# Patient Record
Sex: FEMALE | Race: WHITE | NOT HISPANIC OR LATINO | Employment: OTHER | URBAN - METROPOLITAN AREA
[De-identification: names, ages, dates, MRNs, and addresses within clinical notes are randomized per-mention and may not be internally consistent; named-entity substitution may affect disease eponyms.]

---

## 2017-01-06 LAB
ALBUMIN SERPL-MCNC: 4.3 G/DL (ref 3.5–4.8)
ALBUMIN/GLOB SERPL: 2 {RATIO} (ref 1.1–2.5)
ALP SERPL-CCNC: 82 IU/L (ref 39–117)
ALT SERPL-CCNC: 46 IU/L (ref 0–32)
AST SERPL-CCNC: 34 IU/L (ref 0–40)
BILIRUB SERPL-MCNC: 0.6 MG/DL (ref 0–1.2)
BUN SERPL-MCNC: 29 MG/DL (ref 8–27)
BUN/CREAT SERPL: 41 (ref 11–26)
CALCIUM SERPL-MCNC: 9.1 MG/DL (ref 8.7–10.3)
CHLORIDE SERPL-SCNC: 103 MMOL/L (ref 96–106)
CHOLEST SERPL-MCNC: 134 MG/DL (ref 100–199)
CO2 SERPL-SCNC: 23 MMOL/L (ref 18–29)
COMMENT 011824: ABNORMAL
CREAT SERPL-MCNC: 0.71 MG/DL (ref 0.57–1)
GLOBULIN SER CALC-MCNC: 2.1 G/DL (ref 1.5–4.5)
GLUCOSE SERPL-MCNC: 160 MG/DL (ref 65–99)
HBA1C MFR BLD: 8.3 % (ref 4.8–5.6)
HDLC SERPL-MCNC: 37 MG/DL
LDLC SERPL CALC-MCNC: 58 MG/DL (ref 0–99)
POTASSIUM SERPL-SCNC: 4.5 MMOL/L (ref 3.5–5.2)
PROT SERPL-MCNC: 6.4 G/DL (ref 6–8.5)
SODIUM SERPL-SCNC: 143 MMOL/L (ref 134–144)
TRIGL SERPL-MCNC: 193 MG/DL (ref 0–149)
TSH SERPL DL<=0.005 MIU/L-ACNC: 2.23 UIU/ML (ref 0.45–4.5)
VLDLC SERPL CALC-MCNC: 39 MG/DL (ref 5–40)

## 2017-01-09 ENCOUNTER — OFFICE VISIT (OUTPATIENT)
Dept: MEDICAL GROUP | Facility: CLINIC | Age: 72
End: 2017-01-09
Payer: MEDICARE

## 2017-01-09 VITALS
BODY MASS INDEX: 45.82 KG/M2 | OXYGEN SATURATION: 97 % | SYSTOLIC BLOOD PRESSURE: 120 MMHG | HEART RATE: 78 BPM | WEIGHT: 249 LBS | TEMPERATURE: 97.2 F | RESPIRATION RATE: 16 BRPM | DIASTOLIC BLOOD PRESSURE: 80 MMHG | HEIGHT: 62 IN

## 2017-01-09 DIAGNOSIS — E66.01 MORBID OBESITY WITH BMI OF 45.0-49.9, ADULT (HCC): ICD-10-CM

## 2017-01-09 DIAGNOSIS — E78.5 DYSLIPIDEMIA, GOAL LDL BELOW 100: ICD-10-CM

## 2017-01-09 DIAGNOSIS — I10 ESSENTIAL HYPERTENSION: ICD-10-CM

## 2017-01-09 DIAGNOSIS — K76.0 FATTY LIVER: ICD-10-CM

## 2017-01-09 DIAGNOSIS — E03.2 HYPOTHYROIDISM DUE TO NON-MEDICATION EXOGENOUS SUBSTANCES: ICD-10-CM

## 2017-01-09 PROCEDURE — 99214 OFFICE O/P EST MOD 30 MIN: CPT | Performed by: FAMILY MEDICINE

## 2017-01-09 RX ORDER — LEVOTHYROXINE SODIUM 0.15 MG/1
150 TABLET ORAL
Qty: 90 TAB | Refills: 3 | Status: SHIPPED | OUTPATIENT
Start: 2017-01-09 | End: 2018-02-11 | Stop reason: SDUPTHER

## 2017-01-09 NOTE — MR AVS SNAPSHOT
"        Ragini Griffin Jelly   2017 4:00 PM   Office Visit   MRN: [id number]    Department:  [facility]   Dept Phone:  [phone]    Description:  Female : 1945   Provider:  Franchesca Marques M.D.           Reason for Visit     Diabetes Mellitus     Hypertension     Hyperlipidemia           Allergies as of 2017     Allergen Noted Reactions    Asa [Aspirin] 2009       Cough Syrup M [Cough Cold-Flu Relief] 2009       Dextromethorphan 2013   Swelling      You were diagnosed with     Uncontrolled type 2 diabetes mellitus without complication, without long-term current use of insulin ([facility])   [9831737]       Essential hypertension   [5783841]       Dyslipidemia, goal LDL below 100   [513983]       Morbid obesity with BMI of 45.0-49.9, adult ([facility])   [928917]       Fatty liver   [943140]       Hypothyroidism due to non-medication exogenous substances   [3100356]         Vital Signs     Blood Pressure Pulse Temperature Respirations Height Weight    120/80 mmHg 78 36.2 °C (97.2 °F) 16 1.575 m (5' 2.01\") 112.946 kg (249 lb)    Body Mass Index Oxygen Saturation Smoking Status             45.53 kg/m2 97% Never Smoker          Basic Information     Date Of Birth Sex Race Ethnicity Preferred Language    1945 Female White Non- English      Problem List              ICD-10-CM Priority Class Noted - Resolved    Type II diabetes mellitus, uncontrolled (HCC) E11.65   Unknown - Present    Hypothyroidism due to non-medication exogenous substances E03.2   Unknown - Present    Nocturnal hypoxia G47.34   Unknown - Present    Essential hypertension I10   Unknown - Present    Dyslipidemia, goal LDL below 100 E78.5   Unknown - Present    Osteopenia M85.80   Unknown - Present    Primary osteoarthritis of both shoulders M19.011, M19.012   Unknown - Present    Mild persistent asthma, uncomplicated J45.30   3/3/2014 - Present    Bilateral incipient cataracts H26.9   2016 - Present   " Fatty liver K76.0   5/2/2016 - Present    Microalbuminuria R80.9   5/2/2016 - Present    Morbid obesity with BMI of 45.0-49.9, adult (HCC) E66.01, Z68.42   1/9/2017 - Present      Health Maintenance        Date Due Completion Dates    MAMMOGRAM 12/18/2017 (Originally 9/22/2016) 9/22/2014, 8/13/2013, 6/27/2012, 6/27/2012 (Prv Comp), 4/11/2008, 4/11/2008, 7/14/2006    Override on 6/27/2012: Previously completed    IMM ZOSTER VACCINE 1/1/2018 (Originally 1/12/2005) ---    RETINAL SCREENING 2/26/2017 2/26/2016    A1C SCREENING 7/5/2017 1/5/2017, 9/6/2016, 4/25/2016, 1/4/2016, 9/8/2015, 5/5/2015, 1/6/2015, 8/6/2014, 4/2/2014, 11/13/2013, 7/24/2013, 7/27/2012 (Prv Comp)    Override on 7/27/2012: Previously completed    URINE ACR / MICROALBUMIN 9/6/2017 9/6/2016, 4/25/2016, 9/8/2015, 1/6/2015, 4/2/2014, 11/13/2013    DIABETES MONOFILAMTENT / LE EXAM 9/12/2017 9/12/2016 (Done), 9/14/2015, 9/5/2014, 8/5/2013    Override on 9/12/2016: Done    FASTING LIPID PROFILE 1/5/2018 1/5/2017, 9/6/2016, 4/25/2016, 1/4/2016, 9/8/2015, 5/5/2015, 1/6/2015, 8/6/2014, 4/2/2014, 11/13/2013, 7/24/2013, 7/27/2012 (Prv Comp)    Override on 7/27/2012: Previously completed    SERUM CREATININE 1/5/2018 1/5/2017, 9/6/2016, 4/25/2016, 1/4/2016, 9/8/2015, 5/5/2015, 1/6/2015, 8/6/2014, 4/2/2014, 11/13/2013, 7/24/2013, 7/27/2012 (Prv Comp)    Override on 7/27/2012: Previously completed    BONE DENSITY 8/13/2018 8/13/2013, 4/11/2008    IMM DTaP/Tdap/Td Vaccine (2 - Td) 11/6/2022 11/6/2012    COLONOSCOPY 5/21/2023 5/21/2013 (Prv Comp), 12/3/2012 (Declined)    Override on 5/21/2013: Previously completed    Override on 12/3/2012: Patient Declined            Current Immunizations     13-VALENT PCV PREVNAR 1/12/2016    Influenza TIV (IM) 12/6/2013, 12/3/2012    Influenza Vaccine Adult HD 10/25/2016, 1/12/2016    Pneumococcal Vaccine (UF)Historical Data 11/8/2008    Pneumococcal polysaccharide vaccine (PPSV-23) 12/6/2013    Tdap Vaccine 11/6/2012  1:04 PM         Below and/or attached are the medications your provider expects you to take. Review all of your home medications and newly ordered medications with your provider and/or pharmacist. Follow medication instructions as directed by your provider and/or pharmacist. Please keep your medication list with you and share with your provider. Update the information when medications are discontinued, doses are changed, or new medications (including over-the-counter products) are added; and carry medication information at all times in the event of emergency situations     Allergies:  ASA - (reactions not documented)     COUGH SYRUP M - (reactions not documented)     DEXTROMETHORPHAN - Swelling               Medications  Valid as of: January 09, 2017 -  5:18 PM    Generic Name Brand Name Tablet Size Instructions for use    Albuterol Sulfate (Aero Soln) albuterol 108 (90 BASE) MCG/ACT Inhale 2 Puffs by mouth every 6 hours as needed for Shortness of Breath.        Atenolol (Tab) TENORMIN 25 MG TAKE 1 TABLET BY MOUTH EVERY DAY        Benazepril HCl (Tab) LOTENSIN 5 MG Take 1 Tab by mouth every day.        Blood Glucose Monitoring Suppl (Misc) Blood Glucose Monitoring Suppl SUPPLIES Test strips order: Test strips for Abbott Freestyle Lite meter. Sig: use daily and prn 90 day supply ICD-9 code 250.02        Calcium Citrate-Vitamin D (Tab) CITRACAL+D 315-200 MG-UNIT Take 1 Tab by mouth every day.        Furosemide (Tab) LASIX 40 MG TAKE 1 TABLET BY MOUTH EVERY DAY        Glucosamine-Chondroitin-Vit C (Liquid) Glucosamine-Chondroitin-Vit C 9967-8446-60 MG/30ML Take 30 mL by mouth every day.        Glucose Blood (Strip) glucose blood  1 Strip by Other route 2 Times a Day.        Ibuprofen (Tab) MOTRIN 800 MG TAKE 1 TABLET BY MOUTH TWICE A DAY WITH MEALS        Lancets (Misc) FREESTYLE LANCETS  Inject 1 Each as instructed 2 Times a Day.        Levothyroxine Sodium (Tab) SYNTHROID 150 MCG Take 1 Tab by mouth every day.        Linagliptin  (Tab) TRADJENTA 5 MG Take 1 Tab by mouth every day.        Magnesium Oxide (Tab) MAG- MG Take 3 Tabs by mouth every day. 2 am and 1 hs        MetFORMIN HCl (Tab) GLUCOPHAGE 1000 MG TAKE 1 TABLET BY MOUTH TWICE A DAY WITH MEALS        Methylsulfonylmethane (Cap) Methylsulfonylmethane 500 MG Take 1 Cap by mouth every day.        Misc Natural Product Nasal (Solution) PONARIS NASAL  Spray 10 Drops in nose every 6 hours as needed.        Misc. Devices (Misc) Misc. Devices  This is an order for overnight pulse oxygen study  Dx nocturnal hypoxia G47.34, please do the test on room air.       SPENCER 99 months   NPI [id number]        Misc. Devices (Misc) Misc. Devices  This is an order for overnight pulse ox on room air.  Dx. Nocturnal hypoxia  G47.34, mild persistent asthma J45.30       SPENCER 99 months   NPI [id number]        Multiple Vitamins-Minerals (Tab) CENTRUM SILVER ULTRA WOMENS  Take 1 Tab by mouth every day.        Potassium Chloride Ladonna CR (Tab CR) K-DUR 20 MEQ TAKE 1 TABLET BY MOUTH EVERY DAY        Simvastatin (Tab) ZOCOR 5 MG TAKE 1 TABLET BY MOUTH EVERY EVENING        Whey Protein (Powder) Whey Protein  Take 36 g by mouth 2 Times a Day.        .                 Medicines prescribed today were sent to:     [facility]/PH[facility]ACY #[id number] - [place]  [facility] [address] [address]    Phone: [phone] Fax: [phone]    Open 24 Hours?: No      Medication refill instructions:       If your prescription bottle indicates you have medication refills left, it is not necessary to call your provider’s office. Please contact your pharmacy and they will refill your medication.    If your prescription bottle indicates you do not have any refills left, you may request refills at any time through one of the following ways: The online [employer] system (except Urgent Care), by calling your provider’s office, or by asking your pharmacy to contact your provider’s office with a refill request. Medication  refills are processed only during regular business hours and may not be available until the next business day. Your provider may request additional information or to have a follow-up visit with you prior to refilling your medication.   *Please Note: Medication refills are assigned a new Rx number when refilled electronically. Your pharmacy may indicate that no refills were authorized even though a new prescription for the same medication is available at the pharmacy. Please request the medicine by name with the pharmacy before contacting your provider for a refill.        Your To Do List     Future Labs/Procedures Complete By Expires    COMP METABOLIC PANEL  As directed 1/10/2018    HEMOGLOBIN A1C  As directed 1/10/2018    LIPID PROFILE  As directed 1/10/2018         EcoFactor Access Code: [email or web address]  Expires: 2/8/2017  5:18 PM    EcoFactor  A secure, online tool to manage your health information     [employer]’s EcoFactor® is a secure, online tool that connects you to your personalized health information from the privacy of your home -- day or night - making it very easy for you to manage your healthcare. Once the activation process is completed, you can even access your medical information using the EcoFactor wil, which is available for free in the [employer] Wil store or Google Play store.     EcoFactor provides the following levels of access (as shown below):   My Chart Features   [facility] Doctor [facility]  Specialists [facility]  Urgent  Care Non-[facility]  Primary Care  Doctor   Email your healthcare team securely and privately 24/7 X X X    Manage appointments: schedule your next appointment; view details of past/upcoming appointments X      Request prescription refills. X      View recent personal medical records, including lab and immunizations X X X X   View health record, including health history, allergies, medications X X X X   Read reports about your outpatient visits, procedures, consult and ER notes X X  X X   See your discharge summary, which is a recap of your hospital and/or ER visit that includes your diagnosis, lab results, and care plan. X X       How to register for Lua:  1. Go to  [email or web address].  2. Click on the Sign Up Now box, which takes you to the New Member Sign Up page. You will need to provide the following information:  a. Enter your Lua Access Code exactly as it appears at the top of this page. (You will not need to use this code after you’ve completed the sign-up process. If you do not sign up before the expiration date, you must request a new code.)   b. Enter your date of birth.   c. Enter your home email address.   d. Click Submit, and follow the next screen’s instructions.  3. Create a ITYZt ID. This will be your ITYZt login ID and cannot be changed, so think of one that is secure and easy to remember.  4. Create a Lua password. You can change your password at any time.  5. Enter your Password Reset Question and Answer. This can be used at a later time if you forget your password.   6. Enter your e-mail address. This allows you to receive e-mail notifications when new information is available in Lua.  7. Click Sign Up. You can now view your health information.    For assistance activating your Lua account, call [phone]

## 2017-01-10 NOTE — PROGRESS NOTES
Diabetes Focused Exam:    Chief Complaint   Patient presents with   • Diabetes Mellitus   • Hypertension   • Hyperlipidemia      Subjective:   HPI  Ragini Reaves is a 71 y.o. female who presents for follow up of chronic conditions of diabetes mellitus, hypertension and hyperlipidemia. She indicates that she is feeling well and denies any symptoms referable to the above diagnoses. Specifically denies chest pain, palpitations, dyspnea, orthopnea, PND or peripheral edema. Also denies polyuria, polydipsia, urinary complaints, abdominal complaints, myalgias, numbness, weakness or other related symptoms.     The patient is taking ASA every day and taking all other medications as prescribed. Patient denies any side effects of medication.  DM: A1c goal <7  Glucose monitoring frequency: daily   Fasting sugars: 119-170. Post-prandial sugars: 160-244  Hypoglycemic episodes none  Diabetic complications: none  ACR Albumin/Creatinine Ratio goal <30  HTN: Blood pressure goal <140/<80. Currently Rx ACE/ARB: Yes  Hyperlipidemia:Cholesterol goal LDL <100, total/HDL <5. Currently Rx Statin: Yes  Last eye exam February   Denies visual blurring, double vision, eye pain and floaters  Last monofilament foot exam: 9/2016 Denies foot pain, numbness, calluses, ulcers      See medications and orders placed in encounter report.  Past medical history, family history, social history reviewed and updated as documented in medical record.  Current medications including changes today:  Current Outpatient Prescriptions   Medication Sig Dispense Refill   • levothyroxine (SYNTHROID) 150 MCG Tab TAKE 1 TABLET BY MOUTH EVERY DAY 90 Tab 0   • Misc. Devices Misc This is an order for overnight pulse ox on room air.  Dx. Nocturnal hypoxia  G47.34, mild persistent asthma J45.30       SPENCER 99 months   NPI [id number] 1 Each 0   • glucose blood (FREESTYLE LITE) strip 1 Strip by Other route 2 Times a Day. 50 Strip 4   • FREESTYLE LANCETS Misc Inject 1  Each as instructed 2 Times a Day. 100 Each 4   • albuterol (PROAIR HFA) 108 (90 BASE) MCG/ACT Aero Soln inhalation aerosol Inhale 2 Puffs by mouth every 6 hours as needed for Shortness of Breath. 8.5 g 3   • linagliptin (TRADJENTA) 5 MG Tab tablet Take 1 Tab by mouth every day. 30 Tab 6   • metformin (GLUCOPHAGE) 1000 MG tablet TAKE 1 TABLET BY MOUTH TWICE A DAY WITH MEALS 180 Tab 3   • simvastatin (ZOCOR) 5 MG Tab TAKE 1 TABLET BY MOUTH EVERY EVENING 90 Tab 3   • ibuprofen (MOTRIN) 800 MG Tab TAKE 1 TABLET BY MOUTH TWICE A DAY WITH MEALS 180 Tab 3   • atenolol (TENORMIN) 25 MG Tab TAKE 1 TABLET BY MOUTH EVERY DAY 90 Tab 3   • benazepril (LOTENSIN) 5 MG Tab Take 1 Tab by mouth every day. 90 Tab 3   • potassium chloride SA (K-DUR) 20 MEQ Tab CR TAKE 1 TABLET BY MOUTH EVERY DAY 90 Tab 3   • furosemide (LASIX) 40 MG Tab TAKE 1 TABLET BY MOUTH EVERY DAY 90 Tab 3   • Misc. Devices Misc This is an order for overnight pulse oxygen study  Dx nocturnal hypoxia G47.34, please do the test on room air.       SPENCER 99 months   NPI [id number] 1 Each 0   • eucalyptus/peppermint oil (PONARIS NASAL) SOLN Spray 10 Drops in nose every 6 hours as needed. 1 Bottle 0   • Blood Glucose Monitoring Suppl SUPPLIES MISC Test strips order: Test strips for Abbott Freestyle Lite meter. Sig: use daily and prn 90 day supply ICD-9 code 250.02 150 Each 11   • magnesium oxide (MAG-OX) 400 MG TABS Take 3 Tabs by mouth every day. 2 am and 1 hs 30 Each 0   • calcium citrate-vitamin D (CITRACAL+D) 315-200 MG-UNIT per tablet Take 1 Tab by mouth every day. 30 Tab 0   • Multiple Vitamins-Minerals (CENTRUM SILVER ULTRA WOMENS) TABS Take 1 Tab by mouth every day. 30 Tab 0   • Glucosamine-Chondroitin-Vit C 8778-6820-32 MG/30ML LIQD Take 30 mL by mouth every day. 1 Bottle 0   • Methylsulfonylmethane (MSM) 500 MG CAPS Take 1 Cap by mouth every day. 30 Cap 0   • Whey Protein POWD Take 36 g by mouth 2 Times a Day. 1 Bottle 0     No current facility-administered  "medications for this visit.     Allergies:   Allergies   Allergen Reactions   • Asa [Aspirin]    • Cough Syrup M [Cough Cold-Flu Relief]    • Dextromethorphan Swelling      Social History   Substance Use Topics   • Smoking status: Never Smoker    • Smokeless tobacco: Never Used   • Alcohol Use: No     Exercise: she is making herself walk a little more.  Health Maintenance/Immunizations: discussed, cannot afford co-pay for the shingles.    ROS  Pertinent  ROS findings as above. All other systems reviewed and are negative.      Objective:     OBJECTIVE:  /80 mmHg  Pulse 78  Temp(Src) 36.2 °C (97.2 °F)  Resp 16  Ht 1.575 m (5' 2.01\")  Wt 112.946 kg (249 lb)  BMI 45.53 kg/m2  SpO2 97% Body mass index is 45.53 kg/(m^2). BMI: severely obese  General: No apparent distress, conversant, cooperative and pleasant with the examination.  Psych: Alert and oriented x4, judgment and insight normal  Neck: No JVD or bruits, no adenopathy, supple  Thyroid: normal to inspection and palpation  Lungs: negative findings: normal respiratory rate and rhythm, lungs clear to auscultation  Heart: negative findings: regular rate and rhythm, S1 normal, S2 normal, no murmurs, clicks, or gallops  Abdomen: Soft, nontender, no hepatosplenomegaly or masses, normal bowel sounds  Skin: No rashes, no cyanosis, no lesions or ulcers  Extremities: No cyanosis clubbing or edema.   Feet are examined   Excellent DP pulses    POC labs today:   Lab Results   Component Value Date/Time    GLUCOSE - ACCU-* 04/14/2009 05:27 PM          Last labs    Lab Results   Component Value Date/Time    CHOLESTEROL, 01/05/2017 08:00 AM    LDL 58 01/05/2017 08:00 AM    HDL 37* 01/05/2017 08:00 AM    TRIGLYCERIDES 193* 01/05/2017 08:00 AM       Lab Results   Component Value Date/Time    SODIUM 143 01/05/2017 08:00 AM    POTASSIUM 4.5 01/05/2017 08:00 AM    GLUCOSE 160* 01/05/2017 08:00 AM    BUN-CREATININE RATIO 41* 01/05/2017 08:00 AM     Lab Results "   Component Value Date/Time    GLYCOHEMOGLOBIN 8.3* 01/05/2017 08:00 AM    GLYCOHEMOGLOBIN 8.2* 09/06/2016 07:12 AM    GLYCOHEMOGLOBIN 9.5* 04/25/2016 07:40 AM     Lab Results   Component Value Date/Time    MICROALBUMIN, URINE RANDOM 55.8 09/06/2016 07:12 AM          Assessment/Plan:   Medications, refills, and referrals per orders.   1. Uncontrolled type 2 diabetes mellitus without complication, without long-term current use of insulin ([facility])     2. Essential hypertension     3. Dyslipidemia, goal LDL below 100     4. Morbid obesity with BMI of 45.0-49.9, adult ([facility])     5. Fatty liver       DM2 A1c is not at goal but improving.  Weight has improved, discussed at length.  She is reducing starch in the diet and is feeling better.  Patient to monitor sugars: daily to bid frequency  Discussed diet, exercise, disease management and weight loss goals.  She is thrilled to be under 250, congratulated.  Reducing to next target of 240 discussed.   Education and advise provided today:All medications, side effects and compliance (discussed carefully)  Annual eye examinations at Ophthalmology  Foot care discussed and Podiatry visits  Glycohemoglobin and other lab monitoring  Home glucose monitoring emphasized  Labs immediately prior to next visit  Low cholesterol diet, weight control and daily exercise    Followup:   Do labs and RTC 4 months, sooner should new symptoms or problems arise.

## 2017-03-04 ENCOUNTER — OFFICE VISIT (OUTPATIENT)
Dept: URGENT CARE | Facility: PHYSICIAN GROUP | Age: 72
End: 2017-03-04
Payer: MEDICARE

## 2017-03-04 VITALS
TEMPERATURE: 98.2 F | HEIGHT: 62 IN | OXYGEN SATURATION: 96 % | WEIGHT: 247 LBS | SYSTOLIC BLOOD PRESSURE: 124 MMHG | RESPIRATION RATE: 18 BRPM | BODY MASS INDEX: 45.45 KG/M2 | DIASTOLIC BLOOD PRESSURE: 88 MMHG | HEART RATE: 86 BPM

## 2017-03-04 DIAGNOSIS — J45.30 MILD PERSISTENT ASTHMA WITHOUT COMPLICATION: ICD-10-CM

## 2017-03-04 DIAGNOSIS — J06.9 VIRAL URI WITH COUGH: ICD-10-CM

## 2017-03-04 PROCEDURE — 99213 OFFICE O/P EST LOW 20 MIN: CPT | Performed by: NURSE PRACTITIONER

## 2017-03-04 PROCEDURE — 3017F COLORECTAL CA SCREEN DOC REV: CPT | Performed by: NURSE PRACTITIONER

## 2017-03-04 PROCEDURE — G8482 FLU IMMUNIZE ORDER/ADMIN: HCPCS | Performed by: NURSE PRACTITIONER

## 2017-03-04 PROCEDURE — G8432 DEP SCR NOT DOC, RNG: HCPCS | Performed by: NURSE PRACTITIONER

## 2017-03-04 PROCEDURE — 3014F SCREEN MAMMO DOC REV: CPT | Mod: 8P | Performed by: NURSE PRACTITIONER

## 2017-03-04 PROCEDURE — G8419 CALC BMI OUT NRM PARAM NOF/U: HCPCS | Performed by: NURSE PRACTITIONER

## 2017-03-04 PROCEDURE — 4040F PNEUMOC VAC/ADMIN/RCVD: CPT | Performed by: NURSE PRACTITIONER

## 2017-03-04 PROCEDURE — 1101F PT FALLS ASSESS-DOCD LE1/YR: CPT | Performed by: NURSE PRACTITIONER

## 2017-03-04 PROCEDURE — 1036F TOBACCO NON-USER: CPT | Performed by: NURSE PRACTITIONER

## 2017-03-04 ASSESSMENT — ENCOUNTER SYMPTOMS
CHILLS: 0
RHINORRHEA: 1
FEVER: 0
SORE THROAT: 1
WHEEZING: 0
COUGH: 1
SHORTNESS OF BREATH: 0
MYALGIAS: 0

## 2017-03-04 NOTE — MR AVS SNAPSHOT
"Ragini Reaves   3/4/2017 12:45 PM   Office Visit   MRN: [id number]    Department:  [facility]   Dept Phone:  [phone]    Description:  Female : 1945   Provider:  HERON Crowe           Reason for Visit     Cough cough, sore throat and nasal [id number] sore throat getting better      Allergies as of 3/4/2017     Allergen Noted Reactions    Asa [Aspirin] 2009       Cough Syrup M [Cough Cold-Flu Relief] 2009       Dextromethorphan 2013   Swelling      You were diagnosed with     Viral URI with cough   [040148]       Mild persistent asthma without complication   [369036]         Vital Signs     Blood Pressure Pulse Temperature Respirations Height Weight    124/88 mmHg 86 36.8 °C (98.2 °F) 18 1.575 m (5' 2.01\") 112.038 kg (247 lb)    Body Mass Index Oxygen Saturation Smoking Status             45.17 kg/m2 96% Never Smoker          Basic Information     Date Of Birth Sex Race Ethnicity Preferred Language    1945 Female White Non- English      Your appointments     May 09, 2017  4:00 PM   Established Patient with Franchesca aMrques M.D.   [facility])    [address] [place] [address] [address] [place] [id number] [phone]           You will be receiving a confirmation call a few days before your appointment from our automated call confirmation system.              Problem List              ICD-10-CM Priority Class Noted - Resolved    Type II diabetes mellitus, uncontrolled (CMS-HCC) E11.65   Unknown - Present    Hypothyroidism due to non-medication exogenous substances E03.2   Unknown - Present    Nocturnal hypoxia G47.34   Unknown - Present    Essential hypertension I10   Unknown - Present    Dyslipidemia, goal LDL below 100 E78.5   Unknown - Present    Osteopenia M85.80   Unknown - Present    Primary osteoarthritis of both shoulders M19.011, M19.012   Unknown - Present    Mild persistent asthma, uncomplicated J45.30   3/3/2014 - " Present    Bilateral incipient cataracts H26.9   1/12/2016 - Present    Fatty liver K76.0   5/2/2016 - Present    Microalbuminuria R80.9   5/2/2016 - Present    Morbid obesity with BMI of 45.0-49.9, adult (CMS-HCC) E66.01, Z68.42   1/9/2017 - Present      Health Maintenance        Date Due Completion Dates    RETINAL SCREENING 2/26/2017 2/26/2016    MAMMOGRAM 12/18/2017 (Originally 9/22/2016) 9/22/2014, 8/13/2013, 6/27/2012, 6/27/2012 (Prv Comp), 4/11/2008, 4/11/2008, 7/14/2006    Override on 6/27/2012: Previously completed    IMM ZOSTER VACCINE 1/1/2018 (Originally 1/12/2005) ---    A1C SCREENING 7/5/2017 1/5/2017, 9/6/2016, 4/25/2016, 1/4/2016, 9/8/2015, 5/5/2015, 1/6/2015, 8/6/2014, 4/2/2014, 11/13/2013, 7/24/2013, 7/27/2012 (Prv Comp)    Override on 7/27/2012: Previously completed    URINE ACR / MICROALBUMIN 9/6/2017 9/6/2016, 4/25/2016, 9/8/2015, 1/6/2015, 4/2/2014, 11/13/2013    DIABETES MONOFILAMENT / LE EXAM 9/12/2017 9/12/2016 (Done), 9/14/2015, 9/5/2014, 8/5/2013    Override on 9/12/2016: Done    FASTING LIPID PROFILE 1/5/2018 1/5/2017, 9/6/2016, 4/25/2016, 1/4/2016, 9/8/2015, 5/5/2015, 1/6/2015, 8/6/2014, 4/2/2014, 11/13/2013, 7/24/2013, 7/27/2012 (Prv Comp)    Override on 7/27/2012: Previously completed    SERUM CREATININE 1/5/2018 1/5/2017, 9/6/2016, 4/25/2016, 1/4/2016, 9/8/2015, 5/5/2015, 1/6/2015, 8/6/2014, 4/2/2014, 11/13/2013, 7/24/2013, 7/27/2012 (Prv Comp)    Override on 7/27/2012: Previously completed    BONE DENSITY 8/13/2018 8/13/2013, 4/11/2008    IMM DTaP/Tdap/Td Vaccine (2 - Td) 11/6/2022 11/6/2012    COLONOSCOPY 5/21/2023 5/21/2013 (Prv Comp), 5/21/2013, 12/3/2012 (Declined)    Override on 5/21/2013: Previously completed    Override on 12/3/2012: Patient Declined            Current Immunizations     13-VALENT PCV PREVNAR 1/12/2016    Influenza TIV (IM) 12/6/2013, 12/3/2012    Influenza Vaccine Adult HD 10/25/2016, 1/12/2016    Pneumococcal Vaccine (UF)Historical Data 11/8/2008     Pneumococcal polysaccharide vaccine (PPSV-23) 12/6/2013    Tdap Vaccine 11/6/2012  1:04 PM      Below and/or attached are the medications your provider expects you to take. Review all of your home medications and newly ordered medications with your provider and/or pharmacist. Follow medication instructions as directed by your provider and/or pharmacist. Please keep your medication list with you and share with your provider. Update the information when medications are discontinued, doses are changed, or new medications (including over-the-counter products) are added; and carry medication information at all times in the event of emergency situations     Allergies:  ASA - (reactions not documented)     COUGH SYRUP M - (reactions not documented)     DEXTROMETHORPHAN - Swelling               Medications  Valid as of: March 04, 2017 -  1:02 PM    Generic Name Brand Name Tablet Size Instructions for use    Albuterol Sulfate (Aero Soln) albuterol 108 (90 BASE) MCG/ACT Inhale 2 Puffs by mouth every 6 hours as needed for Shortness of Breath.        Atenolol (Tab) TENORMIN 25 MG TAKE 1 TABLET BY MOUTH EVERY DAY        Benazepril HCl (Tab) LOTENSIN 5 MG Take 1 Tab by mouth every day.        Blood Glucose Monitoring Suppl (Misc) Blood Glucose Monitoring Suppl SUPPLIES Test strips order: Test strips for Abbott Freestyle Lite meter. Sig: use daily and prn 90 day supply ICD-9 code 250.02        Calcium Citrate-Vitamin D (Tab) CITRACAL+D 315-200 MG-UNIT Take 1 Tab by mouth every day.        Furosemide (Tab) LASIX 40 MG TAKE 1 TABLET BY MOUTH EVERY DAY        Glucosamine-Chondroitin-Vit C (Liquid) Glucosamine-Chondroitin-Vit C 3287-8022-33 MG/30ML Take 30 mL by mouth every day.        Glucose Blood (Strip) glucose blood  1 Strip by Other route 2 Times a Day.        Ibuprofen (Tab) MOTRIN 800 MG TAKE 1 TABLET BY MOUTH TWICE A DAY WITH MEALS        Lancets (Misc) FREESTYLE LANCETS  Inject 1 Each as instructed 2 Times a Day.         Levothyroxine Sodium (Tab) SYNTHROID 150 MCG Take 1 Tab by mouth every day.        Linagliptin (Tab) TRADJENTA 5 MG Take 1 Tab by mouth every day.        Magnesium Oxide (Tab) MAG- MG Take 3 Tabs by mouth every day. 2 am and 1 hs        MetFORMIN HCl (Tab) GLUCOPHAGE 1000 MG TAKE 1 TABLET BY MOUTH TWICE A DAY WITH MEALS        Methylsulfonylmethane (Cap) Methylsulfonylmethane 500 MG Take 1 Cap by mouth every day.        Misc Natural Product Nasal (Solution) PONARIS NASAL  Spray 10 Drops in nose every 6 hours as needed.        Misc. Devices (Misc) Misc. Devices  This is an order for overnight pulse oxygen study  Dx nocturnal hypoxia G47.34, please do the test on room air.       SPENCER 99 months   NPI [id number]        Misc. Devices (Misc) Misc. Devices  This is an order for overnight pulse ox on room air.  Dx. Nocturnal hypoxia  G47.34, mild persistent asthma J45.30       SPENCER 99 months   NPI [id number]        Multiple Vitamins-Minerals (Tab) CENTRUM SILVER ULTRA WOMENS  Take 1 Tab by mouth every day.        Potassium Chloride Ladonna CR (Tab CR) Kdur 20 MEQ TAKE 1 TABLET BY MOUTH EVERY DAY        Simvastatin (Tab) ZOCOR 5 MG TAKE 1 TABLET BY MOUTH EVERY EVENING        Whey Protein (Powder) Whey Protein  Take 36 g by mouth 2 Times a Day.        .                 Medicines prescribed today were sent to:     [id number] - [place], [place] - [address] [place] [address] [address]    Phone: [phone] Fax: [phone]    Open 24 Hours?: No      Medication refill instructions:       If your prescription bottle indicates you have medication refills left, it is not necessary to call your provider’s office. Please contact your pharmacy and they will refill your medication.    If your prescription bottle indicates you do not have any refills left, you may request refills at any time through one of the following ways: The online [employer] system (except Urgent Care), by calling your provider’s office, or by  asking your pharmacy to contact your provider’s office with a refill request. Medication refills are processed only during regular business hours and may not be available until the next business day. Your provider may request additional information or to have a follow-up visit with you prior to refilling your medication.   *Please Note: Medication refills are assigned a new Rx number when refilled electronically. Your pharmacy may indicate that no refills were authorized even though a new prescription for the same medication is available at the pharmacy. Please request the medicine by name with the pharmacy before contacting your provider for a refill.           [employer] Access Code: [email or web address]  Expires: 4/3/2017  1:02 PM    [employer]  A secure, online tool to manage your health information     [employer]’s [employer]® is a secure, online tool that connects you to your personalized health information from the privacy of your home -- day or night - making it very easy for you to manage your healthcare. Once the activation process is completed, you can even access your medical information using the [employer] wil, which is available for free in the Apple Wil store or Google Play store.     [employer] provides the following levels of access (as shown below):   My Chart Features   [facility] Doctor [facility]  Urgent  Care Non-[facility]  Primary Care  Doctor   Email your healthcare team securely and privately 24/7 X X X    Manage appointments: schedule your next appointment; view details of past/upcoming appointments X      Request prescription refills. X      View recent personal medical records, including lab and immunizations X X X X   View health record, including health history, allergies, medications X X X X   Read reports about your outpatient visits, procedures, consult and ER notes X X X X   See your discharge summary, which is a recap of your hospital and/or ER visit that includes your  diagnosis, lab results, and care plan. X X       How to register for [employer]:  1. Go to  [email or web address].  2. Click on the Sign Up Now box, which takes you to the New Member Sign Up page. You will need to provide the following information:  a. Enter your [employer] Access Code exactly as it appears at the top of this page. (You will not need to use this code after you’ve completed the sign-up process. If you do not sign up before the expiration date, you must request a new code.)   b. Enter your date of birth.   c. Enter your home email address.   d. Click Submit, and follow the next screen’s instructions.  3. Create a [employer] ID. This will be your [employer] login ID and cannot be changed, so think of one that is secure and easy to remember.  4. Create a [employer] password. You can change your password at any time.  5. Enter your Password Reset Question and Answer. This can be used at a later time if you forget your password.   6. Enter your e-mail address. This allows you to receive e-mail notifications when new information is available in [employer].  7. Click Sign Up. You can now view your health information.    For assistance activating your [employer] account, call [phone]

## 2017-03-04 NOTE — PROGRESS NOTES
"Subjective:      Ragini Reaves is a 72 y.o. female who presents with Cough            Cough  This is a new problem. The current episode started in the past 7 days. The problem has been unchanged. The problem occurs constantly. The cough is non-productive. Associated symptoms include nasal congestion, postnasal drip, rhinorrhea and a sore throat. Pertinent negatives include no chills, fever, myalgias, shortness of breath or wheezing. She has tried a beta-agonist inhaler and OTC cough suppressant for the symptoms. The treatment provided mild relief.   Allergies, medications and history reviewed by me today      Review of Systems   Constitutional: Negative for fever and chills.   HENT: Positive for congestion, postnasal drip, rhinorrhea and sore throat.    Respiratory: Positive for cough. Negative for shortness of breath and wheezing.    Musculoskeletal: Negative for myalgias.          Objective:     /88 mmHg  Pulse 86  Temp(Src) 36.8 °C (98.2 °F)  Resp 18  Ht 1.575 m (5' 2.01\")  Wt 112.038 kg (247 lb)  BMI 45.17 kg/m2  SpO2 96%     Physical Exam   Constitutional: She is oriented to person, place, and time. She appears well-developed and well-nourished. No distress.   HENT:   Head: Normocephalic and atraumatic.   Right Ear: External ear and ear canal normal. Tympanic membrane is not injected and not perforated. No middle ear effusion.   Left Ear: External ear and ear canal normal. Tympanic membrane is not injected and not perforated.  No middle ear effusion.   Nose: Mucosal edema present.   Mouth/Throat: Posterior oropharyngeal erythema present. No oropharyngeal exudate.   Eyes: Conjunctivae are normal. Right eye exhibits no discharge. Left eye exhibits no discharge.   Neck: Normal range of motion. Neck supple.   Cardiovascular: Normal rate, regular rhythm and normal heart sounds.    No murmur heard.  Pulmonary/Chest: Effort normal and breath sounds normal. No respiratory distress. She exhibits no " tenderness.   Musculoskeletal: Normal range of motion.   Normal movement of all 4 extremities.   Lymphadenopathy:     She has no cervical adenopathy.        Right: No supraclavicular adenopathy present.        Left: No supraclavicular adenopathy present.   Neurological: She is alert and oriented to person, place, and time. Gait normal.   Skin: Skin is warm and dry.   Psychiatric: She has a normal mood and affect. Her behavior is normal. Thought content normal.               Assessment/Plan:     1. Viral URI with cough     2. Mild persistent asthma without complication       Viral illness at this time with no indication for antibiotics. Reviewed with patient expected course of illness and also reviewed OTC medications that may be used for symptom relief. Follow up 7-10 days if not improving.  Watch asthma symptoms.  Differential diagnosis, natural history, supportive care, and indications for immediate follow-up discussed at length.

## 2017-03-19 RX ORDER — LINAGLIPTIN 5 MG/1
TABLET, FILM COATED ORAL
Qty: 30 TAB | Refills: 6 | Status: SHIPPED | OUTPATIENT
Start: 2017-03-19 | End: 2017-09-27 | Stop reason: SDUPTHER

## 2017-04-05 DIAGNOSIS — I10 ESSENTIAL HYPERTENSION: ICD-10-CM

## 2017-04-05 RX ORDER — POTASSIUM CHLORIDE 20 MEQ/1
TABLET, EXTENDED RELEASE ORAL
Qty: 90 TAB | Refills: 3 | Status: SHIPPED | OUTPATIENT
Start: 2017-04-05 | End: 2017-11-17

## 2017-04-05 RX ORDER — FUROSEMIDE 40 MG/1
TABLET ORAL
Qty: 90 TAB | Refills: 3 | Status: SHIPPED | OUTPATIENT
Start: 2017-04-05 | End: 2018-03-23 | Stop reason: SDUPTHER

## 2017-04-21 RX ORDER — BENAZEPRIL HYDROCHLORIDE 5 MG/1
TABLET ORAL
Qty: 90 TAB | Refills: 2 | Status: SHIPPED | OUTPATIENT
Start: 2017-04-21 | End: 2018-01-11 | Stop reason: SDUPTHER

## 2017-04-21 NOTE — TELEPHONE ENCOUNTER
Was the patient seen in the last year in this department? Yes pt last appt 1/19/17 next appt 5/9/17    Does patient have an active prescription for medications requested? No     Received Request Via: Pharmacy

## 2017-05-03 LAB
ALBUMIN SERPL-MCNC: 4.5 G/DL (ref 3.5–4.8)
ALBUMIN/GLOB SERPL: 2 {RATIO} (ref 1.2–2.2)
ALP SERPL-CCNC: 80 IU/L (ref 39–117)
ALT SERPL-CCNC: 36 IU/L (ref 0–32)
AST SERPL-CCNC: 27 IU/L (ref 0–40)
BILIRUB SERPL-MCNC: 0.6 MG/DL (ref 0–1.2)
BUN SERPL-MCNC: 32 MG/DL (ref 8–27)
BUN/CREAT SERPL: 46 (ref 12–28)
CALCIUM SERPL-MCNC: 9.6 MG/DL (ref 8.7–10.3)
CHLORIDE SERPL-SCNC: 99 MMOL/L (ref 96–106)
CHOLEST SERPL-MCNC: 139 MG/DL (ref 100–199)
CO2 SERPL-SCNC: 26 MMOL/L (ref 18–29)
COMMENT 011824: ABNORMAL
CREAT SERPL-MCNC: 0.7 MG/DL (ref 0.57–1)
GLOBULIN SER CALC-MCNC: 2.2 G/DL (ref 1.5–4.5)
GLUCOSE SERPL-MCNC: 119 MG/DL (ref 65–99)
HBA1C MFR BLD: 6.9 % (ref 4.8–5.6)
HDLC SERPL-MCNC: 38 MG/DL
LDLC SERPL CALC-MCNC: 71 MG/DL (ref 0–99)
POTASSIUM SERPL-SCNC: 4.2 MMOL/L (ref 3.5–5.2)
PROT SERPL-MCNC: 6.7 G/DL (ref 6–8.5)
SODIUM SERPL-SCNC: 141 MMOL/L (ref 134–144)
TRIGL SERPL-MCNC: 152 MG/DL (ref 0–149)
VLDLC SERPL CALC-MCNC: 30 MG/DL (ref 5–40)

## 2017-05-09 ENCOUNTER — OFFICE VISIT (OUTPATIENT)
Dept: MEDICAL GROUP | Facility: CLINIC | Age: 72
End: 2017-05-09
Payer: MEDICARE

## 2017-05-09 VITALS
SYSTOLIC BLOOD PRESSURE: 128 MMHG | BODY MASS INDEX: 44.53 KG/M2 | TEMPERATURE: 97.2 F | HEART RATE: 78 BPM | OXYGEN SATURATION: 97 % | DIASTOLIC BLOOD PRESSURE: 80 MMHG | RESPIRATION RATE: 16 BRPM | WEIGHT: 242 LBS | HEIGHT: 62 IN

## 2017-05-09 DIAGNOSIS — E66.01 MORBID OBESITY WITH BMI OF 40.0-44.9, ADULT (HCC): ICD-10-CM

## 2017-05-09 DIAGNOSIS — E78.5 DYSLIPIDEMIA, GOAL LDL BELOW 100: ICD-10-CM

## 2017-05-09 DIAGNOSIS — I10 ESSENTIAL HYPERTENSION: ICD-10-CM

## 2017-05-09 PROCEDURE — 3014F SCREEN MAMMO DOC REV: CPT | Mod: 8P | Performed by: FAMILY MEDICINE

## 2017-05-09 PROCEDURE — 1036F TOBACCO NON-USER: CPT | Performed by: FAMILY MEDICINE

## 2017-05-09 PROCEDURE — G8432 DEP SCR NOT DOC, RNG: HCPCS | Performed by: FAMILY MEDICINE

## 2017-05-09 PROCEDURE — 1101F PT FALLS ASSESS-DOCD LE1/YR: CPT | Performed by: FAMILY MEDICINE

## 2017-05-09 PROCEDURE — 4040F PNEUMOC VAC/ADMIN/RCVD: CPT | Performed by: FAMILY MEDICINE

## 2017-05-09 PROCEDURE — 99214 OFFICE O/P EST MOD 30 MIN: CPT | Performed by: FAMILY MEDICINE

## 2017-05-09 PROCEDURE — 3044F HG A1C LEVEL LT 7.0%: CPT | Performed by: FAMILY MEDICINE

## 2017-05-09 PROCEDURE — G8417 CALC BMI ABV UP PARAM F/U: HCPCS | Performed by: FAMILY MEDICINE

## 2017-05-09 NOTE — MR AVS SNAPSHOT
"        Ragini Reaves   2017 4:00 PM   Office Visit   MRN: [id number]    Department:  [facility]   Dept Phone:  [phone]    Description:  Female : 1945   Provider:  Franchesca Marques M.D.           Reason for Visit     Diabetes Mellitus     Hypertension     Hyperlipidemia           Allergies as of 2017     Allergen Noted Reactions    Asa [Aspirin] 2009       Cough Syrup M [Cough Cold-Flu Relief] 2009       Dextromethorphan 2013   Swelling      You were diagnosed with     Uncontrolled type 2 diabetes mellitus without complication, without long-term current use of insulin (CMS-HCC)   [0598721]       Dyslipidemia, goal LDL below 100   [184672]       Essential hypertension   [5505392]       Morbid obesity with BMI of 40.0-44.9, adult (CMS-HCC)   [540678]         Vital Signs     Blood Pressure Pulse Temperature Respirations Height Weight    128/80 mmHg 78 36.2 °C (97.2 °F) 16 1.575 m (5' 2.01\") 109.77 kg (242 lb)    Body Mass Index Oxygen Saturation Smoking Status             44.25 kg/m2 97% Never Smoker          Basic Information     Date Of Birth Sex Race Ethnicity Preferred Language    1945 Female White Non- English      Your appointments     Sep 11, 2017  4:20 PM   Established Patient with Franchesca Marques M.D.   [address] [id number] [phone]           You will be receiving a confirmation call a few days before your appointment from our automated call confirmation system.              Problem List              ICD-10-CM Priority Class Noted - Resolved    Type II diabetes mellitus, uncontrolled (CMS-HCC) E11.65   Unknown - Present    Hypothyroidism due to non-medication exogenous substances E03.2   Unknown - Present    Nocturnal hypoxia G47.34   Unknown - Present    Essential hypertension I10   Unknown - Present    Dyslipidemia, goal LDL below 100 E78.5   Unknown - Present    Osteopenia " M85.80   Unknown - Present    Primary osteoarthritis of both shoulders M19.011, M19.012   Unknown - Present    Mild persistent asthma, uncomplicated J45.30   3/3/2014 - Present    Bilateral incipient cataracts H26.9   1/12/2016 - Present    Fatty liver K76.0   5/2/2016 - Present    Microalbuminuria R80.9   5/2/2016 - Present    Morbid obesity with BMI of 40.0-44.9, adult (CMS-HCC) E66.01, Z68.41   1/9/2017 - Present      Health Maintenance        Date Due Completion Dates    MAMMOGRAM 12/18/2017 (Originally 9/22/2016) 9/22/2014, 8/13/2013, 6/27/2012, 6/27/2012 (Prv Comp), 4/11/2008, 4/11/2008, 7/14/2006    Override on 6/27/2012: Previously completed    IMM ZOSTER VACCINE 1/1/2018 (Originally 1/12/2005) ---    URINE ACR / MICROALBUMIN 9/6/2017 9/6/2016, 4/25/2016, 9/8/2015, 1/6/2015, 4/2/2014, 11/13/2013    DIABETES MONOFILAMENT / LE EXAM 9/12/2017 9/12/2016 (Done), 9/14/2015, 9/5/2014, 8/5/2013    Override on 9/12/2016: Done    A1C SCREENING 11/2/2017 5/2/2017, 1/5/2017, 9/6/2016, 4/25/2016, 1/4/2016, 9/8/2015, 5/5/2015, 1/6/2015, 8/6/2014, 4/2/2014, 11/13/2013, 7/24/2013, 7/27/2012 (Prv Comp)    Override on 7/27/2012: Previously completed    RETINAL SCREENING 3/10/2018 3/10/2017, 2/26/2016    FASTING LIPID PROFILE 5/2/2018 5/2/2017, 1/5/2017, 9/6/2016, 4/25/2016, 1/4/2016, 9/8/2015, 5/5/2015, 1/6/2015, 8/6/2014, 4/2/2014, 11/13/2013, 7/24/2013, 7/27/2012 (Prv Comp)    Override on 7/27/2012: Previously completed    SERUM CREATININE 5/2/2018 5/2/2017, 1/5/2017, 9/6/2016, 4/25/2016, 1/4/2016, 9/8/2015, 5/5/2015, 1/6/2015, 8/6/2014, 4/2/2014, 11/13/2013, 7/24/2013, 7/27/2012 (Prv Comp)    Override on 7/27/2012: Previously completed    BONE DENSITY 8/13/2018 8/13/2013, 4/11/2008    IMM DTaP/Tdap/Td Vaccine (2 - Td) 11/6/2022 11/6/2012    COLONOSCOPY 5/21/2023 5/21/2013 (Prv Comp), 5/21/2013, 12/3/2012 (Declined)    Override on 5/21/2013: Previously completed    Override on 12/3/2012: Patient Declined            Current  Immunizations     13-VALENT PCV PREVNAR 1/12/2016    Influenza TIV (IM) 12/6/2013, 12/3/2012    Influenza Vaccine Adult HD 10/25/2016, 1/12/2016    Pneumococcal Vaccine (UF)Historical Data 11/8/2008    Pneumococcal polysaccharide vaccine (PPSV-23) 12/6/2013    Tdap Vaccine 11/6/2012  1:04 PM      Below and/or attached are the medications your provider expects you to take. Review all of your home medications and newly ordered medications with your provider and/or pharmacist. Follow medication instructions as directed by your provider and/or pharmacist. Please keep your medication list with you and share with your provider. Update the information when medications are discontinued, doses are changed, or new medications (including over-the-counter products) are added; and carry medication information at all times in the event of emergency situations     Allergies:  ASA - (reactions not documented)     COUGH SYRUP M - (reactions not documented)     DEXTROMETHORPHAN - Swelling               Medications  Valid as of: May 09, 2017 -  4:37 PM    Generic Name Brand Name Tablet Size Instructions for use    Albuterol Sulfate (Aero Soln) albuterol 108 (90 BASE) MCG/ACT Inhale 2 Puffs by mouth every 6 hours as needed for Shortness of Breath.        Atenolol (Tab) TENORMIN 25 MG TAKE 1 TABLET BY MOUTH EVERY DAY        Benazepril HCl (Tab) LOTENSIN 5 MG TAKE 1 TAB BY MOUTH EVERY DAY.        Blood Glucose Monitoring Suppl (Misc) Blood Glucose Monitoring Suppl SUPPLIES Test strips order: Test strips for Abbott Freestyle Lite meter. Sig: use daily and prn 90 day supply ICD-9 code 250.02        Calcium Citrate-Vitamin D (Tab) CITRACAL+D 315-200 MG-UNIT Take 1 Tab by mouth every day.        Furosemide (Tab) LASIX 40 MG TAKE 1 TABLET BY MOUTH EVERY DAY        Glucosamine-Chondroitin-Vit C (Liquid) Glucosamine-Chondroitin-Vit C 2955-8030-92 MG/30ML Take 30 mL by mouth every day.        Glucose Blood (Strip) glucose blood  1 Strip by Other  route 2 Times a Day.        Ibuprofen (Tab) MOTRIN 800 MG TAKE 1 TABLET BY MOUTH TWICE A DAY WITH MEALS        Lancets (Misc) FREESTYLE LANCETS  Inject 1 Each as instructed 2 Times a Day.        Levothyroxine Sodium (Tab) SYNTHROID 150 MCG Take 1 Tab by mouth every day.        Linagliptin (Tab) TRADJENTA 5 MG TAKE 1 TAB BY MOUTH EVERY DAY.        Magnesium Oxide (Tab) MAG- MG Take 3 Tabs by mouth every day. 2 am and 1 hs        MetFORMIN HCl (Tab) GLUCOPHAGE 500 MG Take 1 Tab by mouth 2 times a day, with meals.        Methylsulfonylmethane (Cap) Methylsulfonylmethane 500 MG Take 1 Cap by mouth every day.        Misc Natural Product Nasal (Solution) PONARIS NASAL  Spray 10 Drops in nose every 6 hours as needed.        Misc. Devices (Misc) Misc. Devices  This is an order for overnight pulse oxygen study  Dx nocturnal hypoxia G47.34, please do the test on room air.       SPENCER 99 months   NPI [id number]        Misc. Devices (Misc) Misc. Devices  This is an order for overnight pulse ox on room air.  Dx. Nocturnal hypoxia  G47.34, mild persistent asthma J45.30       SPENCER 99 months   NPI [id number]        Multiple Vitamins-Minerals (Tab) CENTRUM SILVER ULTRA WOMENS  Take 1 Tab by mouth every day.        Potassium Chloride Ladonna CR (Tab CR) Kdur 20 MEQ TAKE 1 TABLET BY MOUTH EVERY DAY        Simvastatin (Tab) ZOCOR 5 MG TAKE 1 TABLET BY MOUTH EVERY EVENING        Whey Protein (Powder) Whey Protein  Take 36 g by mouth 2 Times a Day.        .                 Medicines prescribed today were sent to:     [id number] - [place] - [address] [address]    Phone: [phone] Fax: [phone]    Open 24 Hours?: No      Medication refill instructions:       If your prescription bottle indicates you have medication refills left, it is not necessary to call your provider’s office. Please contact your pharmacy and they will refill your medication.    If your prescription bottle indicates  you do not have any refills left, you may request refills at any time through one of the following ways: The online [employer] system (except Urgent Care), by calling your provider’s office, or by asking your pharmacy to contact your provider’s office with a refill request. Medication refills are processed only during regular business hours and may not be available until the next business day. Your provider may request additional information or to have a follow-up visit with you prior to refilling your medication.   *Please Note: Medication refills are assigned a new Rx number when refilled electronically. Your pharmacy may indicate that no refills were authorized even though a new prescription for the same medication is available at the pharmacy. Please request the medicine by name with the pharmacy before contacting your provider for a refill.        Your To Do List     Future Labs/Procedures Complete By Expires    COMP METABOLIC PANEL  As directed 5/10/2018    HEMOGLOBIN A1C  As directed 5/10/2018    LIPID PROFILE  As directed 5/10/2018    MICROALBUMIN CREAT RATIO URINE  As directed 5/10/2018         [employer] Access Code: [email or web address]  Expires: 6/8/2017  4:37 PM    [employer]  A secure, online tool to manage your health information     [employer]’s [employer]® is a secure, online tool that connects you to your personalized health information from the privacy of your home -- day or night - making it very easy for you to manage your healthcare. Once the activation process is completed, you can even access your medical information using the [employer] wil, which is available for free in the Apple Wil store or Google Play store.     [employer] provides the following levels of access (as shown below):   My Chart Features   [facility] Doctor [facility]  Specialists [facility]  Urgent  Care Non-[facility]  Primary Care  Doctor   Email your healthcare team securely and privately 24/7 X X X    Manage appointments: schedule your next  appointment; view details of past/upcoming appointments X      Request prescription refills. X      View recent personal medical records, including lab and immunizations X X X X   View health record, including health history, allergies, medications X X X X   Read reports about your outpatient visits, procedures, consult and ER notes X X X X   See your discharge summary, which is a recap of your hospital and/or ER visit that includes your diagnosis, lab results, and care plan. X X       How to register for [employer]:  1. Go to  [email or web address].  2. Click on the Sign Up Now box, which takes you to the New Member Sign Up page. You will need to provide the following information:  a. Enter your [employer] Access Code exactly as it appears at the top of this page. (You will not need to use this code after you’ve completed the sign-up process. If you do not sign up before the expiration date, you must request a new code.)   b. Enter your date of birth.   c. Enter your home email address.   d. Click Submit, and follow the next screen’s instructions.  3. Create a [employer] ID. This will be your [employer] login ID and cannot be changed, so think of one that is secure and easy to remember.  4. Create a [employer] password. You can change your password at any time.  5. Enter your Password Reset Question and Answer. This can be used at a later time if you forget your password.   6. Enter your e-mail address. This allows you to receive e-mail notifications when new information is available in [employer].  7. Click Sign Up. You can now view your health information.    For assistance activating your [employer] account, call [phone]

## 2017-05-09 NOTE — PROGRESS NOTES
Diabetes Focused Exam:    Chief Complaint   Patient presents with   • Diabetes Mellitus   • Hypertension   • Hyperlipidemia      Subjective:   HPI  Ragini Reaves is a 72 y.o. female who presents for follow up of chronic conditions of diabetes mellitus, hypertension and hyperlipidemia. She indicates that she is feeling well and denies any symptoms referable to the above diagnoses. Specifically denies chest pain, palpitations, dyspnea, orthopnea, PND or peripheral edema. Also denies polyuria, polydipsia, urinary complaints, abdominal complaints, myalgias, numbness, weakness or other related symptoms.     The patient is not taking ASA due to history of GI bleed, she is taking all other medications as prescribed. Patient denies any side effects of medication.  DM: A1c goal <7  Glucose monitoring frequency: daily   Fasting sugars: . Post-prandial sugars: not checking  Hypoglycemic episodes none noted  Diabetic complications: none  ACR Albumin/Creatinine Ratio goal <30   HTN: Blood pressure goal <140/<80. Currently Rx ACE/ARB: Yes  Hyperlipidemia:Cholesterol goal LDL <100, total/HDL <5. Currently Rx Statin: Yes  Last eye exam 3/2017  Denies visual blurring, double vision, eye pain and floaters  Last monofilament foot exam: 9/2016 Denies foot pain, numbness, calluses, ulcers      See medications and orders placed in encounter report.  Past medical history, family history, social history reviewed and updated as documented in medical record.  Current medications including changes today:  Current Outpatient Prescriptions   Medication Sig Dispense Refill   • benazepril (LOTENSIN) 5 MG Tab TAKE 1 TAB BY MOUTH EVERY DAY. 90 Tab 2   • furosemide (LASIX) 40 MG Tab TAKE 1 TABLET BY MOUTH EVERY DAY 90 Tab 3   • potassium chloride SA (KDUR) 20 MEQ Tab CR TAKE 1 TABLET BY MOUTH EVERY DAY 90 Tab 3   • TRADJENTA 5 MG Tab tablet TAKE 1 TAB BY MOUTH EVERY DAY. 30 Tab 6   • levothyroxine (SYNTHROID) 150 MCG Tab Take 1 Tab by  mouth every day. 90 Tab 3   • Misc. Devices Misc This is an order for overnight pulse ox on room air.  Dx. Nocturnal hypoxia  G47.34, mild persistent asthma J45.30       SPENCER 99 months   NPI [id number] 1 Each 0   • FREESTYLE LANCETS Misc Inject 1 Each as instructed 2 Times a Day. 100 Each 4   • albuterol (PROAIR HFA) 108 (90 BASE) MCG/ACT Aero Soln inhalation aerosol Inhale 2 Puffs by mouth every 6 hours as needed for Shortness of Breath. 8.5 g 3   • metformin (GLUCOPHAGE) 1000 MG tablet TAKE 1 TABLET BY MOUTH TWICE A DAY WITH MEALS 180 Tab 3   • simvastatin (ZOCOR) 5 MG Tab TAKE 1 TABLET BY MOUTH EVERY EVENING 90 Tab 3   • ibuprofen (MOTRIN) 800 MG Tab TAKE 1 TABLET BY MOUTH TWICE A DAY WITH MEALS 180 Tab 3   • atenolol (TENORMIN) 25 MG Tab TAKE 1 TABLET BY MOUTH EVERY DAY 90 Tab 3   • Misc. Devices Misc This is an order for overnight pulse oxygen study  Dx nocturnal hypoxia G47.34, please do the test on room air.       SPENCER 99 months   NPI [id number] 1 Each 0   • Blood Glucose Monitoring Suppl SUPPLIES MISC Test strips order: Test strips for Abbott Freestyle Lite meter. Sig: use daily and prn 90 day supply ICD-9 code 250.02 150 Each 11   • glucose blood (FREESTYLE LITE) strip 1 Strip by Other route 2 Times a Day. 50 Strip 4   • eucalyptus/peppermint oil (PONARIS NASAL) SOLN Spray 10 Drops in nose every 6 hours as needed. 1 Bottle 0   • magnesium oxide (MAG-OX) 400 MG TABS Take 3 Tabs by mouth every day. 2 am and 1 hs 30 Each 0   • calcium citrate-vitamin D (CITRACAL+D) 315-200 MG-UNIT per tablet Take 1 Tab by mouth every day. 30 Tab 0   • Multiple Vitamins-Minerals (CENTRUM SILVER ULTRA WOMENS) TABS Take 1 Tab by mouth every day. 30 Tab 0   • Glucosamine-Chondroitin-Vit C 8254-1616-68 MG/30ML LIQD Take 30 mL by mouth every day. 1 Bottle 0   • Methylsulfonylmethane (MSM) 500 MG CAPS Take 1 Cap by mouth every day. 30 Cap 0   • Whey Protein POWD Take 36 g by mouth 2 Times a Day. 1 Bottle 0     No current  "facility-administered medications for this visit.     Allergies:   Allergies   Allergen Reactions   • Asa [Aspirin]    • Cough Syrup M [Cough Cold-Flu Relief]    • Dextromethorphan Swelling      Social History   Substance Use Topics   • Smoking status: Never Smoker    • Smokeless tobacco: Never Used   • Alcohol Use: No     Exercise: walking now daily  Health Maintenance/Immunizations: discussed    ROS  Pertinent  ROS findings as above. All other systems reviewed and are negative.      Objective:     OBJECTIVE:  /80 mmHg  Pulse 78  Temp(Src) 36.2 °C (97.2 °F)  Resp 16  Ht 1.575 m (5' 2.01\")  Wt 109.77 kg (242 lb)  BMI 44.25 kg/m2  SpO2 97% Body mass index is 44.25 kg/(m^2). BMI: severely obese  General: No apparent distress, conversant, cooperative and pleasant with the examination.  Psych: Alert and oriented x4, judgment and insight normal  Neck: No JVD or bruits, no adenopathy, supple  Thyroid: normal to inspection and palpation  Lungs: negative findings: normal respiratory rate and rhythm, lungs clear to auscultation  Heart: negative findings: regular rate and rhythm, S1 normal, S2 normal, no murmurs, clicks, or gallops  Abdomen: Soft, nontender, no hepatosplenomegaly or masses, normal bowel sounds  Skin: No rashes, no cyanosis, no lesions or ulcers  Extremities: No cyanosis clubbing or edema.   Feet are examined, no ulcerations, maceration or fissuring    POC labs:   Lab Results   Component Value Date/Time    GLUCOSE - ACCU-* 04/14/2009 05:27 PM          Last labs    Lab Results   Component Value Date/Time    CHOLESTEROL, 05/02/2017 07:40 AM    LDL 71 05/02/2017 07:40 AM    HDL 38* 05/02/2017 07:40 AM    TRIGLYCERIDES 152* 05/02/2017 07:40 AM       Lab Results   Component Value Date/Time    SODIUM 141 05/02/2017 07:40 AM    POTASSIUM 4.2 05/02/2017 07:40 AM    GLUCOSE 119* 05/02/2017 07:40 AM    BUN-CREATININE RATIO 46* 05/02/2017 07:40 AM     Lab Results   Component Value Date/Time    " GLYCOHEMOGLOBIN 6.9* 05/02/2017 07:40 AM    GLYCOHEMOGLOBIN 8.3* 01/05/2017 08:00 AM    GLYCOHEMOGLOBIN 8.2* 09/06/2016 07:12 AM     Lab Results   Component Value Date/Time    MICROALBUMIN, URINE RANDOM 55.8 09/06/2016 07:12 AM          Assessment/Plan:   Medications, refills, and referrals per orders.   1. Uncontrolled type 2 diabetes mellitus without complication, without long-term current use of insulin (CMS-HCC)     2. Dyslipidemia, goal LDL below 100     3. Essential hypertension     4. Morbid obesity with BMI of 45.0-49.9, adult (CMS-HCC)       DM2 A1c is at goal   Patient to monitor sugars: daily frequency  Discussed diet, exercise, disease management and weight loss goals.  She is congratulated on her weight loss, she will keep going.   Education and advise provided today:All medications, side effects and compliance (discussed carefully)  Annual eye examinations at Ophthalmology  Foot care discussed and Podiatry visits  Glycohemoglobin and other lab monitoring  Home glucose monitoring emphasized  Labs immediately prior to next visit  Low cholesterol diet, weight control and daily exercise    Followup:   Do labs and RTC 4 months, sooner should new symptoms or problems arise.    The metformin is reduced to 500 mg twice daily    Diet discussed, she is congratulated on her weight loss.

## 2017-07-09 RX ORDER — BLOOD-GLUCOSE METER
KIT MISCELLANEOUS
Qty: 100 STRIP | Refills: 4 | Status: SHIPPED | OUTPATIENT
Start: 2017-07-09 | End: 2017-07-13 | Stop reason: SDUPTHER

## 2017-07-13 NOTE — TELEPHONE ENCOUNTER
Previous Rx sent with no Dx code please resend.  Thank you!  Was the patient seen in the last year in this department? Yes     Does patient have an active prescription for medications requested? Yes     Received Request Via: Pharmacy

## 2017-07-14 NOTE — TELEPHONE ENCOUNTER
Was the patient seen in the last year in this department? Yes     Does patient have an active prescription for medications requested? Yes     Received Request Via: Pharmacy   Previous order sent without DX please resent to pharmacy.  Thank you

## 2017-07-18 DIAGNOSIS — E78.5 DYSLIPIDEMIA, GOAL LDL BELOW 100: ICD-10-CM

## 2017-07-18 DIAGNOSIS — M19.011 PRIMARY OSTEOARTHRITIS OF BOTH SHOULDERS: ICD-10-CM

## 2017-07-18 DIAGNOSIS — M19.012 PRIMARY OSTEOARTHRITIS OF BOTH SHOULDERS: ICD-10-CM

## 2017-07-18 RX ORDER — SIMVASTATIN 5 MG
TABLET ORAL
Qty: 90 TAB | Refills: 0 | Status: SHIPPED | OUTPATIENT
Start: 2017-07-18 | End: 2017-10-16 | Stop reason: SDUPTHER

## 2017-07-18 RX ORDER — ATENOLOL 25 MG/1
TABLET ORAL
Qty: 90 TAB | Refills: 0 | Status: SHIPPED | OUTPATIENT
Start: 2017-07-18 | End: 2017-10-16 | Stop reason: SDUPTHER

## 2017-07-18 RX ORDER — IBUPROFEN 800 MG/1
TABLET ORAL
Qty: 180 TAB | Refills: 0 | Status: SHIPPED | OUTPATIENT
Start: 2017-07-18 | End: 2017-10-16 | Stop reason: SDUPTHER

## 2017-07-19 NOTE — TELEPHONE ENCOUNTER
Was the patient seen in the last year in this department? Yes     Does patient have an active prescription for medications requested? Yes     Received Request Via: Pharmacy per pharmacy is missing ICD 10 and without it it can't be billed to [employer]. Resent no verbal allowed.

## 2017-08-18 RX ORDER — LANCETS 28 GAUGE
EACH MISCELLANEOUS
Qty: 100 EACH | Refills: 4 | Status: SHIPPED | OUTPATIENT
Start: 2017-08-18 | End: 2018-05-29 | Stop reason: SDUPTHER

## 2017-08-18 NOTE — TELEPHONE ENCOUNTER
Was the patient seen in the last year in this department? Yes     Does patient have an active prescription for medications requested? No     Received Request Via: Pharmacy'

## 2017-08-31 RX ORDER — BLOOD-GLUCOSE METER
KIT MISCELLANEOUS
Qty: 100 STRIP | Refills: 0 | Status: SHIPPED | OUTPATIENT
Start: 2017-08-31 | End: 2017-09-11 | Stop reason: SDUPTHER

## 2017-09-07 LAB
ALBUMIN SERPL-MCNC: 4.6 G/DL (ref 3.5–4.8)
ALBUMIN/CREAT UR: 37.9 MG/G CREAT (ref 0–30)
ALBUMIN/GLOB SERPL: 1.9 {RATIO} (ref 1.2–2.2)
ALP SERPL-CCNC: 95 IU/L (ref 39–117)
ALT SERPL-CCNC: 55 IU/L (ref 0–32)
AST SERPL-CCNC: 40 IU/L (ref 0–40)
BILIRUB SERPL-MCNC: 0.6 MG/DL (ref 0–1.2)
BUN SERPL-MCNC: 31 MG/DL (ref 8–27)
BUN/CREAT SERPL: 47 (ref 12–28)
CALCIUM SERPL-MCNC: 8.9 MG/DL (ref 8.7–10.3)
CHLORIDE SERPL-SCNC: 102 MMOL/L (ref 96–106)
CHOLEST SERPL-MCNC: 149 MG/DL (ref 100–199)
CO2 SERPL-SCNC: 23 MMOL/L (ref 18–29)
COMMENT 011824: ABNORMAL
CREAT SERPL-MCNC: 0.66 MG/DL (ref 0.57–1)
CREAT UR-MCNC: 117.9 MG/DL
GLOBULIN SER CALC-MCNC: 2.4 G/DL (ref 1.5–4.5)
GLUCOSE SERPL-MCNC: 162 MG/DL (ref 65–99)
HBA1C MFR BLD: 7.3 % (ref 4.8–5.6)
HDLC SERPL-MCNC: 43 MG/DL
LDLC SERPL CALC-MCNC: 73 MG/DL (ref 0–99)
MICROALBUMIN UR-MCNC: 44.7 UG/ML
POTASSIUM SERPL-SCNC: 4.5 MMOL/L (ref 3.5–5.2)
PROT SERPL-MCNC: 7 G/DL (ref 6–8.5)
SODIUM SERPL-SCNC: 142 MMOL/L (ref 134–144)
TRIGL SERPL-MCNC: 167 MG/DL (ref 0–149)
VLDLC SERPL CALC-MCNC: 33 MG/DL (ref 5–40)

## 2017-09-11 ENCOUNTER — OFFICE VISIT (OUTPATIENT)
Dept: MEDICAL GROUP | Facility: CLINIC | Age: 72
End: 2017-09-11
Payer: MEDICARE

## 2017-09-11 VITALS
BODY MASS INDEX: 43.98 KG/M2 | SYSTOLIC BLOOD PRESSURE: 120 MMHG | OXYGEN SATURATION: 95 % | HEART RATE: 90 BPM | RESPIRATION RATE: 20 BRPM | HEIGHT: 62 IN | DIASTOLIC BLOOD PRESSURE: 60 MMHG | WEIGHT: 239 LBS | TEMPERATURE: 97.2 F

## 2017-09-11 DIAGNOSIS — E78.5 DYSLIPIDEMIA, GOAL LDL BELOW 100: ICD-10-CM

## 2017-09-11 DIAGNOSIS — I10 ESSENTIAL HYPERTENSION: ICD-10-CM

## 2017-09-11 DIAGNOSIS — E66.01 MORBID OBESITY WITH BMI OF 40.0-44.9, ADULT (HCC): ICD-10-CM

## 2017-09-11 DIAGNOSIS — Z23 NEED FOR IMMUNIZATION AGAINST INFLUENZA: ICD-10-CM

## 2017-09-11 DIAGNOSIS — K76.0 FATTY LIVER: ICD-10-CM

## 2017-09-11 DIAGNOSIS — Z12.31 ENCOUNTER FOR SCREENING MAMMOGRAM FOR BREAST CANCER: ICD-10-CM

## 2017-09-11 DIAGNOSIS — E03.2 HYPOTHYROIDISM DUE TO NON-MEDICATION EXOGENOUS SUBSTANCES: ICD-10-CM

## 2017-09-11 DIAGNOSIS — E11.9 DM TYPE 2, GOAL HBA1C < 8% (HCC): ICD-10-CM

## 2017-09-11 PROCEDURE — 99214 OFFICE O/P EST MOD 30 MIN: CPT | Mod: 25 | Performed by: FAMILY MEDICINE

## 2017-09-11 PROCEDURE — 90662 IIV NO PRSV INCREASED AG IM: CPT | Performed by: FAMILY MEDICINE

## 2017-09-11 PROCEDURE — G0008 ADMIN INFLUENZA VIRUS VAC: HCPCS | Performed by: FAMILY MEDICINE

## 2017-09-11 NOTE — PROGRESS NOTES
Diabetes Focused Exam:    Chief Complaint   Patient presents with   • Diabetes Mellitus     on lefft leg   • Back Pain   • Shoulder Pain   • Numbness     left leg      Subjective:   HPI  Ragini Reaves is a 72 y.o. female who presents for follow up of chronic conditions of diabetes mellitus, hypertension and hyperlipidemia. She indicates that she is feeling well and denies any symptoms referable to the above diagnoses. Specifically denies chest pain, palpitations, dyspnea, orthopnea, PND or peripheral edema. Also denies polyuria, polydipsia, urinary complaints, abdominal complaints, myalgias, numbness, weakness or other related symptoms.     Needs her flu vaccine today.    She is getting numbness left lateral thigh.  There is a feeling of tightness as well, occurs after walking 20 minutes or more.    The patient is not taking ASA every day since she is allergic, she and taking all other medications as prescribed. Patient denies any side effects of medication.  DM: A1c goal <8 Glucose monitoring frequency: once or twice daily   Fasting sugars: . Post-prandial sugars: 150s  Hypoglycemic episodes none  Diabetic complications: peripheral neuropathy  ACR Albumin/Creatinine Ratio goal <30  HTN: Blood pressure goal <140/<80. Currently Rx ACE/ARB: Yes  Hyperlipidemia:Cholesterol goal LDL <100, total/HDL <5. Currently Rx Statin: Yes  Last eye exam 3/2017   Denies visual blurring, double vision, eye pain and floaters  Last monofilament foot exam: today  Denies foot pain, numbness, calluses, ulcers      See medications and orders placed in encounter report.  Past medical history, family history, social history reviewed and updated as documented in medical record.  Current medications including changes today:  Current Outpatient Prescriptions   Medication Sig Dispense Refill   • FREESTYLE LITE strip USE 1 STRIP BY OTHER ROUTE 2 TIMES A DAY. ICD 10 E11.9 100 Strip 0   • FREESTYLE LANCETS Misc INJECT 1 EACH AS  INSTRUCTED 2 TIMES A DAY. 100 Each 4   • atenolol (TENORMIN) 25 MG Tab TAKE 1 TABLET BY MOUTH EVERY DAY 90 Tab 0   • ibuprofen (MOTRIN) 800 MG Tab TAKE 1 TABLET BY MOUTH TWICE A DAY WITH MEALS 180 Tab 0   • simvastatin (ZOCOR) 5 MG Tab TAKE 1 TABLET BY MOUTH EVERY EVENING 90 Tab 0   • metformin (GLUCOPHAGE) 500 MG Tab Take 1 Tab by mouth 2 times a day, with meals. 180 Tab 3   • benazepril (LOTENSIN) 5 MG Tab TAKE 1 TAB BY MOUTH EVERY DAY. 90 Tab 2   • furosemide (LASIX) 40 MG Tab TAKE 1 TABLET BY MOUTH EVERY DAY 90 Tab 3   • potassium chloride SA (KDUR) 20 MEQ Tab CR TAKE 1 TABLET BY MOUTH EVERY DAY 90 Tab 3   • TRADJENTA 5 MG Tab tablet TAKE 1 TAB BY MOUTH EVERY DAY. 30 Tab 6   • levothyroxine (SYNTHROID) 150 MCG Tab Take 1 Tab by mouth every day. 90 Tab 3   • Misc. Devices Misc This is an order for overnight pulse ox on room air.  Dx. Nocturnal hypoxia  G47.34, mild persistent asthma J45.30       SPENCER 99 months   NPI [id number] 1 Each 0   • albuterol (PROAIR HFA) 108 (90 BASE) MCG/ACT Aero Soln inhalation aerosol Inhale 2 Puffs by mouth every 6 hours as needed for Shortness of Breath. 8.5 g 3   • Misc. Devices Misc This is an order for overnight pulse oxygen study  Dx nocturnal hypoxia G47.34, please do the test on room air.       SPENCER 99 months   NPI [id number] 1 Each 0   • eucalyptus/peppermint oil (PONARIS NASAL) SOLN Spray 10 Drops in nose every 6 hours as needed. 1 Bottle 0   • Blood Glucose Monitoring Suppl SUPPLIES MISC Test strips order: Test strips for Abbott Freestyle Lite meter. Sig: use daily and prn 90 day supply ICD-9 code 250.02 150 Each 11   • magnesium oxide (MAG-OX) 400 MG TABS Take 3 Tabs by mouth every day. 2 am and 1 hs 30 Each 0   • calcium citrate-vitamin D (CITRACAL+D) 315-200 MG-UNIT per tablet Take 1 Tab by mouth every day. 30 Tab 0   • Multiple Vitamins-Minerals (CENTRUM SILVER ULTRA WOMENS) TABS Take 1 Tab by mouth every day. 30 Tab 0   • Glucosamine-Chondroitin-Vit C 0621-7104-42 MG/30ML  "LIQD Take 30 mL by mouth every day. 1 Bottle 0   • Methylsulfonylmethane (MSM) 500 MG CAPS Take 1 Cap by mouth every day. 30 Cap 0   • Whey Protein POWD Take 36 g by mouth 2 Times a Day. 1 Bottle 0     No current facility-administered medications for this visit.      Allergies:   Allergies   Allergen Reactions   • Asa [Aspirin]    • Cough Syrup M [Cough Cold-Flu Relief]    • Dextromethorphan Swelling      Social History   Substance Use Topics   • Smoking status: Never Smoker   • Smokeless tobacco: Never Used   • Alcohol use No     Exercise: she is waking with a friend five days per week   Health Maintenance/Immunizations: discussed, influenza vaccine today    ROS  Pertinent  ROS findings as above. All other systems reviewed and are negative.      Objective:     OBJECTIVE:  /60   Pulse 90   Temp 36.2 °C (97.2 °F)   Resp 20   Ht 1.575 m (5' 2.01\")   Wt 108.4 kg (239 lb)   SpO2 95%   BMI 43.70 kg/m²  Body mass index is 43.7 kg/m². BMI: severely obese  General: No apparent distress, conversant, cooperative and pleasant with the examination.  Psych: Alert and oriented x4, judgment and insight normal  Neck: No JVD or bruits, no adenopathy, supple  Thyroid: normal to inspection and palpation  Lungs: negative findings: normal respiratory rate and rhythm, lungs clear to auscultation  Heart: negative findings: regular rate and rhythm, S1 normal, S2 normal, no murmurs, clicks, or gallops  Abdomen: Soft, nontender, no hepatosplenomegaly or masses, normal bowel sounds  Skin: No rashes, no cyanosis, no lesions or ulcers  Extremities: No cyanosis clubbing or edema. Stasis changes on left leg are improved.  Feet are examined sensation is slightly diminished on left but present.  Right is normal to monofilament.  DP pulses are normal.      POC labs:   Lab Results   Component Value Date/Time    POCGLUCOSE 109 (H) 04/14/2009 05:27 PM          Last labs    Lab Results   Component Value Date/Time    CHOLSTRLTOT 149 " 09/06/2017 08:22 AM    LDL 73 09/06/2017 08:22 AM    HDL 43 09/06/2017 08:22 AM    TRIGLYCERIDE 167 (H) 09/06/2017 08:22 AM       Lab Results   Component Value Date/Time    SODIUM 142 09/06/2017 08:22 AM    POTASSIUM 4.5 09/06/2017 08:22 AM    GLUCOSE 162 (H) 09/06/2017 08:22 AM    BUNCREATRAT 47 (H) 09/06/2017 08:22 AM     Lab Results   Component Value Date/Time    HBA1C 7.3 (H) 09/06/2017 08:22 AM    HBA1C 6.9 (H) 05/02/2017 07:40 AM    HBA1C 8.3 (H) 01/05/2017 08:00 AM    HBA1C 8.7 (H) 09/08/2015 07:49 AM    HBA1C 8.4 (H) 05/05/2015 08:32 AM    HBA1C 9.4 (H) 01/06/2015 08:37 AM     Lab Results   Component Value Date/Time    MICRALB 44.7 09/06/2017 08:22 AM          Assessment/Plan:   Medications, refills, and referrals per orders.   1. Uncontrolled type 2 diabetes mellitus without complication, without long-term current use of insulin (CMS-[facility])     2. Essential hypertension     3. Dyslipidemia, goal LDL below 100     4. Morbid obesity with BMI of 40.0-44.9, adult (CMS-[facility])     5. Fatty liver     6. Need for immunization against influenza  INFLUENZA VACCINE, HIGH DOSE (65+ ONLY)     DM2 A1c is at goal   Patient to monitor sugars: daily frequency  Discussed diet, exercise, disease management and weight loss goals.  She has made progress on her morbid obesity.  She is walking and cutting back on portions.   Education and advise provided today:All medications, side effects and compliance (discussed carefully)  Annual eye examinations at Ophthalmology  Foot care discussed and Podiatry visits  Glycohemoglobin and other lab monitoring  Home glucose monitoring emphasized  Labs immediately prior to next visit, orders placed  Long term diabetic complications  Low cholesterol diet, weight control and daily exercise    Followup:   Do labs and RTC 4 months, sooner should new symptoms or problems arise.    Mammogram order discussed and placed

## 2017-09-27 RX ORDER — LINAGLIPTIN 5 MG/1
TABLET, FILM COATED ORAL
Qty: 30 TAB | Refills: 6 | Status: SHIPPED | OUTPATIENT
Start: 2017-09-27 | End: 2018-01-12 | Stop reason: SDUPTHER

## 2017-10-04 ENCOUNTER — HOSPITAL ENCOUNTER (OUTPATIENT)
Dept: RADIOLOGY | Facility: MEDICAL CENTER | Age: 72
End: 2017-10-04
Attending: FAMILY MEDICINE
Payer: MEDICARE

## 2017-10-04 DIAGNOSIS — Z12.31 SCREENING MAMMOGRAM, ENCOUNTER FOR: ICD-10-CM

## 2017-10-04 PROCEDURE — G0202 SCR MAMMO BI INCL CAD: HCPCS

## 2017-10-16 DIAGNOSIS — E78.5 DYSLIPIDEMIA, GOAL LDL BELOW 100: ICD-10-CM

## 2017-10-16 DIAGNOSIS — M19.011 PRIMARY OSTEOARTHRITIS OF BOTH SHOULDERS: ICD-10-CM

## 2017-10-16 DIAGNOSIS — M19.012 PRIMARY OSTEOARTHRITIS OF BOTH SHOULDERS: ICD-10-CM

## 2017-10-16 RX ORDER — SIMVASTATIN 5 MG
5 TABLET ORAL EVERY EVENING
Qty: 90 TAB | Refills: 3 | Status: SHIPPED | OUTPATIENT
Start: 2017-10-16 | End: 2018-09-30 | Stop reason: SDUPTHER

## 2017-10-16 RX ORDER — IBUPROFEN 800 MG/1
800 TABLET ORAL 2 TIMES DAILY WITH MEALS
Qty: 180 TAB | Refills: 3 | Status: SHIPPED | OUTPATIENT
Start: 2017-10-16 | End: 2017-11-29

## 2017-10-16 RX ORDER — ATENOLOL 25 MG/1
TABLET ORAL
Qty: 90 TAB | Refills: 3 | Status: SHIPPED | OUTPATIENT
Start: 2017-10-16 | End: 2017-11-29

## 2017-11-17 ENCOUNTER — OFFICE VISIT (OUTPATIENT)
Dept: MEDICAL GROUP | Facility: CLINIC | Age: 72
End: 2017-11-17
Payer: MEDICARE

## 2017-11-17 ENCOUNTER — HOSPITAL ENCOUNTER (OUTPATIENT)
Dept: RADIOLOGY | Facility: MEDICAL CENTER | Age: 72
End: 2017-11-17
Attending: FAMILY MEDICINE
Payer: MEDICARE

## 2017-11-17 VITALS
SYSTOLIC BLOOD PRESSURE: 118 MMHG | WEIGHT: 236 LBS | HEART RATE: 80 BPM | HEIGHT: 62 IN | RESPIRATION RATE: 14 BRPM | BODY MASS INDEX: 43.43 KG/M2 | OXYGEN SATURATION: 96 % | DIASTOLIC BLOOD PRESSURE: 64 MMHG | TEMPERATURE: 97.2 F

## 2017-11-17 DIAGNOSIS — R07.89 CHEST TIGHTNESS OR PRESSURE: ICD-10-CM

## 2017-11-17 DIAGNOSIS — R06.02 EXERTIONAL SHORTNESS OF BREATH: ICD-10-CM

## 2017-11-17 DIAGNOSIS — G47.34 NOCTURNAL HYPOXIA: ICD-10-CM

## 2017-11-17 DIAGNOSIS — E66.01 MORBID OBESITY WITH BMI OF 40.0-44.9, ADULT (HCC): ICD-10-CM

## 2017-11-17 DIAGNOSIS — R91.8 OPACITIES OF BOTH LUNGS PRESENT ON CHEST X-RAY: ICD-10-CM

## 2017-11-17 PROCEDURE — 93000 ELECTROCARDIOGRAM COMPLETE: CPT | Performed by: FAMILY MEDICINE

## 2017-11-17 PROCEDURE — 71020 DX-CHEST-2 VIEWS: CPT

## 2017-11-17 PROCEDURE — 99214 OFFICE O/P EST MOD 30 MIN: CPT | Performed by: FAMILY MEDICINE

## 2017-11-17 RX ORDER — SPIRONOLACTONE 25 MG/1
25 TABLET ORAL DAILY
Qty: 30 TAB | Refills: 3 | Status: SHIPPED | OUTPATIENT
Start: 2017-11-17 | End: 2018-01-12 | Stop reason: SDUPTHER

## 2017-11-17 RX ORDER — ASCORBIC ACID 500 MG
500 TABLET ORAL DAILY
Qty: 30 TAB | Refills: 11 | COMMUNITY
Start: 2017-11-17

## 2017-11-17 ASSESSMENT — PATIENT HEALTH QUESTIONNAIRE - PHQ9: CLINICAL INTERPRETATION OF PHQ2 SCORE: 0

## 2017-11-17 NOTE — PROGRESS NOTES
Chief Complaint   Patient presents with   • Shortness of Breath   • Chest Pressure       Subjective:     HPI:   Ragini Reaves presents today with the followin. Exertional shortness of breath  She is persistently short of breath even with mild exertion. She feels this is getting worse. She denies wheezing or whistling in the chest  Present since August.  Denies palpitations. Has rare dizziness.    2. Chest tightness or pressure  She is noting mid chest pressure, this is occurring nearly daily. Has been present since august    3. Nocturnal hypoxia  She is using only nighttime oxygen. Was unable to tolerate C Pap masks in the past. Has heard of new mask that she may be able to tolerate. Discussed referral to pulmonology. May be able to try the new CPAP    4. Morbid obesity with BMI of 40.0-44.9, adult (CMS-HCC)  She has been successfully losing weight.  She has been unable to add increased exercise due to her shortness of breath but has reduced portions.    5. Opacities of both lungs present on chest x-ray  This was discovered after the patient left. I believe the lung opacities represent some fluid from heart failure. Not able to adequately characterize at this time. I have called the patient with the results. I will begin spironolactone at low dose once daily. I have also discussed a cardiology referral.  Discussed this and findings.  Also stressed stopping the potassium with the addition of the spironolactone. Her last potassium level was 4.5 in September, on the higher side of normal anyway. With the ACE inhibitor and the spironolactone I feel the potassium would be unwise despite good kidney function.  - REFERRAL TO CARDIOLOGY      Patient Active Problem List    Diagnosis Date Noted   • DM type 2, goal HbA1c < 8% (CMS-HCC)    • Morbid obesity with BMI of 40.0-44.9, adult (CMS-HCC) 2017   • Fatty liver 2016   • Microalbuminuria 2016   • Bilateral incipient cataracts 2016   •  Mild persistent asthma, uncomplicated 03/03/2014   • Primary osteoarthritis of both shoulders    • Osteopenia    • Essential hypertension    • Dyslipidemia, goal LDL below 100    • Uncontrolled type 2 diabetes mellitus without complication, without long-term current use of insulin (CMS-[facility])    • Hypothyroidism due to non-medication exogenous substances    • Nocturnal hypoxia        Current medicines (including changes today)  Current Outpatient Prescriptions   Medication Sig Dispense Refill   • coenzyme Q-10 30 MG capsule Take 1 Cap by mouth every day. 30 Cap 11   • ascorbic acid (VITAMIN C) 500 MG tablet Take 1 Tab by mouth every day. 30 Tab 11   • Lactobacillus (DIGESTIVE HEALTH PROBIOTIC) Cap Take 1 Cap by mouth every day. 30 Cap 11   • Multiple Vitamins-Minerals (ULTRA WOMENS PACK) Misc Take 1 Each by mouth every day. 30 Each 11   • spironolactone (ALDACTONE) 25 MG Tab Take 1 Tab by mouth every day. 30 Tab 3   • atenolol (TENORMIN) 25 MG Tab TAKE 1 TABLET BY MOUTH EVERY DAY 90 Tab 3   • ibuprofen (MOTRIN) 800 MG Tab Take 1 Tab by mouth 2 times a day, with meals. 180 Tab 3   • simvastatin (ZOCOR) 5 MG Tab Take 1 Tab by mouth every evening. 90 Tab 3   • TRADJENTA 5 MG Tab tablet TAKE 1 TAB BY MOUTH EVERY DAY. 30 Tab 6   • glucose blood (FREESTYLE LITE) strip 1 Strip by Other route 2 Times a Day. 100 Strip 11   • FREESTYLE LANCETS Misc INJECT 1 EACH AS INSTRUCTED 2 TIMES A DAY. 100 Each 4   • metformin (GLUCOPHAGE) 500 MG Tab Take 1 Tab by mouth 2 times a day, with meals. 180 Tab 3   • benazepril (LOTENSIN) 5 MG Tab TAKE 1 TAB BY MOUTH EVERY DAY. 90 Tab 2   • furosemide (LASIX) 40 MG Tab TAKE 1 TABLET BY MOUTH EVERY DAY 90 Tab 3   • levothyroxine (SYNTHROID) 150 MCG Tab Take 1 Tab by mouth every day. 90 Tab 3   • Misc. Devices Misc This is an order for overnight pulse ox on room air.  Dx. Nocturnal hypoxia  G47.34, mild persistent asthma J45.30       SPENCER 99 months   NPI [id number] 1 Each 0   • albuterol (PROAIR  "HFA) 108 (90 BASE) MCG/ACT Aero Soln inhalation aerosol Inhale 2 Puffs by mouth every 6 hours as needed for Shortness of Breath. 8.5 g 3   • Misc. Devices Misc This is an order for overnight pulse oxygen study  Dx nocturnal hypoxia G47.34, please do the test on room air.       SPENCER 99 months   NPI [id number] 1 Each 0   • eucalyptus/peppermint oil (PONARIS NASAL) SOLN Spray 10 Drops in nose every 6 hours as needed. 1 Bottle 0   • Blood Glucose Monitoring Suppl SUPPLIES MISC Test strips order: Test strips for Abbott Freestyle Lite meter. Sig: use daily and prn 90 day supply ICD-9 code 250.02 150 Each 11   • Glucosamine-Chondroitin-Vit C 5010-7434-12 MG/30ML LIQD Take 30 mL by mouth every day. 1 Bottle 0     No current facility-administered medications for this visit.        Allergies   Allergen Reactions   • Asa [Aspirin]    • Cough Syrup M [Cough Cold-Flu Relief]    • Dextromethorphan Swelling       ROS: As per HPI       Objective:     Blood pressure 118/64, pulse 80, temperature 36.2 °C (97.2 °F), resp. rate 14, height 1.575 m (5' 2.01\"), weight 107 kg (236 lb), SpO2 96 %. Body mass index is 43.15 kg/m².    Physical Exam:  Constitutional: Well-developed and well-nourished. Not diaphoretic. No distress. Lucid and fluent. Short of breath walking or with rapid speech.  Skin: Skin is warm and dry. No rash noted.  Head: Atraumatic without lesions.  Eyes: Conjunctivae and extraocular motions are normal. Pupils are equal, round, and reactive to light. No scleral icterus.   Ears:  External ears unremarkable.   Nose: Nares patent.   Mouth/Throat: Tongue normal. Oropharynx is clear and moist. Posterior pharynx without erythema or exudates.  Neck: Supple, trachea midline. No thyromegaly present. No cervical or supraclavicular lymphadenopathy. No JVD or carotid bruits appreciated.  No retractions.  Cardiovascular: Regular rate and rhythm.  Normal S1, S2 without murmur appreciated.  Chest: Effort normal. Clear to auscultation " throughout. No adventitious sounds. No rales appreciated.  Sounds are distant in both bases.  Abdomen: Soft, non tender, and without distention. Active bowel sounds in all four quadrants. No rebound, guarding, masses or hepatosplenomegaly.  Extremities: No cyanosis, clubbing, erythema, nor edema. No pitting edema appreciated.  Neurological: Alert and oriented x 3.  No cranial nerve deficit.   Psychiatric:  Behavior, mood, and affect are appropriate.    EKG Interpretation-HR is 71, normal.  there are no previous tracings available for comparison, normal sinus rhythm, left axis deviation, probable RVH, no ischemic changes or Q waves appreciated.  Otherwise normal.         Assessment and Plan:     72 y.o. female with the following issues:    1. Exertional shortness of breath  DX-CHEST-2 VIEWS    CARDIAC STRESS TEST TREADMILL ONLY    EKG - Clinic performed    REFERRAL TO PULMONOLOGY    REFERRAL TO CARDIOLOGY    spironolactone (ALDACTONE) 25 MG Tab   2. Chest tightness or pressure  DX-CHEST-2 VIEWS    CARDIAC STRESS TEST TREADMILL ONLY    EKG - Clinic performed    REFERRAL TO CARDIOLOGY   3. Nocturnal hypoxia  REFERRAL TO PULMONOLOGY   4. Morbid obesity with BMI of 40.0-44.9, adult (CMS-[facility])  Patient identified as having weight management issue.  Appropriate orders and counseling given.   5. Opacities of both lungs present on chest x-ray  REFERRAL TO CARDIOLOGY    spironolactone (ALDACTONE) 25 MG Tab         Followup: Return in about 6 weeks (around 12/29/2017), or if symptoms worsen or fail to improve.     Addendum after patient left; she went and had the chest x-ray done which shows some fluffy infiltrates consistent with diffuse edema. I believe I see Kerley  B-mary.  Will call the patient and add spironolactone.  Treadmill is ordered but will ask her to see cardiology as well.

## 2017-11-27 ENCOUNTER — TELEPHONE (OUTPATIENT)
Dept: CARDIOLOGY | Facility: MEDICAL CENTER | Age: 72
End: 2017-11-27

## 2017-11-29 ENCOUNTER — OFFICE VISIT (OUTPATIENT)
Dept: CARDIOLOGY | Facility: MEDICAL CENTER | Age: 72
End: 2017-11-29
Payer: MEDICARE

## 2017-11-29 VITALS
WEIGHT: 238 LBS | OXYGEN SATURATION: 92 % | HEART RATE: 80 BPM | SYSTOLIC BLOOD PRESSURE: 120 MMHG | BODY MASS INDEX: 43.79 KG/M2 | HEIGHT: 62 IN | DIASTOLIC BLOOD PRESSURE: 74 MMHG

## 2017-11-29 DIAGNOSIS — E66.01 MORBID OBESITY WITH BMI OF 40.0-44.9, ADULT (HCC): ICD-10-CM

## 2017-11-29 DIAGNOSIS — I10 ESSENTIAL HYPERTENSION: ICD-10-CM

## 2017-11-29 DIAGNOSIS — I50.32 CHRONIC DIASTOLIC CONGESTIVE HEART FAILURE (HCC): ICD-10-CM

## 2017-11-29 DIAGNOSIS — E78.5 DYSLIPIDEMIA, GOAL LDL BELOW 100: ICD-10-CM

## 2017-11-29 DIAGNOSIS — R06.09 DYSPNEA ON EXERTION: ICD-10-CM

## 2017-11-29 DIAGNOSIS — C73 PRIMARY THYROID CANCER (HCC): ICD-10-CM

## 2017-11-29 DIAGNOSIS — I20.89 ANGINA OF EFFORT: ICD-10-CM

## 2017-11-29 LAB — EKG IMPRESSION: NORMAL

## 2017-11-29 PROCEDURE — 99205 OFFICE O/P NEW HI 60 MIN: CPT | Mod: 25 | Performed by: INTERNAL MEDICINE

## 2017-11-29 PROCEDURE — 93000 ELECTROCARDIOGRAM COMPLETE: CPT | Performed by: INTERNAL MEDICINE

## 2017-11-29 RX ORDER — BISOPROLOL FUMARATE 5 MG/1
5 TABLET, FILM COATED ORAL DAILY
Qty: 30 TAB | Refills: 1 | Status: SHIPPED | OUTPATIENT
Start: 2017-11-29 | End: 2018-01-23 | Stop reason: SDUPTHER

## 2017-11-29 RX ORDER — POTASSIUM CHLORIDE 20 MEQ/1
TABLET, EXTENDED RELEASE ORAL
COMMUNITY
Start: 2017-09-27 | End: 2017-11-29

## 2017-11-29 RX ORDER — NITROGLYCERIN 0.4 MG/1
0.4 TABLET SUBLINGUAL PRN
Qty: 25 TAB | Refills: 1 | Status: SHIPPED | OUTPATIENT
Start: 2017-11-29 | End: 2018-08-22

## 2017-11-29 RX ORDER — ACETAMINOPHEN 500 MG
500 TABLET ORAL EVERY 6 HOURS PRN
Status: DISCONTINUED | OUTPATIENT
Start: 2017-11-29 | End: 2019-03-11

## 2017-11-29 ASSESSMENT — ENCOUNTER SYMPTOMS
PARESTHESIAS: 0
DYSPNEA ON EXERTION: 1
LEFT EYE: 0
NUMBNESS: 0
FOCAL WEAKNESS: 0
CLAUDICATION: 0
SYNCOPE: 0
ORTHOPNEA: 0
MYALGIAS: 0
PALPITATIONS: 1
LIGHT-HEADEDNESS: 0
DOUBLE VISION: 0
NIGHT SWEATS: 0
COUGH: 1
HEMATEMESIS: 0
FEVER: 0
IRREGULAR HEARTBEAT: 0
RIGHT EYE: 0
CHILLS: 0
HEMATOCHEZIA: 0

## 2017-11-29 NOTE — PROGRESS NOTES
Cardiology Initial Consultation Note    Date of note:    11/29/2017    Primary Care Provider: Chapito Marques M.D.  Referring Provider: Chapito Marques M.D.    Patient Name: Ragini Reaves   YOB: 1945  MRN:              [id number]    Chief Complaint: shortness of breath.     History of Present Illness: Ragini Reaves is a 72 y.o. female whose current medical problems include NIDDM, YASMANI on home O2 at night, hypothyroidism, hypertension, dyslipidemia who is here for cardiac consultation for shortness of breath.     In July she was walking 15 minutes without stopping with friends, about half a mild.  Starting august she started getting short of breath during these walks, and she had to stop last month due to SOB.  She now gets dyspneic even walking across the room. She also has mild chest pressure which also started around august.     She does take 800mg twice a day of motrin for years for MSK pain    She has been on lasix 40mg PO daily for years. Of note she is very claustrophobic. She does have a history of phen phen use in the 1990s for 4-5 months.      She has lost over 50 pounds intentionally, and plans to lose more.       Review of Systems   Constitution: Negative for chills, fever and night sweats.   Eyes: Negative for double vision, vision loss in left eye and vision loss in right eye.   Cardiovascular: Positive for dyspnea on exertion and palpitations (rare, self limited since she was a child). Negative for chest pain, claudication, cyanosis, irregular heartbeat, leg swelling, orthopnea and syncope.   Respiratory: Positive for cough (dry).    Skin: Positive for rash (slight redness on forearms. ).   Musculoskeletal: Negative for myalgias.   Gastrointestinal: Negative for hematemesis, hematochezia and melena.   Genitourinary: Positive for non-menstrual bleeding (from fibroids, severe bleeding 2002, not removed.).   Neurological: Negative for focal weakness,  light-headedness, numbness and paresthesias.       All other systems reviewed by comprehensive questionnaire and are negative.       Past Medical History:   Diagnosis Date   • Cataract immature 7/2012    Dr. Carey   • DM type 2, goal HbA1c < 8% (CMS-HCC)    • Dyslipidemia, goal LDL below 130    • Hypertension    • Hypothyroidism    • Mild intermittent asthma 2004    perfumes or smoke triggers   • Nocturnal hypoxemia     on 2.5 liter nightly   • Osteoarthritis    • Osteopenia    • Papillary carcinoma of thyroid (CMS-HCC) 6/2000   • Type II or unspecified type diabetes mellitus without mention of complication, uncontrolled    • Uterine fibroid          Past Surgical History:   Procedure Laterality Date   • COLONOSCOPY WITH BIOPSY  5/21/2013    Performed by Mauro Garcia M.D. at ENDOSCOPY [facility] ORS   • OTHER      THYROIDECTOMY  2009         Current Outpatient Prescriptions   Medication Sig Dispense Refill   • coenzyme Q-10 30 MG capsule Take 1 Cap by mouth every day. 30 Cap 11   • ascorbic acid (VITAMIN C) 500 MG tablet Take 1 Tab by mouth every day. 30 Tab 11   • Lactobacillus (DIGESTIVE HEALTH PROBIOTIC) Cap Take 1 Cap by mouth every day. 30 Cap 11   • Multiple Vitamins-Minerals (ULTRA WOMENS PACK) Misc Take 1 Each by mouth every day. 30 Each 11   • spironolactone (ALDACTONE) 25 MG Tab Take 1 Tab by mouth every day. 30 Tab 3   • atenolol (TENORMIN) 25 MG Tab TAKE 1 TABLET BY MOUTH EVERY DAY 90 Tab 3   • ibuprofen (MOTRIN) 800 MG Tab Take 1 Tab by mouth 2 times a day, with meals. 180 Tab 3   • simvastatin (ZOCOR) 5 MG Tab Take 1 Tab by mouth every evening. 90 Tab 3   • TRADJENTA 5 MG Tab tablet TAKE 1 TAB BY MOUTH EVERY DAY. 30 Tab 6   • glucose blood (FREESTYLE LITE) strip 1 Strip by Other route 2 Times a Day. 100 Strip 11   • FREESTYLE LANCETS Misc INJECT 1 EACH AS INSTRUCTED 2 TIMES A DAY. 100 Each 4   • metformin (GLUCOPHAGE) 500 MG Tab Take 1 Tab by mouth 2 times a day, with meals. 180 Tab 3   •  benazepril (LOTENSIN) 5 MG Tab TAKE 1 TAB BY MOUTH EVERY DAY. 90 Tab 2   • furosemide (LASIX) 40 MG Tab TAKE 1 TABLET BY MOUTH EVERY DAY 90 Tab 3   • levothyroxine (SYNTHROID) 150 MCG Tab Take 1 Tab by mouth every day. 90 Tab 3   • Misc. Devices Misc This is an order for overnight pulse ox on room air.  Dx. Nocturnal hypoxia  G47.34, mild persistent asthma J45.30       SPENCER 99 months   NPI [id number] 1 Each 0   • albuterol (PROAIR HFA) 108 (90 BASE) MCG/ACT Aero Soln inhalation aerosol Inhale 2 Puffs by mouth every 6 hours as needed for Shortness of Breath. 8.5 g 3   • Misc. Devices Misc This is an order for overnight pulse oxygen study  Dx nocturnal hypoxia G47.34, please do the test on room air.       SPENCER 99 months   NPI [id number] 1 Each 0   • eucalyptus/peppermint oil (PONARIS NASAL) SOLN Spray 10 Drops in nose every 6 hours as needed. 1 Bottle 0   • Blood Glucose Monitoring Suppl SUPPLIES MISC Test strips order: Test strips for Abbott Freestyle Lite meter. Sig: use daily and prn 90 day supply ICD-9 code 250.02 150 Each 11   • Glucosamine-Chondroitin-Vit C 8401-6516-07 MG/30ML LIQD Take 30 mL by mouth every day. 1 Bottle 0     No current facility-administered medications for this visit.          Allergies   Allergen Reactions   • Asa [Aspirin]    • Cough Syrup M [Cough Cold-Flu Relief]    • Dextromethorphan Swelling         Family History   Problem Relation Age of Onset   • Cancer Mother      pelvic   • Lung Disease Brother      Weggeners, fibrosis   No FH of CAD.       Social History     Social History   • Marital status: Single     Spouse name: N/A   • Number of children: N/A   • Years of education: N/A     Occupational History   • Not on file.     Social History Main Topics   • Smoking status: Never Smoker   • Smokeless tobacco: Never Used   • Alcohol use No   • Drug use: No   • Sexual activity: No     Other Topics Concern   • Not on file     Social History Narrative   • No narrative on file         Physical  "Exam:  Ambulatory Vitals  Blood pressure 120/74, pulse 80, height 1.575 m (5' 2\"), weight 108 kg (238 lb), SpO2 92 %.   Oxygen Therapy:  Pulse Oximetry: 92 %  BP Readings from Last 4 Encounters:   11/29/17 120/74   11/17/17 118/64   09/11/17 120/60   05/09/17 128/80       Weight/BMI: Body mass index is 43.53 kg/m².  Wt Readings from Last 4 Encounters:   11/29/17 108 kg (238 lb)   11/17/17 107 kg (236 lb)   09/11/17 108.4 kg (239 lb)   05/09/17 109.8 kg (242 lb)       General: Well appearing and in no apparent distress  Eyes: nl conjunctiva  ENT: OP clear  Neck: JVP 6-7 cm H2O, no carotid bruits  Lungs: normal respiratory effort, CTAB  Heart: RRR, no murmurs, no rubs or gallops, trace edema bilateral lower extremities. No LV/RV heave on cardiac palpatation. 2+ bilateral radial pulses.  2+ bilateral dp pulses.   Abdomen: soft, non tender, non distended, no masses, normal bowel sounds.  No HSM. Severe central obesity  Extremities/MSK: no clubbing, no cyanosis  Neurological: No focal sensory deficits  Psychiatric: Appropriate affect, A/O x 3  Skin: Warm extremities      Lab Data Review:  Lab Results   Component Value Date/Time    CHOLSTRLTOT 149 09/06/2017 08:22 AM    LDL 73 09/06/2017 08:22 AM    HDL 43 09/06/2017 08:22 AM    TRIGLYCERIDE 167 (H) 09/06/2017 08:22 AM       Lab Results   Component Value Date/Time    SODIUM 142 09/06/2017 08:22 AM    POTASSIUM 4.5 09/06/2017 08:22 AM    CHLORIDE 102 09/06/2017 08:22 AM    CO2 23 09/06/2017 08:22 AM    GLUCOSE 162 (H) 09/06/2017 08:22 AM    BUN 31 (H) 09/06/2017 08:22 AM    CREATININE 0.66 09/06/2017 08:22 AM    BUNCREATRAT 47 (H) 09/06/2017 08:22 AM     CrCl cannot be calculated (Patient's most recent lab result is older than the maximum 7 days allowed.).  Lab Results   Component Value Date/Time    ALKPHOSPHAT 95 09/06/2017 08:22 AM    ASTSGOT 40 09/06/2017 08:22 AM    ALTSGPT 55 (H) 09/06/2017 08:22 AM    TBILIRUBIN 0.6 09/06/2017 08:22 AM      Lab Results   Component " Value Date/Time    WBC 8.0 2015 08:37 AM    WBC 8.0 2005 07:00 PM     Lab Results   Component Value Date/Time    HBA1C 7.3 (H) 2017 08:22 AM    HBA1C 8.7 (H) 2015 07:49 AM     No components found for: CHAPARRITA      Cardiac Imaging and Procedures Review:    EKG dated 17 : My personal interpretation is NSR.     Radiology test Review:  CXR:   FINDINGS:  The heart is normal in size.  Moderate diffuse interstitial opacities.  No airspace consolidation.  No pleural effusions are appreciated.  Sternotomy wires demonstrated.        Medical Decision Makin. Angina of effort (CMS-HCC)  High risk for CAD and now with typical angina.   - start aspirin 81mg PO daily, will need PPI if stomach discomfort  - no active ischemia on EKG today, discussed at length LHC vs stress testing, patient prefers medical management and stress testing at this point  -continue statin, uptitrate if positive  -DC atenolol, start bisoprolol 5mg PO daily  -ntg prn  -ED precautions discussed at length.       2. Uncontrolled type 2 diabetes mellitus without complication, without long-term current use of insulin (CMS-HCC)  Per Dr. Marques, last hgbA1c 7.3, improving with diet control    3. Essential hypertension  Well controlled  -continue spironolactone, bisoprolol, lasix, benazepril    4. Dyslipidemia, goal LDL below 100  Simvastatin, will increase if dx CAD    5. Morbid obesity with BMI of 40.0-44.9, adult (CMS-HCC)  Working on weight loss  -consider weight management referral next visit    6. Dyspnea on exertion  Valvular disease vs angina equivalent vs right heart failure    7. Primary thyroid cancer (CMS-HCC)  Resected with no e/o recurrence. This is why she has sternal wires    8. Chronic diastolic heart failure  -patient has been on lasix stably for years, assumed HFpEF  -echo, r/o ischemia as per above.     Return in about 2 months (around 2018).      Frandy Pereira MD  [facility]  [facility] [address] [facility]  [address] [address]  Phone: [phone]  Fax: [phone]

## 2017-11-29 NOTE — PATIENT INSTRUCTIONS
Please start aspirin 81mg by mouth daily, this is a necessary medication for your heart condition.     Please stop motrin/ibuprofen as this may cause stomach bleeding or kidney failure.     Stop atenolol, and start bisoprolol 5mg by mouth daily.     Please start tylenol 500-1000mg (1-2 tablets) every 6 hours as needed for pain.  This is a necessary medication for your condition as a replacement for your ibuprofen.  Consider tramadol from your PCP if you need further pain control.     Please call our office if you experience shortness of breath, decreased ability to exercise, or start having chest pain which goes away with rest or use of nitroglycerin.  If you experience chest pain that does not resolve with nitroglycerin and/or rest, please call 911 for emergency evaluation.

## 2017-11-29 NOTE — LETTER
Frandy Pereira MD  [facility]  [facility] [address]  [address], [place] [address]  [place] [address]  Phone: [phone]  Fax: [phone]             Dear Chapito Marques M.D.,    I had the pleasure of seeing your patient Ragini Reaves ( - 1945) today in clinic for shortness of breath and chest pain.  As you may recall, she is a pleasant 72 y.o. woman whose current medical problems include NIDDM, YASMANI on home O2 at night, hypothyroidism, hypertension, obesity, and dyslipidemia.  As you were, I am worried her symptoms are anginal in nature.  I have started her on aspirin 81mg PO daily, and asked her to let me know if she has stomach discomfort (why she has a listed allergy) as she can then start on a ppi.  I have asked her to stop her motrin due to the risk of GI bleeding with this medication and aspirin, and suggested she use tylenol and possibly tramadol instead.  I have switched her atenolol to bisoprolol 5mg PO daily.  I have ordered an echocardiogram as well as a stress test to evaluate her for coronary artery disease.  I have started her on nitroglycerin as needed for symptom control.  I have not changed her lasix dose as she appeared euvolemic today.  I will let you know what I find with the above test results, and will likely have further recommendations at that time.  Thank you for your consultation, and I look forward to providing your patients with excellent cardiovascular care.  Please feel free to contact me at any time if you have any questions.      Alonso,  Frandy Pereira MD  [facility]

## 2017-12-08 ENCOUNTER — PATIENT OUTREACH (OUTPATIENT)
Dept: HEALTH INFORMATION MANAGEMENT | Facility: OTHER | Age: 72
End: 2017-12-08

## 2017-12-09 NOTE — PROGRESS NOTES
1. Attempt #: 1    2. HealthConnect Verified: yes    3. Verify PCP: yes    4. Care Team Updated:       •   DME Company (gait device, O2, CPAP, etc.): NO/UNABLE TO ADDRESS       •   Other Specialists (eye doctor, derm, GYN, cardiology, endo, etc): YES    5.  Reviewed/Updated the following with patient:       •   Communication Preference Obtained? YES       •   Preferred Pharmacy? YES       •   Preferred Lab? YES       •   Family History (document living status of immediate family members and if + hx of cancer, diabetes, hypertension, hyperlipidemia, heart attack, stroke) YES. Was Abstract Encounter opened and chart updated? YES    6. [employer] Activation: declined    7. [employer] Wil: no    8. Annual Wellness Visit Scheduling  Scheduling Status:Scheduled      9. Care Gap Scheduling (Attempt to Schedule EACH Overdue Care Gap!)     Health Maintenance Due   Topic Date Due   • Annual Wellness Visit  07/29/2016        Scheduled patient for Annual Wellness Visit      10. Patient was advised: “This is a free wellness visit. The provider will screen for medical conditions to help you stay healthy. If you have other concerns to address you may be asked to discuss these at a separate visit or there may be an additional fee.”     11. Patient was informed to arrive 15 min prior to their scheduled appointment and bring in their medication bottles.

## 2017-12-11 ENCOUNTER — HOSPITAL ENCOUNTER (OUTPATIENT)
Dept: CARDIOLOGY | Facility: MEDICAL CENTER | Age: 72
End: 2017-12-11
Attending: INTERNAL MEDICINE
Payer: MEDICARE

## 2017-12-11 DIAGNOSIS — R06.09 DYSPNEA ON EXERTION: ICD-10-CM

## 2017-12-11 PROCEDURE — 93306 TTE W/DOPPLER COMPLETE: CPT | Mod: 26 | Performed by: INTERNAL MEDICINE

## 2017-12-11 PROCEDURE — 93306 TTE W/DOPPLER COMPLETE: CPT

## 2017-12-12 ENCOUNTER — OFFICE VISIT (OUTPATIENT)
Dept: PULMONOLOGY | Facility: HOSPICE | Age: 72
End: 2017-12-12
Payer: MEDICARE

## 2017-12-12 VITALS
SYSTOLIC BLOOD PRESSURE: 122 MMHG | BODY MASS INDEX: 42.69 KG/M2 | HEIGHT: 62 IN | TEMPERATURE: 97.5 F | HEART RATE: 80 BPM | RESPIRATION RATE: 16 BRPM | DIASTOLIC BLOOD PRESSURE: 54 MMHG | OXYGEN SATURATION: 96 % | WEIGHT: 232 LBS

## 2017-12-12 DIAGNOSIS — G47.33 OSA (OBSTRUCTIVE SLEEP APNEA): ICD-10-CM

## 2017-12-12 DIAGNOSIS — J45.30 MILD PERSISTENT ASTHMA, UNCOMPLICATED: ICD-10-CM

## 2017-12-12 DIAGNOSIS — R06.00 DYSPNEA, UNSPECIFIED TYPE: ICD-10-CM

## 2017-12-12 LAB
LV EJECT FRACT  99904: 70
LV EJECT FRACT MOD 2C 99903: 72.61
LV EJECT FRACT MOD 4C 99902: 81.34
LV EJECT FRACT MOD BP 99901: 78.25

## 2017-12-12 PROCEDURE — 99204 OFFICE O/P NEW MOD 45 MIN: CPT | Performed by: INTERNAL MEDICINE

## 2017-12-12 RX ORDER — ATENOLOL 25 MG/1
TABLET ORAL
COMMUNITY
Start: 2017-10-16 | End: 2018-01-12

## 2017-12-12 RX ORDER — ZOLPIDEM TARTRATE 5 MG/1
5 TABLET ORAL NIGHTLY PRN
Qty: 3 TAB | Refills: 0 | Status: SHIPPED | OUTPATIENT
Start: 2017-12-12 | End: 2018-01-12

## 2017-12-12 RX ORDER — IBUPROFEN 800 MG/1
TABLET ORAL
COMMUNITY
Start: 2017-10-16 | End: 2018-01-12

## 2017-12-13 ENCOUNTER — TELEPHONE (OUTPATIENT)
Dept: CARDIOLOGY | Facility: MEDICAL CENTER | Age: 72
End: 2017-12-13

## 2017-12-13 ENCOUNTER — APPOINTMENT (OUTPATIENT)
Dept: RADIOLOGY | Facility: MEDICAL CENTER | Age: 72
End: 2017-12-13
Attending: INTERNAL MEDICINE
Payer: MEDICARE

## 2017-12-13 DIAGNOSIS — I20.89 ANGINA OF EFFORT: ICD-10-CM

## 2017-12-13 PROCEDURE — 700111 HCHG RX REV CODE 636 W/ 250 OVERRIDE (IP)

## 2017-12-13 PROCEDURE — A9555 RB82 RUBIDIUM: HCPCS

## 2017-12-13 NOTE — TELEPHONE ENCOUNTER
ISI Ortega/Lyssa     Patient is returning your call from today, 12/13 and can be reached at [phone].        ==============================================================================    S/w pt, discussed Echo results per Dr Pereira, pt verbalizes understanding

## 2017-12-13 NOTE — PROGRESS NOTES
Chief Complaint   Patient presents with   • New Patient     Shortness of breath, nocturnal hypoxemia       HPI:  The patient is a 72-year-old woman with a history of increasing dyspnea on exertion over the past 6 months. She does not give a history of chest pain or angina. She is currently being evaluated by cardiology. She has history of mild asthma and uses pro-air as a rescue inhaler. She is not on any chronic asthma medication. She had pulmonary function testing in 2007 which was normal. She says she gets short of breath with walking up stairs and minimal activity. She says this is a distinct change over the past 6 months. She feels that her shortness of breath is not similar to her episodes of asthma that she experienced in the past. She does complain of an occasional nonproductive cough. She has no history of any significant industrial exposures. She has no history of pulmonary embolism or DVT. She did use Fen/Phen for short period of time in the 1990s. She tells me that she had a brother that was diagnosed with Wegener's granulomatosis. She had a sleep study in 1992. At that time she was told that she did not have obstructive sleep apnea. However she did have evidence of nocturnal hypoxemia and has been on supplemental oxygen at 3 L/m since that time. A recent echocardiogram shows no evidence of pulmonary hypertension. Her left ventricular function is good.    Past Medical History:   Diagnosis Date   • Cataract immature 7/2012    Dr. Carey   • Chickenpox    • DM type 2, goal HbA1c < 8% (CMS-HCC)    • Dyslipidemia, goal LDL below 130    • Frequent urination    • Hypertension    • Hypothyroidism    • Mild intermittent asthma 2004    perfumes or smoke triggers   • Nocturnal hypoxemia     on 2.5 liter nightly   • Obesity    • Osteoarthritis    • Osteopenia    • Papillary carcinoma of thyroid (CMS-[facility]) 6/2000   • Ringing in ears    • Shortness of breath    • Sleep apnea    • Tonsillitis    • Toothache    • Type  II or unspecified type diabetes mellitus without mention of complication, uncontrolled    • Uterine fibroid    • Wears glasses        ROS:   Constitutional: Denies fevers, chills, night sweats, fatigue or weight loss  Eyes: Denies vision loss, pain, drainage, double vision  Ears, Nose, Throat: Denies earache, tinnitus, hoarseness  Cardiovascular: Denies chest pain, tightness, palpitations  Respiratory:See history of present illness  Sleep: See history of present illness  GI: Denies abdominal pain, nausea, vomiting, diarrhea  : Denies frequent urination, hematuria, painful urination  Musculoskeletal: Denies back pain, painful joints, sore muscles  Neurological: Denies headaches, seizures  Skin: Denies rashes, color changes  Psychiatric: Denies depression or thoughts of suicide  Hematologic: Denies bleeding tendency or clotting tendency  Allergic/Immunologic: Denies rhinitis, skin sensitivity    Social History     Social History   • Marital status: Single     Spouse name: N/A   • Number of children: N/A   • Years of education: N/A     Occupational History   • Not on file.     Social History Main Topics   • Smoking status: Never Smoker   • Smokeless tobacco: Never Used   • Alcohol use No   • Drug use: No   • Sexual activity: No     Other Topics Concern   • Not on file     Social History Narrative   • No narrative on file     Cough syrup m [cough cold-flu relief]; Dextromethorphan; and Asa [aspirin]  Current Outpatient Prescriptions on File Prior to Visit   Medication Sig Dispense Refill   • aspirin 81 MG tablet Take 1 Tab by mouth every day. 90 Tab 3   • bisoprolol (ZEBETA) 5 MG Tab Take 1 Tab by mouth every day. 30 Tab 1   • nitroglycerin (NITROSTAT) 0.4 MG SL Tab Place 1 Tab under tongue as needed for Chest Pain. 25 Tab 1   • coenzyme Q-10 30 MG capsule Take 1 Cap by mouth every day. 30 Cap 11   • ascorbic acid (VITAMIN C) 500 MG tablet Take 1 Tab by mouth every day. 30 Tab 11   • Lactobacillus (DIGESTIVE HEALTH  PROBIOTIC) Cap Take 1 Cap by mouth every day. 30 Cap 11   • Multiple Vitamins-Minerals (ULTRA WOMENS PACK) Misc Take 1 Each by mouth every day. 30 Each 11   • spironolactone (ALDACTONE) 25 MG Tab Take 1 Tab by mouth every day. 30 Tab 3   • simvastatin (ZOCOR) 5 MG Tab Take 1 Tab by mouth every evening. 90 Tab 3   • TRADJENTA 5 MG Tab tablet TAKE 1 TAB BY MOUTH EVERY DAY. 30 Tab 6   • metformin (GLUCOPHAGE) 500 MG Tab Take 1 Tab by mouth 2 times a day, with meals. 180 Tab 3   • benazepril (LOTENSIN) 5 MG Tab TAKE 1 TAB BY MOUTH EVERY DAY. 90 Tab 2   • furosemide (LASIX) 40 MG Tab TAKE 1 TABLET BY MOUTH EVERY DAY 90 Tab 3   • levothyroxine (SYNTHROID) 150 MCG Tab Take 1 Tab by mouth every day. 90 Tab 3   • albuterol (PROAIR HFA) 108 (90 BASE) MCG/ACT Aero Soln inhalation aerosol Inhale 2 Puffs by mouth every 6 hours as needed for Shortness of Breath. 8.5 g 3   • eucalyptus/peppermint oil (PONARIS NASAL) SOLN Spray 10 Drops in nose every 6 hours as needed. 1 Bottle 0   • Glucosamine-Chondroitin-Vit C 9829-5941-89 MG/30ML LIQD Take 30 mL by mouth every day. 1 Bottle 0   • glucose blood (FREESTYLE LITE) strip 1 Strip by Other route 2 Times a Day. 100 Strip 11   • FREESTYLE LANCETS Misc INJECT 1 EACH AS INSTRUCTED 2 TIMES A DAY. 100 Each 4   • Misc. Devices Misc This is an order for overnight pulse ox on room air.  Dx. Nocturnal hypoxia  G47.34, mild persistent asthma J45.30       SPENCER 99 months   NPI [id number] 1 Each 0   • Misc. Devices Misc This is an order for overnight pulse oxygen study  Dx nocturnal hypoxia G47.34, please do the test on room air.       SPENCER 99 months   NPI [id number] 1 Each 0   • Blood Glucose Monitoring Suppl SUPPLIES MISC Test strips order: Test strips for Abbott Freestyle Lite meter. Sig: use daily and prn 90 day supply ICD-9 code 250.02 150 Each 11     Current Facility-Administered Medications on File Prior to Visit   Medication Dose Route Frequency Provider Last Rate Last Dose   • acetaminophen  "(TYLENOL) tablet 500 mg  500 mg Oral Q6HRS PRN Frandy Pereira M.D.         Blood pressure 122/54, pulse 80, temperature 36.4 °C (97.5 °F), resp. rate 16, height 1.575 m (5' 2\"), weight 105.2 kg (232 lb), SpO2 96 %.  Family History   Problem Relation Age of Onset   • Lung Disease Brother    • Cancer Mother      pelvic   • Stroke Maternal Grandmother        Physical Exam:  No distress at rest. BMI 42.43  HEENT: PERRLA, EOMI, no scleral icterus, no nasal or oral lesions  Neck: No thyromegaly, no adenopathy, no bruits  Mallampatti: Grade III  Lungs: Equal breath sounds, no wheezes or crackles  Heart: Regular rate and rhythm, no gallops or murmurs  Abdomen: Soft, benign, no organomegaly  Extremities: No clubbing, cyanosis, or edema  Neurologic: Cranial nerve, motor, and sensory exam are normal    1. Dyspnea, unspecified type    2. YASMANI (obstructive sleep apnea)    3. Mild persistent asthma, uncomplicated        We will repeat pulmonary function testing to see if there has been any significant change since her prior study in 2007.  We will also arrange for overnight polysomnography since she does have a history of possible obstructive sleep apnea.  We will provide her with Ambien 5 mg, 3 tabs to bring to her sleep study.  She will continue to use albuterol as rescue inhaler. We will not add any other medications pending her repeat PFTs  She is finishing a cardiac evaluation with an echocardiogram and cardiac PET scan  We will see her back in about 6 weeks with pulmonary function tests  "

## 2017-12-13 NOTE — PATIENT INSTRUCTIONS
We will repeat pulmonary function testing to see if there has been any significant change since her prior study in 2007.  We will also arrange for overnight polysomnography since she does have a history of possible obstructive sleep apnea.  We will provide her with Ambien 5 mg, 3 tabs to bring to her sleep study.  She will continue to use albuterol as rescue inhaler. We will not add any other medications pending her repeat PFTs  She is finishing a cardiac evaluation with an echocardiogram and cardiac PET scan  We will see her back in about 6 weeks with pulmonary function tests

## 2017-12-13 NOTE — TELEPHONE ENCOUNTER
----- Message from Frandy Pereira M.D. sent at 12/13/2017  8:39 AM PST -----  The patient's echocardiogram appears completely normal except for mild regurgitation of her aortic valve.  Please call her with the results and confirm they have no further questions.  If they have other results pending, let them know we will contact them when they are available. Thank you!  -Dr. Pereira

## 2017-12-14 NOTE — PROCEDURES
REFERRING PHYSICIAN:  Dr. Frandy Pereira    AGE: 72.  GENDER:  Female.  HEIGHT:  5 feet, 2 inches.  WEIGHT:  238 pounds.    INDICATIONS:  Chest pain.    PROCEDURE:  The patient reviewed and signed the acknowledgement for testing   form.  The patient was in a fasting state and was properly prepared for   testing.  An intravenous line was inserted and a flush of normal saline   followed to insure line patency.    A transmission scan was acquired for attenuation correction using the internal   Germanium sources.  The patient was then administered 30.00 mCi of   Rubidium-82.  Approximately 90 seconds after the infusion, resting imagine   were obtained with ECG-gating.  Following the resting series, the patient   administered _____ mg of adenosine over four minutes.  The blood pressure,   heart rate and ECG were monitored and recorded.  After the dipyridamole   infusion was completed, another transmission scan for attenuation correction   was obtained.  The patient was then administered 30.00 mCi of Rubidium-82.    Approximately 90 seconds after the infusion, Peak stress images were obtained   with ECG-gating.    CLINICAL RESPONSE:  Resting blood pressure was 102/60 mmHg with a heart rate   of 84 beats per minute.  Immediately post-dipyridamole infusion the blood   pressure was 87/59 mmHg with a heart rate of 101 beats per minute.  After a   recovery period the blood pressure was 93/53 mmHg with a heart rate of 80   beats per minute.    The patient experienced shortness of breath symptoms during testing.    Aminophylline 0 mg was administered following the scan.    ELECTROCARDIOGRAPHIC FINDINGS:  Rest EKG showed sinus rhythm with RSR prime of   lead V1.  Stress EKG shows no significant ST segment changes, occasional PVCs   noted.    SCINTOGRAPHIC FINDINGS:  Rest and stress images shows no significant   attenuation.    GATED WALL MOTION FINDINGS:  Resting ejection fraction 84% and stress ejection   fraction 91%.  Gated  images showed no significant wall motion abnormalities.    CONCLUSIONS AND IMPRESSIONS:  1.  Negative cardiac PET scan.  No evidence of ischemia or infarction.  2.  Resting ejection fraction of 84%, stress ejection fraction of 91%.  No   segmental wall motion abnormalities noted.  3.  No significant ST segment changes.  Occasional PVCs noted.  4.  Shortness of breath was experienced in this study.       ____________________________________     MD DAVID SANCHEZ / DIVYA    DD:  12/13/2017 14:39:38  DT:  12/13/2017 16:01:43    D#:  [id number]  Job#:  [id number]

## 2017-12-15 ENCOUNTER — TELEPHONE (OUTPATIENT)
Dept: CARDIOLOGY | Facility: MEDICAL CENTER | Age: 72
End: 2017-12-15

## 2017-12-15 NOTE — TELEPHONE ENCOUNTER
----- Message from Frandy Pereira M.D. sent at 12/14/2017  1:26 PM PST -----  The patient's stress test was completely normal, please call her with the results and confirm they have no further questions.  If they have other results pending, let them know we will contact them when they are available.  I will see her in February for follow-up as scheduled. Thank you!  -Dr. Pereira

## 2017-12-16 ENCOUNTER — SLEEP STUDY (OUTPATIENT)
Dept: SLEEP MEDICINE | Facility: MEDICAL CENTER | Age: 72
End: 2017-12-16
Attending: INTERNAL MEDICINE
Payer: MEDICARE

## 2017-12-16 DIAGNOSIS — G47.33 OSA (OBSTRUCTIVE SLEEP APNEA): ICD-10-CM

## 2017-12-16 PROCEDURE — 95811 POLYSOM 6/>YRS CPAP 4/> PARM: CPT | Performed by: INTERNAL MEDICINE

## 2017-12-19 ENCOUNTER — TELEPHONE (OUTPATIENT)
Dept: MEDICAL GROUP | Facility: CLINIC | Age: 72
End: 2017-12-19

## 2017-12-19 NOTE — PROCEDURES
CLINICAL COMMENTS:  The patient underwent a split night polysomnogram with a CPAP titration using the standard montage for measurement of parameters of sleep, respiratory events, movement abnormalities, heart rate and rhythm. A microphone was used to monitor snoring.    ANALYSIS: Testing began at 8:16 PM.  The diagnostic recording time was 178.6 minutes with a sleep period of 159.8 minutes.  Total sleep time was 85.5 minutes with a sleep efficiency of 47.9%.  The sleep latency was 18.9 minutes, and REM latency was N/A minutes.  The patient had 27 arousals in total, for an arousal index of 18.9.        RESPIRATORY: The patient had 1 apneas in total.  Of these, 1 were obstructive apneas, and 0 were central apneas.  This resulted in an apnea index (AI) of 0.7.  The patient had 14 hypopneas in total, which resulted in a hypopnea index of 9.8.  The overall AHI was 10.5, while the AHI during REM was N/A.  The supine AHI = N/A.    OXIMETRY: Oxygen saturation monitoring showed a mean SpO2 of 92.8% for the diagnostic part of the study, with a minimum oxygen saturation of 87.0%.  Oxygen saturations were below 89% for 11.1% of sleep time.    CARDIAC: The highest heart rate for the first part of the study was 83.0 beats per minute.  The average heart rate during sleep was 70.4 bpm, while the highest heart rate was 79.0 bpm.    LIMB MOVEMENTS: There were a total of 15 periodic limb movements during sleep, of which 5 were PLMS arousals.  This resulted in a PLMS index of 10.5 and a PLMS arousal index of 3.5.    TREATMENT    Treatment recording time was 199.2 minutes with a total sleep time of 21.0min.  The patient had an arousal index of 0.0.      RESPIRATORY: The patient had 0 obstructive apneas, 0 central apneas, and 0 hypopneas for an overall AHI was 0.0.    OXIMETRY: The mean SpO2 during treatment was 91.4%, with a minimum oxygen saturation of 88.0%.        Interpretation:    Ms. Reaves presented with exertional dyspnea and  nocturnal hypoxemia. This is a split-night study.    In the diagnostic phase there is severe fragmentation of sleep with increased wake after sleep onset time and an elevated arousal and awakening index. No slow-wave sleep or REM sleep time was recorded. There are some periodic limb movements and the periodic limb movement with arousal index is 3.5 events per hour. The apnea hypopnea index is 10.5 events per hour, primarily including hypopnea events with a single obstructive apnea. That number may underestimate the severity of sleep-disordered breathing as no supine sleep or REM sleep time was recorded. The lowest arterial oxygen saturation was 87% on room air she spent about 15% of the diagnostic time the saturation below 90%.    In the treatment phase of the study there was a further deterioration in sleep continuity. Only 21 minutes of sleep time was recorded with wake after sleep onset time totaling more than 1.5 hours and sleep onset latency of about 1.5 hours. The periodic limb movements were reduced in frequency. CPAP was applied at 5 cm water pressure where the apnea hypopnea index was zero with a mean arterial oxygen saturation of 90% and a minimum saturation of 88% on room air. That step in the titration included 21 minutes of non-REM sleep time but did not include supine sleep time or REM sleep time.    Assessment:  Mild obstructive sleep apnea hypopnea with an apnea hypopnea index of 10.5 events per hour and a lowest arterial oxygen saturation of 87% on room air. Marked fragmentation of sleep related in part to laboratory and treatment effects but chronic insomnia might be considered. There did seem to be some response to CPAP therapy but the study was limited by the collection of only 21 minutes of sleep time in the treatment phase of the split-night study.    Recommendations:  Clinical correlation is required. If the patient presented with significant daytime somnolence or if the mild arterial oxygen  desaturation seen in this study is deemed clinically significant, treatment may be considered. Airway pressurization could be initiated through a titration polysomnogram with the use of auto titrating CPAP. Alternative treatments for sleep-disordered breathing include reconstructive otolaryngologic surgery, dental appliances and weight loss. If any these latter treatment options are selected, a follow-up polysomnogram is suggested to assess the efficacy of therapy. Behavioral measures including avoidance of sedatives, alcohol and supine body position may be of additional benefit.

## 2017-12-21 ENCOUNTER — OFFICE VISIT (OUTPATIENT)
Dept: MEDICAL GROUP | Facility: CLINIC | Age: 72
End: 2017-12-21
Payer: MEDICARE

## 2017-12-21 VITALS
BODY MASS INDEX: 44.37 KG/M2 | SYSTOLIC BLOOD PRESSURE: 155 MMHG | DIASTOLIC BLOOD PRESSURE: 60 MMHG | TEMPERATURE: 98 F | WEIGHT: 235 LBS | HEIGHT: 61 IN | RESPIRATION RATE: 16 BRPM | HEART RATE: 74 BPM | OXYGEN SATURATION: 94 %

## 2017-12-21 DIAGNOSIS — K76.0 FATTY LIVER: ICD-10-CM

## 2017-12-21 DIAGNOSIS — C73 PRIMARY THYROID CANCER (HCC): ICD-10-CM

## 2017-12-21 DIAGNOSIS — R80.9 MICROALBUMINURIA: ICD-10-CM

## 2017-12-21 DIAGNOSIS — H26.9 BILATERAL INCIPIENT CATARACTS: ICD-10-CM

## 2017-12-21 DIAGNOSIS — Z00.00 MEDICARE ANNUAL WELLNESS VISIT, SUBSEQUENT: ICD-10-CM

## 2017-12-21 DIAGNOSIS — M19.012 PRIMARY OSTEOARTHRITIS OF BOTH SHOULDERS: ICD-10-CM

## 2017-12-21 DIAGNOSIS — M19.011 PRIMARY OSTEOARTHRITIS OF BOTH SHOULDERS: ICD-10-CM

## 2017-12-21 DIAGNOSIS — I10 ESSENTIAL HYPERTENSION: ICD-10-CM

## 2017-12-21 DIAGNOSIS — E11.9 DM TYPE 2, GOAL HBA1C < 8% (HCC): ICD-10-CM

## 2017-12-21 DIAGNOSIS — E78.5 DYSLIPIDEMIA, GOAL LDL BELOW 100: ICD-10-CM

## 2017-12-21 DIAGNOSIS — E66.01 MORBID OBESITY WITH BMI OF 40.0-44.9, ADULT (HCC): ICD-10-CM

## 2017-12-21 DIAGNOSIS — R09.89 ABNORMAL PULMONARY FINDING: ICD-10-CM

## 2017-12-21 DIAGNOSIS — Z99.81 CHRONIC RESPIRATORY FAILURE WITH HYPOXIA, ON HOME O2 THERAPY (HCC): ICD-10-CM

## 2017-12-21 DIAGNOSIS — J96.11 CHRONIC RESPIRATORY FAILURE WITH HYPOXIA, ON HOME O2 THERAPY (HCC): ICD-10-CM

## 2017-12-21 DIAGNOSIS — J45.30 MILD PERSISTENT ASTHMA, UNCOMPLICATED: ICD-10-CM

## 2017-12-21 DIAGNOSIS — E03.2 HYPOTHYROIDISM DUE TO NON-MEDICATION EXOGENOUS SUBSTANCES: ICD-10-CM

## 2017-12-21 DIAGNOSIS — M85.80 OSTEOPENIA, UNSPECIFIED LOCATION: ICD-10-CM

## 2017-12-21 DIAGNOSIS — G47.34 NOCTURNAL HYPOXIA: ICD-10-CM

## 2017-12-21 PROCEDURE — G0439 PPPS, SUBSEQ VISIT: HCPCS | Performed by: FAMILY MEDICINE

## 2017-12-21 RX ORDER — LUTEIN 10 MG
10 TABLET ORAL EVERY EVENING
COMMUNITY

## 2017-12-21 ASSESSMENT — PATIENT HEALTH QUESTIONNAIRE - PHQ9: CLINICAL INTERPRETATION OF PHQ2 SCORE: 0

## 2017-12-21 NOTE — PROGRESS NOTES
Chief Complaint   Patient presents with   • Annual Wellness Visit         HPI:  Ragini is a 72 y.o. here for Medicare Annual Wellness Visit    She lives alone. She is in a senior housing complex. She has support from neighbors.    Patient Active Problem List    Diagnosis Date Noted   • Abnormal pulmonary finding 12/21/2017   • Chronic respiratory failure with hypoxia, on home O2 therapy (CMS-HCC) 12/21/2017   • Primary thyroid cancer (CMS-HCC) 11/29/2017   • DM type 2, goal HbA1c < 8% (CMS-HCC)    • Morbid obesity with BMI of 40.0-44.9, adult (CMS-HCC) 01/09/2017   • Fatty liver 05/02/2016   • Microalbuminuria 05/02/2016   • Bilateral incipient cataracts 01/12/2016   • Mild persistent asthma, uncomplicated 03/03/2014   • Primary osteoarthritis of both shoulders    • Osteopenia    • Essential hypertension    • Dyslipidemia, goal LDL below 100    • Uncontrolled type 2 diabetes mellitus without complication, without long-term current use of insulin (CMS-HCC)    • Hypothyroidism due to non-medication exogenous substances    • Nocturnal hypoxia        Current Outpatient Prescriptions   Medication Sig Dispense Refill   • Lutein 10 MG Tab Take  by mouth.     • aspirin 81 MG tablet Take 1 Tab by mouth every day. 90 Tab 3   • bisoprolol (ZEBETA) 5 MG Tab Take 1 Tab by mouth every day. 30 Tab 1   • coenzyme Q-10 30 MG capsule Take 1 Cap by mouth every day. 30 Cap 11   • ascorbic acid (VITAMIN C) 500 MG tablet Take 1 Tab by mouth every day. 30 Tab 11   • Lactobacillus (DIGESTIVE HEALTH PROBIOTIC) Cap Take 1 Cap by mouth every day. 30 Cap 11   • Multiple Vitamins-Minerals (ULTRA WOMENS PACK) Misc Take 1 Each by mouth every day. 30 Each 11   • spironolactone (ALDACTONE) 25 MG Tab Take 1 Tab by mouth every day. 30 Tab 3   • simvastatin (ZOCOR) 5 MG Tab Take 1 Tab by mouth every evening. 90 Tab 3   • TRADJENTA 5 MG Tab tablet TAKE 1 TAB BY MOUTH EVERY DAY. 30 Tab 6   • glucose blood (FREESTYLE LITE) strip 1 Strip by Other route  2 Times a Day. 100 Strip 11   • FREESTYLE LANCETS Misc INJECT 1 EACH AS INSTRUCTED 2 TIMES A DAY. 100 Each 4   • metformin (GLUCOPHAGE) 500 MG Tab Take 1 Tab by mouth 2 times a day, with meals. 180 Tab 3   • benazepril (LOTENSIN) 5 MG Tab TAKE 1 TAB BY MOUTH EVERY DAY. 90 Tab 2   • furosemide (LASIX) 40 MG Tab TAKE 1 TABLET BY MOUTH EVERY DAY 90 Tab 3   • levothyroxine (SYNTHROID) 150 MCG Tab Take 1 Tab by mouth every day. 90 Tab 3   • albuterol (PROAIR HFA) 108 (90 BASE) MCG/ACT Aero Soln inhalation aerosol Inhale 2 Puffs by mouth every 6 hours as needed for Shortness of Breath. 8.5 g 3   • Blood Glucose Monitoring Suppl SUPPLIES MISC Test strips order: Test strips for Abbott Freestyle Lite meter. Sig: use daily and prn 90 day supply ICD-9 code 250.02 150 Each 11   • Glucosamine-Chondroitin-Vit C 2105-8142-80 MG/30ML LIQD Take 30 mL by mouth every day. 1 Bottle 0   • atenolol (TENORMIN) 25 MG Tab      • ibuprofen (MOTRIN) 800 MG Tab      • zolpidem (AMBIEN) 5 MG Tab Take 1 Tab by mouth at bedtime as needed for Sleep. 3 Tab 0   • nitroglycerin (NITROSTAT) 0.4 MG SL Tab Place 1 Tab under tongue as needed for Chest Pain. 25 Tab 1   • Misc. Devices Misc This is an order for overnight pulse ox on room air.  Dx. Nocturnal hypoxia  G47.34, mild persistent asthma J45.30       SPENCER 99 months   NPI [id number] 1 Each 0   • Misc. Devices Misc This is an order for overnight pulse oxygen study  Dx nocturnal hypoxia G47.34, please do the test on room air.       SPENCER 99 months   NPI [id number] 1 Each 0   • eucalyptus/peppermint oil (PONARIS NASAL) SOLN Spray 10 Drops in nose every 6 hours as needed. 1 Bottle 0     Current Facility-Administered Medications   Medication Dose Route Frequency Provider Last Rate Last Dose   • acetaminophen (TYLENOL) tablet 500 mg  500 mg Oral Q6HRS PRN Frandy Pereira M.D.            Patient is taking medications as noted in medication list.  Current supplements as per medication list.     Allergies:  Cough syrup m [cough cold-flu relief]; Dextromethorphan; and Asa [aspirin]    Current social contact/activities: Pt states that she spends time with family and friends. She reports that she lives in a senior living center.      Is patient current with immunizations? No, due for ZOSTAVAX (Shingles). Patient is interested in receiving NONE today.    She  reports that she has never smoked. She has never used smokeless tobacco. She reports that she does not drink alcohol or use drugs.  Counseling given: Yes        DPA/Advanced directive: Patient has Advanced Directive, but it is not on file. Instructed to bring in a copy to scan into their chart.    ROS:    Gait: Uses no assistive device   Ostomy: no   Other tubes: no   Amputations: no   Chronic oxygen use yes 2.5 when she sleeps at night  Last eye exam March 2017   Wears hearing aids: no   : Reports urinary leakage during the last 6 months that has not interfered at all with their daily activities or sleep.  Denies chest pain, pressure, palpitations.  She does have exertional SOB  Felt better after the second CPAP mask was tried.  Denies abdominal pain, denies blood in the stool.    Screening:    DIABETES    Has patient ever had diabetes education? Yes, and patient is interested in more. Referral pended.          Depression Screening    Little interest or pleasure in doing things?  0 - not at all  Feeling down, depressed, or hopeless? 0 - not at all  Patient Health Questionnaire Score: 0    If depressive symptoms identified deferred to follow up visit unless specifically addressed in assessment and plan.    Interpretation of PHQ-9 Total Score   Score Severity   1-4 No Depression   5-9 Mild Depression   10-14 Moderate Depression   15-19 Moderately Severe Depression   20-27 Severe Depression    Screening for Cognitive Impairment    Three Minute Recall (apple, watch, vernon)  3/3    Draw clock face with all 12 numbers set to the hand to show 10 minutes past 11 o'clock  1  5/5  If cognitive concerns identified, deferred for follow up unless specifically addressed in assessment and plan.    Fall Risk Assessment    Has the patient had two or more falls in the last year or any fall with injury in the last year?  No  If fall risk identified, deferred for follow up unless specifically addressed in assessment and plan.    Safety Assessment    Throw rugs on floor.  Yes  Handrails on all stairs.  Yes  Good lighting in all hallways.  Yes  Difficulty hearing.  No  Patient counseled about all safety risks that were identified.    Functional Assessment ADLs    Are there any barriers preventing you from cooking for yourself or meeting nutritional needs?  No.    Are there any barriers preventing you from driving safely or obtaining transportation?  No.    Are there any barriers preventing you from using a telephone or calling for help?  No.    Are there any barriers preventing you from shopping?  No.    Are there any barriers preventing you from taking care of your own finances?  No.    Are there any barriers preventing you from managing your medications?  No.    Are you currently engaging any exercise or physical activity?  No.  Pt states that she was walking, but it has been hard due to her breathing.     Health Maintenance Summary                Annual Wellness Visit Overdue 7/29/2016      Done 7/29/2015     IMM ZOSTER VACCINE Postponed 1/1/2018 Originally 1/12/2005. Insurance/Financial    A1C SCREENING Next Due 3/6/2018      Done 9/6/2017 HEMOGLOBIN A1C (A)     Patient has more history with this topic...    RETINAL SCREENING Next Due 3/10/2018      Done 3/10/2017 REFERRAL FOR RETINAL SCREENING EXAM     Patient has more history with this topic...    BONE DENSITY Next Due 8/13/2018      Done 8/13/2013 DS-BONE DENSITY STUDY (DEXA)     Patient has more history with this topic...    FASTING LIPID PROFILE Next Due 9/6/2018      Done 9/6/2017 LIPID PANEL (A)     Patient has more history with this  topic...    URINE ACR / MICROALBUMIN Next Due 9/6/2018      Done 9/6/2017 MICROALB/CREAT RATIO RAND. UR (A)     Patient has more history with this topic...    SERUM CREATININE Next Due 9/6/2018      Done 9/6/2017 COMP METABOLIC PANEL (A)     Patient has more history with this topic...    DIABETES MONOFILAMENT / LE EXAM Next Due 9/11/2018      Done 9/11/2017 AMB DIABETIC MONOFILAMENT LOWER EXTREMITY EXAM     Patient has more history with this topic...    MAMMOGRAM Next Due 10/4/2019      Done 10/4/2017 MA-MAMMO SCREENING BILAT W/TOMOSYNTHESIS W/CAD     Patient has more history with this topic...    IMM DTaP/Tdap/Td Vaccine Next Due 11/6/2022      Done 11/6/2012 Imm Admin: Tdap Vaccine    COLONOSCOPY Next Due 5/21/2023      Done 5/21/2013 Surg:COLONOSCOPY WITH BIOPSY     Patient has more history with this topic...          Patient Care Team:  Chapito Marques M.D. as PCP - General (Family Medicine)  Taras Herring D.P.M. as Consulting Physician (Podiatry)  Tyson Carey M.D. as Consulting Physician (Ophthalmology)  Frandy Pereira M.D. as Consulting Physician (Cardiology)  [employer]    Social History   Substance Use Topics   • Smoking status: Never Smoker   • Smokeless tobacco: Never Used   • Alcohol use No     Family History   Problem Relation Age of Onset   • Lung Disease Brother    • Cancer Mother      pelvic   • Stroke Maternal Grandmother      She  has a past medical history of Cataract immature (7/2012); Chickenpox; DM type 2, goal HbA1c < 8% (CMS-[facility]); Dyslipidemia, goal LDL below 130; Frequent urination; Hypertension; Hypothyroidism; Mild intermittent asthma (2004); Nocturnal hypoxemia; Obesity; Osteoarthritis; Osteopenia; 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   Past Surgical History:  "  Procedure Laterality Date   • COLONOSCOPY WITH BIOPSY  5/21/2013    Performed by Mauro Garcia M.D. at ENDOSCOPY [facility]   • OTHER      THYROIDECTOMY  2009   • TONSILLECTOMY             Exam:     Blood pressure 155/60, pulse 74, temperature 36.7 °C (98 °F), resp. rate 16, height 1.549 m (5' 1\"), weight 106.6 kg (235 lb), SpO2 94 %. On room air at rest. Body mass index is 44.4 kg/m².    Hearing good.    Dentition fair  Alert, oriented in no acute distress.  Eye contact is good, speech goal directed, affect calm  HEENT:   EOMI, PERRLA.   Oropharynx is well hydrated with normal soft palate motion. No exudate or ulcerations noted. Ear canals are patent, normal cerumen, TMs are pearly grey and quiet in appearance.  Neck:       Full range of motion, mild posterior cervical spasm and forward head position. No JVD or carotid bruits appreciated. No cervical adenopathy appreciated. No thyromegaly or neck masses appreciated.  No retractions appreciated.  Lungs:     Clear to auscultation A&P with good air movement. Moderate kyphosis.  Heart:      Regular rate and rhythm normal S1 and S2 without murmur appreciated.  Strong and symmetric radial and DP pulses.  Abd:        Soft, bowel sounds positive, nontender. No bloating noted. No hepatosplenomegaly or mass appreciated. No pulsatile mass appreciated.  Ext:         Extremities show symmetric and full range of motion with normal strength. No cyanosis or clubbing appreciated. No peripheral edema appreciated.  Neuro:    Gait is normal. Patient is lucid, fluent and appropriate. No significant tremor appreciated.  Skin:       Exhibit no rashes, pigmented lesions or ulcerations.      Assessment and Plan. The following treatment and monitoring plan is recommended: She is generally well and stable  1. Medicare annual wellness visit, subsequent  Her medical problems are stable overall    2. Uncontrolled type 2 diabetes mellitus without complication, without long-term current " use of insulin (CMS-HCC)  REFERRAL TO DIABETIC EDUCATION Diabetes Self Management Education / Training (DSME/T) and Medical Nutrition Therapy (MNT): Initial Group DSME/MNT as authorized by payor, Follow-Up DSME/MNT as authorized by payor; DSME/T Content: Monitoring Diabete...   3. Hypothyroidism due to non-medication exogenous substances  She has stable hypothyroidism. She has a history of thyroid cancer treated with surgery and radiation. Her last TSH was in January and is at target. She has lab orders already placed for a TSH recheck next month. Denies increased insomnia, tremor or significant change in appetite. Stable problem.    4. Nocturnal hypoxia  She continues to wear the nocturnal oxygen but this was leaving her with a shortness of breath. She had a recent evaluation for sleep apnea which showed some mixed results. However, it seems she had some benefit from the C Pap. She will be following up with pulmonology in a few weeks and hopes that they will agree to start C Pap treatment. Has been investigated and is relatively stable although she is still short of breath and fatigued. Daytime oxygen levels remain normal.    5. Essential hypertension  She is checking her blood pressure intermittently. At home and at the grocery store she typically gets 130s to 140s systolic over 60s to 70s diastolic. Denies palpitations, chest pressure, syncope, increased cough or edema. Clinically stable though mildly elevated here today.    6. Dyslipidemia, goal LDL below 100  She is a never smoker. She continues low-dose coated aspirin 81 mg daily. She is tolerating the low-dose simvastatin. Denies muscle aches or muscle weakness from the medication. No history of stroke or heart attack. No history of documented peripheral vascular disease. Stable problem.    7. Abnormal pulmonary finding And chronic hypoxia on home O2 therapy  As referred to above, she recently had overnight sleep apnea testing which showed some abnormalities  with significant oxygen desaturation. She is oxygen dependent at night. May benefit from sleep. Has pulmonary follow-up.    8. Mild persistent asthma, uncomplicated  This is currently in good control on her pulmonary regimen. She continues to follow-up with pulmonology.    9. Osteopenia, unspecified location  She has mild osteopenia. She is on a low-dose vitamin D and calcium regimen. No history of pathologic fracture. Stable.    10. Primary osteoarthritis of both shoulders  She has pain from arthritis in both shoulders as well as some arthritis in other joints. She treats with Tylenol as needed, heat and stretching.    11. Bilateral incipient cataracts  She is following with optometry. no decrease in vision. Clinically stable.    12. Fatty liver  She continues to work on controlling her diabetes and reducing her carbohydrates. Discussed triglycerides and triglycerides storage overall. Has been clinically stable.    13. Microalbuminuria  This has been relatively stable and improving from the last few. She is on an ACE inhibitor. This may need to be increased at her next visit. Stable problem.    14. Morbid obesity with BMI of 40.0-44.9, adult (CMS-HCC)  She continues to struggle with her morbid obesity. She has gained 3 pounds from the last visit but was measured at an inch less in height. Therefore her BMI is higher. Discussed challenges of holiday eating. Discussed strategies. Stable problem.    15. DM type 2, goal HbA1c < 8% (CMS-HCC)  Her diabetes control has been improving. She continues to follow regularly and is compliant with medication regimen. Stable problem.    16. Primary thyroid cancer (CMS-HCC)  She has been treated for this in the past and is clinically stable.          Services suggested: No services needed at this time  Health Care Screening recommendations as per orders if indicated.  Referrals offered: PT/OT/Nutrition counseling/Behavioral Health/Smoking cessation as per orders if  indicated.    Discussion today about general wellness and lifestyle habits:  Discussed, needs help with meal planning  · Prevent falls and reduce trip hazards; Cautioned about securing or removing rugs.  · Have a working fire alarm and carbon monoxide detector; yes  · Engage in regular physical activity and social activities       Follow-up: Return in about 3 weeks (around 1/12/2018), or if symptoms worsen or fail to improve.

## 2018-01-04 LAB
ALBUMIN SERPL-MCNC: 4.3 G/DL (ref 3.5–4.8)
ALBUMIN/GLOB SERPL: 1.7 {RATIO} (ref 1.2–2.2)
ALP SERPL-CCNC: 93 IU/L (ref 39–117)
ALT SERPL-CCNC: 38 IU/L (ref 0–32)
AST SERPL-CCNC: 32 IU/L (ref 0–40)
BILIRUB SERPL-MCNC: 0.5 MG/DL (ref 0–1.2)
BUN SERPL-MCNC: 24 MG/DL (ref 8–27)
BUN/CREAT SERPL: 42 (ref 12–28)
CALCIUM SERPL-MCNC: 9.2 MG/DL (ref 8.7–10.3)
CHLORIDE SERPL-SCNC: 102 MMOL/L (ref 96–106)
CHOLEST SERPL-MCNC: 135 MG/DL (ref 100–199)
CO2 SERPL-SCNC: 25 MMOL/L (ref 18–29)
COMMENT 011824: ABNORMAL
CREAT SERPL-MCNC: 0.57 MG/DL (ref 0.57–1)
ERYTHROCYTE [DISTWIDTH] IN BLOOD BY AUTOMATED COUNT: 14.1 % (ref 12.3–15.4)
GLOBULIN SER CALC-MCNC: 2.6 G/DL (ref 1.5–4.5)
GLUCOSE SERPL-MCNC: 136 MG/DL (ref 65–99)
HBA1C MFR BLD: 7.4 % (ref 4.8–5.6)
HCT VFR BLD AUTO: 43.3 % (ref 34–46.6)
HDLC SERPL-MCNC: 37 MG/DL
HGB BLD-MCNC: 14.4 G/DL (ref 11.1–15.9)
IF AFRICAN AMERICAN  100797: 107 ML/MIN/1.73
IF NON AFRICAN AMER 100791: 93 ML/MIN/1.73
LDLC SERPL CALC-MCNC: 69 MG/DL (ref 0–99)
MCH RBC QN AUTO: 28 PG (ref 26.6–33)
MCHC RBC AUTO-ENTMCNC: 33.3 G/DL (ref 31.5–35.7)
MCV RBC AUTO: 84 FL (ref 79–97)
NRBC BLD AUTO-RTO: NORMAL %
PLATELET # BLD AUTO: 242 X10E3/UL (ref 150–379)
POTASSIUM SERPL-SCNC: 4.6 MMOL/L (ref 3.5–5.2)
PROT SERPL-MCNC: 6.9 G/DL (ref 6–8.5)
RBC # BLD AUTO: 5.15 X10E6/UL (ref 3.77–5.28)
SODIUM SERPL-SCNC: 142 MMOL/L (ref 134–144)
TRIGL SERPL-MCNC: 145 MG/DL (ref 0–149)
TSH SERPL DL<=0.005 MIU/L-ACNC: 0.91 UIU/ML (ref 0.45–4.5)
VLDLC SERPL CALC-MCNC: 29 MG/DL (ref 5–40)
WBC # BLD AUTO: 5.7 X10E3/UL (ref 3.4–10.8)

## 2018-01-08 ENCOUNTER — NON-PROVIDER VISIT (OUTPATIENT)
Dept: PULMONOLOGY | Facility: HOSPICE | Age: 73
End: 2018-01-08
Payer: MEDICARE

## 2018-01-08 DIAGNOSIS — R06.00 DYSPNEA, UNSPECIFIED TYPE: ICD-10-CM

## 2018-01-08 PROCEDURE — 94060 EVALUATION OF WHEEZING: CPT | Performed by: INTERNAL MEDICINE

## 2018-01-08 PROCEDURE — 94726 PLETHYSMOGRAPHY LUNG VOLUMES: CPT | Performed by: INTERNAL MEDICINE

## 2018-01-08 PROCEDURE — 94729 DIFFUSING CAPACITY: CPT | Performed by: INTERNAL MEDICINE

## 2018-01-08 ASSESSMENT — PULMONARY FUNCTION TESTS
FEV1/FVC_PERCENT_PREDICTED: 120
FEV1_PREDICTED: 2.11
FEV1_PERCENT_CHANGE: 0
FEV1/FVC: 93.64
FEV1: 2.01
FEV1/FVC_PERCENT_PREDICTED: 124
FVC_PERCENT_PREDICTED: 78
FVC: 2.2
FVC_PREDICTED: 2.81
FEV1/FVC_PERCENT_PREDICTED: 75
FEV1_PERCENT_PREDICTED: 97
FEV1: 2.06
FEV1/FVC: 91
FEV1_PERCENT_PREDICTED: 95
FVC: 2.22
FVC_PERCENT_PREDICTED: 79
FEV1/FVC_PERCENT_CHANGE: 0
FEV1_PERCENT_CHANGE: 2

## 2018-01-09 NOTE — PROCEDURES
Good patient effort & cooperation.  The results of this test meet the ATS/ERS standards for acceptability and repeatability.  Predicted equations for Spirometry are N-Gonzales II, ITS for lung volumes, and [facility] for DLCO.  The DLCO was uncorrected for Hgb.  A bronchodilator of Ventolin HFA- 2puffs via spacer were administered.  DLCO was performed during dilation period.    SPIROMETRY:  1. FVC was 2.20 L, 78 % of predicted  2. FEV1 was 2.06 L, 97 % of predicted   3. FEV1/FVC ratio was 94 %  4. There was no significant response to bronchodilators     LUNG VOLUMES:  1. RV was  91 % of predicted   2. TLC was 89 % of predicted       DIFFUSION CAPACITY:  1.Diffusion capacity [id number] % of predicted       IMPRESSION:  The patient has mild decrease in FVC to 78% predicted probably secondary to the patient's elevated BMI of 40.3. Total lung capacity was 89% predicted which preclude restrictive ventilatory defect by definition. The rest of the pulmonary function test was within normal limits. Clinical correlation is required.

## 2018-01-11 RX ORDER — BENAZEPRIL HYDROCHLORIDE 5 MG/1
TABLET ORAL
Qty: 90 TAB | Refills: 2 | Status: SHIPPED | OUTPATIENT
Start: 2018-01-11 | End: 2018-08-30 | Stop reason: SDUPTHER

## 2018-01-12 ENCOUNTER — OFFICE VISIT (OUTPATIENT)
Dept: MEDICAL GROUP | Facility: CLINIC | Age: 73
End: 2018-01-12
Payer: MEDICARE

## 2018-01-12 VITALS
SYSTOLIC BLOOD PRESSURE: 112 MMHG | DIASTOLIC BLOOD PRESSURE: 70 MMHG | TEMPERATURE: 97.2 F | HEIGHT: 61 IN | HEART RATE: 87 BPM | BODY MASS INDEX: 43.43 KG/M2 | WEIGHT: 230 LBS | RESPIRATION RATE: 12 BRPM | OXYGEN SATURATION: 94 %

## 2018-01-12 DIAGNOSIS — E66.01 MORBID OBESITY WITH BMI OF 40.0-44.9, ADULT (HCC): ICD-10-CM

## 2018-01-12 DIAGNOSIS — R91.8 OPACITIES OF BOTH LUNGS PRESENT ON CHEST X-RAY: ICD-10-CM

## 2018-01-12 DIAGNOSIS — Z99.81 CHRONIC RESPIRATORY FAILURE WITH HYPOXIA, ON HOME O2 THERAPY (HCC): ICD-10-CM

## 2018-01-12 DIAGNOSIS — E78.5 DYSLIPIDEMIA, GOAL LDL BELOW 100: ICD-10-CM

## 2018-01-12 DIAGNOSIS — I10 ESSENTIAL HYPERTENSION: ICD-10-CM

## 2018-01-12 DIAGNOSIS — J96.11 CHRONIC RESPIRATORY FAILURE WITH HYPOXIA, ON HOME O2 THERAPY (HCC): ICD-10-CM

## 2018-01-12 DIAGNOSIS — C73 PRIMARY THYROID CANCER (HCC): ICD-10-CM

## 2018-01-12 DIAGNOSIS — E11.9 DM TYPE 2, GOAL HBA1C < 8% (HCC): ICD-10-CM

## 2018-01-12 DIAGNOSIS — R06.02 EXERTIONAL SHORTNESS OF BREATH: ICD-10-CM

## 2018-01-12 PROCEDURE — 99214 OFFICE O/P EST MOD 30 MIN: CPT | Performed by: FAMILY MEDICINE

## 2018-01-12 RX ORDER — SPIRONOLACTONE 25 MG/1
25 TABLET ORAL DAILY
Qty: 90 TAB | Refills: 3 | Status: SHIPPED | OUTPATIENT
Start: 2018-01-12 | End: 2018-09-10 | Stop reason: SDUPTHER

## 2018-01-12 ASSESSMENT — PAIN SCALES - GENERAL: PAINLEVEL: NO PAIN

## 2018-01-13 NOTE — PROGRESS NOTES
Diabetes Focused Exam:    Chief Complaint   Patient presents with   • Diabetes Mellitus     questions about veins    • Hypertension     talk about weight loss   • Hyperlipidemia   • Obesity   • Other     veins      Subjective:   HPI  Ragini Reaves is a 73 y.o. female who presents for follow up of chronic conditions of diabetes mellitus, hypertension and hyperlipidemia. She indicates that she is feeling well and denies any symptoms referable to the above diagnoses. Specifically denies chest pain, palpitations, dyspnea, orthopnea, PND or peripheral edema. Also denies polyuria, polydipsia, urinary complaints, abdominal complaints, myalgias, numbness, weakness or other related symptoms.     The patient is taking ASA every day 81 mg and taking all other medications as prescribed. Patient denies any side effects of medication.  DM: A1c goal <8  Glucose monitoring frequency: daily   Fasting sugars: , usually 130-160. Post-prandial sugars: not checking  Hypoglycemic episodes none  Diabetic complications: none  ACR Albumin/Creatinine Ratio goal <30  HTN: Blood pressure goal <140/<80. Currently Rx ACE/ARB: Yes  Hyperlipidemia:Cholesterol goal LDL <100, total/HDL <5. Currently Rx Statin: Yes  Last eye exam 3/2017, has appointment for March   Denies visual blurring, double vision, eye pain and floaters  Last monofilament foot exam: 9/2017 Denies foot pain, numbness, calluses, ulcers      See medications and orders placed in encounter report.  Past medical history, family history, social history reviewed and updated as documented in medical record.    Current medications including changes today:  Current Outpatient Prescriptions   Medication Sig Dispense Refill   • spironolactone (ALDACTONE) 25 MG Tab Take 1 Tab by mouth every day. 90 Tab 3   • linagliptin (TRADJENTA) 5 MG Tab tablet Take 1 Tab by mouth every day. 90 Tab 3   • benazepril (LOTENSIN) 5 MG Tab TAKE 1 TAB BY MOUTH EVERY DAY. 90 Tab 2   • Lutein 10 MG  Tab Take  by mouth.     • aspirin 81 MG tablet Take 1 Tab by mouth every day. 90 Tab 3   • bisoprolol (ZEBETA) 5 MG Tab Take 1 Tab by mouth every day. 30 Tab 1   • nitroglycerin (NITROSTAT) 0.4 MG SL Tab Place 1 Tab under tongue as needed for Chest Pain. 25 Tab 1   • coenzyme Q-10 30 MG capsule Take 1 Cap by mouth every day. 30 Cap 11   • ascorbic acid (VITAMIN C) 500 MG tablet Take 1 Tab by mouth every day. 30 Tab 11   • Lactobacillus (DIGESTIVE HEALTH PROBIOTIC) Cap Take 1 Cap by mouth every day. 30 Cap 11   • Multiple Vitamins-Minerals (ULTRA WOMENS PACK) Misc Take 1 Each by mouth every day. 30 Each 11   • simvastatin (ZOCOR) 5 MG Tab Take 1 Tab by mouth every evening. 90 Tab 3   • glucose blood (FREESTYLE LITE) strip 1 Strip by Other route 2 Times a Day. 100 Strip 11   • FREESTYLE LANCETS Misc INJECT 1 EACH AS INSTRUCTED 2 TIMES A DAY. 100 Each 4   • metformin (GLUCOPHAGE) 500 MG Tab Take 1 Tab by mouth 2 times a day, with meals. 180 Tab 3   • furosemide (LASIX) 40 MG Tab TAKE 1 TABLET BY MOUTH EVERY DAY 90 Tab 3   • levothyroxine (SYNTHROID) 150 MCG Tab Take 1 Tab by mouth every day. 90 Tab 3   • Misc. Devices Misc This is an order for overnight pulse ox on room air.  Dx. Nocturnal hypoxia  G47.34, mild persistent asthma J45.30       SPENCER 99 months   NPI [id number] 1 Each 0   • albuterol (PROAIR HFA) 108 (90 BASE) MCG/ACT Aero Soln inhalation aerosol Inhale 2 Puffs by mouth every 6 hours as needed for Shortness of Breath. 8.5 g 3   • Misc. Devices Misc This is an order for overnight pulse oxygen study  Dx nocturnal hypoxia G47.34, please do the test on room air.       SPENCER 99 months   NPI [id number] 1 Each 0   • eucalyptus/peppermint oil (PONARIS NASAL) SOLN Spray 10 Drops in nose every 6 hours as needed. 1 Bottle 0   • Blood Glucose Monitoring Suppl SUPPLIES MISC Test strips order: Test strips for Abbott Freestyle Lite meter. Sig: use daily and prn 90 day supply ICD-9 code 250.02 150 Each 11   •  "Glucosamine-Chondroitin-Vit C 9950-6181-29 MG/30ML LIQD Take 30 mL by mouth every day. 1 Bottle 0     Current Facility-Administered Medications   Medication Dose Route Frequency Provider Last Rate Last Dose   • acetaminophen (TYLENOL) tablet 500 mg  500 mg Oral Q6HRS PRN Frandy Pereira M.D.         Allergies:   Allergies   Allergen Reactions   • Cough Syrup M [Cough Cold-Flu Relief] Swelling     Severe facial swelling   • Dextromethorphan Swelling   • Asa [Aspirin]      Stomach bleeding.       Social History   Substance Use Topics   • Smoking status: Never Smoker   • Smokeless tobacco: Never Used   • Alcohol use No     Exercise: walking more.  Has had good weight loss.  Persists.  Health Maintenance/Immunizations: discussed, up to date    ROS  Pertinent  ROS findings as above. All other systems reviewed and are negative.      Objective:     OBJECTIVE:  /70   Pulse 87   Temp 36.2 °C (97.2 °F)   Resp 12   Ht 1.549 m (5' 1\")   Wt 104.3 kg (230 lb)   SpO2 94%   Breastfeeding? No   BMI 43.46 kg/m²  Body mass index is 43.46 kg/m². BMI: severely obese, improved  General: No apparent distress, conversant, cooperative and pleasant with the examination.  Psych: Alert and oriented x4, judgment and insight normal  Neck: No JVD or bruits, no adenopathy, supple  Thyroid: normal to inspection and palpation  Lungs: negative findings: normal respiratory rate and rhythm, lungs clear to auscultation  Heart: negative findings: regular rate and rhythm, S1 normal, S2 normal, no murmurs, clicks, or gallops  Abdomen: Soft, nontender, no hepatosplenomegaly or masses, normal bowel sounds  Skin: No rashes, no cyanosis, no lesions or ulcers  Extremities: No cyanosis clubbing or edema.   Feet are examined   POC labs today:   Lab Results   Component Value Date/Time    POCGLUCOSE 109 (H) 04/14/2009 05:27 PM          Last labs    Lab Results   Component Value Date/Time    CHOLSTRLTOT 135 01/03/2018 10:12 AM    LDL 69 01/03/2018 " 10:12 AM    HDL 37 (L) 01/03/2018 10:12 AM    TRIGLYCERIDE 145 01/03/2018 10:12 AM       Lab Results   Component Value Date/Time    SODIUM 142 01/03/2018 10:12 AM    POTASSIUM 4.6 01/03/2018 10:12 AM    GLUCOSE 136 (H) 01/03/2018 10:12 AM    BUNCREATRAT 42 (H) 01/03/2018 10:12 AM     Lab Results   Component Value Date/Time    HBA1C 7.4 (H) 01/03/2018 10:12 AM    HBA1C 7.3 (H) 09/06/2017 08:22 AM    HBA1C 6.9 (H) 05/02/2017 07:40 AM    HBA1C 8.7 (H) 09/08/2015 07:49 AM    HBA1C 8.4 (H) 05/05/2015 08:32 AM    HBA1C 9.4 (H) 01/06/2015 08:37 AM     Lab Results   Component Value Date/Time    MICRALB 44.7 09/06/2017 08:22 AM          Assessment/Plan:   Medications, refills, and referrals per orders.   1. Uncontrolled type 2 diabetes mellitus without complication, without long-term current use of insulin (CMS-HCC)  linagliptin (TRADJENTA) 5 MG Tab tablet    COMP METABOLIC PANEL    LIPID PROFILE    HEMOGLOBIN A1C   2. DM type 2, goal HbA1c < 8% (CMS-HCC)  linagliptin (TRADJENTA) 5 MG Tab tablet    COMP METABOLIC PANEL   3. Morbid obesity with BMI of 40.0-44.9, adult (CMS-HCC)     4. Essential hypertension  COMP METABOLIC PANEL   5. Dyslipidemia, goal LDL below 100  COMP METABOLIC PANEL    LIPID PROFILE   6. Primary thyroid cancer (CMS-HCC)     7. [place] with [place] [place]     8. Opacities of both lungs present on chest x-ray  spironolactone (ALDACTONE) 25 MG Tab    CBC WITHOUT DIFFERENTIAL   9. Exertional shortness of breath  spironolactone (ALDACTONE) 25 MG Tab     DM2 A1c is at goal   Patient to monitor sugars: daily frequency  Discussed diet, exercise, disease management and weight loss goals.  Congratulated on her progress, has lost nearly 60 pounds.   Education and advise provided today:All medications, side effects and compliance (discussed carefully)  Annual eye examinations at Ophthalmology  Foot care discussed and Podiatry visits  Glycohemoglobin and other lab  monitoring  Home glucose monitoring emphasized  Labs immediately prior to next visit  Low cholesterol diet, weight control and daily exercise    Followup:   Do labs and RTC 6 months, sooner should new symptoms or problems arise.      Tolerating spironolactone well, renew of medication discussed and written.

## 2018-01-23 DIAGNOSIS — I20.89 ANGINA OF EFFORT: ICD-10-CM

## 2018-01-23 RX ORDER — BISOPROLOL FUMARATE 5 MG/1
5 TABLET, FILM COATED ORAL DAILY
Qty: 90 TAB | Refills: 3 | Status: SHIPPED | OUTPATIENT
Start: 2018-01-23 | End: 2018-08-22

## 2018-02-01 ENCOUNTER — OFFICE VISIT (OUTPATIENT)
Dept: PULMONOLOGY | Facility: HOSPICE | Age: 73
End: 2018-02-01
Payer: MEDICARE

## 2018-02-01 VITALS
WEIGHT: 232 LBS | HEART RATE: 64 BPM | HEIGHT: 61 IN | TEMPERATURE: 98.1 F | OXYGEN SATURATION: 97 % | BODY MASS INDEX: 43.8 KG/M2 | RESPIRATION RATE: 16 BRPM | SYSTOLIC BLOOD PRESSURE: 124 MMHG | DIASTOLIC BLOOD PRESSURE: 80 MMHG

## 2018-02-01 DIAGNOSIS — E66.01 MORBID OBESITY WITH BMI OF 40.0-44.9, ADULT (HCC): ICD-10-CM

## 2018-02-01 DIAGNOSIS — G47.34 NOCTURNAL HYPOXIA: ICD-10-CM

## 2018-02-01 DIAGNOSIS — Z99.81 CHRONIC RESPIRATORY FAILURE WITH HYPOXIA, ON HOME O2 THERAPY (HCC): ICD-10-CM

## 2018-02-01 DIAGNOSIS — G47.33 OBSTRUCTIVE SLEEP APNEA: ICD-10-CM

## 2018-02-01 DIAGNOSIS — J45.30 MILD PERSISTENT ASTHMA, UNCOMPLICATED: ICD-10-CM

## 2018-02-01 DIAGNOSIS — J96.11 CHRONIC RESPIRATORY FAILURE WITH HYPOXIA, ON HOME O2 THERAPY (HCC): ICD-10-CM

## 2018-02-01 PROCEDURE — 99214 OFFICE O/P EST MOD 30 MIN: CPT | Performed by: INTERNAL MEDICINE

## 2018-02-01 NOTE — PATIENT INSTRUCTIONS
This lady comes in today to follow-up on her lung function testing. It looks remarkably good with the exception of the impact of her weight, I explained that her flows and volumes are normal except for the reduced expiratory reserve volume. She does not have significant wheezing or cough. She is been trying to exercise, medically significant excessive weight has been addressed with portion control and she is dropped 60 pounds intentionally.    The second issue is that she is on nighttime oxygen, and actually underwent the sleep study before the sleep office appointment. She had mild sleep apnea, with low-level CPAP this was corrected although she did not have complete titration, it appears that low-level CPAP was very effective. We will initiate auto CPAP range 6-12, she likes fullface mask, saturations were corrected and she can place her nighttime oxygen aside until we do the follow-up in the sleep center. If downloaded compliance data look good, home follow-up oximetry on his CPAP may be useful to then discontinue supplemental oxygen entirely. In the meantime health maintenance issues addressed include weight loss as described above, not smoking, and vaccinations are current. Reassess in the sleep center in follow-up with a nurse practitioner

## 2018-02-01 NOTE — PROGRESS NOTES
Ragini Reaves is a 73 y.o. female here for nighttime hypoxia, results of lung function testing, and results of recent sleep study. Patient was referred by her primary care doctor.    History of Present Illness:      This lady comes in today to follow-up on her lung function testing. It looks remarkably good with the exception of the impact of her weight, I explained that her flows and volumes are normal except for the reduced expiratory reserve volume. She does not have significant wheezing or cough. She is been trying to exercise, medically significant excessive weight has been addressed with portion control and she is dropped 60 pounds intentionally.    The second issue is that she is on nighttime oxygen, and actually underwent the sleep study before the sleep office appointment. She had mild sleep apnea, with low-level CPAP this was corrected although she did not have complete titration, it appears that low-level CPAP was very effective. We will initiate auto CPAP range 6-12, she likes fullface mask, saturations were corrected and she can place her nighttime oxygen aside until we do the follow-up in the sleep center. If downloaded compliance data look good, home follow-up oximetry on his CPAP may be useful to then discontinue supplemental oxygen entirely. In the meantime health maintenance issues addressed include weight loss as described above, not smoking, and vaccinations are current. Reassess in the sleep center in follow-up with a nurse practitioner    Constitutional ROS: No unexpected change in weight, No unexplained fevers  Eyes: No change in vision or blurring or double vision  Mouth/Throat ROS: No sore throat, No recent change in voice or hoarseness  Pulmonary ROS: See present history for pertinent positives  Cardiovascular ROS: No chest pain to suggest acute coronary syndrome  Gastrointestinal ROS: No abdominal pain to suggest peptic disease  Musculoskeletal/Extremities ROS: no acute artritis  or unusual swelling  Hematologic/Lymphatic ROS: No easy bleeding or unusual lymph node swelling  Neurologic ROS: No new or unusual weakness  Psychiatric ROS: No hallucinations  Allergic/Immunologic: No  urticaria or allergic rash      Current Outpatient Prescriptions   Medication Sig Dispense Refill   • spironolactone (ALDACTONE) 25 MG Tab Take 1 Tab by mouth every day. 90 Tab 3   • linagliptin (TRADJENTA) 5 MG Tab tablet Take 1 Tab by mouth every day. 90 Tab 3   • benazepril (LOTENSIN) 5 MG Tab TAKE 1 TAB BY MOUTH EVERY DAY. 90 Tab 2   • Lutein 10 MG Tab Take  by mouth.     • aspirin 81 MG tablet Take 1 Tab by mouth every day. 90 Tab 3   • coenzyme Q-10 30 MG capsule Take 1 Cap by mouth every day. 30 Cap 11   • ascorbic acid (VITAMIN C) 500 MG tablet Take 1 Tab by mouth every day. 30 Tab 11   • Lactobacillus (DIGESTIVE HEALTH PROBIOTIC) Cap Take 1 Cap by mouth every day. 30 Cap 11   • Multiple Vitamins-Minerals (ULTRA WOMENS PACK) Misc Take 1 Each by mouth every day. 30 Each 11   • simvastatin (ZOCOR) 5 MG Tab Take 1 Tab by mouth every evening. 90 Tab 3   • metformin (GLUCOPHAGE) 500 MG Tab Take 1 Tab by mouth 2 times a day, with meals. 180 Tab 3   • furosemide (LASIX) 40 MG Tab TAKE 1 TABLET BY MOUTH EVERY DAY 90 Tab 3   • levothyroxine (SYNTHROID) 150 MCG Tab Take 1 Tab by mouth every day. 90 Tab 3   • albuterol (PROAIR HFA) 108 (90 BASE) MCG/ACT Aero Soln inhalation aerosol Inhale 2 Puffs by mouth every 6 hours as needed for Shortness of Breath. 8.5 g 3   • eucalyptus/peppermint oil (PONARIS NASAL) SOLN Spray 10 Drops in nose every 6 hours as needed. 1 Bottle 0   • Glucosamine-Chondroitin-Vit C 0630-4092-51 MG/30ML LIQD Take 30 mL by mouth every day. 1 Bottle 0   • bisoprolol (ZEBETA) 5 MG Tab TAKE 1 TAB BY MOUTH EVERY DAY. 90 Tab 3   • nitroglycerin (NITROSTAT) 0.4 MG SL Tab Place 1 Tab under tongue as needed for Chest Pain. 25 Tab 1   • glucose blood (FREESTYLE LITE) strip 1 Strip by Other route 2 Times a Day.  100 Strip 11   • FREESTYLE LANCETS Misc INJECT 1 EACH AS INSTRUCTED 2 TIMES A DAY. 100 Each 4   • Misc. Devices Misc This is an order for overnight pulse ox on room air.  Dx. Nocturnal hypoxia  G47.34, mild persistent asthma J45.30       SPENCER 99 months   NPI [id number] 1 Each 0   • Misc. Devices Misc This is an order for overnight pulse oxygen study  Dx nocturnal hypoxia G47.34, please do the test on room air.       SPENCER 99 months   NPI [id number] 1 Each 0   • Blood Glucose Monitoring Suppl SUPPLIES MISC Test strips order: Test strips for Abbott Freestyle Lite meter. Sig: use daily and prn 90 day supply ICD-9 code 250.02 150 Each 11     Current Facility-Administered Medications   Medication Dose Route Frequency Provider Last Rate Last Dose   • acetaminophen (TYLENOL) tablet 500 mg  500 mg Oral Q6HRS PRN Frandy Pereira M.D.           Social History   Substance Use Topics   • Smoking status: Never Smoker   • Smokeless tobacco: Never Used   • Alcohol use No        Past Medical History:   Diagnosis Date   • Cataract immature 7/2012    Dr. 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 at ENDOSCOPY [facility]S   • OTHER      THYROIDECTOMY  2009   • TONSILLECTOMY         Allergies: Cough syrup m [cough cold-flu relief];  "Dextromethorphan; and Asa [aspirin]    Family History   Problem Relation Age of Onset   • Lung Disease Brother    • Cancer Mother      pelvic   • Stroke Maternal Grandmother        Physical Examination    Vitals:    02/01/18 1027   Height: 1.549 m (5' 1\")   Weight: 105.2 kg (232 lb)   Weight % change since last entry.: 0 %   BP: 124/80   Pulse: 64   BMI (Calculated): 43.84   Resp: 16   Temp: 36.7 °C (98.1 °F)       General Appearance: alert, no distress  Skin: Skin color, texture, turgor normal. No rashes or lesions.  Eyes: negative  Oropharynx: Lips, mucosa, and tongue normal. Teeth and gums normal. Oropharynx moist and without lesion  Lungs: positive findings: Scattered rhonchi without wheezes  Heart: negative. RRR without murmur, gallop, or rubs.  No ectopy.  Abdomen: Abdomen soft, non-tender. . No masses,  No organomegaly  Extremities:  No deformities, edema, or skin discoloration  Joints: No acute arthritis  Peripheral Pulses:perfused  Neurologic: intact grossly      III (soft palate, base of uvula visible)    Imaging: None presently    PFTS: Described      Assessment and Plan  1. Chronic respiratory failure with hypoxia, on home O2 therapy (CMS-HCC)    2. Nocturnal hypoxia  - DME CPAP    3. Mild persistent asthma, uncomplicated    4. Morbid obesity with BMI of 40.0-44.9, adult (CMS-[facility])    5. Obstructive sleep apnea  - DME CPAP    This lady comes in today to follow-up on her lung function testing. It looks remarkably good with the exception of the impact of her weight, I explained that her flows and volumes are normal except for the reduced expiratory reserve volume. She does not have significant wheezing or cough. She is been trying to exercise, medically significant excessive weight has been addressed with portion control and she is dropped 60 pounds intentionally.    The second issue is that she is on nighttime oxygen, and actually underwent the sleep study before the sleep office appointment. She had mild " sleep apnea, with low-level CPAP this was corrected although she did not have complete titration, it appears that low-level CPAP was very effective. We will initiate auto CPAP range 6-12, she likes fullface mask, saturations were corrected and she can place her nighttime oxygen aside until we do the follow-up in the sleep center. If downloaded compliance data look good, home follow-up oximetry on his CPAP may be useful to then discontinue supplemental oxygen entirely. In the meantime health maintenance issues addressed include weight loss as described above, not smoking, and vaccinations are current. Reassess in the sleep center in follow-up with a nurse practitioner  Followup Return in about 6 weeks (around 3/15/2018) for follow up visit with sleep provider.

## 2018-02-11 DIAGNOSIS — E03.2 HYPOTHYROIDISM DUE TO NON-MEDICATION EXOGENOUS SUBSTANCES: ICD-10-CM

## 2018-02-11 RX ORDER — LEVOTHYROXINE SODIUM 0.15 MG/1
150 TABLET ORAL
Qty: 90 TAB | Refills: 3 | Status: SHIPPED | OUTPATIENT
Start: 2018-02-11 | End: 2018-12-03 | Stop reason: SDUPTHER

## 2018-02-15 ENCOUNTER — TELEPHONE (OUTPATIENT)
Dept: CARDIOLOGY | Facility: MEDICAL CENTER | Age: 73
End: 2018-02-15

## 2018-02-15 ENCOUNTER — OFFICE VISIT (OUTPATIENT)
Dept: CARDIOLOGY | Facility: MEDICAL CENTER | Age: 73
End: 2018-02-15
Payer: MEDICARE

## 2018-02-15 VITALS
DIASTOLIC BLOOD PRESSURE: 60 MMHG | HEART RATE: 82 BPM | SYSTOLIC BLOOD PRESSURE: 110 MMHG | OXYGEN SATURATION: 90 % | WEIGHT: 233 LBS | BODY MASS INDEX: 43.99 KG/M2 | HEIGHT: 61 IN

## 2018-02-15 DIAGNOSIS — E66.01 MORBID OBESITY WITH BMI OF 40.0-44.9, ADULT (HCC): ICD-10-CM

## 2018-02-15 DIAGNOSIS — G47.34 NOCTURNAL HYPOXIA: ICD-10-CM

## 2018-02-15 DIAGNOSIS — K76.0 FATTY LIVER: ICD-10-CM

## 2018-02-15 DIAGNOSIS — I10 ESSENTIAL HYPERTENSION: ICD-10-CM

## 2018-02-15 DIAGNOSIS — G47.33 OBSTRUCTIVE SLEEP APNEA: ICD-10-CM

## 2018-02-15 DIAGNOSIS — E78.5 DYSLIPIDEMIA, GOAL LDL BELOW 100: ICD-10-CM

## 2018-02-15 PROCEDURE — 99214 OFFICE O/P EST MOD 30 MIN: CPT | Performed by: INTERNAL MEDICINE

## 2018-02-15 ASSESSMENT — ENCOUNTER SYMPTOMS
HEMOPTYSIS: 0
LIGHT-HEADEDNESS: 0
FEVER: 0
HEMATEMESIS: 0
CHILLS: 0
NIGHT SWEATS: 0
FOCAL WEAKNESS: 0
LEFT EYE: 0
HEMATOCHEZIA: 0
DOUBLE VISION: 0
COUGH: 0
RIGHT EYE: 0

## 2018-02-15 NOTE — PROGRESS NOTES
Cardiology Follow-up Consultation Note    Date of note:    2/15/2018    Primary Care Provider: Chapito Marques M.D.    Name:             Ragini Reaves   YOB: 1945  MRN:               [id number]    CC: shortness of breath.     Patient ID/HPI:   Ragini Reaves is a 72 y.o. female whose current medical problems include NIDDM, YASMANI on home O2 at night transitioning to CPAP, hypothyroidism, hypertension, dyslipidemia, fatty liver, and obesity with BMI of 44 who presents for follow-up.     Last clinic visit: 1/23/2018  She has been on lasix 40mg PO daily for years. Of note she is very claustrophobic. She does have a history of phen phen use in the 1990s for 4-5 months.       She has lost over 50 pounds intentionally, and plans to lose more.       Interim History:  She saw Dr. Newsome from pulmonary who arranged for repeat PFTs and sleep study. PFTs looked great, however she did have mild sleep apnea and was started on CPAP which has been ordered, but she hasn't started it yet.    She had a relatively normal echocardiogram and a normal stress test.     She reports her shortness of breath is improved, and she can walk well as long as she's not hurrying.     No further exertional chest pain.  No longer taking motrin, tylenol is working.         Review of Systems   Constitution: Negative for chills, fever and night sweats.   Eyes: Negative for double vision, vision loss in left eye and vision loss in right eye.   Respiratory: Negative for cough and hemoptysis.    Gastrointestinal: Negative for hematemesis, hematochezia and melena.   Neurological: Negative for focal weakness and light-headedness.     Patient denies chest pain, palpitations, dyspnea on exertion, pre-syncope, syncope, lower extremity swelling, orthopnea, PND or recent weight gain.       Past Medical History:   Diagnosis Date   • Cataract immature 7/2012    Dr. Carey   • Chickenpox    • DM type 2, goal HbA1c < 8% (CMS-HCC)     • Dyslipidemia, goal LDL below 130    • Frequent urination    • Hypertension    • Hypothyroidism    • Mild intermittent asthma 2004    perfumes or smoke triggers   • Nocturnal hypoxemia     on 2.5 liter nightly   • Obesity    • Osteoarthritis    • Osteopenia    • Papillary carcinoma of thyroid (CMS-HCC) 6/2000   • Ringing in ears    • Shortness of breath    • Sleep apnea    • Tonsillitis    • Toothache    • Type II or unspecified type diabetes mellitus without mention of complication, uncontrolled    • Uterine fibroid    • Wears glasses          Past Surgical History:   Procedure Laterality Date   • COLONOSCOPY WITH BIOPSY  5/21/2013    Performed by Mauro Garcia M.D. at ENDOSCOPY [facility] [place] [facility]   • OTHER      THYROIDECTOMY  2009   • TONSILLECTOMY           Current Outpatient Prescriptions   Medication Sig Dispense Refill   • levothyroxine (SYNTHROID) 150 MCG Tab TAKE 1 TAB BY MOUTH EVERY DAY. 90 Tab 3   • bisoprolol (ZEBETA) 5 MG Tab TAKE 1 TAB BY MOUTH EVERY DAY. 90 Tab 3   • spironolactone (ALDACTONE) 25 MG Tab Take 1 Tab by mouth every day. 90 Tab 3   • linagliptin (TRADJENTA) 5 MG Tab tablet Take 1 Tab by mouth every day. 90 Tab 3   • benazepril (LOTENSIN) 5 MG Tab TAKE 1 TAB BY MOUTH EVERY DAY. 90 Tab 2   • Lutein 10 MG Tab Take  by mouth.     • aspirin 81 MG tablet Take 1 Tab by mouth every day. 90 Tab 3   • coenzyme Q-10 30 MG capsule Take 1 Cap by mouth every day. 30 Cap 11   • ascorbic acid (VITAMIN C) 500 MG tablet Take 1 Tab by mouth every day. 30 Tab 11   • Lactobacillus (DIGESTIVE HEALTH PROBIOTIC) Cap Take 1 Cap by mouth every day. 30 Cap 11   • Multiple Vitamins-Minerals (ULTRA WOMENS PACK) Misc Take 1 Each by mouth every day. 30 Each 11   • simvastatin (ZOCOR) 5 MG Tab Take 1 Tab by mouth every evening. 90 Tab 3   • metformin (GLUCOPHAGE) 500 MG Tab Take 1 Tab by mouth 2 times a day, with meals. 180 Tab 3   • furosemide (LASIX) 40 MG Tab TAKE 1 TABLET BY MOUTH EVERY DAY 90 Tab 3   •  Glucosamine-Chondroitin-Vit C 6708-3462-21 MG/30ML LIQD Take 30 mL by mouth every day. 1 Bottle 0   • nitroglycerin (NITROSTAT) 0.4 MG SL Tab Place 1 Tab under tongue as needed for Chest Pain. 25 Tab 1   • glucose blood (FREESTYLE LITE) strip 1 Strip by Other route 2 Times a Day. 100 Strip 11   • FREESTYLE LANCETS Misc INJECT 1 EACH AS INSTRUCTED 2 TIMES A DAY. 100 Each 4   • Misc. Devices Misc This is an order for overnight pulse ox on room air.  Dx. Nocturnal hypoxia  G47.34, mild persistent asthma J45.30       PSENCER 99 months   NPI [id number] 1 Each 0   • albuterol (PROAIR HFA) 108 (90 BASE) MCG/ACT Aero Soln inhalation aerosol Inhale 2 Puffs by mouth every 6 hours as needed for Shortness of Breath. 8.5 g 3   • Misc. Devices Misc This is an order for overnight pulse oxygen study  Dx nocturnal hypoxia G47.34, please do the test on room air.       SPENCER 99 months   NPI [id number] 1 Each 0   • eucalyptus/peppermint oil (PONARIS NASAL) SOLN Spray 10 Drops in nose every 6 hours as needed. 1 Bottle 0   • Blood Glucose Monitoring Suppl SUPPLIES MISC Test strips order: Test strips for Abbott Freestyle Lite meter. Sig: use daily and prn 90 day supply ICD-9 code 250.02 150 Each 11     Current Facility-Administered Medications   Medication Dose Route Frequency Provider Last Rate Last Dose   • acetaminophen (TYLENOL) tablet 500 mg  500 mg Oral Q6HRS PRN Frandy Pereira M.D.             Allergies   Allergen Reactions   • Cough Syrup M [Cough Cold-Flu Relief] Swelling     Severe facial swelling   • Dextromethorphan Swelling   • Asa [Aspirin]      Stomach bleeding.          Family History   Problem Relation Age of Onset   • Lung Disease Brother    • Cancer Mother      pelvic   • Stroke Maternal Grandmother          Social History     Social History   • Marital status:      Spouse name: N/A   • Number of children: N/A   • Years of education: N/A     Occupational History   • Not on file.     Social History Main Topics   •  "Smoking status: Never Smoker   • Smokeless tobacco: Never Used   • Alcohol use No   • Drug use: No   • Sexual activity: No     Other Topics Concern   • Not on file     Social History Narrative   • No narrative on file         Physical Exam:  Ambulatory Vitals  Blood pressure 110/60, pulse 82, height 1.549 m (5' 1\"), weight 105.7 kg (233 lb), SpO2 90 %.   Oxygen Therapy:  Pulse Oximetry: 90 %  BP Readings from Last 4 Encounters:   02/15/18 110/60   02/01/18 124/80   01/12/18 112/70   12/21/17 155/60       Weight/BMI: Body mass index is 44.02 kg/m².  Wt Readings from Last 4 Encounters:   02/15/18 105.7 kg (233 lb)   02/01/18 105.2 kg (232 lb)   01/12/18 104.3 kg (230 lb)   12/21/17 106.6 kg (235 lb)       General: Well appearing and in no apparent distress  Eyes: nl conjunctiva  ENT: OP clear  Neck: JVP 6-7 cm H2O, no carotid bruits  Lungs: normal respiratory effort, CTAB  Heart: RRR, no murmurs, no rubs or gallops, trace right LE edema, 1+ left LE edema. No LV/RV heave on cardiac palpatation. 2+ bilateral radial pulses.  2+ bilateral dp pulses.   Abdomen: soft, non tender, non distended, no masses, normal bowel sounds.  No HSM. Severe central obesity  Extremities/MSK: no clubbing, no cyanosis  Neurological: No focal sensory deficits  Psychiatric: Appropriate affect, A/O x 3, intact judgement.   Skin: Warm extremities      Lab Data Review:  Lab Results   Component Value Date/Time    CHOLSTRLTOT 135 01/03/2018 10:12 AM    LDL 69 01/03/2018 10:12 AM    HDL 37 (L) 01/03/2018 10:12 AM    TRIGLYCERIDE 145 01/03/2018 10:12 AM       Lab Results   Component Value Date/Time    SODIUM 142 01/03/2018 10:12 AM    POTASSIUM 4.6 01/03/2018 10:12 AM    CHLORIDE 102 01/03/2018 10:12 AM    CO2 25 01/03/2018 10:12 AM    GLUCOSE 136 (H) 01/03/2018 10:12 AM    BUN 24 01/03/2018 10:12 AM    CREATININE 0.57 01/03/2018 10:12 AM    BUNCREATRAT 42 (H) 01/03/2018 10:12 AM     Lab Results   Component Value Date/Time    ALKPHOSPHAT 93 01/03/2018 " 10:12 AM    ASTSGOT 32 01/03/2018 10:12 AM    ALTSGPT 38 (H) 01/03/2018 10:12 AM    TBILIRUBIN 0.5 01/03/2018 10:12 AM      Lab Results   Component Value Date/Time    WBC 5.7 01/03/2018 10:12 AM    WBC 8.0 02/01/2005 07:00 PM     Lab Results   Component Value Date/Time    HBA1C 7.4 (H) 01/03/2018 10:12 AM    HBA1C 8.7 (H) 09/08/2015 07:49 AM     No components found for: TROP          Cardiac Imaging and Procedures Review:    EKG dated 11/29/17 : My personal interpretation is NSR.        Nuclear perfusion imaging 12/13/2017:  CONCLUSIONS AND IMPRESSIONS:  1.  Negative cardiac PET scan.  No evidence of ischemia or infarction.  2.  Resting ejection fraction of 84%, stress ejection fraction of 91%.  No   segmental wall motion abnormalities noted.  3.  No significant ST segment changes.  Occasional PVCs noted.  4.  Shortness of breath was experienced in this study.    Radiology test Review:  CXR:   FINDINGS:  The heart is normal in size.  Moderate diffuse interstitial opacities.  No airspace consolidation.  No pleural effusions are appreciated.  Sternotomy wires demonstrated.       Sleep study:  Assessment:  Mild obstructive sleep apnea hypopnea with an apnea hypopnea index of 10.5 events per hour and a lowest arterial oxygen saturation of 87% on room air. Marked fragmentation of sleep related in part to laboratory and treatment effects but chronic insomnia might be considered. There did seem to be some response to CPAP therapy but the study was limited by the collection of only 21 minutes of sleep time in the treatment phase of the split-night study.     Recommendations:  Clinical correlation is required. If the patient presented with significant daytime somnolence or if the mild arterial oxygen desaturation seen in this study is deemed clinically significant, treatment may be considered. Airway pressurization could be initiated through a titration polysomnogram with the use of auto titrating CPAP. Alternative  treatments for sleep-disordered breathing include reconstructive otolaryngologic surgery, dental appliances and weight loss. If any these latter treatment options are selected, a follow-up polysomnogram is suggested to assess the efficacy of therapy. Behavioral measures including avoidance of sedatives, alcohol and supine body position may be of additional benefit.     PFTs:  SPIROMETRY:  1. FVC was 2.20 L, 78 % of predicted  2. FEV1 was 2.06 L, 97 % of predicted   3. FEV1/FVC ratio was 94 %  4. There was no significant response to bronchodilators      LUNG VOLUMES:  1. RV was  91 % of predicted   2. TLC was 89 % of predicted         DIFFUSION CAPACITY:  1.Diffusion capacity [id number] % of predicted         IMPRESSION:  The patient has mild decrease in FVC to 78% predicted probably secondary to the patient's elevated BMI of 40.3. Total lung capacity was 89% predicted which preclude restrictive ventilatory defect by definition. The rest of the pulmonary function test was within normal limits. Clinical correlation is required.       Medical Decision Makin. Angina of effort (CMS-HCC)  Resolved, negative stress test  -ED precautions discussed, has ntg prn  -continue aspirin for primary prevention given risk factors  -continue statin, last LDL at goal        2. Uncontrolled type 2 diabetes mellitus without complication, without long-term current use of insulin (CMS-HCC)  Per Dr. Marques, last hgbA1c 7.3, improving with diet control     3. Essential hypertension  Well controlled  -continue spironolactone, bisoprolol, lasix, benazepril     4. Dyslipidemia, goal LDL below 100  Simvastatin     5. Morbid obesity with BMI of 40.0-44.9, adult (CMS-HCC)  Working on weight loss  - weight management referral      6. Dyspnea on exertion  Improving  -continue weight loss, CPAP     7. Primary thyroid cancer (CMS-HCC)  Resected with no e/o recurrence. This is why she has sternal wires     8. Chronic diastolic heart failure  -patient  has been on lasix stably for years, assumed HFpEF  -continue lasix/spironolactone  -discussed importance of daily weights, and ED precautions for dehydration or heart failure.        Return in about 6 months (around 8/15/2018).      Frandy Pereira MD  [facility]  [facility], [address]  [address] [address]  Phone: [phone]  Fax: [phone]

## 2018-02-16 NOTE — TELEPHONE ENCOUNTER
"Yadira Carpenter - REFERRAL TO [facility] HEALTH IMPROVEMENT PROGRAMS << Less Detail',event)\" href=\"javascript:;\">More Detail >>      REFERRAL TO [facility] HEALTH IMPROVEMENT PROGRAMS   Yadira Carpenter   Sent: Thu February 15, 2018  3:27 PM   To: Lyssa Ellison R.N.                  Message     The referral to [facility] Health Improvement Programs will be sent to [facility] Health Improvement Programs. The contact number is [phone].       =================================================================================    MyChart message sent to pt.   "

## 2018-02-28 ENCOUNTER — NON-PROVIDER VISIT (OUTPATIENT)
Dept: MEDICAL GROUP | Facility: CLINIC | Age: 73
End: 2018-02-28
Payer: MEDICARE

## 2018-02-28 DIAGNOSIS — E11.9 DM TYPE 2, GOAL HBA1C < 8% (HCC): ICD-10-CM

## 2018-02-28 PROCEDURE — G0109 DIAB MANAGE TRN IND/GROUP: HCPCS | Performed by: INTERNAL MEDICINE

## 2018-02-28 NOTE — LETTER
February 28, 2018      Chapito Marques M.D.  [address]  [address]  [place] [address]          RE: Ragini Reaves  [id number]    Dear:Chapito Marques M.D.    The above referenced patient received 3 hours of diabetes education from [facility] [place].    Topics taught (may include but not limited to):  Introduction to diabetes, benefits and responsibilities of patient, physiology of diabetes and the diease process, benefits of blood glucose monitoring and record keeping, medication action and possible side effects, hypoglycemia, sick day management, exercise, stress reduction and travel with diabetes.     Provided meter and instructed in use: no. Pt using Abbott Freestyle Litemeter.    Dilated eye exam within the past year: yes Goal is for patient to have yearly.   Lipid panel:   Lab Results   Component Value Date/Time    CHOLSTRLTOT 135 01/03/2018 10:12 AM    LDL 69 01/03/2018 10:12 AM    HDL 37 (L) 01/03/2018 10:12 AM    TRIGLYCERIDE 145 01/03/2018 10:12 AM    Date:1/3/18 Chol./HDL ratio 3.6  Goal is less than 5   Micro-albumin/Creat. Ratio: 37.9  Date: 9/6/17  Goal is less than 20 ng/dl  HbA1c:   Lab Results   Component Value Date/Time    HBA1C 7.4 (H) 01/03/2018 10:12 AM    HBA1C 8.7 (H) 09/08/2015 07:49 AM    Goal is 7% or less in the next 3 months.  Vitamin D level: 38.1   Date: in 2014 Goal is greater than 30 ng/ml.    Daily aspirin use if >30 years old w/o contradictions:yes dose :81 mg    Patient/caregiver appeared to understand the content as demonstrated by appropriate questions.     Notes:Ragini states that she has lost almost 65 lbs over the past 4-5 years, she is very interested in trying to lose more weight.  She states she thought she had been given a referral to the weight loss program but I did not see one in the chart.  She would also be eligible for the Medicare Obesity Counseling with one of our dietitians in your office.      The patient was provided with  written materials to back-up verbal education.   Thank you for this consultation,     Sarah Belcher R.N., CDE  Certified Diabetes Nurse Educator

## 2018-02-28 NOTE — PROGRESS NOTES
Attended Type 2 Self Management Class on : 2/28/18  Time: 2657-7570  Has had diabetes since: Diagnosed in 2006  Medications for diabetes: Metformin 500 mg bid and Tradjenta 5 mg daily    Exercise: not currently exercising.   Eye Exam: states completed March 2017, reminded to make follow up.   Foot Exam: checks feet daily, denies problems.     Diabetes Education Content    Introduction To Diabetes   Define Type 2 diabetes: Education taught  Define Type 1 diabetes: Education taught  Understand feaures and benefits of education and management: Education taught  Describe who is responsible for diabetes management: Education taught  Describe impact of diabetes on family/friends: Education taught  Discuss role of significant other in management: Education taught  Diabetes Lifestyle Changes / Goals  State benefits of making appropriate lifestyle changes: Education taught  Identify lifestyle behaviors participant wants to change: Education taught  Identify risk factors that interfere with health and strategies to reduce : Education taught  Verbalize need for and frequency of health care follow-up: Education taught  Develop behavioral objectives and expected health outcomes: Education taught  Diabetes Exercise and Activity  Describe role of exercise in diabetes management: Education taught  State relationship of exercise to blood sugar: Education taught  State the benefits/risk(s) of exercise and precautions to follow: Education taught  Diabetes Self Blood Glucose Monitoring  Discuss rationale and importance of SBGM: Education taught  Discuss appropriate record keeping: Education taught  Discuss how to use results from blood glucose testing: Education taught  Evaluation and interpretation of blood glucose patterns: Education taught  Diabetes Disease Process  Discuss signs, symptoms, TX and prevention of hyperglycemia: Education taught  Discuss beta cell dysfunction and insulin resistance: Education taught  Discuss  "Insulin and its role in the body: Education taught  Discuss the role of the liver in glucose metabolism: Education taught  Discuss hormonal regulation: Education taught  Define benefits of good control and discuss what it means to be in \"good control\": Education taught  Discuss impact of exercise, food, meds, stress, and special factors on diabetes: Education taught  Discuss when to confer with HCP for possible treatment plan adjustments: Education taught  Diabetes Insulin and Medications  Action of oral medications, onset and duration: Education taught  Discuss incretin secretagogs and their use in diabetes management: Education taught  Identify the onset, peak and duration of different insulin: Education taught  Discuss proper injection technique and site rotation: Education taught  Discuss storage of insulin and disposal of sharps: Education taught  Hypoglycemia  List signs, symptoms and causes of hypoglycemia: Education taught  Discuss physiology of hypoglycemic reactions: Education taught  Accurately describe appropriate treatment and prevention: Education taught  Discuss when to contact HCP: Education taught  Sick Day Care  Verbalize important items to monitor when sick and when to contact HCP: Education taught  Review sick day box: Education taught  State diabetes medication adjustments on sick days: Education taught  Complications (Chronic)  Explain prevention, TX, signs/symptoms of: retinopathy, neuropathy, nephropathy, infections: Education taught  Explain prevention, TX, signs/symptoms of: CAD, cerebral-vascular disease and sexual dysfunction: Education taught  Identify when to notify HCP of complications: Education taught  State principles of skin, dental and foot care.  Discuss proper foot care, prevention of foot probelms when to notify HCP>: Education taught  Demonstrate how to examine feet and what to look for: Education taught  Psychosocial adjustment  Identify sources of stress: Education " taught  Identify resources and techniques for stress reduction: Education taught  Discuss health care referral network / community resources for support: Education taught  Define proper precautions to use when traveling: Education taught

## 2018-03-21 ENCOUNTER — SLEEP CENTER VISIT (OUTPATIENT)
Dept: SLEEP MEDICINE | Facility: MEDICAL CENTER | Age: 73
End: 2018-03-21
Payer: MEDICARE

## 2018-03-21 VITALS
HEART RATE: 70 BPM | SYSTOLIC BLOOD PRESSURE: 128 MMHG | WEIGHT: 233 LBS | BODY MASS INDEX: 43.99 KG/M2 | DIASTOLIC BLOOD PRESSURE: 84 MMHG | OXYGEN SATURATION: 95 % | RESPIRATION RATE: 16 BRPM | HEIGHT: 61 IN

## 2018-03-21 DIAGNOSIS — E11.9 DM TYPE 2, GOAL HBA1C < 8% (HCC): ICD-10-CM

## 2018-03-21 DIAGNOSIS — J45.30 MILD PERSISTENT ASTHMA, UNCOMPLICATED: ICD-10-CM

## 2018-03-21 DIAGNOSIS — E66.01 MORBID OBESITY WITH BMI OF 40.0-44.9, ADULT (HCC): ICD-10-CM

## 2018-03-21 DIAGNOSIS — G47.33 OBSTRUCTIVE SLEEP APNEA: ICD-10-CM

## 2018-03-21 PROCEDURE — 99214 OFFICE O/P EST MOD 30 MIN: CPT | Performed by: FAMILY MEDICINE

## 2018-03-21 NOTE — PATIENT INSTRUCTIONS
Calorie Counting for Weight Loss  Calories are energy you get from the things you eat and drink. Your body uses this energy to keep you going throughout the day. The number of calories you eat affects your weight. When you eat more calories than your body needs, your body stores the extra calories as fat. When you eat fewer calories than your body needs, your body burns fat to get the energy it needs.  Calorie counting means keeping track of how many calories you eat and drink each day. If you make sure to eat fewer calories than your body needs, you should lose weight. In order for calorie counting to work, you will need to eat the number of calories that are right for you in a day to lose a healthy amount of weight per week. A healthy amount of weight to lose per week is usually 1-2 lb (0.5-0.9 kg). A dietitian can determine how many calories you need in a day and give you suggestions on how to reach your calorie goal.   WHAT IS MY MY PLAN?  My goal is to have __________ calories per day.   If I have this many calories per day, I should lose around __________ pounds per week.  WHAT DO I NEED TO KNOW ABOUT CALORIE COUNTING?  In order to meet your daily calorie goal, you will need to:  · Find out how many calories are in each food you would like to eat. Try to do this before you eat.  · Decide how much of the food you can eat.  · Write down what you ate and how many calories it had. Doing this is called keeping a food log.  WHERE DO I FIND CALORIE INFORMATION?  The number of calories in a food can be found on a Nutrition Facts label. Note that all the information on a label is based on a specific serving of the food. If a food does not have a Nutrition Facts label, try to look up the calories online or ask your dietitian for help.  HOW DO I DECIDE HOW MUCH TO EAT?  To decide how much of the food you can eat, you will need to consider both the number of calories in one serving and the size of one serving. This  information can be found on the Nutrition Facts label. If a food does not have a Nutrition Facts label, look up the information online or ask your dietitian for help.  Remember that calories are listed per serving. If you choose to have more than one serving of a food, you will have to multiply the calories per serving by the amount of servings you plan to eat. For example, the label on a package of bread might say that a serving size is 1 slice and that there are 90 calories in a serving. If you eat 1 slice, you will have eaten 90 calories. If you eat 2 slices, you will have eaten 180 calories.  HOW DO I KEEP A FOOD LOG?  After each meal, record the following information in your food log:  · What you ate.  · How much of it you ate.  · How many calories it had.  · Then, add up your calories.  Keep your food log near you, such as in a small notebook in your pocket. Another option is to use a mobile irvin or website. Some programs will calculate calories for you and show you how many calories you have left each time you add an item to the log.  WHAT ARE SOME CALORIE COUNTING TIPS?  · Use your calories on foods and drinks that will fill you up and not leave you hungry. Some examples of this include foods like nuts and nut butters, vegetables, lean proteins, and high-fiber foods (more than 5 g fiber per serving).  · Eat nutritious foods and avoid empty calories. Empty calories are calories you get from foods or beverages that do not have many nutrients, such as candy and soda. It is better to have a nutritious high-calorie food (such as an avocado) than a food with few nutrients (such as a bag of chips).  · Know how many calories are in the foods you eat most often. This way, you do not have to look up how many calories they have each time you eat them.  · Look out for foods that may seem like low-calorie foods but are really high-calorie foods, such as baked goods, soda, and fat-free candy.  · Pay attention to calories  in drinks. Drinks such as sodas, specialty coffee drinks, alcohol, and juices have a lot of calories yet do not fill you up. Choose low-calorie drinks like water and diet drinks.  · Focus your calorie counting efforts on higher calorie items. Logging the calories in a garden salad that contains only vegetables is less important than calculating the calories in a milk shake.  · Find a way of tracking calories that works for you. Get creative. Most people who are successful find ways to keep track of how much they eat in a day, even if they do not count every calorie.  WHAT ARE SOME PORTION CONTROL TIPS?  · Know how many calories are in a serving. This will help you know how many servings of a certain food you can have.  · Use a measuring cup to measure serving sizes. This is helpful when you start out. With time, you will be able to estimate serving sizes for some foods.  · Take some time to put servings of different foods on your favorite plates, bowls, and cups so you know what a serving looks like.  · Try not to eat straight from a bag or box. Doing this can lead to overeating. Put the amount you would like to eat in a cup or on a plate to make sure you are eating the right portion.  · Use smaller plates, glasses, and bowls to prevent overeating. This is a quick and easy way to practice portion control. If your plate is smaller, less food can fit on it.  · Try not to multitask while eating, such as watching TV or using your computer. If it is time to eat, sit down at a table and enjoy your food. Doing this will help you to start recognizing when you are full. It will also make you more aware of what and how much you are eating.  HOW CAN I CALORIE COUNT WHEN EATING OUT?  · Ask for smaller portion sizes or child-sized portions.  · Consider sharing an entree and sides instead of getting your own entree.  · If you get your own entree, eat only half. Ask for a box at the beginning of your meal and put the rest of your  "entree in it so you are not tempted to eat it.  · Look for the calories on the menu. If calories are listed, choose the lower calorie options.  · Choose dishes that include vegetables, fruits, whole grains, low-fat dairy products, and lean protein. Focusing on smart food choices from each of the 5 food groups can help you stay on track at restaurants.  · Choose items that are boiled, broiled, grilled, or steamed.  · Choose water, milk, unsweetened iced tea, or other drinks without added sugars. If you want an alcoholic beverage, choose a lower calorie option. For example, a regular sen can have up to 700 calories and a glass of wine has around 150.  · Stay away from items that are buttered, battered, fried, or served with cream sauce. Items labeled \"crispy\" are usually fried, unless stated otherwise.  · Ask for dressings, sauces, and syrups on the side. These are usually very high in calories, so do not eat much of them.  · Watch out for salads. Many people think salads are a healthy option, but this is often not the case. Many salads come with lombardi, fried chicken, lots of cheese, fried chips, and dressing. All of these items have a lot of calories. If you want a salad, choose a garden salad and ask for grilled meats or steak. Ask for the dressing on the side, or ask for olive oil and vinegar or lemon to use as dressing.  · Estimate how many servings of a food you are given. For example, a serving of cooked rice is ½ cup or about the size of half a tennis ball or one cupcake wrapper. Knowing serving sizes will help you be aware of how much food you are eating at restaurants. The list below tells you how big or small some common portion sizes are based on everyday objects.  ¨ 1 oz--4 stacked dice.  ¨ 3 oz--1 deck of cards.  ¨ 1 tsp--1 dice.  ¨ 1 Tbsp--½ a Ping-Pong ball.  ¨ 2 Tbsp--1 Ping-Pong ball.  ¨ ½ cup--1 tennis ball or 1 cupcake wrapper.  ¨ 1 cup--1 baseball.     This information is not intended to " replace advice given to you by your health care provider. Make sure you discuss any questions you have with your health care provider.     Document Released: 12/18/2006 Document Revised: 01/08/2016 Document Reviewed: 10/23/2014  [employer] Patient Education ©2016 [employer]

## 2018-03-21 NOTE — PROGRESS NOTES
[facility] Follow Up Note     Date: 3/21/2018 / Time: 11:21 AM    Patient ID:   Name:             Ragini Reaves   YOB: 1945  Age:                 73 y.o.  female   MRN:               [id number]      Thank you for requesting a sleep medicine consultation on Ragini Reaves at the sleep center. She presents today with the chief complaints of YASMANI follow up.     HISTORY OF PRESENT ILLNESS:       Pt is currently on supplemental O2 @ 2.55 L/min. CPAP was ordered on 2/1/18 but has not received the CPAP yet. She goes to sleep around 2:30 am and wakes up around 11:30 am. She is getting about 5-6 hrs of sleep on a good night and about few hr of sleep on a bad night. The bad nights are about 1 per week. She is using CPAP most days of the week. The symptoms of excessive daytime continues.         SLEEP HISTORY   PSG: Mild obstructive sleep apnea hypopnea with an apnea hypopnea index of 10.5 events per hour and a lowest arterial oxygen saturation of 87% on room air.      REVIEW OF SYSTEMS:       Constitutional: Denies fevers, Denies weight changes  Eyes: Denies changes in vision, no eye pain  Ears/Nose/Throat/Mouth: Denies nasal congestion or sore throat   Cardiovascular: Denies chest pain or palpitations   Respiratory: Denies shortness of breath , Denies cough  Gastrointestinal/Hepatic: Denies abdominal pain, nausea, vomiting, diarrhea, constipation or GI bleeding   Genitourinary: Denies bladder dysfunction, dysuria or frequency  Musculoskeletal/Rheum: Denies  joint pain and swelling   Skin/Breast: Denies rash,   Neurological: Denies headache, confusion, memory loss or focal weakness/parasthesias  Psychiatric: denies mood disorder     Comprehensive review of systems form is reviewed with the patient and is attached in the EMR.     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  MEDS:   Current Outpatient Prescriptions:   •  levothyroxine (SYNTHROID) 150 MCG Tab, TAKE 1 TAB BY MOUTH EVERY DAY., Disp: 90 Tab, Rfl: 3  •  bisoprolol (ZEBETA) 5 MG Tab, TAKE 1 TAB BY MOUTH EVERY DAY., Disp: 90 Tab, Rfl: 3  •  spironolactone (ALDACTONE) 25 MG Tab, Take 1 Tab by mouth every day., Disp: 90 Tab, Rfl: 3  •  linagliptin (TRADJENTA) 5 MG Tab tablet, Take 1 Tab by mouth every day., Disp: 90 Tab, Rfl: 3  •  benazepril (LOTENSIN) 5 MG Tab, TAKE 1 TAB BY MOUTH EVERY DAY., Disp: 90 Tab, Rfl: 2  •  Lutein 10 MG Tab, Take  by mouth., Disp: , Rfl:   •  aspirin 81 MG tablet, Take 1 Tab by mouth every day., Disp: 90 Tab, Rfl: 3  •  nitroglycerin (NITROSTAT) 0.4 MG SL Tab, Place 1 Tab under tongue as needed for Chest Pain., Disp: 25 Tab, Rfl: 1  •  coenzyme Q-10 30 MG capsule, Take 1 Cap by mouth every day., Disp: 30 Cap, Rfl: 11  •  ascorbic acid (VITAMIN C) 500 MG tablet, Take 1 Tab by mouth every day., Disp: 30 Tab, Rfl: 11  •  Lactobacillus (DIGESTIVE HEALTH PROBIOTIC) Cap, Take 1 Cap by mouth every day., Disp: 30 Cap, Rfl: 11  •  Multiple Vitamins-Minerals (ULTRA WOMENS PACK) Misc, Take 1 Each by mouth every day., Disp: 30 Each, Rfl: 11  •  simvastatin (ZOCOR) 5 MG Tab, Take 1 Tab by mouth every evening., Disp: 90 Tab, Rfl: 3  •  glucose blood (FREESTYLE LITE) strip, 1 Strip by Other route 2 Times a Day., Disp: 100 Strip, Rfl: 11  •  FREESTYLE LANCETS Misc, INJECT 1 EACH AS INSTRUCTED 2 TIMES A DAY., Disp: 100 Each, Rfl: 4  •  metformin (GLUCOPHAGE) 500 MG Tab, Take 1 Tab by mouth 2 times a day, with meals., Disp: 180 Tab, Rfl:  3  •  furosemide (LASIX) 40 MG Tab, TAKE 1 TABLET BY MOUTH EVERY DAY, Disp: 90 Tab, Rfl: 3  •  Misc. Devices Misc, This is an order for overnight pulse ox on room air.  Dx. Nocturnal hypoxia  G47.34, mild persistent asthma J45.30       SPENCER 99 months   NPI [id number], Disp: 1 Each, Rfl: 0  •  albuterol (PROAIR HFA) 108 (90 BASE) MCG/ACT Aero Soln inhalation aerosol, Inhale 2 Puffs by mouth every 6 hours as needed for Shortness of Breath., Disp: 8.5 g, Rfl: 3  •  Misc. Devices Misc, This is an order for overnight pulse oxygen study  Dx nocturnal hypoxia G47.34, please do the test on room air.       SPENCER 99 months   NPI [id number], Disp: 1 Each, Rfl: 0  •  eucalyptus/peppermint oil (PONARIS NASAL) SOLN, Spray 10 Drops in nose every 6 hours as needed., Disp: 1 Bottle, Rfl: 0  •  Blood Glucose Monitoring Suppl SUPPLIES MISC, Test strips order: Test strips for Abbott Freestyle Lite meter. Sig: use daily and prn 90 day supply ICD-9 code 250.02, Disp: 150 Each, Rfl: 11  •  Glucosamine-Chondroitin-Vit C 1561-8986-31 MG/30ML LIQD, Take 30 mL by mouth every day., Disp: 1 Bottle, Rfl: 0    Current Facility-Administered Medications:   •  acetaminophen (TYLENOL) tablet 500 mg, 500 mg, Oral, Q6HRS PRN, Frandy Pereira M.D.  ALLERGIES:   Allergies   Allergen Reactions   • Cough Syrup M [Cough Cold-Flu Relief] Swelling     Severe facial swelling   • Dextromethorphan Swelling   • Asa [Aspirin]      Stomach bleeding.      SURGHX:   Past Surgical History:   Procedure Laterality Date   • COLONOSCOPY WITH BIOPSY  5/21/2013    Performed by Mauro Garcia M.D. at ENDOSCOPY [facility]   • OTHER      THYROIDECTOMY  2009   • TONSILLECTOMY       SOCHX:  reports that she has never smoked. She has never used smokeless tobacco. She reports that she does not drink alcohol or use drugs..  FH:   Family History   Problem Relation Age of Onset   • Lung Disease Brother    • Cancer Mother      pelvic   • Stroke Maternal Grandmother   "        Physical Exam:  Vitals/ General Appearance:   Weight/BMI: Body mass index is 44.02 kg/m².  Blood pressure 128/84, pulse 70, resp. rate 16, height 1.549 m (5' 1\"), weight 105.7 kg (233 lb), SpO2 95 %.  Vitals:    03/21/18 1123   BP: 128/84   Pulse: 70   Resp: 16   SpO2: 95%   Weight: 105.7 kg (233 lb)   Height: 1.549 m (5' 1\")       Pt. is alert and oriented to time, place and person. Cooperative and in no apparent distress.       1. Head: Atraumatic, normocephalic.   2. Ears: Normal tympanic membrane and no discharge  3. Nose: No inferior turbinate hypertophy, no septal deviation, no polyp.   4. Throat: Oropharynx appears crowded in that the palate is not overhanging (Malam Tatiana scale 3. Tonsils are enlarged, uvula is large, pharynx not inflamed. Tongue isnot enlarged.   5. Neck: Supple. No thyromegaly  6. Chest: Trachea central, no spine deformity   7. Lungs auscultation: B/L good air entry, vesicular breath sounds, no adventitious sounds  8. Heart auscultation: 1st and 2nd heart sounds normal, regular rhythm. No appreciable murmur.  9. Abdomen: Soft, non tender, no organomegaly. Bowel sounds present  10. Extremities: no clubbing, no pedal edema.  11. Skin: No rash  12. NEUROLOGICAL EXAMINATION: On neurological exam, the patient was alert and oriented x3. speech was clear and fluent without dysarthria.      INVESTIGATIONS:           ASSESSMENT AND PLAN     1.Obstructive Sleep Apnea (YASMANI).She  Is currently suppose to be on CPAP 6-12 cm.  The symptoms of excessive daytime, snoring continues.      The pathophysiology of YASMANI and the increased risk of cardiovascular morbidity from untreated YASMANI is discussed in detail with the patient. She  also has HTN and DM which can be worsened by her YASMANI.     She is urged to avoid supine sleep, weight gain and alcoholic beverages since all of these can worsen YASMANI. She is cautioned against drowsy driving. If She feels sleepy while driving, She must pull over for a break/nap, " rather than persist on the road, in the interest of She own safety and that of others on the road.   Plan   - Should be on Auto CPAP 6-12 cm however she has not received it from [facility]   - overnight pulse ox should be with CPAP w/o supplemental oxygen once she has received her CPAP   - compliance download was reviewed and discussed with the pt   - compliance was reinforced     2. Body mass index is 44.02 kg/m².  Obesity  - recommended healthy diet with portion control , aerobic exercise 5x week  - obesity counseling done today: Drink more water. Reduce carb intake in drinks. Try to eat 3 meals a day with last meal 4-6 hours prior to bedtime. Limit caloric intake to 1600 - 1800 kcal per day.Restrict carbohydrate intake to 50 g per day to 100 g per day    3. IDDM:   well controlled on Tradjenta and metformin. Last A1c was 7.4.     3. Mild persistent Asthma: Managed by pulmonary. Currently stable on albuterol inhaler PRN.  Based on recent PFT The patient has mild decrease in FVC to 78% predicted probably secondary to the patient's elevated BMI of 40.3. Total lung capacity was 89% predicted which preclude restrictive ventilatory defect by definition.

## 2018-03-23 DIAGNOSIS — I10 ESSENTIAL HYPERTENSION: ICD-10-CM

## 2018-03-23 RX ORDER — FUROSEMIDE 40 MG/1
TABLET ORAL
Qty: 90 TAB | Refills: 3 | Status: SHIPPED | OUTPATIENT
Start: 2018-03-23 | End: 2018-06-07

## 2018-04-03 ENCOUNTER — TELEPHONE (OUTPATIENT)
Dept: MEDICAL GROUP | Facility: CLINIC | Age: 73
End: 2018-04-03

## 2018-04-03 DIAGNOSIS — E11.9 CONTROLLED TYPE 2 DIABETES MELLITUS WITHOUT COMPLICATION, WITHOUT LONG-TERM CURRENT USE OF INSULIN (HCC): ICD-10-CM

## 2018-04-04 ENCOUNTER — PATIENT MESSAGE (OUTPATIENT)
Dept: HEALTH INFORMATION MANAGEMENT | Facility: OTHER | Age: 73
End: 2018-04-04

## 2018-04-04 NOTE — TELEPHONE ENCOUNTER
Declined; patient has bilateral macular degeneration and age-related cataract. She needs to continue follow-up with ophthalmology.

## 2018-04-18 ENCOUNTER — OFFICE VISIT (OUTPATIENT)
Dept: HEALTH INFORMATION MANAGEMENT | Facility: MEDICAL CENTER | Age: 73
End: 2018-04-18
Payer: MEDICARE

## 2018-04-18 VITALS
SYSTOLIC BLOOD PRESSURE: 110 MMHG | OXYGEN SATURATION: 96 % | HEIGHT: 61 IN | DIASTOLIC BLOOD PRESSURE: 66 MMHG | WEIGHT: 234.7 LBS | BODY MASS INDEX: 44.31 KG/M2 | HEART RATE: 70 BPM

## 2018-04-18 DIAGNOSIS — G47.33 OBSTRUCTIVE SLEEP APNEA: ICD-10-CM

## 2018-04-18 DIAGNOSIS — E66.01 MORBID OBESITY WITH BMI OF 40.0-44.9, ADULT (HCC): ICD-10-CM

## 2018-04-18 DIAGNOSIS — E78.5 DYSLIPIDEMIA, GOAL LDL BELOW 100: ICD-10-CM

## 2018-04-18 DIAGNOSIS — I10 ESSENTIAL HYPERTENSION: ICD-10-CM

## 2018-04-18 DIAGNOSIS — E11.9 DM TYPE 2, GOAL HBA1C < 8% (HCC): ICD-10-CM

## 2018-04-18 PROCEDURE — 99205 OFFICE O/P NEW HI 60 MIN: CPT | Performed by: INTERNAL MEDICINE
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
## 2018-05-01 ENCOUNTER — NON-PROVIDER VISIT (OUTPATIENT)
Dept: MEDICAL GROUP | Facility: CLINIC | Age: 73
End: 2018-05-01
Payer: MEDICARE

## 2018-05-01 PROCEDURE — G0447 BEHAVIOR COUNSEL OBESITY 15M: HCPCS | Performed by: FAMILY MEDICINE

## 2018-05-01 NOTE — PROGRESS NOTES
"IBT INITIAL ASSESMENT    Author: Ysabel SYLWIAMyra Cardona Date & Time created: 5/1/2018  2:29 PM   Visit #: 1  Referring Provider: Chapito Marques M.D.  Patient Age: 73 y.o.  Time in/Out:  1:22-1:42pm    ASSESS:  Vitals:    05/01/18 1427   Weight: 106.8 kg (235 lb 8 oz)   Height: 1.549 m (5' 1\")      Vitals:    05/01/18 1427   Weight: 106.8 kg (235 lb 8 oz)   Height: 1.549 m (5' 1\")    Body mass index is 44.5 kg/m². Goal Weight:  1450-150 lbs  The patient states lowering dose/removing diabetes medications and improving breathing on exertion as a motivator for weight loss and has tried Weight Watcher and Phen-Phen for weight loss in the past with no lasting success.  Comorbidities include YASMANI, fatty liver, uncontrolled type 2 diabetes mellitus, hypertension, dyslipidemia..     Current Dietary/Exercise Habits  1.  How many servings of fruits and vegetables do you eat in a typical day?  1-2x/week  2. How many servings of whole grains do you eat in a typical day?  0  3. How many times in a typical week do you eat foods high in fat or eat at a fast food restaurant?  1-2x/month  4. How many times in a typical week do you eat red meat, pork, or processed meat?  Mostly eats chicken and turkey, no pork  5. How many days a week do you participate in moderately intense physical activity for 30 minutes?  0  6. How many days a week do you participate in vigorously intense physical activity for 20 minutes?  0  7. How many days a week do you do strength training exercise?  0  Estimated Stage of Change   Preparation as evidenced by starting her food journal and keeping a blood sugar log.  ADVISE:    Physical Activity:  Pt is currently walking as tolerated. She gets SOB while walking longer distances. Is planning to start walking with a group of her neighbors once a week at the senior housing she lives in.    Dietary Guidelines: Pt was mostly interested in getting meal ideas - provided her with handout regarding low carb meal ideas " reference for Diabetes by the Plate Diabetic Living cookbook. Pt brought in her protein powder (1 scoop = 50kcals and 10g of protein). After she uses this in the morning she will have a small breakfast 2 hours after. It sounds like she would like to continue using the product - we will discuss on her next visit how this can fit into her meals.     The patient has been advised of how weight management and physical activity impacts their health and will help to reduce complications and health risk factors.    AGREE:  Goals:   1. Continue 3-5 day food diary of all foods/drinks consumed, the amount you consumed (approximately), and the time when it was consumed.  2. Set a schedule to start walking with neighbors      ASSIST:  Ragini states she has already lost 60 lbs and is looking to for assistance and accountability to continue losing. She started a food journal, but unfortunately due to time constraints we did not have time to review it in detail. [facility], but it sounds like she is hesitant to use these. Her blood sugars are averaging  in the last week. Reviewed basic guidelines from [facility] program and will review the Plate Method for meal planning and her food journal on her next visit.   ARRANGE:     Return for follow-up in 2 weeks    The patient was assisted in making follow-up appointment per orders.

## 2018-05-03 ENCOUNTER — OFFICE VISIT (OUTPATIENT)
Dept: HEALTH INFORMATION MANAGEMENT | Facility: MEDICAL CENTER | Age: 73
End: 2018-05-03
Payer: MEDICARE

## 2018-05-03 VITALS
DIASTOLIC BLOOD PRESSURE: 62 MMHG | WEIGHT: 232 LBS | HEIGHT: 61 IN | BODY MASS INDEX: 43.8 KG/M2 | SYSTOLIC BLOOD PRESSURE: 102 MMHG | HEART RATE: 71 BPM | OXYGEN SATURATION: 97 %

## 2018-05-03 DIAGNOSIS — E78.5 DYSLIPIDEMIA, GOAL LDL BELOW 100: ICD-10-CM

## 2018-05-03 DIAGNOSIS — I10 ESSENTIAL HYPERTENSION: ICD-10-CM

## 2018-05-03 DIAGNOSIS — G47.33 OBSTRUCTIVE SLEEP APNEA: ICD-10-CM

## 2018-05-03 DIAGNOSIS — K76.0 FATTY LIVER: ICD-10-CM

## 2018-05-03 DIAGNOSIS — K59.01 SLOW TRANSIT CONSTIPATION: ICD-10-CM

## 2018-05-03 DIAGNOSIS — E66.01 MORBID OBESITY WITH BMI OF 40.0-44.9, ADULT (HCC): ICD-10-CM

## 2018-05-03 PROCEDURE — 99213 OFFICE O/P EST LOW 20 MIN: CPT | Performed by: INTERNAL MEDICINE

## 2018-05-03 RX ORDER — DOCUSATE SODIUM 100 MG/1
100 CAPSULE, LIQUID FILLED ORAL 2 TIMES DAILY
Qty: 60 CAP | Refills: 1 | Status: ON HOLD | OUTPATIENT
Start: 2018-05-03 | End: 2019-03-13

## 2018-05-03 NOTE — PATIENT INSTRUCTIONS
If blood glu <100 for three or more readings, reduce metformin to 500 mg qam  Reduce Lasix by 50% to 20 mg daily

## 2018-05-03 NOTE — PROGRESS NOTES
Bariatric Medicine Follow Up  Chief Complaint   Patient presents with   • Weight Gain       History of Present Illness:   Ragini Reaves is a 73 y.o. female who presents for weight management follow-up and to help address co-morbidities related to overweight, including type 2 diabetes, dyslipidemia, hypertension, YASMANI. Also with history of primary thyroid cancer.    During the patient's last visit, the following were discussed and recommended:  Weight Goal: 5% wt loss at one month after start (pt goal weight is 145 lb)  Diet: For breakfast, 2 cups milk with protein powder. No apple juice. Bring in protein powder product for review next visit.  For lunch and dinner, high protein/low carb meals as per list of foods to try, and carbs to avoid.  One protein snack between lunch and dinner from list given.  1 Robard meal replacement shake or hot chocolate in the evening instead of chips or instant hot cocoa.  Eliminate pasta, potatoes, chips and instant hot chocolate, juice.  Physical Activity: Walking as tolerated  Risk level for moderate/vigorous exercise program: Moderate  New Rx: None.  Will discontinue Tradjenta when glucose consistently 100.  Side Effects: Will review consent if applicable.  Behavior change: Mindful eating, planning  Follow-up: 2 weeks  IBT for RD visits.      The patient is doing well. She has significantly reduced her snacking. She is using her protein powder from [employer] (10 g protein in 2 scoops, carbohydrate content not listed on the bottle) each morning. For lunch, she has a salad with cheese and tomato and to eggs. For dinner she has tomato soup that is homemade and one string cheese stick, not 3. She has increased her water intake. She is not feeling hungry.    Feels that Tradjenta is working well for her. Blood glucose , feels shaky at 97. Denies lightheadedness with blood pressure below her baseline.    Exercise:   Walking as tolerated     Review of Systems  "  Constipation, relieved with Colace. Feeling hypoglycemic at times but not often. Denies lightheadedness or worsening fatigue. Sleeping well.  All other ROS were reviewed and are otherwise unchanged from my previous visit with patient.    Physical Exam:    /62   Pulse 71   Ht 1.549 m (5' 1\")   Wt 105.2 kg (232 lb)   SpO2 97%   BMI 43.84 kg/m²   Waist: 55 in  Body fat % 57  REE 1614 kcal/day    Weight change since last visit: -1 lb   Waist Circum change since last visit: +2.5 in     Constitutional: Oriented to person, place, and time and well-developed, well-nourished, and in no distress.    Head: Normocephalic.   Musculoskeletal: Normal range of motion. No edema.   Neurological: Alert and oriented to person, place, and time. No muscle weakness.  Gait normal.   Skin: Warm and dry. Not diaphoretic.   Psychiatric: Mood, memory, affect and judgment normal.     Laboratory:   None new.    ASSESSMENT/PLAN:  Body mass index is 43.84 kg/m².    Obesity Stage ([place]):  2; Class 3    1. Morbid obesity with BMI of 40.0-44.9, adult (HCC)     2. Uncontrolled type 2 diabetes mellitus without complication, without long-term current use of insulin ([facility])     3. Essential hypertension     4. Dyslipidemia, goal LDL below 100     5. Fatty liver     6. Obstructive sleep apnea     7. Slow transit constipation       The patient has began to make some positive changes, has decreased snacking and overall total calorie intake. Continue to encourage high protein/low carbohydrates. Adjust metformin if blood glucose continues low. Tradjenta seems to be helping with weight management. Monitor blood pressure, may need to reduce antihypertensives, diuretic. Continue to monitor lipids, sleep. Colace for constipation.    The patient and I have discussed at length and agree to the following recommendations, which are all addressing the above diagnoses:    Weight Goal: 3-5% wt loss each month (pt goal weight is 145 lb)  Diet: 4 scoops " protein powder with milk for breakfast  Patient to review carbohydrate contents of protein powder and bring into next dietitian visit  Consider resuming one Robard meal replacement in the evening instead of snacking   Physical Activity: Walking as tolerated  Risk level for moderate/vigorous exercise program: Moderate  New Rx: Colace for constipation  Decrease Lasix by 50%, to 20 mg daily  Consider discontinuing spironolactone  Decrease metformin by 50% to 500 mg by mouth daily if blood glucose reading 100 or less on 3+ readings  Side Effects: Will review consent if applicable.  Behavior change: Mindful eating, planning  Follow-up: One month  RD IBT visit in 2 weeks

## 2018-05-15 ENCOUNTER — NON-PROVIDER VISIT (OUTPATIENT)
Dept: MEDICAL GROUP | Facility: CLINIC | Age: 73
End: 2018-05-15
Payer: MEDICARE

## 2018-05-15 VITALS — HEIGHT: 61 IN | BODY MASS INDEX: 44.46 KG/M2 | WEIGHT: 235.5 LBS

## 2018-05-15 VITALS — WEIGHT: 235.5 LBS | HEIGHT: 61 IN | BODY MASS INDEX: 44.46 KG/M2

## 2018-05-15 PROCEDURE — G0447 BEHAVIOR COUNSEL OBESITY 15M: HCPCS | Performed by: FAMILY MEDICINE

## 2018-05-15 NOTE — PROGRESS NOTES
"5/15/2018     Visit #2       Chapito Marques M.D.      73 y.o.           Time in/Out:  1:24-1:50pm    ASSESS:    Vitals:    05/15/18 1321   Weight: 106.8 kg (235 lb 8 oz)   Height: 1.549 m (5' 1\")            Wt Readings from Last 2 Encounters:   05/15/18 106.8 kg (235 lb 8 oz)   05/03/18 105.2 kg (232 lb)            Body mass index is 44.5 kg/m².       Difference:  +2 lbs     Starting weight:  233.5 lbs     Difference:  +2 lbs     Current Dietary/Exercise Habits:  Ragini has not started to walk around the building yet. She feels like her weight always fluctuates. She feels like she is losing weight up top, but not in her midsection area where she would like to see it go and notes she has some muscle loss specifically in her upper extremities.     Estimated Stage of Change:    Preparation as evidenced by planning to exercise with group home walking group, but has not started yet.      ADVISE:      Physical Activity:  Recommend Ragini start by decreasing her sedentary time. She has agreed to walk around her complex starting 2 times a week.      Dietary Guidelines:  Reviewed food basics with Ragini and specifics about each food groups. Ragini was unaware that carbohydrates have the most affect on her blood sugar so we went into further detail about which foods make up this food group. Discussed combining foods (protein + CHO) for better blood sugar control.    From review of Ragini's food diary, she is very likely not getting enough protein and looks to be averaging 50 grams a day or less. This might be why she is noticing some muscle wasting in her arms. Reviewed healthy protein foods and using the plate guide to incorporate protein choices at every meal. I would like her to be getting at least 80 grams of protein a day (20% based on REE of 1614).      Diet Recall:   B - 4 scoops protein powder + 2C 2% milk  S - string cheese  L - lentils and onions or lettuce, tomato and an egg  S - string cheese, or a " banana or 2 HB eggs   D - tomato soup with canned tomatoes     The patient has been advised of how weight management and physical activity impacts their health and will help to reduce complications and health risk factors.        AGREE:  New Goals:  1. Protein at every meal  2. Walk around building facility 2x a week to start     ASSIST:  Ragini is going to work on increasing protein in her diet. She is unsure how to do this when cooking for one, so we went over some options like using pre-cooked grilled chicken, pre-portioned frozen fish, and eggs to help her meet her daily protein needs. She requests we review some more meal planning ideas and recipes on her next visit as she gets bored of her own cooking and would like some help with planning out what to eat for the week.       ARRANGE:     Return for follow-up in 2 weeks.      The patient was assisted in making follow-up appointment per orders.

## 2018-05-29 ENCOUNTER — NON-PROVIDER VISIT (OUTPATIENT)
Dept: MEDICAL GROUP | Facility: CLINIC | Age: 73
End: 2018-05-29
Payer: MEDICARE

## 2018-05-29 VITALS — BODY MASS INDEX: 44.5 KG/M2 | HEIGHT: 61 IN | WEIGHT: 235.7 LBS

## 2018-05-29 PROCEDURE — G0447 BEHAVIOR COUNSEL OBESITY 15M: HCPCS | Performed by: INTERNAL MEDICINE

## 2018-05-29 RX ORDER — LANCETS 28 GAUGE
EACH MISCELLANEOUS
Qty: 100 EACH | Refills: 4 | Status: SHIPPED | OUTPATIENT
Start: 2018-05-29 | End: 2019-03-11

## 2018-05-29 NOTE — PROGRESS NOTES
"5/29/2018     Visit #3       Chapito Marques M.D.      73 y.o.           Time in/Out:  1:20-1:35pm    ASSESS:    Vitals:    05/29/18 1317   Weight: 106.9 kg (235 lb 11.2 oz)   Height: 1.549 m (5' 1\")            Wt Readings from Last 2 Encounters:   05/29/18 106.9 kg (235 lb 11.2 oz)   05/15/18 106.8 kg (235 lb 8 oz)            Body mass index is 44.54 kg/m².       Difference:  +0.2 lbs     Starting weight:  233.5 lbs     Difference:  +2.2 lbs     Current Dietary/Exercise Habits:  Ragini started walking, she walked around her building twice last week with her walking group. She would like to increase the frequency of this. Using her crock pot to cook chicken for more protein. Using meal replacement powder occasionally, but making a smoothie out of it.     Estimated Stage of Change:    Action as evidenced by starting to walk.      ADVISE:      Physical Activity:  Ragini has agreed to set her alarm so she is up at 9 am. This helps her to stay on track and walk regularly. She plans to continue walking 2 days a week and will increase as she builds up stamina.      Dietary Guidelines:  Ragini has increased her protein intake and is doing some meal preparation using her crock pot. Recommend she limit high added sugar sauces like BBQ and add vegetables to her crock pot recipes. Reviewed meal planning calendar guidelines with her. She will consider using it to plan out the meal she will have during the week.    Reviewed meal replacement guidelines - no more than 1/2 cup fruit, add NS vegetables like mixed greens, preferably water or low calorie liquid like almond milk or water.     The patient has been advised of how weight management and physical activity impacts their health and will help to reduce complications and health risk factors.        AGREE:  New Goals:  1. Continue walking 2 days a week around apartment complex.  2. Limit 1/2 cup fruit in meal replacement smoothie, preferably water added      ASSIST:  " Ragini continues to monitor her blood sugars closely and they have been within acceptable ranges. When she does have a fasting BS above desired, she is able to recognize what it is that she ate that caused it. She occasionally attends potlucks which we will need to discuss in further detail on her next visit as the frequency sounds like it could be adding up in additional calories.       ARRANGE:     Return for follow-up in 4 weeks      The patient was assisted in making follow-up appointment per orders.

## 2018-06-07 ENCOUNTER — OFFICE VISIT (OUTPATIENT)
Dept: HEALTH INFORMATION MANAGEMENT | Facility: MEDICAL CENTER | Age: 73
End: 2018-06-07
Payer: MEDICARE

## 2018-06-07 VITALS
DIASTOLIC BLOOD PRESSURE: 62 MMHG | SYSTOLIC BLOOD PRESSURE: 100 MMHG | HEIGHT: 61 IN | HEART RATE: 70 BPM | WEIGHT: 233 LBS | OXYGEN SATURATION: 92 % | BODY MASS INDEX: 43.99 KG/M2

## 2018-06-07 DIAGNOSIS — E78.5 DYSLIPIDEMIA, GOAL LDL BELOW 100: ICD-10-CM

## 2018-06-07 DIAGNOSIS — E11.9 DM TYPE 2, GOAL HBA1C < 8% (HCC): ICD-10-CM

## 2018-06-07 DIAGNOSIS — E66.01 MORBID OBESITY WITH BMI OF 40.0-44.9, ADULT (HCC): ICD-10-CM

## 2018-06-07 DIAGNOSIS — G47.33 OBSTRUCTIVE SLEEP APNEA: ICD-10-CM

## 2018-06-07 DIAGNOSIS — I10 ESSENTIAL HYPERTENSION: ICD-10-CM

## 2018-06-07 PROCEDURE — 99213 OFFICE O/P EST LOW 20 MIN: CPT | Performed by: INTERNAL MEDICINE

## 2018-06-07 RX ORDER — FUROSEMIDE 20 MG/1
20 TABLET ORAL DAILY
Status: SHIPPED | DISCHARGE
Start: 2018-06-07 | End: 2018-08-22

## 2018-06-07 NOTE — PROGRESS NOTES
Bariatric Medicine Follow Up  Chief Complaint   Patient presents with   • Weight Gain       History of Present Illness:   Ragini Reaves is a 73 y.o. female who presents for weight management follow-up and to help address co-morbidities related to overweight, including uncontrolled type 2 diabetes mellitus, hypertension, dyslipidemia, YASMANI.    During the patient's last visit, the following were discussed and recommended:  Weight Goal: 3-5% wt loss each month (pt goal weight is 145 lb)  Diet: 4 scoops protein powder with milk for breakfast  Patient to review carbohydrate contents of protein powder and bring into next dietitian visit  Consider resuming one Robard meal replacement in the evening instead of snacking   Physical Activity: Walking as tolerated  Risk level for moderate/vigorous exercise program: Moderate  New Rx: Colace for constipation  Decrease Lasix by 50%, to 20 mg daily  Consider discontinuing spironolactone  Decrease metformin by 50% to 500 mg by mouth daily if blood glucose reading 100 or less on 3+ readings  Side Effects: Will review consent if applicable.  Behavior change: Mindful eating, planning  Follow-up: One month  RD IBT visit in 2 weeks    Ragini has been struggling to lose weight.  She is still having a protein shake that she makes for breakfast, has 2 eggs and a cheese stick throughout the day for snacks.  She has a sandwich for lunch, with one slice of bread.  For dinner she has chicken and tomato for example.  She is not keeping track of her intake, would be willing to keep a detailed food journal.  She is not really following our recommendations, food lists would be willing to resume that.    Sleeping better, CPAP mask not fitting completely properly and she does feel claustrophobic.  Her blood sugars are running 120s-130s on average.  Not getting hypoglycemic.  Not currently on a statin.  No recent medication changes.  Blood pressure controlled, on the low side the last 2  "visits.    Exercise:   Walking as tolerated     Review of Systems   Denies lightheadedness, worsening fatigue.  Sleep has improved.  Denies constipation.  Constipation controlled.  All other ROS were reviewed and are otherwise unchanged from my previous visit with patient.    Physical Exam:    /62   Pulse 70   Ht 1.549 m (5' 1\")   Wt 105.7 kg (233 lb)   SpO2 92%   BMI 44.02 kg/m²   Waist: 54.5 in  Body fat % 57  REE 1618 kcal/day    Weight change since last visit: +1 lb (-1 lb total)  Waist Circum change since last visit: -0.5 in (+2 in total)    Constitutional: Oriented to person, place, and time and well-developed, well-nourished, and in no distress.    Head: Normocephalic.   Musculoskeletal: Normal range of motion. No edema.   Neurological: Alert and oriented to person, place, and time. No muscle weakness.  Gait normal.   Skin: Warm and dry. Not diaphoretic.   Psychiatric: Mood, memory, affect and judgment normal.     Laboratory:   None new.    ASSESSMENT/PLAN:  Body mass index is 44.02 kg/m².    Obesity Stage ([place]): 2; Class 3    1. Morbid obesity with BMI of 40.0-44.9, adult ([facility])     2. DM type 2, goal HbA1c < 8% ([facility])     3. Obstructive sleep apnea     4. Dyslipidemia, goal LDL below 100     5. Essential hypertension       It is unclear whether the patient has made significant food changes.  Encouraged her to keep a detailed food journal, bring in to dietitian visit, follow our guidelines.  Blood sugars still somewhat high but overall improved.  Sleep is stable.  Blood pressure low, will reduce Lasix.    The patient and I have discussed at length and agree to the following recommendations, which are all addressing the above diagnoses:    Weight Goal: 3-5% wt loss each month (pt goal weight is 145 lb)  Diet: High Protein/Low Carb Meals and 2 snacks between meals daily  Only have food items from the list given on her initial visit  >100 g protein, <100 g total carbs daily  64+ oz water per " day  Avoid sweet drinks and sodas  Track daily intake, keep very detailed log and bring into dietitian visit  Physical Activity: Encourage increase walking  Risk level for moderate/vigorous exercise program: Low to moderate  New Rx: Reduce Lasix from 40 mg to 20 mg daily.  Side Effects: Will review consent if applicable.  Behavior change: Tracking  Follow-up: 2 months  IBT visit 1 month  Suggest stopping Lasix    Patient's body mass index is 44.02 kg/m². Exercise and nutrition counseling were performed at this visit.

## 2018-06-20 RX ORDER — LANCETS 28 GAUGE
EACH MISCELLANEOUS
Qty: 100 EACH | Refills: 4 | Status: ON HOLD | OUTPATIENT
Start: 2018-06-20 | End: 2019-03-13

## 2018-06-28 ENCOUNTER — SLEEP CENTER VISIT (OUTPATIENT)
Dept: SLEEP MEDICINE | Facility: MEDICAL CENTER | Age: 73
End: 2018-06-28
Payer: MEDICARE

## 2018-06-28 VITALS
WEIGHT: 232 LBS | RESPIRATION RATE: 15 BRPM | SYSTOLIC BLOOD PRESSURE: 112 MMHG | HEART RATE: 71 BPM | DIASTOLIC BLOOD PRESSURE: 72 MMHG | BODY MASS INDEX: 43.8 KG/M2 | OXYGEN SATURATION: 94 % | HEIGHT: 61 IN

## 2018-06-28 DIAGNOSIS — E03.2 HYPOTHYROIDISM DUE TO NON-MEDICATION EXOGENOUS SUBSTANCES: ICD-10-CM

## 2018-06-28 DIAGNOSIS — I10 ESSENTIAL HYPERTENSION: ICD-10-CM

## 2018-06-28 DIAGNOSIS — J45.30 MILD PERSISTENT ASTHMA, UNCOMPLICATED: ICD-10-CM

## 2018-06-28 DIAGNOSIS — C73 PRIMARY THYROID CANCER (HCC): ICD-10-CM

## 2018-06-28 DIAGNOSIS — G47.33 OBSTRUCTIVE SLEEP APNEA: ICD-10-CM

## 2018-06-28 DIAGNOSIS — E66.01 MORBID OBESITY WITH BMI OF 40.0-44.9, ADULT (HCC): ICD-10-CM

## 2018-06-28 PROBLEM — Z99.81 CHRONIC RESPIRATORY FAILURE WITH HYPOXIA, ON HOME O2 THERAPY (HCC): Status: RESOLVED | Noted: 2017-12-21 | Resolved: 2018-06-28

## 2018-06-28 PROBLEM — J96.11 CHRONIC RESPIRATORY FAILURE WITH HYPOXIA, ON HOME O2 THERAPY (HCC): Status: RESOLVED | Noted: 2017-12-21 | Resolved: 2018-06-28

## 2018-06-28 PROCEDURE — 99214 OFFICE O/P EST MOD 30 MIN: CPT | Performed by: NURSE PRACTITIONER

## 2018-06-28 ASSESSMENT — ENCOUNTER SYMPTOMS
FEVER: 0
DIAPHORESIS: 0
POLYDIPSIA: 0
NEUROLOGICAL NEGATIVE: 1
EYE DISCHARGE: 0
HEMOPTYSIS: 0
WHEEZING: 0
PSYCHIATRIC NEGATIVE: 1
MUSCULOSKELETAL NEGATIVE: 1
WEAKNESS: 0
SHORTNESS OF BREATH: 1
SPUTUM PRODUCTION: 0
GASTROINTESTINAL NEGATIVE: 1
WEIGHT LOSS: 0
BRUISES/BLEEDS EASILY: 0
COUGH: 1
CHILLS: 0
EYE PAIN: 0
CARDIOVASCULAR NEGATIVE: 1

## 2018-06-28 NOTE — PROGRESS NOTES
Chief Complaint   Patient presents with   • Follow-Up     1st Compliance   • Apnea         HPI: This patient is a 73 y.o. female, who presents for follow-up of obstructive sleep apnea with first compliance check.     She has a history of mild intermittent asthma, obesity, BMI 43, DM II, HTN, history thyroid cancer S/P thyroidectomy now on thyroid replacement.    In regards to sleep apnea, former PSG indicates mild sleep apnea with an AHI of 10.5, minimum saturation 87%.  Prior to CPAP therapy she was using nocturnal oxygen.  She was ordered CPAP in March.  She has attempted to use CPAP briefly, but feels the pressure is too high.  Surprisingly she feels it has been beneficial although her compliance report shows only 12 minutes of use on a few occasions.  She continues to report fatigue and frequent nocturnal awakenings. she is using a full facemask which is the most comfortable for her.  She has tried a nasal mask in the past but did not like this.      In regards to asthma, she has been followed through our pulmonary clinic with PFTs showing an FEV1 of 2.06 L 97% predicted, FEV1 FVC ratio 94, TLC and DLCO were normal.she uses albuterol as needed.  She reports exertional dyspnea but attributes this to weight.  Her dyspnea has improved since her pulmonary visit.  Her asthma is triggered by strong perfumes, seasonal changes and allergies.  She denies recurrent URIs.  She also had a cardiac workup for her dyspnea, which she tells me was unremarkable.  Echocardiogram from December 2017 shows normal ejection fraction, normal RVSP.    Past Medical History:   Diagnosis Date   • Cataract immature 7/2012    Dr. Carey   • Chickenpox    • DM type 2, goal HbA1c < 8% ([facility])    • Dyslipidemia, goal LDL below 130    • Frequent urination    • Hypertension    • Hypothyroidism    • Mild intermittent asthma 2004    perfumes or smoke triggers   • Nocturnal hypoxemia     on 2.5 liter nightly   • Obesity    • Osteoarthritis    •  Osteopenia    • 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   • Ringing in ears    • Shortness of breath    • Sleep apnea    • Tonsillitis    • Toothache    • Type II or unspecified type diabetes mellitus without mention of complication, uncontrolled    • Uterine fibroid    • Wears glasses        Social History   Substance Use Topics   • Smoking status: Never Smoker   • Smokeless tobacco: Never Used   • Alcohol use No       Family History   Problem Relation Age of Onset   • Lung Disease Brother    • Cancer Mother      pelvic   • Stroke Maternal Grandmother        Current medications as of today   Current Outpatient Prescriptions   Medication Sig Dispense Refill   • FREESTYLE LANCETS Misc INJECT 1 EACH AS INSTRUCTED 2 TIMES A DAY *E11.9* 100 Each 4   • furosemide (LASIX) 20 MG Tab Take 1 Tab by mouth every day.     • FREESTYLE LANCETS Misc INJECT 1 EACH AS INSTRUCTED 2 TIMES A DAY *E11.9* 100 Each 4   • docusate sodium (COLACE) 100 MG Cap Take 1 Cap by mouth 2 times a day. 60 Cap 1   • metFORMIN (GLUCOPHAGE) 500 MG Tab TAKE 1 TAB BY MOUTH 2 TIMES A DAY, WITH MEALS. 180 Tab 3   • levothyroxine (SYNTHROID) 150 MCG Tab TAKE 1 TAB BY MOUTH EVERY DAY. 90 Tab 3   • bisoprolol (ZEBETA) 5 MG Tab TAKE 1 TAB BY MOUTH EVERY DAY. 90 Tab 3   • spironolactone (ALDACTONE) 25 MG Tab Take 1 Tab by mouth every day. 90 Tab 3   • linagliptin (TRADJENTA) 5 MG Tab tablet Take 1 Tab by mouth every day. 90 Tab 3   • benazepril (LOTENSIN) 5 MG Tab TAKE 1 TAB BY MOUTH EVERY DAY. 90 Tab 2   • Lutein 10 MG Tab Take  by mouth.     • aspirin 81 MG tablet Take 1 Tab by mouth every day. 90 Tab 3   • nitroglycerin (NITROSTAT) 0.4 MG SL Tab Place 1 Tab under tongue as needed for Chest Pain. 25 Tab 1   • coenzyme Q-10 30 MG capsule Take 1 Cap by mouth every day. 30 Cap 11   • ascorbic acid (VITAMIN C) 500 MG tablet Take 1 Tab by mouth every day. 30 Tab 11   • Lactobacillus (DIGESTIVE HEALTH PROBIOTIC) Cap Take 1 Cap by mouth every day. 30 Cap 11   •  "Multiple Vitamins-Minerals (ULTRA WOMENS PACK) Misc Take 1 Each by mouth every day. 30 Each 11   • simvastatin (ZOCOR) 5 MG Tab Take 1 Tab by mouth every evening. 90 Tab 3   • glucose blood (FREESTYLE LITE) strip 1 Strip by Other route 2 Times a Day. 100 Strip 11   • Misc. Devices Misc This is an order for overnight pulse ox on room air.  Dx. Nocturnal hypoxia  G47.34, mild persistent asthma J45.30       SPENCER 99 months   NPI [id number] 1 Each 0   • albuterol (PROAIR HFA) 108 (90 BASE) MCG/ACT Aero Soln inhalation aerosol Inhale 2 Puffs by mouth every 6 hours as needed for Shortness of Breath. 8.5 g 3   • Misc. Devices Misc This is an order for overnight pulse oxygen study  Dx nocturnal hypoxia G47.34, please do the test on room air.       SPENCER 99 months   NPI [id number] 1 Each 0   • eucalyptus/peppermint oil (PONARIS NASAL) SOLN Spray 10 Drops in nose every 6 hours as needed. 1 Bottle 0   • Blood Glucose Monitoring Suppl SUPPLIES MISC Test strips order: Test strips for Abbott Freestyle Lite meter. Sig: use daily and prn 90 day supply ICD-9 code 250.02 150 Each 11   • Glucosamine-Chondroitin-Vit C 1575-8310-41 MG/30ML LIQD Take 30 mL by mouth every day. 1 Bottle 0     Current Facility-Administered Medications   Medication Dose Route Frequency Provider Last Rate Last Dose   • acetaminophen (TYLENOL) tablet 500 mg  500 mg Oral Q6HRS PRN Frandy Pereira M.D.           Allergies: Cough syrup m [cough cold-flu relief]; Dextromethorphan; and Asa [aspirin]    Blood pressure 112/72, pulse 71, resp. rate 15, height 1.549 m (5' 1\"), weight 105.2 kg (232 lb), SpO2 94 %.      Review of Systems   Constitutional: Positive for malaise/fatigue. Negative for chills, diaphoresis, fever and weight loss.   HENT: Negative.    Eyes: Negative for pain and discharge.   Respiratory: Positive for cough and shortness of breath. Negative for hemoptysis, sputum production and wheezing.    Cardiovascular: Negative.    Gastrointestinal: Negative.  "   Genitourinary: Negative.    Musculoskeletal: Negative.    Skin: Negative.    Neurological: Negative.  Negative for weakness.   Endo/Heme/Allergies: Negative for environmental allergies and polydipsia. Does not bruise/bleed easily.   Psychiatric/Behavioral: Negative.        Physical Exam   Constitutional: She is oriented to person, place, and time and well-developed, well-nourished, and in no distress.   HENT:   Head: Normocephalic and atraumatic.   Nose: Nose normal.   Eyes: Pupils are equal, round, and reactive to light.   Neck: Normal range of motion. Neck supple. No tracheal deviation present. No thyromegaly present.   Scar present mid chest at site of prior thyroidectomy   Cardiovascular: Normal rate, regular rhythm and normal heart sounds.    Pulmonary/Chest: Effort normal and breath sounds normal.   Musculoskeletal: Normal range of motion.   Neurological: She is alert and oriented to person, place, and time. Gait normal.   Skin: Skin is warm and dry.   Psychiatric: Mood, memory, affect and judgment normal.       Immunization History   Administered Date(s) Administered   • Influenza TIV (IM) 12/03/2012, 12/06/2013   • Influenza Vaccine Adult HD 01/12/2016, 10/25/2016, 09/11/2017   • Pneumococcal Conjugate Vaccine (Prevnar/PCV-13) 01/12/2016   • Pneumococcal Vaccine (UF)Historical Data 11/08/2008   • Pneumococcal polysaccharide vaccine (PPSV-23) 12/06/2013   • Tdap Vaccine 11/06/2012         Diagnosis:  1. Obstructive sleep apnea     2. Morbid obesity with BMI of 40.0-44.9, adult (HCC)  HEIGHT AND WEIGHT   3. Mild persistent asthma, uncomplicated     4. Essential hypertension     5. Hypothyroidism due to non-medication exogenous substances     6. Primary thyroid cancer (HCC)         Plan:  Prior testing including PFTs, echocardiogram, sleep testing was reviewed by myself    Patient is up-to-date with influenza, Pneumovax 23 and Prevnar 13 vaccinations.    In regards to sleep apnea, patient is strongly  encouraged to use her CPAP machine.  I reviewed with her the pathophysiology of obstructive sleep apnea as well as potential cardiac and neurologic risks associated with untreated sleep apnea.  She understands untreated sleep apnea is contributing to her fatigue and frequent nocturnal awakenings.  She is encouraged to use her machine at least 4 hours per night, ideally all night long.  She is recommended to use her machine during the day while watching TV or using the computer to help acclimate to therapy.  I will decrease her pressure to 5-10 cm H2O.  Make sure her ramp-up function is set to 45 minutes.  I would like her to follow-up in 2 months to review compliance download, sooner if needed    Patient's body mass index is 43.84 kg/m². Exercise and nutrition counseling were performed at this visit.  Patient is strongly encouraged to minimize carbohydrates and sugars from her diet.  Encouraged routine exercise 4-5 times per week.  She says that she has lost 60 pounds over the past 7 years and plans to continue to lose weight.  We briefly discussed referral for possible bariatric surgery but at this time will postpone.  Certainly surgery carries risks.  She will continue to try to lose weight with diet and exercise    In regards to asthma, her symptoms are well controlled. she will continue albuterol as needed.  Avoidance of triggers as recommended. should she develop worsening symptoms recommend follow-up back to pulmonology.    In regards to thyroid replacement and previous thyroidectomy.  She follows with her PCP Dr. Sneed regularly.  Last blood work was done in January.  Patient has follow-up July 6.  Patient understands the importance of routine blood work to ensure thyroid replacement adequate as this may contribute to her fatigue.

## 2018-06-30 LAB
ALBUMIN SERPL-MCNC: 4.1 G/DL (ref 3.5–4.8)
ALBUMIN/GLOB SERPL: 1.8 {RATIO} (ref 1.2–2.2)
ALP SERPL-CCNC: 81 IU/L (ref 39–117)
ALT SERPL-CCNC: 34 IU/L (ref 0–32)
AST SERPL-CCNC: 27 IU/L (ref 0–40)
BASOPHILS # BLD AUTO: 0 X10E3/UL (ref 0–0.2)
BASOPHILS NFR BLD AUTO: 1 %
BILIRUB SERPL-MCNC: 0.5 MG/DL (ref 0–1.2)
BUN SERPL-MCNC: 24 MG/DL (ref 8–27)
BUN/CREAT SERPL: 33 (ref 12–28)
CALCIUM SERPL-MCNC: 8.8 MG/DL (ref 8.7–10.3)
CHLORIDE SERPL-SCNC: 105 MMOL/L (ref 96–106)
CHOLEST SERPL-MCNC: 112 MG/DL (ref 100–199)
CO2 SERPL-SCNC: 24 MMOL/L (ref 20–29)
CREAT SERPL-MCNC: 0.72 MG/DL (ref 0.57–1)
EOSINOPHIL # BLD AUTO: 0.2 X10E3/UL (ref 0–0.4)
EOSINOPHIL NFR BLD AUTO: 2 %
ERYTHROCYTE [DISTWIDTH] IN BLOOD BY AUTOMATED COUNT: 14 % (ref 12.3–15.4)
GLOBULIN SER CALC-MCNC: 2.3 G/DL (ref 1.5–4.5)
GLUCOSE SERPL-MCNC: 142 MG/DL (ref 65–99)
HBA1C MFR BLD: 7.2 % (ref 4.8–5.6)
HCT VFR BLD AUTO: 40.9 % (ref 34–46.6)
HDLC SERPL-MCNC: 36 MG/DL
HGB BLD-MCNC: 13.6 G/DL (ref 11.1–15.9)
IF AFRICAN AMERICAN  100797: 96 ML/MIN/1.73
IF NON AFRICAN AMER 100791: 83 ML/MIN/1.73
IMM GRANULOCYTES # BLD: 0 X10E3/UL (ref 0–0.1)
IMM GRANULOCYTES NFR BLD: 0 %
IMMATURE CELLS  115398: NORMAL
LABORATORY COMMENT REPORT: ABNORMAL
LDLC SERPL CALC-MCNC: 54 MG/DL (ref 0–99)
LYMPHOCYTES # BLD AUTO: 2.3 X10E3/UL (ref 0.7–3.1)
LYMPHOCYTES NFR BLD AUTO: 36 %
MCH RBC QN AUTO: 28.2 PG (ref 26.6–33)
MCHC RBC AUTO-ENTMCNC: 33.3 G/DL (ref 31.5–35.7)
MCV RBC AUTO: 85 FL (ref 79–97)
MONOCYTES # BLD AUTO: 0.7 X10E3/UL (ref 0.1–0.9)
MONOCYTES NFR BLD AUTO: 11 %
MORPHOLOGY BLD-IMP: NORMAL
NEUTROPHILS # BLD AUTO: 3.2 X10E3/UL (ref 1.4–7)
NEUTROPHILS NFR BLD AUTO: 50 %
NRBC BLD AUTO-RTO: NORMAL %
PLATELET # BLD AUTO: 189 X10E3/UL (ref 150–379)
POTASSIUM SERPL-SCNC: 4.2 MMOL/L (ref 3.5–5.2)
PROT SERPL-MCNC: 6.4 G/DL (ref 6–8.5)
RBC # BLD AUTO: 4.83 X10E6/UL (ref 3.77–5.28)
SODIUM SERPL-SCNC: 142 MMOL/L (ref 134–144)
TRIGL SERPL-MCNC: 109 MG/DL (ref 0–149)
VLDLC SERPL CALC-MCNC: 22 MG/DL (ref 5–40)
WBC # BLD AUTO: 6.4 X10E3/UL (ref 3.4–10.8)

## 2018-07-06 ENCOUNTER — OFFICE VISIT (OUTPATIENT)
Dept: MEDICAL GROUP | Facility: CLINIC | Age: 73
End: 2018-07-06
Payer: MEDICARE

## 2018-07-06 VITALS
TEMPERATURE: 97.2 F | HEIGHT: 61 IN | SYSTOLIC BLOOD PRESSURE: 100 MMHG | HEART RATE: 66 BPM | BODY MASS INDEX: 43.61 KG/M2 | RESPIRATION RATE: 14 BRPM | WEIGHT: 231 LBS | OXYGEN SATURATION: 93 % | DIASTOLIC BLOOD PRESSURE: 64 MMHG

## 2018-07-06 DIAGNOSIS — J45.30 MILD PERSISTENT ASTHMA, UNCOMPLICATED: ICD-10-CM

## 2018-07-06 DIAGNOSIS — R74.8 ELEVATED LIVER ENZYMES: ICD-10-CM

## 2018-07-06 DIAGNOSIS — E03.2 HYPOTHYROIDISM DUE TO NON-MEDICATION EXOGENOUS SUBSTANCES: ICD-10-CM

## 2018-07-06 DIAGNOSIS — I10 ESSENTIAL HYPERTENSION: ICD-10-CM

## 2018-07-06 DIAGNOSIS — E78.5 DYSLIPIDEMIA, GOAL LDL BELOW 100: ICD-10-CM

## 2018-07-06 DIAGNOSIS — K76.0 FATTY LIVER: ICD-10-CM

## 2018-07-06 DIAGNOSIS — E11.9 DM TYPE 2, GOAL HBA1C < 8% (HCC): ICD-10-CM

## 2018-07-06 DIAGNOSIS — E66.01 MORBID OBESITY WITH BMI OF 40.0-44.9, ADULT (HCC): ICD-10-CM

## 2018-07-06 PROCEDURE — 99214 OFFICE O/P EST MOD 30 MIN: CPT | Performed by: FAMILY MEDICINE

## 2018-07-06 RX ORDER — ALBUTEROL SULFATE 90 UG/1
2 AEROSOL, METERED RESPIRATORY (INHALATION) EVERY 6 HOURS PRN
Qty: 1 INHALER | Refills: 6 | Status: ON HOLD | OUTPATIENT
Start: 2018-07-06 | End: 2019-03-13

## 2018-07-06 NOTE — PROGRESS NOTES
Diabetes Focused Exam:    Chief Complaint   Patient presents with   • Diabetes Mellitus   • Hypertension   • Hyperlipidemia      Subjective:   HPI  Ragini Reaves is a 73 y.o. female who presents for follow up of chronic conditions of diabetes mellitus, hypertension and hyperlipidemia. She indicates that she is feeling well and denies any symptoms referable to the above diagnoses. Specifically denies chest pain, palpitations, dyspnea, orthopnea, PND or peripheral edema. Also denies polyuria, polydipsia, urinary complaints, abdominal complaints, myalgias, numbness, weakness or other related symptoms.     She feels she is making progress with her weight loss.  She is following with Dr. Mendoza.      Trouble with CPAP mask.  The pressure was adjusted which helps but needs to wear longer.  Will be following up soon with pulmonology.    Blood pressure has remained low.  Have discussed the furosemide with her, agree she can go to 20 mg.  She can take 40 mg on days when the edema is bothersome.  She will continue the spironolactone.  Discontinue bisoprolol.  Denies any exertional angina.    The patient is taking ASA every day 81 mg and taking all other medications as prescribed. Patient denies any side effects of medication.  DM: A1c goal <8%  Glucose monitoring frequency:  bid   Fasting sugars: , generally 110-145. Post-prandial sugars: up to 210  Hypoglycemic episodes none  Diabetic complications: none  Recent eye examination was normal.  ACR Albumin/Creatinine Ratio goal <30  HTN: Blood pressure goal <140/<80. Currently Rx ACE/ARB: Yes  Hyperlipidemia:Cholesterol goal LDL <100, total/HDL <5. Currently Rx Statin: Yes  Last eye exam 7/2/2018.   Denies visual blurring, double vision, eye pain and floaters  Last monofilament foot exam: 9/2017  Denies foot pain, numbness, calluses, ulcers      See medications and orders placed in encounter report.  Past medical history, family history, social history  reviewed and updated as documented in medical record.    Current medications including changes today:  Current Outpatient Prescriptions   Medication Sig Dispense Refill   • VENTOLIN  (90 Base) MCG/ACT Aero Soln inhalation aerosol Inhale 2 Puffs by mouth every 6 hours as needed for Shortness of Breath. 1 Inhaler 6   • FREESTYLE LANCETS Misc INJECT 1 EACH AS INSTRUCTED 2 TIMES A DAY *E11.9* 100 Each 4   • furosemide (LASIX) 20 MG Tab Take 1 Tab by mouth every day.     • FREESTYLE LANCETS Misc INJECT 1 EACH AS INSTRUCTED 2 TIMES A DAY *E11.9* 100 Each 4   • docusate sodium (COLACE) 100 MG Cap Take 1 Cap by mouth 2 times a day. 60 Cap 1   • metFORMIN (GLUCOPHAGE) 500 MG Tab TAKE 1 TAB BY MOUTH 2 TIMES A DAY, WITH MEALS. 180 Tab 3   • levothyroxine (SYNTHROID) 150 MCG Tab TAKE 1 TAB BY MOUTH EVERY DAY. 90 Tab 3   • bisoprolol (ZEBETA) 5 MG Tab TAKE 1 TAB BY MOUTH EVERY DAY. 90 Tab 3   • spironolactone (ALDACTONE) 25 MG Tab Take 1 Tab by mouth every day. 90 Tab 3   • linagliptin (TRADJENTA) 5 MG Tab tablet Take 1 Tab by mouth every day. 90 Tab 3   • benazepril (LOTENSIN) 5 MG Tab TAKE 1 TAB BY MOUTH EVERY DAY. 90 Tab 2   • Lutein 10 MG Tab Take  by mouth.     • aspirin 81 MG tablet Take 1 Tab by mouth every day. 90 Tab 3   • nitroglycerin (NITROSTAT) 0.4 MG SL Tab Place 1 Tab under tongue as needed for Chest Pain. 25 Tab 1   • coenzyme Q-10 30 MG capsule Take 1 Cap by mouth every day. 30 Cap 11   • ascorbic acid (VITAMIN C) 500 MG tablet Take 1 Tab by mouth every day. 30 Tab 11   • Lactobacillus (DIGESTIVE HEALTH PROBIOTIC) Cap Take 1 Cap by mouth every day. 30 Cap 11   • Multiple Vitamins-Minerals (ULTRA WOMENS PACK) Misc Take 1 Each by mouth every day. 30 Each 11   • simvastatin (ZOCOR) 5 MG Tab Take 1 Tab by mouth every evening. 90 Tab 3   • glucose blood (FREESTYLE LITE) strip 1 Strip by Other route 2 Times a Day. 100 Strip 11   • Misc. Devices Misc This is an order for overnight pulse ox on room air.  Dx.  "Nocturnal hypoxia  G47.34, mild persistent asthma J45.30       SPENCER 99 months   NPI [id number] 1 Each 0   • Misc. Devices Misc This is an order for overnight pulse oxygen study  Dx nocturnal hypoxia G47.34, please do the test on room air.       SPENCER 99 months   NPI [id number] 1 Each 0   • eucalyptus/peppermint oil (PONARIS NASAL) SOLN Spray 10 Drops in nose every 6 hours as needed. 1 Bottle 0   • Blood Glucose Monitoring Suppl SUPPLIES MISC Test strips order: Test strips for Abbott Freestyle Lite meter. Sig: use daily and prn 90 day supply ICD-9 code 250.02 150 Each 11   • Glucosamine-Chondroitin-Vit C 9154-2823-43 MG/30ML LIQD Take 30 mL by mouth every day. 1 Bottle 0     Current Facility-Administered Medications   Medication Dose Route Frequency Provider Last Rate Last Dose   • acetaminophen (TYLENOL) tablet 500 mg  500 mg Oral Q6HRS PRN Frandy Pereira M.D.         Allergies:   Allergies   Allergen Reactions   • Cough Syrup M [Cough Cold-Flu Relief] Swelling     Severe facial swelling   • Dextromethorphan Swelling   • Asa [Aspirin]      Stomach bleeding.       Social History   Substance Use Topics   • Smoking status: Never Smoker   • Smokeless tobacco: Never Used   • Alcohol use No     Exercise: walking with her friend now 2-3 days per week.  She has enrolled at the [facility]  Health Maintenance/Immunizations: discussed, up to date    ROS  Pertinent  ROS findings as above. All other systems reviewed and are negative.      Objective:     OBJECTIVE:  /64   Pulse 66   Temp 36.2 °C (97.2 °F)   Resp 14   Ht 1.549 m (5' 1\")   Wt 104.8 kg (231 lb)   SpO2 93%   BMI 43.65 kg/m²  Body mass index is 43.65 kg/m². BMI: severely obese  General: No apparent distress, conversant, cooperative and pleasant with the examination.  Psych: Alert and oriented x4, judgment and insight normal  Neck: No JVD or bruits, no adenopathy, supple  Thyroid: normal to inspection and palpation  Lungs: negative findings: normal " respiratory rate and rhythm, lungs clear to auscultation  Heart: negative findings: regular rate and rhythm, S1 normal, S2 normal, no murmurs, clicks, or gallops  Abdomen: Soft, nontender, no hepatosplenomegaly or masses, normal bowel sounds  Skin: No rashes, no cyanosis, no lesions or ulcers  Extremities: No cyanosis clubbing or edema.   Feet are examined     POC labs today:   Lab Results   Component Value Date/Time    POCGLUCOSE 109 (H) 04/14/2009 05:27 PM          Last labs    Lab Results   Component Value Date/Time    CHOLSTRLTOT 112 06/29/2018 08:48 AM    LDL 54 06/29/2018 08:48 AM    HDL 36 (L) 06/29/2018 08:48 AM    TRIGLYCERIDE 109 06/29/2018 08:48 AM       Lab Results   Component Value Date/Time    SODIUM 142 06/29/2018 08:48 AM    POTASSIUM 4.2 06/29/2018 08:48 AM    GLUCOSE 142 (H) 06/29/2018 08:48 AM    BUNCREATRAT 33 (H) 06/29/2018 08:48 AM     Lab Results   Component Value Date/Time    HBA1C 7.2 (H) 06/29/2018 08:48 AM    HBA1C 7.4 (H) 01/03/2018 10:12 AM    HBA1C 7.3 (H) 09/06/2017 08:22 AM    HBA1C 8.7 (H) 09/08/2015 07:49 AM    HBA1C 8.4 (H) 05/05/2015 08:32 AM    HBA1C 9.4 (H) 01/06/2015 08:37 AM     Lab Results   Component Value Date/Time    MICRALB 44.7 09/06/2017 08:22 AM          Assessment/Plan:   Medications, refills, and referrals per orders.   1. Uncontrolled type 2 diabetes mellitus without complication, without long-term current use of insulin (HCC)  HEMOGLOBIN A1C    MICROALBUMIN CREAT RATIO URINE    COMP METABOLIC PANEL   2. DM type 2, goal HbA1c < 8% ([facility])  HEMOGLOBIN A1C    MICROALBUMIN CREAT RATIO URINE    COMP METABOLIC PANEL   3. Essential hypertension  COMP METABOLIC PANEL   4. Dyslipidemia, goal LDL below 100  COMP METABOLIC PANEL    LIPID PROFILE   5. Morbid obesity with BMI of 40.0-44.9, adult ([facility])     6. Mild persistent asthma, uncomplicated  VENTOLIN  (90 Base) MCG/ACT Aero Soln inhalation aerosol    CBC WITHOUT DIFFERENTIAL   7. Elevated liver enzymes  US-ABDOMEN  COMPLETE SURVEY    COMP METABOLIC PANEL   8. Fatty liver  US-ABDOMEN COMPLETE SURVEY    COMP METABOLIC PANEL   9. Hypothyroidism due to non-medication exogenous substances  TSH     DM2 A1c is at goal   Patient to monitor sugars: bid, occasionally tid frequency  Discussed diet, exercise, disease management and weight loss goals.   Education and advise provided today:All medications, side effects and compliance (discussed carefully)  Annual eye examinations at Ophthalmology  Foot care discussed and Podiatry visits  Glycohemoglobin and other lab monitoring  Home glucose monitoring emphasized  Labs immediately prior to next visit  Low cholesterol diet, weight control and daily exercise  Discussed fatty liver and need to stay on metformin.    Discussed furosemide reduction to 20 and discontinuation of bisoprolol.    Followup:   Do labs and RTC 6 months, sooner should new symptoms or problems arise.

## 2018-07-06 NOTE — PATIENT INSTRUCTIONS
Reduce bisoprolol to 1/2 daily for a week then discontinue.  Okay to reduce furosemide (lasix) to 20.  Can take 40 on days when there is increased swelling.

## 2018-07-17 ENCOUNTER — NON-PROVIDER VISIT (OUTPATIENT)
Dept: MEDICAL GROUP | Facility: CLINIC | Age: 73
End: 2018-07-17
Payer: MEDICARE

## 2018-07-17 VITALS — HEIGHT: 61 IN | BODY MASS INDEX: 44.25 KG/M2 | WEIGHT: 234.4 LBS

## 2018-07-17 PROCEDURE — G0447 BEHAVIOR COUNSEL OBESITY 15M: HCPCS | Performed by: FAMILY MEDICINE

## 2018-07-17 NOTE — PROGRESS NOTES
"7/17/2018     Visit #4       Chapito Marques M.D.      73 y.o.           Time in/Out:  1317 - 1331    ASSESS:    Vitals:    07/17/18 1439   Weight: 106.3 kg (234 lb 6.4 oz)   Height: 1.549 m (5' 1\")            Wt Readings from Last 2 Encounters:   07/17/18 106.3 kg (234 lb 6.4 oz)   07/06/18 104.8 kg (231 lb)            Body mass index is 44.29 kg/m².       Difference:  - 1.3#     Starting weight:  235.5#     Difference:  - 1.1#     Current Dietary/Exercise Habits:  Has not been walking outside d/t the excessive heat and has joined the [facility] with a plan to walk on treadmills, swim, and lift some light weights.  She has not yet attended the gym d/t being busy with errands and appointments.      States she had to restart her Lasix because her legs became very swollen and she is still trying to get fluid off from that.  Her BG's have been  mg/dL in the last week and she is happy about this.  She is eating when she is hungry and has been eating some high sodium foods recently, like Top Ramen.    Estimated Stage of Change:    Action as evidenced by the patient eating when she is hungry.    ADVISE:    Physical Activity:  Ragini's plan to go to the gym is solid, now she needs to follow through with going.  She has set a goal to exercise on at least two days each week to start.  She eventually wants to do more and knows that setting a small goal is a good starting point.     Dietary Guidelines:  Ragini and I reviewed high sodium foods and she is going to do try to do a better job of limiting the amount of sodium she consumes by removing certain foods from her regular diet, like Top Ramen.      The patient has been advised of how weight management and physical activity impacts their health and will help to reduce complications and health risk factors.      AGREE:  Visit #5 goal:   1.  Exercise at least two days each week at the [facility].     2.  Eliminate high sodium " foods from your diet.     3.  Continue your other eating habits.    ASSIST:  Ragini is taking some the steps in the right direction and it appears that she is doing some good things.  She has managed to lose weight from her last visit with the RD despite retaining fluid today - I believe that if she can limit her sodium better she will continue to see weight loss through improved fluid status mainly.      The addition of exercise is only going to benefit her so I emphasized that she must make time during the week to go.  Once she is in a good habit she will find it much easier to continue to go on a regular basis.  At next f/u she is going to bring in her food diary and that can be reviewed.    ARRANGE:     Return for follow-up in 3 weeks     The patient was assisted in making follow-up appointment per orders.

## 2018-07-18 ENCOUNTER — HOSPITAL ENCOUNTER (OUTPATIENT)
Dept: RADIOLOGY | Facility: MEDICAL CENTER | Age: 73
End: 2018-07-18
Attending: FAMILY MEDICINE
Payer: MEDICARE

## 2018-07-18 DIAGNOSIS — R74.8 ELEVATED LIVER ENZYMES: ICD-10-CM

## 2018-07-18 DIAGNOSIS — K76.0 FATTY LIVER: ICD-10-CM

## 2018-07-18 PROCEDURE — 76700 US EXAM ABDOM COMPLETE: CPT

## 2018-08-08 ENCOUNTER — NON-PROVIDER VISIT (OUTPATIENT)
Dept: MEDICAL GROUP | Facility: MEDICAL CENTER | Age: 73
End: 2018-08-08
Payer: MEDICARE

## 2018-08-08 PROCEDURE — G0447 BEHAVIOR COUNSEL OBESITY 15M: HCPCS | Performed by: NURSE PRACTITIONER

## 2018-08-09 ENCOUNTER — OFFICE VISIT (OUTPATIENT)
Dept: HEALTH INFORMATION MANAGEMENT | Facility: MEDICAL CENTER | Age: 73
End: 2018-08-09
Payer: MEDICARE

## 2018-08-09 VITALS — WEIGHT: 223.3 LBS | BODY MASS INDEX: 42.16 KG/M2 | HEIGHT: 61 IN

## 2018-08-09 VITALS
WEIGHT: 230.4 LBS | BODY MASS INDEX: 43.5 KG/M2 | HEIGHT: 61 IN | HEART RATE: 81 BPM | SYSTOLIC BLOOD PRESSURE: 122 MMHG | DIASTOLIC BLOOD PRESSURE: 70 MMHG | OXYGEN SATURATION: 95 %

## 2018-08-09 DIAGNOSIS — E78.5 DYSLIPIDEMIA, GOAL LDL BELOW 100: ICD-10-CM

## 2018-08-09 DIAGNOSIS — E66.01 MORBID OBESITY WITH BMI OF 40.0-44.9, ADULT (HCC): ICD-10-CM

## 2018-08-09 DIAGNOSIS — I10 ESSENTIAL HYPERTENSION: ICD-10-CM

## 2018-08-09 DIAGNOSIS — K76.0 FATTY LIVER: ICD-10-CM

## 2018-08-09 DIAGNOSIS — G47.33 OBSTRUCTIVE SLEEP APNEA: ICD-10-CM

## 2018-08-09 PROCEDURE — 99214 OFFICE O/P EST MOD 30 MIN: CPT | Performed by: INTERNAL MEDICINE

## 2018-08-09 NOTE — PROGRESS NOTES
"8/9/2018     Visit #5       Garth Marx D.*      73 y.o.           Time in/Out:  11:00-11:15am    ASSESS:    Vitals:    08/09/18 1542   Weight: 101.3 kg (223 lb 4.8 oz)   Height: 1.549 m (5' 1\")            Wt Readings from Last 2 Encounters:   08/09/18 104.5 kg (230 lb 6.4 oz)   08/09/18 101.3 kg (223 lb 4.8 oz)            Body mass index is 42.19 kg/m².       Difference:  - 1.6 lbs     Starting weight:  233.5     Difference:  - 0.7 lbs     Current Dietary/Exercise Habits:  Pt is still unable to walk outside due to the heat and smoke and yet to go to the community center to exercise. Her blood sugars have been 109-125 in the last week and she is very happy with these results. The edema in her legs has improved and she states she has decreased the frequency of Top Ramen.     Estimated Stage of Change:    Action as evidenced by working to decrease frequency of high sodium foods.      ADVISE:      Physical Activity:  Follow through with plan to go to the gym.      Dietary Guidelines:  Continue limiting high sodium foods. Focus on protein at every meal.     The patient has been advised of how weight management and physical activity impacts their health and will help to reduce complications and health risk factors.        AGREE:  New Goals:  1. Start exercising at [facility]  2. Continue limiting high sodium foods -- read the label     ASSIST:  No significant changes other than an improvement in BLE edema. Ragini will work on getting to the gym and continuing previous goals. We will review her food journal on her next visit as we did not have time on today's visit.       ARRANGE:     Return for follow-up in 2 weeks      The patient was assisted in making follow-up appointment per orders.  "

## 2018-08-09 NOTE — PROGRESS NOTES
Bariatric Medicine Follow Up  Chief Complaint   Patient presents with   • Weight Gain       History of Present Illness:   Ragini Reaves is a 73 y.o. female who presents for weight management follow-up and to help address co-morbidities related to overweight, including T2 DM, YASMANI, HLD, HTN.    During the patient's last visit, the following were discussed and recommended:  Weight Goal: 3-5% wt loss each month (pt goal weight is 145 lb)  Diet: High Protein/Low Carb Meals and 2 snacks between meals daily  Only have food items from the list given on her initial visit  >100 g protein, <100 g total carbs daily  64+ oz water per day  Avoid sweet drinks and sodas  Track daily intake, keep very detailed log and bring into dietitian visit  Physical Activity: Encourage increase walking  Risk level for moderate/vigorous exercise program: Low to moderate  New Rx: Reduce Lasix from 40 mg to 20 mg daily.  Side Effects: Will review consent if applicable.  Behavior change: Tracking  Follow-up: 2 months  IBT visit 1 month  Suggest stopping Lasix    Was to start keeping a strict food diary for review at follow-up visits, which she has been doing and brings in today.  She was also to eliminate high sodium foods so she would not be retaining water.      Had been eating a lot of Ramen noodles.  Still having noodles 3 times per week.  Having fish and vegetables with pressure cooker.  Having protein shake in am. Bananas and fruit also several times daily.    She has been eating when she is hungry.  Pants are fitting looser, which she is pleased about.  Does not seem ready to make major changes, but has made some as per her food diary.    Blood glucose has been around 125.  Told by PCP recently to stay on metformin for fatty liver.  Reduced Lasix to 20 mg daily but then had to resume 40 mg daily due to PASTORA.  Discontinued bisoprolol.  Doing better with CPAP.    Exercise:   Advised recently by RD to make the time to go exercise.    "  Review of Systems   Some shortness of breath with the smoke in the air, but overall feels well.  Denies lightheadedness, hypoglycemia, diaphoresis.  All other ROS were reviewed and are otherwise unchanged from my previous visit with patient.    Physical Exam:    /70   Pulse 81   Ht 1.549 m (5' 1\")   Wt 104.5 kg (230 lb 6.4 oz)   SpO2 95%   BMI 43.53 kg/m²   Waist: 53 in  Body fat % 56  REE 1608 kcal/day    Weight change since last visit: -3 lb (-4 lb total)  Waist Circum change since last visit: -1.5 in (-1 in total)    Constitutional: Oriented to person, place, and time and well-developed, well-nourished, and in no distress.    Head: Normocephalic.   Musculoskeletal: Normal range of motion.  1+ pitting edema ankles to mid shins bilateral.   Neurological: Alert and oriented to person, place, and time. No muscle weakness.  Gait normal.   Skin: Warm and dry. Not diaphoretic.   Psychiatric: Mood, memory, affect and judgment normal.     Laboratory:   None new.      ASSESSMENT/PLAN:  Body mass index is 43.53 kg/m².    Obesity Stage ([place]):  2; Class 3    1. Morbid obesity with BMI of 40.0-44.9, adult (HCC)     2. Uncontrolled type 2 diabetes mellitus without complication, without long-term current use of insulin (HCC)     3. Essential hypertension     4. Dyslipidemia, goal LDL below 100     5. Fatty liver     6. Obstructive sleep apnea       The patient continues to make positive change.  She is slowly altering her diet, has some resistance to reducing CHO more.  Her glucose is under better control with weight loss and CHO reduction.  Blood pressure well controlled.  Continue to monitor lipids, sleep.  Continue Metformin given fatty liver.    The patient and I have discussed at length and agree to the following recommendations, which are all addressing the above diagnoses:    Weight Goal: 3-5% wt loss each month (pt goal weight is 145 lb)  Diet: High Protein/Low Carb Meals and 2 snacks between meals " daily  >100 g protein, <100 g total carbs daily  64+ oz water per day  Avoid sweet drinks and sodas  Track daily intake with written journal as she is doing  Considering whether she wants to reduce bananas and ramen intake  Physical Activity: Consider details of how to incorporate exercise program  Risk level for moderate/vigorous exercise program: Low to moderate  New Rx: None  Behavior change: Continue to assess patient's readiness to change  Follow-up: one month  IBT q2 wks for the next month per her request    Patient's body mass index is 43.53 kg/m². Exercise and nutrition counseling were performed at this visit.

## 2018-08-22 ENCOUNTER — OFFICE VISIT (OUTPATIENT)
Dept: CARDIOLOGY | Facility: MEDICAL CENTER | Age: 73
End: 2018-08-22
Payer: MEDICARE

## 2018-08-22 ENCOUNTER — TELEPHONE (OUTPATIENT)
Dept: CARDIOLOGY | Facility: MEDICAL CENTER | Age: 73
End: 2018-08-22

## 2018-08-22 VITALS
HEART RATE: 90 BPM | OXYGEN SATURATION: 92 % | DIASTOLIC BLOOD PRESSURE: 80 MMHG | HEIGHT: 61 IN | BODY MASS INDEX: 43.61 KG/M2 | WEIGHT: 231 LBS | SYSTOLIC BLOOD PRESSURE: 112 MMHG

## 2018-08-22 DIAGNOSIS — I10 ESSENTIAL HYPERTENSION: ICD-10-CM

## 2018-08-22 DIAGNOSIS — E78.5 DYSLIPIDEMIA, GOAL LDL BELOW 100: ICD-10-CM

## 2018-08-22 DIAGNOSIS — E66.01 MORBID OBESITY WITH BMI OF 40.0-44.9, ADULT (HCC): ICD-10-CM

## 2018-08-22 DIAGNOSIS — G47.33 OBSTRUCTIVE SLEEP APNEA: ICD-10-CM

## 2018-08-22 DIAGNOSIS — I50.32 CHRONIC DIASTOLIC CONGESTIVE HEART FAILURE (HCC): ICD-10-CM

## 2018-08-22 DIAGNOSIS — E11.9 DM TYPE 2, GOAL HBA1C < 8% (HCC): ICD-10-CM

## 2018-08-22 PROCEDURE — 99213 OFFICE O/P EST LOW 20 MIN: CPT | Performed by: INTERNAL MEDICINE

## 2018-08-22 RX ORDER — FUROSEMIDE 40 MG/1
40 TABLET ORAL EVERY MORNING
COMMUNITY
Start: 2018-08-22 | End: 2020-05-05 | Stop reason: SDUPTHER

## 2018-08-22 ASSESSMENT — ENCOUNTER SYMPTOMS
NIGHT SWEATS: 0
DOUBLE VISION: 0
COUGH: 0
LEFT EYE: 0
HEMATOCHEZIA: 0
RIGHT EYE: 0
CHILLS: 0
HEMATEMESIS: 0
HEMOPTYSIS: 0
FEVER: 0
FOCAL WEAKNESS: 0
LIGHT-HEADEDNESS: 0

## 2018-08-22 NOTE — PATIENT INSTRUCTIONS
Please take lasix 40mg twice a day (once in the morning, once in the early afternoon) for 3 days only.    Please monitor your weight daily.  If your weight increases by 2 pounds in one day, or 5 pounds over 3 days, please take an extra dose of lasix 40mg. If this happens twice in a row, please call the office for further instructions.

## 2018-08-22 NOTE — PROGRESS NOTES
Cardiology Follow-up Consultation Note    Date of note:   8/22/2018  Primary Care Provider: Chapito Marques M.D.    Name:             Ragini Reaves   YOB: 1945  MRN:               [id number]    CC: shortness of breath.     Patient ID/HPI:   Ragini Reaves is a 73 y.o. female whose current medical problems include NIDDM, YASMANI on home O2 at night transitioning to CPAP, hypothyroidism, hypertension, dyslipidemia, fatty liver, and obesity with BMI of 44 who presents for follow-up.     Last clinic visit: 1/23/2018  She has been on lasix 40mg PO daily for years. Of note she is very claustrophobic. She does have a history of phen phen use in the 1990s for 4-5 months.       She has lost over 50 pounds intentionally, and plans to lose more.       At our clinic visit,  2/15/2018:  She saw Dr. Newsome from pulmonary who arranged for repeat PFTs and sleep study. PFTs looked great, however she did have mild sleep apnea and was started on CPAP which has been ordered, but she hasn't started it yet.    She had a relatively normal echocardiogram and a normal stress test.     She reports her shortness of breath is improved, and she can walk well as long as she's not hurrying.     No further exertional chest pain.  No longer taking motrin, tylenol is working.       Interim History:  In terms of obesity, she is continuing to lose weight.    In terms of CHF, her lasix dose was decreased to 20mg PO daily, and she did have significant leg swelling, she is back up to the increased dose of 40mg PO Daily.     diabetes control improving, hgbA1c 7.2    Finally on CPAP for YASMANI.         Review of Systems   Constitution: Negative for chills, fever and night sweats.   Eyes: Negative for double vision, vision loss in left eye and vision loss in right eye.   Respiratory: Negative for cough and hemoptysis.    Gastrointestinal: Negative for hematemesis, hematochezia and melena.   Neurological: Negative for focal  weakness and light-headedness.     Patient denies chest pain, palpitations, dyspnea on exertion, pre-syncope, syncope, lower extremity swelling, orthopnea, PND or recent weight gain.       Past Medical History:   Diagnosis Date   • Cataract immature 7/2012    Dr. Carey   • Chickenpox    • DM type 2, goal HbA1c < 8% ([facility])    • Dyslipidemia, goal LDL below 130    • Frequent urination    • Hypertension    • Hypothyroidism    • Mild intermittent asthma 2004    perfumes or smoke triggers   • Nocturnal hypoxemia     on 2.5 liter nightly   • Obesity    • Osteoarthritis    • Osteopenia    • 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   • Ringing in ears    • Shortness of breath    • Sleep apnea    • Tonsillitis    • Toothache    • Type II or unspecified type diabetes mellitus without mention of complication, uncontrolled    • Uterine fibroid    • Wears glasses          Past Surgical History:   Procedure Laterality Date   • COLONOSCOPY WITH BIOPSY  5/21/2013    Performed by Mauro Garcia M.D. at ENDOSCOPY [facility]S   • OTHER      THYROIDECTOMY  2009   • TONSILLECTOMY           Current Outpatient Prescriptions   Medication Sig Dispense Refill   • VENTOLIN  (90 Base) MCG/ACT Aero Soln inhalation aerosol Inhale 2 Puffs by mouth every 6 hours as needed for Shortness of Breath. 1 Inhaler 6   • furosemide (LASIX) 20 MG Tab Take 1 Tab by mouth every day. (Patient taking differently: Take 40 mg by mouth every day.)     • docusate sodium (COLACE) 100 MG Cap Take 1 Cap by mouth 2 times a day. 60 Cap 1   • metFORMIN (GLUCOPHAGE) 500 MG Tab TAKE 1 TAB BY MOUTH 2 TIMES A DAY, WITH MEALS. 180 Tab 3   • levothyroxine (SYNTHROID) 150 MCG Tab TAKE 1 TAB BY MOUTH EVERY DAY. 90 Tab 3   • spironolactone (ALDACTONE) 25 MG Tab Take 1 Tab by mouth every day. 90 Tab 3   • linagliptin (TRADJENTA) 5 MG Tab tablet Take 1 Tab by mouth every day. 90 Tab 3   • benazepril (LOTENSIN) 5 MG Tab TAKE 1 TAB BY MOUTH EVERY DAY. 90 Tab 2   •  Lutein 10 MG Tab Take  by mouth.     • aspirin 81 MG tablet Take 1 Tab by mouth every day. 90 Tab 3   • coenzyme Q-10 30 MG capsule Take 1 Cap by mouth every day. 30 Cap 11   • ascorbic acid (VITAMIN C) 500 MG tablet Take 1 Tab by mouth every day. 30 Tab 11   • Lactobacillus (DIGESTIVE HEALTH PROBIOTIC) Cap Take 1 Cap by mouth every day. 30 Cap 11   • Multiple Vitamins-Minerals (ULTRA WOMENS PACK) Misc Take 1 Each by mouth every day. 30 Each 11   • simvastatin (ZOCOR) 5 MG Tab Take 1 Tab by mouth every evening. 90 Tab 3   • eucalyptus/peppermint oil (PONARIS NASAL) SOLN Spray 10 Drops in nose every 6 hours as needed. 1 Bottle 0   • Glucosamine-Chondroitin-Vit C 2375-1480-57 MG/30ML LIQD Take 30 mL by mouth every day. 1 Bottle 0   • FREESTYLE LANCETS Misc INJECT 1 EACH AS INSTRUCTED 2 TIMES A DAY *E11.9* 100 Each 4   • FREESTYLE LANCETS Misc INJECT 1 EACH AS INSTRUCTED 2 TIMES A DAY *E11.9* 100 Each 4   • bisoprolol (ZEBETA) 5 MG Tab TAKE 1 TAB BY MOUTH EVERY DAY. 90 Tab 3   • nitroglycerin (NITROSTAT) 0.4 MG SL Tab Place 1 Tab under tongue as needed for Chest Pain. 25 Tab 1   • glucose blood (FREESTYLE LITE) strip 1 Strip by Other route 2 Times a Day. 100 Strip 11   • Misc. Devices Misc This is an order for overnight pulse ox on room air.  Dx. Nocturnal hypoxia  G47.34, mild persistent asthma J45.30       SPENCER 99 months   NPI [id number] 1 Each 0   • Misc. Devices Misc This is an order for overnight pulse oxygen study  Dx nocturnal hypoxia G47.34, please do the test on room air.       SPENCER 99 months   NPI [id number] 1 Each 0   • Blood Glucose Monitoring Suppl SUPPLIES MISC Test strips order: Test strips for Abbott Freestyle Lite meter. Sig: use daily and prn 90 day supply ICD-9 code 250.02 150 Each 11     Current Facility-Administered Medications   Medication Dose Route Frequency Provider Last Rate Last Dose   • acetaminophen (TYLENOL) tablet 500 mg  500 mg Oral Q6HRS PRN Frandy Pereira M.D.             Allergies    "  Allergen Reactions   • Cough Syrup M [Cough Cold-Flu Relief] Swelling     Severe facial swelling   • Dextromethorphan Swelling   • Asa [Aspirin]      Stomach bleeding.          Family History   Problem Relation Age of Onset   • Lung Disease Brother    • Cancer Mother         pelvic   • Stroke Maternal Grandmother          Social History     Social History   • Marital status:      Spouse name: N/A   • Number of children: N/A   • Years of education: N/A     Occupational History   • Not on file.     Social History Main Topics   • Smoking status: Never Smoker   • Smokeless tobacco: Never Used   • Alcohol use No   • Drug use: No   • Sexual activity: No     Other Topics Concern   • Not on file     Social History Narrative   • No narrative on file         Physical Exam:  Ambulatory Vitals  Blood pressure 112/80, pulse 90, height 1.549 m (5' 1\"), weight 104.8 kg (231 lb), SpO2 92 %.   Oxygen Therapy:  Pulse Oximetry: 92 %  BP Readings from Last 4 Encounters:   08/22/18 112/80   08/09/18 122/70   07/06/18 100/64   06/28/18 112/72       Weight/BMI: Body mass index is 43.65 kg/m².  Wt Readings from Last 4 Encounters:   08/22/18 104.8 kg (231 lb)   08/09/18 104.5 kg (230 lb 6.4 oz)   08/09/18 101.3 kg (223 lb 4.8 oz)   07/17/18 106.3 kg (234 lb 6.4 oz)       General: No apparent distress  Eyes: nl conjunctiva  ENT: OP clear  Neck: JVP <8 cm H2O, no carotid bruits  Lungs: normal respiratory effort, mild bibasilac crackles  Heart: RRR, no murmurs, no rubs or gallops,1+ bilateral LE edema. No LV/RV heave on cardiac palpatation. 2+ bilateral radial pulses.  2+ bilateral dp pulses.   Abdomen: soft, non tender, non distended, no masses, normal bowel sounds.  No HSM. Severe central obesity  Extremities/MSK: no clubbing, no cyanosis  Neurological: No focal sensory deficits  Psychiatric: Appropriate affect, A/O x 3, intact judgement.   Skin: Warm extremities      Lab Data Review:  Lab Results   Component Value Date/Time    " CHOLSTRLTOT 112 06/29/2018 08:48 AM    LDL 54 06/29/2018 08:48 AM    HDL 36 (L) 06/29/2018 08:48 AM    TRIGLYCERIDE 109 06/29/2018 08:48 AM       Lab Results   Component Value Date/Time    SODIUM 142 06/29/2018 08:48 AM    POTASSIUM 4.2 06/29/2018 08:48 AM    CHLORIDE 105 06/29/2018 08:48 AM    CO2 24 06/29/2018 08:48 AM    GLUCOSE 142 (H) 06/29/2018 08:48 AM    BUN 24 06/29/2018 08:48 AM    CREATININE 0.72 06/29/2018 08:48 AM    BUNCREATRAT 33 (H) 06/29/2018 08:48 AM     Lab Results   Component Value Date/Time    ALKPHOSPHAT 81 06/29/2018 08:48 AM    ASTSGOT 27 06/29/2018 08:48 AM    ALTSGPT 34 (H) 06/29/2018 08:48 AM    TBILIRUBIN 0.5 06/29/2018 08:48 AM      Lab Results   Component Value Date/Time    WBC 6.4 06/29/2018 08:48 AM    WBC 8.0 02/01/2005 07:00 PM     Lab Results   Component Value Date/Time    HBA1C 7.2 (H) 06/29/2018 08:48 AM    HBA1C 8.7 (H) 09/08/2015 07:49 AM     No components found for: TROP          Cardiac Imaging and Procedures Review:    EKG dated 11/29/17 : My personal interpretation is NSR.        Nuclear perfusion imaging 12/13/2017:  CONCLUSIONS AND IMPRESSIONS:  1.  Negative cardiac PET scan.  No evidence of ischemia or infarction.  2.  Resting ejection fraction of 84%, stress ejection fraction of 91%.  No   segmental wall motion abnormalities noted.  3.  No significant ST segment changes.  Occasional PVCs noted.  4.  Shortness of breath was experienced in this study.    TTE 12/2017:  CONCLUSIONS  1. Normal right and left ventricular size and function.   2. Normal regional wall motion.  3. Mild to moderate aortic insufficiency which is eccentric and   directed anteriorly. Mild aortic valve calcification.   4. Normal estimated right atrial pressure.     No prior study is available for comparison.       Radiology test Review:  CXR:   FINDINGS:  The heart is normal in size.  Moderate diffuse interstitial opacities.  No airspace consolidation.  No pleural effusions are  appreciated.  Sternotomy wires demonstrated.       Sleep study:  Assessment:  Mild obstructive sleep apnea hypopnea with an apnea hypopnea index of 10.5 events per hour and a lowest arterial oxygen saturation of 87% on room air. Marked fragmentation of sleep related in part to laboratory and treatment effects but chronic insomnia might be considered. There did seem to be some response to CPAP therapy but the study was limited by the collection of only 21 minutes of sleep time in the treatment phase of the split-night study.     Recommendations:  Clinical correlation is required. If the patient presented with significant daytime somnolence or if the mild arterial oxygen desaturation seen in this study is deemed clinically significant, treatment may be considered. Airway pressurization could be initiated through a titration polysomnogram with the use of auto titrating CPAP. Alternative treatments for sleep-disordered breathing include reconstructive otolaryngologic surgery, dental appliances and weight loss. If any these latter treatment options are selected, a follow-up polysomnogram is suggested to assess the efficacy of therapy. Behavioral measures including avoidance of sedatives, alcohol and supine body position may be of additional benefit.     PFTs:  SPIROMETRY:  1. FVC was 2.20 L, 78 % of predicted  2. FEV1 was 2.06 L, 97 % of predicted   3. FEV1/FVC ratio was 94 %  4. There was no significant response to bronchodilators      LUNG VOLUMES:  1. RV was  91 % of predicted   2. TLC was 89 % of predicted         DIFFUSION CAPACITY:  1.Diffusion capacity [id number] % of predicted         IMPRESSION:  The patient has mild decrease in FVC to 78% predicted probably secondary to the patient's elevated BMI of 40.3. Total lung capacity was 89% predicted which preclude restrictive ventilatory defect by definition. The rest of the pulmonary function test was within normal limits. Clinical correlation is required.       Medical  Decision Makin. Angina of effort (CMS-HCC)  Resolved, negative stress test  -ED precautions discussed, has ntg prn  -continue aspirin for primary prevention given risk factors  -continue statin, last LDL at goal        2. Uncontrolled type 2 diabetes mellitus without complication, without long-term current use of insulin (CMS-HCC)  Per Dr. Marques, last hgbA1c 7.2, improving with diet control     3. Essential hypertension  Well controlled  -continue spironolactone, lasix, benazepril. Bisoprolol stopped to assist with weight loss.      4. Dyslipidemia, goal LDL below 70  Simvastatin, last LDL 54     5. Morbid obesity with BMI of 40.0-44.9, adult (CMS-HCC)  Working on weight loss with Dr. Dillard.      6. Dyspnea on exertion  Improving  -continue weight loss, CPAP     7. Primary thyroid cancer (CMS-HCC)  Resected with no e/o recurrence. This is why she has sternal wires     8. Chronic diastolic heart failure  patient has been on lasix stably for years, assumed HFpEF. NYHA class II-III. No recent 6MWT.   -continue lasix/spironolactone  -discussed importance of daily weights, and ED precautions for dehydration or heart failure.  -increase lasix to 40mg PO bid x 3 days for mild volume overload today.         Return in about 6 months (around 2019).      Frandy Pereira MD  [facility]  [facility], [address]  [address] [address]  Phone: [phone]  Fax: [phone]

## 2018-08-30 ENCOUNTER — SLEEP CENTER VISIT (OUTPATIENT)
Dept: SLEEP MEDICINE | Facility: MEDICAL CENTER | Age: 73
End: 2018-08-30
Payer: MEDICARE

## 2018-08-30 ENCOUNTER — TELEPHONE (OUTPATIENT)
Dept: CARDIOLOGY | Facility: MEDICAL CENTER | Age: 73
End: 2018-08-30

## 2018-08-30 VITALS
HEIGHT: 61 IN | BODY MASS INDEX: 43.43 KG/M2 | SYSTOLIC BLOOD PRESSURE: 110 MMHG | RESPIRATION RATE: 16 BRPM | OXYGEN SATURATION: 91 % | DIASTOLIC BLOOD PRESSURE: 70 MMHG | HEART RATE: 82 BPM | WEIGHT: 230 LBS

## 2018-08-30 DIAGNOSIS — I50.32 CHRONIC DIASTOLIC CONGESTIVE HEART FAILURE (HCC): ICD-10-CM

## 2018-08-30 DIAGNOSIS — Z79.899 ON POTASSIUM WASTING DIURETIC THERAPY: ICD-10-CM

## 2018-08-30 DIAGNOSIS — E66.01 MORBID OBESITY WITH BMI OF 40.0-44.9, ADULT (HCC): ICD-10-CM

## 2018-08-30 DIAGNOSIS — M79.89 LEG SWELLING: ICD-10-CM

## 2018-08-30 DIAGNOSIS — G47.33 OBSTRUCTIVE SLEEP APNEA: ICD-10-CM

## 2018-08-30 DIAGNOSIS — J45.30 MILD PERSISTENT ASTHMA, UNCOMPLICATED: ICD-10-CM

## 2018-08-30 PROCEDURE — 99214 OFFICE O/P EST MOD 30 MIN: CPT | Performed by: NURSE PRACTITIONER

## 2018-08-30 RX ORDER — BENAZEPRIL HYDROCHLORIDE 5 MG/1
TABLET ORAL
Qty: 90 TAB | Refills: 2 | Status: SHIPPED | OUTPATIENT
Start: 2018-08-30 | End: 2018-12-10 | Stop reason: SDUPTHER

## 2018-08-30 RX ORDER — POTASSIUM CHLORIDE 750 MG/1
10 TABLET, FILM COATED, EXTENDED RELEASE ORAL DAILY
Qty: 30 TAB | Refills: 3 | Status: SHIPPED | OUTPATIENT
Start: 2018-08-30 | End: 2018-10-23 | Stop reason: SDUPTHER

## 2018-08-30 ASSESSMENT — ENCOUNTER SYMPTOMS
CHILLS: 0
PND: 0
RESPIRATORY NEGATIVE: 1
WEAKNESS: 0
CLAUDICATION: 0
GASTROINTESTINAL NEGATIVE: 1
EYE PAIN: 0
NEUROLOGICAL NEGATIVE: 1
MUSCULOSKELETAL NEGATIVE: 1
PALPITATIONS: 0
BRUISES/BLEEDS EASILY: 0
WEIGHT LOSS: 0
DIAPHORESIS: 0
EYE DISCHARGE: 0
FEVER: 0
ORTHOPNEA: 0
PSYCHIATRIC NEGATIVE: 1

## 2018-08-30 NOTE — TELEPHONE ENCOUNTER
Frandy Pereira M.D.  ISI Rice, can we touch base with Ms. Reaves and ensure she is back on lasix 40mg PO daily and started kcl 10mEq daily.  She should also get a bmp, mag and bnp in 1 week after returning to this dose.  Thanks!   -Frandy    Previous Messages      ----- Message -----   From: ERNA Luna   Sent: 8/30/2018   9:51 AM   To: Frandy Pereira M.D.     Helraven Pereira,     I just saw this patient at our sleep clinic for her sleep apnea.  She reports her Lasix dose was recently cut back and reports increased swelling in her lower extremities.  1+ on exam today bilaterally.  She reports that she is not taking potassium with her Lasix.  She has no history of kidney disease.  I have encouraged her to follow-up with your office to discuss Lasix dosing and potassium.  Just FYI.     Thanks!   Taniya Mateus HAWKINS         Called pt, pt confirmed that she has been taking her Lasix 40mg PO daily, she reports that Dr Mendoza took her off her KCL, pt requested new Rx for KCL to be sent to [facility], discussed about watching fluid and sodium intake also to help with leg swelling. She agreed w/ repeat blood work in one week.     New Rx for KCL 10meq PO daily sent to [facility]    BMP, Mag and BNP ordered, lab slip mailed to pt.
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
## 2018-09-05 ENCOUNTER — NON-PROVIDER VISIT (OUTPATIENT)
Dept: MEDICAL GROUP | Facility: MEDICAL CENTER | Age: 73
End: 2018-09-05
Payer: MEDICARE

## 2018-09-05 VITALS — BODY MASS INDEX: 43.27 KG/M2 | HEIGHT: 61 IN | WEIGHT: 229.2 LBS

## 2018-09-05 PROCEDURE — G0447 BEHAVIOR COUNSEL OBESITY 15M: HCPCS | Performed by: NURSE PRACTITIONER

## 2018-09-05 NOTE — PROGRESS NOTES
"9/5/2018     Visit #6       Chapito Marques M.D.      73 y.o.           Time in/Out:  11:10-11:27am    ASSESS:    Vitals:    09/05/18 1134   Weight: 104 kg (229 lb 3.2 oz)   Height: 1.549 m (5' 1\")            Wt Readings from Last 2 Encounters:   09/05/18 104 kg (229 lb 3.2 oz)   08/30/18 104.3 kg (230 lb)            Body mass index is 43.31 kg/m².       Difference: - 3.6 lbs    Starting weight:  233.5 lbs (234.7 lbs w/ Dr. Mendoza)   Total Weight Loss:  - 4.3 lbs     Current Dietary/Exercise Habits:  The pt is very happy about her weight loss and reports her home scale is reading 226 lbs. She was frustrated that her weight was stuck in the 230-235 range. Started walking yesterday with her neighbor around the block completing 1 lap. Now that the weather has cooled down she feels doing this more regularly will be easier for her. Snacking on green beans she makes in the pressure cooker which fills her up and keeps her feeling satisfied. Has been limiting Ramen noodles to 2-3 days a week.  She is very happy with her blood sugars lately running averaging in the low 100s. States her physicial told her to have no more than 2 L of water a day, she is getting typically < 4 cups and thinks she should be drinking more. Does not like plain water because it makes her mouth dry, uses Crystal Lite.     Diet Recall:  B-protein powder ([employer]), 1.5 cups of 2% milk  S - fresh pressure cooked green beans  L - mac n cheese 3/4-1 cup, homemade with ~1/2 cup desiree jie cheese OR chicken breast anywhere from 5-13 ounces depending on how hungry she is OR lentils & onions 1.5 cups  S - tomato soup or a smoothie (stawberries, banana, yogurt, milk)  D - fettuccini noodles OR 2 HB eggs w/ crackers OR Ramen noodles    Estimated Stage of Change:    Preparation as evidenced by planning to increase her exercise .      ADVISE:      Physical Activity:  Walking is becoming easier for the pt. She is getting less out of breath and attributes this " to her weight loss. Was told in the past she has no issues with her lungs or heart, but that weight loss to decrease abdominal girth would help.      Dietary Guidelines: The pt still has a very high intake of carbohydrates especially those that are processed (macaroni, fettuccini, Ramen etc). Encouraged her to limit CHO to 1/2 cup serving at a meal. Recommended limiting fruit in her smoothie, try adding almond milk instead of cows milk and add spinach or vegetables for a lower calorie option. Reviewed snack options again of 1 oz protein + NS veggies and recommended a snack range from 100-150 calories.     The patient has been advised of how weight management and physical activity impacts their health and will help to reduce complications and health risk factors.        AGREE:  New Goals:  1. Walk around the block once 5 days a week   2. Increase water intake    ASSIST:  The pt is somewhat resistant to food recommendations. She appears happy with what she is currently doing and despite a slow rate of weight loss is pleased with her progress. Ragini is awake sometimes from 7:30-2:00am. From what she reported to me, it sounds like she may be continuing to snack every 2-3 hrs which is likely contributing to excess calorie intake. We will continue to focus on increasing her exercise as this is an area she appears more open to making changes.     ARRANGE:     Return for follow-up in 1 month      The patient was assisted in making follow-up appointment per orders.

## 2018-09-09 LAB
BNP SERPL-MCNC: 9 PG/ML (ref 0–100)
BUN SERPL-MCNC: 29 MG/DL (ref 8–27)
BUN/CREAT SERPL: 41 (ref 12–28)
CALCIUM SERPL-MCNC: 9.3 MG/DL (ref 8.7–10.3)
CHLORIDE SERPL-SCNC: 102 MMOL/L (ref 96–106)
CO2 SERPL-SCNC: 23 MMOL/L (ref 20–29)
CREAT SERPL-MCNC: 0.7 MG/DL (ref 0.57–1)
GLUCOSE SERPL-MCNC: 183 MG/DL (ref 65–99)
IF AFRICAN AMERICAN  100797: 99 ML/MIN/1.73
IF NON AFRICAN AMER 100791: 86 ML/MIN/1.73
MAGNESIUM SERPL-MCNC: 1.9 MG/DL (ref 1.6–2.3)
POTASSIUM SERPL-SCNC: 4.6 MMOL/L (ref 3.5–5.2)
SODIUM SERPL-SCNC: 140 MMOL/L (ref 134–144)

## 2018-09-10 ENCOUNTER — TELEPHONE (OUTPATIENT)
Dept: CARDIOLOGY | Facility: MEDICAL CENTER | Age: 73
End: 2018-09-10

## 2018-09-10 DIAGNOSIS — R91.8 OPACITIES OF BOTH LUNGS PRESENT ON CHEST X-RAY: ICD-10-CM

## 2018-09-10 DIAGNOSIS — R06.02 EXERTIONAL SHORTNESS OF BREATH: ICD-10-CM

## 2018-09-10 RX ORDER — SPIRONOLACTONE 25 MG/1
12.5 TABLET ORAL DAILY
Qty: 90 TAB | Refills: 3 | COMMUNITY
Start: 2018-09-10 | End: 2019-01-22 | Stop reason: SDUPTHER

## 2018-09-10 NOTE — TELEPHONE ENCOUNTER
Labs stable except for rising bun.  Please have her decrease spironolactone to 12.5mg PO daily, no other changes at this time, thanks!

## 2018-09-11 NOTE — TELEPHONE ENCOUNTER
Called pt, discussed lab results per Dr Pereira and his recommendations, pt verbalizes understanding    MAR updated.

## 2018-09-18 ENCOUNTER — TELEPHONE (OUTPATIENT)
Dept: CARDIOLOGY | Facility: MEDICAL CENTER | Age: 73
End: 2018-09-18

## 2018-09-18 DIAGNOSIS — I10 ESSENTIAL HYPERTENSION: ICD-10-CM

## 2018-09-18 DIAGNOSIS — I50.32 CHRONIC DIASTOLIC CONGESTIVE HEART FAILURE (HCC): ICD-10-CM

## 2018-09-18 NOTE — TELEPHONE ENCOUNTER
Labs reviewed and stable. BUN still elevated.  Advise dehydration precautions, and if worsening dizziness or hypotension, we may need to decrease lasix dose, but for now if she is asymptomatic lets continue to monitor.  Lets plan for a repeat BMP, mag in December, and lets schedule her f/u in February. Thanks!

## 2018-09-18 NOTE — TELEPHONE ENCOUNTER
Called pt, discussed lab results per Dr Pereira and his recommendations, pt agreed and verbalizes understanding    BMP, Mag ordered, lab slip mailed to pt

## 2018-09-30 DIAGNOSIS — E78.5 DYSLIPIDEMIA, GOAL LDL BELOW 100: ICD-10-CM

## 2018-10-01 RX ORDER — SIMVASTATIN 5 MG
5 TABLET ORAL EVERY EVENING
Qty: 90 TAB | Refills: 3 | Status: SHIPPED | OUTPATIENT
Start: 2018-10-01 | End: 2019-09-22 | Stop reason: SDUPTHER

## 2018-10-03 ENCOUNTER — NON-PROVIDER VISIT (OUTPATIENT)
Dept: MEDICAL GROUP | Facility: MEDICAL CENTER | Age: 73
End: 2018-10-03
Payer: MEDICARE

## 2018-10-03 VITALS — BODY MASS INDEX: 43.84 KG/M2 | WEIGHT: 232.2 LBS | HEIGHT: 61 IN

## 2018-10-03 PROCEDURE — G0447 BEHAVIOR COUNSEL OBESITY 15M: HCPCS | Performed by: NURSE PRACTITIONER

## 2018-10-03 NOTE — PROGRESS NOTES
"10/3/2018     Visit #7       Chapito Marques M.D.     73 y.o.           Time in/Out:  11:33-11:55am    ASSESS:    Vitals:    10/03/18 1137   Weight: 105.3 kg (232 lb 3.2 oz)   Height: 1.549 m (5' 1\")            Wt Readings from Last 2 Encounters:   10/03/18 105.3 kg (232 lb 3.2 oz)   09/05/18 104 kg (229 lb 3.2 oz)            Body mass index is 43.87 kg/m².       Difference:  +3 lbs    Starting weight:  233.5 lbs     Difference: -1.3 lbs      Current Dietary/Exercise Habits:  The pt reports eating more than normal this morning. She is trying to walk at least 3 days a week around her complex, but admits she isn't always consistent with this because if her walking partner is not available she will not go. Did not go this morning because it started to rain. She notices her weight fluctuates a lot and she has swelling in her legs sometimes. She wants more meal ideas and remains unsure how to cook healthy for 1 person. She is happy with her blood sugars that have been <130s on average. Had one day >150, but realized she did not wait 2 hrs after the meal to check.     Estimated Stage of Change:    Preparation as evidenced by setting a goal to walk great than 3 days a week.      ADVISE:      Physical Activity:  Pt's goal of 3 days a week walking around her complex remains. We discussed ways to overcome certain barriers that keep her from exercising regularly. She has a small workout machine that is currently in her living room she admits to not using, but has agreed that she could do this on the days she is not able to walk.      Dietary Guidelines:  The patients frequent weight fluctuations are likely due to her high sodium intake. She admits to eating top ramen last night (800 mg sodium) and 1 can of stewed tomatoes (600 mg) totaling about 1400 mg. Recommended she remain in the range of 0426-4217, and most often on the lower end of the range due to hx BLE edema, hypertension, and CKD/3. We practicing reading food labels " and choosing low sodium options with <300 mg per serving. The pt decided that she could pour out the broth from her Ramen and instead just have the noodles. She otherwise is not ready to give up some of her favorite high sodium foods altogether.   Gave the patient Diabetes by the Plate book to give her some ideas for meals. Also provided her with a Meal Ideas handout of lower CHO options to cook.     The patient has been advised of how weight management and physical activity impacts their health and will help to reduce complications and health risk factors.        AGREE:  Previous Goals:  1. Walk around the block once 5 days a week   2. Increase water intake    New Goals:  1. Limit sodium intake   2. Walk or use home seated elliptical machine 3 days a week      ASSIST:  The pt's weight continues to fluctuate. Despite little weight loss since her initial visit with me in May, her blood sugars are better controlled. The pt struggles to reduce her intake of high sodium foods which is likely the cause of her weight fluctuations.  She has made some improvements to her food choices, but from her diet recall still remains with a high intake of refined CHO foods like mac n cheese, noodles, and crackers. She has been incorporating more exercise lately, but is not consistent. We will continue to work together to help her make better food choices and incorporate regular physical activity. We will review some tips for meal planning for 1 on her next visit.       ARRANGE:     Return for follow-up in 4 weeks      The patient was assisted in making follow-up appointment per orders.

## 2018-10-05 ENCOUNTER — NON-PROVIDER VISIT (OUTPATIENT)
Dept: MEDICAL GROUP | Facility: MEDICAL CENTER | Age: 73
End: 2018-10-05
Payer: MEDICARE

## 2018-10-05 ENCOUNTER — TELEPHONE (OUTPATIENT)
Dept: MEDICAL GROUP | Facility: MEDICAL CENTER | Age: 73
End: 2018-10-05

## 2018-10-05 DIAGNOSIS — Z23 NEED FOR IMMUNIZATION AGAINST INFLUENZA: ICD-10-CM

## 2018-10-05 DIAGNOSIS — Z23 NEED FOR VACCINATION: ICD-10-CM

## 2018-10-05 PROCEDURE — 90662 IIV NO PRSV INCREASED AG IM: CPT | Performed by: FAMILY MEDICINE

## 2018-10-05 PROCEDURE — G0008 ADMIN INFLUENZA VIRUS VAC: HCPCS | Performed by: FAMILY MEDICINE

## 2018-10-05 NOTE — NON-PROVIDER
Ragini Reaves is a 73 y.o. female here for a non-provider visit for Flu injection.    Reason for injection: Flu  Order in MAR?: Yes  Patient supplied?:No  Minimum interval has been met for this injection (per MAR order): Yes      Patient tolerated injection and no adverse effects were observed or reported: Yes

## 2018-10-08 RX ORDER — BLOOD-GLUCOSE METER
1 KIT MISCELLANEOUS 2 TIMES DAILY
Qty: 100 STRIP | Refills: 5 | Status: ON HOLD | OUTPATIENT
Start: 2018-10-08 | End: 2019-03-13

## 2018-10-09 ENCOUNTER — OFFICE VISIT (OUTPATIENT)
Dept: HEALTH INFORMATION MANAGEMENT | Facility: MEDICAL CENTER | Age: 73
End: 2018-10-09
Payer: MEDICARE

## 2018-10-09 VITALS
BODY MASS INDEX: 42.37 KG/M2 | SYSTOLIC BLOOD PRESSURE: 118 MMHG | HEIGHT: 61 IN | OXYGEN SATURATION: 94 % | HEART RATE: 91 BPM | WEIGHT: 224.4 LBS | DIASTOLIC BLOOD PRESSURE: 68 MMHG

## 2018-10-09 DIAGNOSIS — I10 ESSENTIAL HYPERTENSION: ICD-10-CM

## 2018-10-09 DIAGNOSIS — E11.9 DM TYPE 2, GOAL HBA1C < 8% (HCC): ICD-10-CM

## 2018-10-09 DIAGNOSIS — E66.01 MORBID OBESITY WITH BMI OF 40.0-44.9, ADULT (HCC): ICD-10-CM

## 2018-10-09 DIAGNOSIS — K59.00 CONSTIPATION, UNSPECIFIED CONSTIPATION TYPE: ICD-10-CM

## 2018-10-09 DIAGNOSIS — E78.5 DYSLIPIDEMIA, GOAL LDL BELOW 100: ICD-10-CM

## 2018-10-09 PROCEDURE — 99214 OFFICE O/P EST MOD 30 MIN: CPT | Performed by: INTERNAL MEDICINE

## 2018-10-09 NOTE — PROGRESS NOTES
Bariatric Medicine Follow Up  Chief Complaint   Patient presents with   • Weight Gain       History of Present Illness:   Ragini Reaves is a 73 y.o. female who presents for weight management follow-up and to help address co-morbidities related to overweight, including T2 DM, HTN, HLD, YASMANI.    During the patient's last visit, the following were discussed and recommended:  Weight Goal: 3-5% wt loss each month (pt goal weight is 145 lb)  Diet: High Protein/Low Carb Meals and 2 snacks between meals daily  >100 g protein, <100 g total carbs daily  64+ oz water per day  Avoid sweet drinks and sodas  Track daily intake with written journal as she is doing  Considering whether she wants to reduce bananas and ramen intake  Physical Activity: Consider details of how to incorporate exercise program  Risk level for moderate/vigorous exercise program: Low to moderate  New Rx: None  Behavior change: Continue to assess patient's readiness to change  Follow-up: one month  IBT q2 wks for the next month per her request    Patient was to go to IBT every 2 weeks until her follow-up here.  Has seen RD.  Pt feels great, energy level higher, really proud of her success so far.    Having PP in am, vegetables and chicken for lunch.  Somedays has second shake in afternoon.  Snacking less in evening, has reduced portion size and eating slower.  Not feeling too hungry.  Being more mindful with food portions.  Finds if she has a little dessert every so often does not crave or binge.    Blood glucose under better control.  FSBG 80-120s usually.  Wants to cut back on Tradjenta if she can.  Does not want to reduce Lasix b/c still with PASTORA.    Exercise:   Walking in mornings M-F, 1/2 mile daily around a large block     Review of Systems   Denies lightheaded, constipation.  Colace helping with constipation.  All other ROS were reviewed and are otherwise unchanged from my previous visit with patient.    Physical Exam:    /68 (BP  "Location: Right arm, Patient Position: Sitting, BP Cuff Size: Large adult)   Pulse 91   Ht 1.549 m (5' 1\")   Wt 101.8 kg (224 lb 6.4 oz)   SpO2 94%   BMI 42.40 kg/m²   Waist:  53 in  Body fat % 55.6  REE 1583 kcal/day    Weight change since last visit: -6 lb (-10 lb total)  Waist Circum change since last visit: 0 in (-2 in total)    Constitutional: Oriented to person, place, and time and well-developed, well-nourished, and in no distress.    Head: Normocephalic.   Abdominal: Soft. Bowel sounds are normal.   Musculoskeletal: Normal range of motion. Trace pitting ankle edema.   Neurological: Alert and oriented to person, place, and time. No muscle weakness.  Gait normal.   Skin: Warm and dry. Not diaphoretic.   Psychiatric: Mood, memory, affect and judgment normal.     Laboratory:   None new.      ASSESSMENT/PLAN:  Body mass index is 42.4 kg/m².    Obesity Stage ([place]):  2; Class 3    1. Morbid obesity with BMI of 40.0-44.9, adult ([facility])     2. DM type 2, goal HbA1c < 8% ([facility])     3. Dyslipidemia, goal LDL below 100     4. Essential hypertension     5. Constipation, unspecified constipation type       The patient is making progress with wt loss, improved T2DM control.  Stimulus narrowing is helping, along with CHO reduction and portion control.  Can reduce Tradjenta dosing.  Continue to monitor blood glucose, may be able to discontinue Tradjenta if continues low normal.  Monitor blood pressure and lipid levels with further weight loss.  Blood pressure controlled.    The patient and I have discussed at length and agree to the following recommendations, which are all addressing the above diagnoses:    Weight Goal: 3-5% wt loss each month (pt goal weight is 145 lb)  Diet: High Protein/Low Carb Meals and 2 snacks between meals daily  >100 g protein, <100 g total carbs daily  64+ oz water per day  Avoid sweet drinks and sodas  Track daily intake in written form, bring in next visit  Physical Activity: Encouraged to " incorporate exercise program  Risk level for moderate/vigorous exercise program: Moderate due to deconditioning  New Rx: Reduce Tradjenta dosing, consider discontinuing if blood glucose  off the medication  No change in Lasix per pt request.  Behavior change: Mindful eating, tracking, stimulus narrowing  Follow-up: One month  IBT    Patient's body mass index is 42.4 kg/m². Exercise and nutrition counseling were performed at this visit.

## 2018-10-18 ENCOUNTER — TELEPHONE (OUTPATIENT)
Dept: SLEEP MEDICINE | Facility: MEDICAL CENTER | Age: 73
End: 2018-10-18

## 2018-10-18 NOTE — TELEPHONE ENCOUNTER
Scheduling called because the patient would like to know if she needs a new sleep study. If so please place order for sleep study.

## 2018-10-23 ENCOUNTER — TELEPHONE (OUTPATIENT)
Dept: PULMONOLOGY | Facility: HOSPICE | Age: 73
End: 2018-10-23

## 2018-10-23 DIAGNOSIS — Z79.899 ON POTASSIUM WASTING DIURETIC THERAPY: ICD-10-CM

## 2018-10-23 DIAGNOSIS — I50.32 CHRONIC DIASTOLIC CONGESTIVE HEART FAILURE (HCC): ICD-10-CM

## 2018-10-23 DIAGNOSIS — M79.89 LEG SWELLING: ICD-10-CM

## 2018-10-23 DIAGNOSIS — G47.33 OSA (OBSTRUCTIVE SLEEP APNEA): ICD-10-CM

## 2018-10-23 RX ORDER — POTASSIUM CHLORIDE 750 MG/1
10 TABLET, FILM COATED, EXTENDED RELEASE ORAL DAILY
Qty: 90 TAB | Refills: 1 | Status: SHIPPED | OUTPATIENT
Start: 2018-10-23 | End: 2019-04-14 | Stop reason: SDUPTHER

## 2018-10-31 ENCOUNTER — NON-PROVIDER VISIT (OUTPATIENT)
Dept: MEDICAL GROUP | Facility: MEDICAL CENTER | Age: 73
End: 2018-10-31
Payer: MEDICARE

## 2018-10-31 VITALS — WEIGHT: 226.6 LBS | HEIGHT: 61 IN | BODY MASS INDEX: 42.78 KG/M2

## 2018-10-31 PROCEDURE — G0447 BEHAVIOR COUNSEL OBESITY 15M: HCPCS | Performed by: NURSE PRACTITIONER

## 2018-10-31 NOTE — PROGRESS NOTES
"10/31/2018     Visit #8       Chapito Marques M.D.      73 y.o.           Time in/Out:  11:25-11:50    ASSESS:    Vitals:    10/31/18 1306   Weight: 102.8 kg (226 lb 9.6 oz)   Height: 1.549 m (5' 1\")            Wt Readings from Last 2 Encounters:   10/31/18 102.8 kg (226 lb 9.6 oz)   10/09/18 101.8 kg (224 lb 6.4 oz)            Body mass index is 42.82 kg/m².       Difference:  - 5.6 lbs     Starting weight:  233.5 lbs     Difference:  - 6.9 lbs     Current Dietary/Exercise Habits:  \"I have been a naughty girl lately eating more sweets\". Baked cookies for friends and then ate at least 10 of them. Knew what she was doing, but didn't stop. Blood sugars slightly higher after potluck meal she had and also the cookies around 150s. Trajenta was stopped by Dr. Mendoza. Her blood sugars were averaging low 90-100s, now more regularly 115-130s, 150 after high CHO meals/snacks. She does not like when her BS is below 100, feels funny. Is considering looking into a gym to get more active, but unsure about this.     Estimated Stage of Change:    Contemplation as evidenced by considering looking into a gym, but hesitant about it.      ADVISE:      Physical Activity:  Encouraged Ragini start by visiting local gyms/exercise facility's to find something that fits for her. Gave her option of [employer]. This will be especially important going into the winter months when her activity is more likely to be limited.      Dietary Guidelines:  Ragini realizes that having home backed cookies in her house was not beneficial for her. She plans to bring a healthier treat next time and not bake cookies. I have recommended she call Dr. Mendoza regarding her Trajenta medication if she has questions or concerns about it as I am unable to answer her medication questions.     The patient has been advised of how weight management and physical activity impacts their health and will help to reduce complications and health risk factors.  "       AGREE:  Previous Goals:  1. Limit sodium intake   2. Walk or use home seated elliptical machine 3 days a week    New Goals:  1. Contact Dr. Mendoza if concerned about being off Trajenta and blood sugars  2. Look into signing up at a gym with your friend  3. Limit sweets, if having to 1 serving/portion       ASSIST:  Today is the pt's 6 month visit and she has lost >6.6 lbs meeting the requirements to continue visits on a monthly basis for the next 6 months. She continues to make progress with weight loss and lifestyle change and is most happy with her ability to keep off the weight she has lost. She is feeling better, has more energy and notes that she was able to wear a t-shirt today that was very tight on her this time last year. She is monitoring her blood sugars closely and able to identify the foods she consumes that cause her sugars to go high and make adjustments accordingly. She continues to work on including more regular physical activity into her weekly routine which I will encourage her to do.       ARRANGE:     Return for follow-up in 1 month     The patient was assisted in making follow-up appointment per orders.

## 2018-11-05 ENCOUNTER — SLEEP STUDY (OUTPATIENT)
Dept: SLEEP MEDICINE | Facility: MEDICAL CENTER | Age: 73
End: 2018-11-05
Payer: MEDICARE

## 2018-11-05 DIAGNOSIS — G47.33 OSA (OBSTRUCTIVE SLEEP APNEA): ICD-10-CM

## 2018-11-05 PROCEDURE — 95811 POLYSOM 6/>YRS CPAP 4/> PARM: CPT | Performed by: FAMILY MEDICINE

## 2018-11-13 ENCOUNTER — TELEPHONE (OUTPATIENT)
Dept: PULMONOLOGY | Facility: HOSPICE | Age: 73
End: 2018-11-13

## 2018-11-13 DIAGNOSIS — G47.33 OBSTRUCTIVE SLEEP APNEA: ICD-10-CM

## 2018-11-13 NOTE — TELEPHONE ENCOUNTER
Please sign updated order for CPAP now that patient had new titration study needed due to usage compliance failure during the first 90 days.      I DID NOT PUT IN A PRESSURE - PLEASE ADD    >>most recent pressure change from 6/28/18    decreaes cpap to 5-10, ramp up function to                              45min     Per new titration study CPAP of  6 was best pressure      Please also advise on her follow up-    Right now she is scheduled for 11/30/18 for a 3 month follow up but technically she does not need the appt as it is really too soon, she will need to be seen for another compliance check which will need to be atleast 30 days out.      Are you OK with me moving her appt out to past 30 days but sooner than 90?

## 2018-11-13 NOTE — PROCEDURES
Comments:  The patient underwent a diagnostic polysomnogram using the standard montage for measurement of parameters of sleep, respiratory events, movement abnormalities, and heart rate and rhythm.    A microphone was used to monitor snoring.  Interpretation:  Study start time was 10:50:39 PM.  Diagnostic recording time was 7h 6.5m with a total sleep time of 4h 43.0m resulting in a sleep efficiency of 66.35%%.    Sleep latency from the start of the study was 25 minutes and the latency from sleep to REM was 235 minutes.  In total,72  arousals were scored for an arousal index of 15.3.  Respiratory:  There were a total of 2 apneas consisting of 0 obstructive apneas, 0 mixed apneas, and 2 central apneas.  A total of 17 hypopneas were scored.  The apnea index was 0.42 per hour and the hypopnea index was 3.60 per hour resulting in an overall AHI of 4.03.  AHI during rem was 9.6 and AHI while supine was 8.40.  Oximetry:  There was a mean oxygen saturation of 90.0% with a minimum oxygen saturation of 84.0%.  Time spent with oxygen saturations below 89% was 10.0 minutes.  Cardiac:  The highest heart rate seen while awake was 105 BPM while the highest heart rate during sleep was 84 BPM with an average sleeping heart rate of 67 BPM.  Limb Movements:  There were a total of 0 PLMs during sleep, of which 0 were PLMS arousals.  This resulted in a PLMS index of 0.0 and a PLMS arousal index of 0.0.     CPAP was tried from 5 to 6 cm H2O.    Technical summary: The patient underwent a CPAP titration.  This was a 16 channel montage study to include a 6 channel EEG, a 2 channel EOG, and chin EMG, left and right leg EMG, a snore channel, and a CFLOW pressure transducer.   Respiratory effort was assessed with the use of a thoracic and abdominal monitor and overnight oximetry was obtained. Audio and video recordings were reviewed. This was a fully attended study and sleep stage scoring was performed. The test was technically  adequate.    General sleep summary:  During the overnight study, the sleep latency was 25 min which is prolonged. The REM latency was 235 min which is increased. The total sleep time was 283 min and sleep efficiency was 66 % which is decreased.  Sleep stage proportions showed decreased REM and increased WASO of 143 min.  In regards to sleep quality there was a moderate degree of sleep fragmentation as shown by the arousal index of 15 an hour. The arousals were due to spontaneous arousal.    CPAP Titration:  The PAP titration was initiated with CPAP 5 cm of water and the pressure which was slowly titrated up in an attempt to eliminate sleep disordered breathing and snoring. The final pressure tested during the study was CPAP 6 cm water and at this final pressure the patient was observed in the supine position  and in the REM sleep stage. The apnea hypopnea index improved to 3.1 per hour and O2 juan 85%. The average O2 stauration was 91%. She spent 7 min of sleep time below 89% O2 saturation. Snoring was resolved. There were no significant periodic limb movements.  The patient demonstrated NSR and an average heart rate of 67 beats per minute.  There was no ventricular ectopy or tachyarrhythmias. The patient utilized small airfit F20 mask with heated humidification. The CPAP was well-tolerated and there were minimal air leaks. No supplemental oxygen was required.    Impression:  1.  YASMANI  2.  Successful CPAP titration   3.  Increased WASO    Recommendations:  I recommend CPAP 6 cm with small airfit F20  mask. Recommended 30 day compliance download to assess the efficacy of the recommended pressure and compliance for further outpatient monitoring and management of CPAP therapy. In some cases alternative treatment options may prove effective in resolving sleep apnea and these options include upper airway surgery, the use of a dental orthotic or weight loss and positional therapy. Clinical correlation is required. In  general patients with sleep apnea are advised to avoid alcohol and sedatives and to not operate a motor vehicle while drowsy and are at a greater risk for cardiovascular disease.

## 2018-11-26 NOTE — TELEPHONE ENCOUNTER
Update notes for appt on 11/30 that she can move appt or be seen but will need another appt between 30 and 90 days for new compliance OV

## 2018-11-30 ENCOUNTER — SLEEP CENTER VISIT (OUTPATIENT)
Dept: SLEEP MEDICINE | Facility: MEDICAL CENTER | Age: 73
End: 2018-11-30
Payer: MEDICARE

## 2018-11-30 VITALS
HEART RATE: 82 BPM | OXYGEN SATURATION: 91 % | DIASTOLIC BLOOD PRESSURE: 82 MMHG | WEIGHT: 227 LBS | RESPIRATION RATE: 18 BRPM | BODY MASS INDEX: 42.86 KG/M2 | HEIGHT: 61 IN | SYSTOLIC BLOOD PRESSURE: 122 MMHG

## 2018-11-30 DIAGNOSIS — G47.33 OBSTRUCTIVE SLEEP APNEA: ICD-10-CM

## 2018-12-03 DIAGNOSIS — E03.2 HYPOTHYROIDISM DUE TO NON-MEDICATION EXOGENOUS SUBSTANCES: ICD-10-CM

## 2018-12-03 RX ORDER — LEVOTHYROXINE SODIUM 0.15 MG/1
150 TABLET ORAL
Qty: 90 TAB | Refills: 3 | Status: ON HOLD | OUTPATIENT
Start: 2018-12-03 | End: 2019-03-13

## 2018-12-03 NOTE — TELEPHONE ENCOUNTER
Was the patient seen in the last year in this department? Yes    Does patient have an active prescription for medications requested? Yes    Received Request Via: Pharmacy     Pharmacy is requesting Levothyroxine refill

## 2018-12-10 ENCOUNTER — OFFICE VISIT (OUTPATIENT)
Dept: HEALTH INFORMATION MANAGEMENT | Facility: MEDICAL CENTER | Age: 73
End: 2018-12-10
Payer: MEDICARE

## 2018-12-10 VITALS
OXYGEN SATURATION: 94 % | SYSTOLIC BLOOD PRESSURE: 118 MMHG | DIASTOLIC BLOOD PRESSURE: 68 MMHG | HEIGHT: 61 IN | BODY MASS INDEX: 42.8 KG/M2 | HEART RATE: 84 BPM | WEIGHT: 226.7 LBS

## 2018-12-10 DIAGNOSIS — E11.9 DM TYPE 2, GOAL HBA1C < 8% (HCC): ICD-10-CM

## 2018-12-10 DIAGNOSIS — E78.5 DYSLIPIDEMIA, GOAL LDL BELOW 100: ICD-10-CM

## 2018-12-10 DIAGNOSIS — E88.810 METABOLIC SYNDROME: ICD-10-CM

## 2018-12-10 DIAGNOSIS — E66.01 MORBID OBESITY WITH BMI OF 40.0-44.9, ADULT (HCC): ICD-10-CM

## 2018-12-10 DIAGNOSIS — I10 ESSENTIAL HYPERTENSION: ICD-10-CM

## 2018-12-10 PROCEDURE — 99214 OFFICE O/P EST MOD 30 MIN: CPT | Performed by: INTERNAL MEDICINE

## 2018-12-10 RX ORDER — BENAZEPRIL HYDROCHLORIDE 5 MG/1
5 TABLET ORAL
Qty: 90 TAB | Refills: 2 | Status: SHIPPED | OUTPATIENT
Start: 2018-12-10 | End: 2019-01-04 | Stop reason: SINTOL

## 2018-12-10 NOTE — PROGRESS NOTES
Bariatric Medicine Follow Up  Chief Complaint   Patient presents with   • Weight Gain       History of Present Illness:   Ragini Reaves is a 73 y.o. female who presents for weight management follow-up and to help address co-morbidities related to overweight, including T2DM, HTN.    During the patient's last visit, the following were discussed and recommended:  Weight Goal: 3-5% wt loss each month (pt goal weight is 145 lb)  Diet: High Protein/Low Carb Meals and 2 snacks between meals daily  >100 g protein, <100 g total carbs daily  64+ oz water per day  Avoid sweet drinks and sodas  Track daily intake in written form, bring in next visit  Physical Activity: Encouraged to incorporate exercise program  Risk level for moderate/vigorous exercise program: Moderate due to deconditioning  New Rx: Reduce Tradjenta dosing, consider discontinuing if blood glucose  off the medication  No change in Lasix per pt request.  Behavior change: Mindful eating, tracking, stimulus narrowing  Follow-up: One month  IBT      Trying to eat better, fewer sweets.  Does not want to skip completely, then she would binge.  Not keeping a food log daily, but reviews with dietitian at their visits.    Missed RD visit last week d/t bad weather.  Wants to know about the ketogenic diet.  Would prefer not to do a ketogenic diet but wonders what that means.  Portions out a larger meal and freezes some.  Cut bread intake way down, leaving bun off burger for example.    Still tracking her fasting glucose daily.  Stopped Tradjenta, had hypoglycemia.  Restarted it.  Now fasting glucose 80-120s.  Feels Tradjenta keeping her glucose better controlled.  Labs due next month via PCP.  Metformin dose now half what from 2017.  Blood pressure controlled on spironolactone, benazepril, furosemide.    Exercise:   None  Too cold for walking     Review of Systems   Denies lightheadedness, recurring hypoglycemia, diaphoresis.  All other ROS were reviewed  "and are otherwise unchanged from my previous visit with patient.    Physical Exam:    /68 (BP Location: Right arm, Patient Position: Sitting, BP Cuff Size: Large adult)   Pulse 84   Ht 1.549 m (5' 1\")   Wt 102.8 kg (226 lb 11.2 oz)   SpO2 94%   BMI 42.83 kg/m²   Waist: 50.25 in  Body fat % 55.9  REE 1593 kcal/day    Weight change since last visit: +2 lb (-8 lb total)  Waist Circum change since last visit: -3 in (-2 in total)    Constitutional: Oriented to person, place, and time and well-developed, well-nourished, and in no distress.    Head: Normocephalic. .   Musculoskeletal: Normal range of motion. No edema.   Neurological: Alert and oriented to person, place, and time. No muscle weakness.  Gait normal.   Skin: Warm and dry. Not diaphoretic.   Psychiatric: Mood, memory, affect and judgment normal.     Laboratory:   None new.      ASSESSMENT/PLAN:  Body mass index is 42.83 kg/m².    Obesity Stage (Collins): 2; Class 3    1. Metabolic syndrome     2. DM type 2, goal HbA1c < 8% ([facility])     3. Morbid obesity with BMI of 40.0-44.9, adult ([facility])     4. Dyslipidemia, goal LDL below 100     5. Essential hypertension       The patient continues to make progress with reduced CHO intake, portion control.  Reduction in waist circumference is improving her T2 DM control, along with Tradjenta.  May be able to reduce her Tradjenta dosing.  Monitor lipids, blood pressure with weight loss.  Blood pressure currently well controlled.    The patient and I have discussed at length and agree to the following recommendations, which are all addressing the above diagnoses:    Weight Goal: 3-5% wt loss each month (pt goal weight is 145 lb)  Diet: High Protein/Low Carb Meals and 2 snacks between meals daily  64+ oz water per day  Avoid sweet drinks and sodas  Track daily intake in written form, bring in next visit for review  Physical Activity:  Start home machine workouts!  Risk level for moderate/vigorous exercise program: " Moderate given relative inactivity currently  New Rx: Patient declines anti-obesity medication  Behavior change: Continue tracking, mindful eating, increase activity level  Follow-up: 2 months  IBT    Patient's body mass index is 42.83 kg/m². Exercise and nutrition counseling were performed at this visit.

## 2018-12-19 ENCOUNTER — NON-PROVIDER VISIT (OUTPATIENT)
Dept: MEDICAL GROUP | Facility: MEDICAL CENTER | Age: 73
End: 2018-12-19
Payer: MEDICARE

## 2018-12-19 VITALS — BODY MASS INDEX: 42.82 KG/M2 | WEIGHT: 226.8 LBS | HEIGHT: 61 IN

## 2018-12-19 PROCEDURE — G0447 BEHAVIOR COUNSEL OBESITY 15M: HCPCS | Performed by: NURSE PRACTITIONER

## 2018-12-20 ENCOUNTER — SLEEP CENTER VISIT (OUTPATIENT)
Dept: SLEEP MEDICINE | Facility: MEDICAL CENTER | Age: 73
End: 2018-12-20
Payer: MEDICARE

## 2018-12-20 VITALS
HEIGHT: 61 IN | OXYGEN SATURATION: 96 % | RESPIRATION RATE: 16 BRPM | BODY MASS INDEX: 42.67 KG/M2 | SYSTOLIC BLOOD PRESSURE: 112 MMHG | HEART RATE: 123 BPM | WEIGHT: 226 LBS | DIASTOLIC BLOOD PRESSURE: 72 MMHG

## 2018-12-20 DIAGNOSIS — J45.30 MILD PERSISTENT ASTHMA, UNCOMPLICATED: ICD-10-CM

## 2018-12-20 DIAGNOSIS — E66.01 MORBID OBESITY WITH BMI OF 40.0-44.9, ADULT (HCC): ICD-10-CM

## 2018-12-20 DIAGNOSIS — G47.33 OBSTRUCTIVE SLEEP APNEA: ICD-10-CM

## 2018-12-20 PROCEDURE — 99213 OFFICE O/P EST LOW 20 MIN: CPT | Performed by: NURSE PRACTITIONER

## 2018-12-20 ASSESSMENT — ENCOUNTER SYMPTOMS
BRUISES/BLEEDS EASILY: 0
EYE DISCHARGE: 0
RESPIRATORY NEGATIVE: 1
EYE PAIN: 0
PSYCHIATRIC NEGATIVE: 1
CARDIOVASCULAR NEGATIVE: 1
GASTROINTESTINAL NEGATIVE: 1
CONSTITUTIONAL NEGATIVE: 1
NEUROLOGICAL NEGATIVE: 1
MUSCULOSKELETAL NEGATIVE: 1

## 2018-12-20 NOTE — PROGRESS NOTES
Chief Complaint   Patient presents with   • Follow-Up     YASMANI         HPI: This patient is a 73 y.o. female, who presents for follow-up of YASMANI with compliance check.     She has a history of mild intermittent asthma, obesity, BMI 42.7, DM II, HTN, history thyroid cancer S/P thyroidectomy now on thyroid replacement.     In regards to sleep apnea, former PSG indicates mild sleep apnea with an AHI of 10.5, minimum saturation 87%.  Prior to CPAP therapy she was using nocturnal oxygen.  She was ordered CPAP in March. She has had a difficult time adjusting.  However compliance today shows 90% compliance over the past 30 days, average use 4 hours 39 minutes per night with an AHI of 2.2. She feels she is benefiting from therapy.  She gets a better quality sleep at night.  She feels more rested during the day.  Denies a.m. headache.    In regards to asthma, PFTs are normal with an FEV1 of 2.06 L 97% predicted, FEV1 FVC ratio 94, TLC and DLCO were normal. she uses albuterol as needed.  She reports exertional dyspnea but attributes this to weight. Her asthma is triggered by strong perfumes, seasonal changes and allergies.  Her breathing is stable.  Denies URI since being seen.     She is followed by Dr. Muir for weight management.  BMI is 42.7.    Past Medical History:   Diagnosis Date   • Cataract immature 7/2012    Dr. Carey   • Chickenpox    • DM type 2, goal HbA1c < 8% ([facility])    • Dyslipidemia, goal LDL below 130    • Frequent urination    • Hypertension    • Hypothyroidism    • Mild intermittent asthma 2004    perfumes or smoke triggers   • Nocturnal hypoxemia     on 2.5 liter nightly   • Obesity    • Osteoarthritis    • Osteopenia    • Papillary carcinoma of thyroid ([facility]) 6/2000   • Ringing in ears    • Shortness of breath    • Sleep apnea    • Tonsillitis    • Toothache    • Type II or unspecified type diabetes mellitus without mention of complication, uncontrolled    • Uterine fibroid    • Wears glasses         Social History   Substance Use Topics   • Smoking status: Never Smoker   • Smokeless tobacco: Never Used   • Alcohol use No       Family History   Problem Relation Age of Onset   • Lung Disease Brother    • Cancer Mother         pelvic   • Stroke Maternal Grandmother        Immunization History   Administered Date(s) Administered   • Influenza (IM) Preservative Free 12/03/2012   • Influenza Seasonal Injectable 12/06/2013   • Influenza Vaccine Adult HD 01/12/2016, 10/25/2016, 09/11/2017, 10/05/2018   • Pneumococcal Conjugate Vaccine (Prevnar/PCV-13) 01/12/2016   • Pneumococcal Vaccine (UF)Historical Data 11/08/2008   • Pneumococcal polysaccharide vaccine (PPSV-23) 12/06/2013   • Tdap Vaccine 11/06/2012       Current medications as of today   Current Outpatient Prescriptions   Medication Sig Dispense Refill   • benazepril (LOTENSIN) 5 MG Tab Take 1 Tab by mouth every day. 90 Tab 2   • levothyroxine (SYNTHROID) 150 MCG Tab Take 1 Tab by mouth every day. 90 Tab 3   • potassium chloride ER (KLOR-CON 10) 10 MEQ tablet Take 1 Tab by mouth every day. 90 Tab 1   • FREESTYLE LITE strip 1 STRIP BY OTHER ROUTE 2 TIMES A DAY. 100 Strip 5   • simvastatin (ZOCOR) 5 MG Tab TAKE 1 TAB BY MOUTH EVERY EVENING. 90 Tab 3   • spironolactone (ALDACTONE) 25 MG Tab Take 0.5 Tabs by mouth every day. 90 Tab 3   • furosemide (LASIX) 40 MG Tab Take 1 Tab by mouth every day.     • VENTOLIN  (90 Base) MCG/ACT Aero Soln inhalation aerosol Inhale 2 Puffs by mouth every 6 hours as needed for Shortness of Breath. 1 Inhaler 6   • FREESTYLE LANCETS Misc INJECT 1 EACH AS INSTRUCTED 2 TIMES A DAY *E11.9* 100 Each 4   • FREESTYLE LANCETS Misc INJECT 1 EACH AS INSTRUCTED 2 TIMES A DAY *E11.9* 100 Each 4   • docusate sodium (COLACE) 100 MG Cap Take 1 Cap by mouth 2 times a day. 60 Cap 1   • metFORMIN (GLUCOPHAGE) 500 MG Tab TAKE 1 TAB BY MOUTH 2 TIMES A DAY, WITH MEALS. 180 Tab 3   • linagliptin (TRADJENTA) 5 MG Tab tablet Take 1 Tab by mouth  "every day. 90 Tab 3   • Lutein 10 MG Tab Take  by mouth.     • aspirin 81 MG tablet Take 1 Tab by mouth every day. 90 Tab 3   • coenzyme Q-10 30 MG capsule Take 1 Cap by mouth every day. 30 Cap 11   • ascorbic acid (VITAMIN C) 500 MG tablet Take 1 Tab by mouth every day. 30 Tab 11   • Lactobacillus (DIGESTIVE HEALTH PROBIOTIC) Cap Take 1 Cap by mouth every day. 30 Cap 11   • Multiple Vitamins-Minerals (ULTRA WOMENS PACK) Misc Take 1 Each by mouth every day. 30 Each 11   • Misc. Devices Misc This is an order for overnight pulse ox on room air.  Dx. Nocturnal hypoxia  G47.34, mild persistent asthma J45.30       SPENCER 99 months   NPI [id number] 1 Each 0   • Misc. Devices Misc This is an order for overnight pulse oxygen study  Dx nocturnal hypoxia G47.34, please do the test on room air.       SPENCER 99 months   NPI [id number] 1 Each 0   • eucalyptus/peppermint oil (PONARIS NASAL) SOLN Spray 10 Drops in nose every 6 hours as needed. 1 Bottle 0   • Blood Glucose Monitoring Suppl SUPPLIES MISC Test strips order: Test strips for Abbott Freestyle Lite meter. Sig: use daily and prn 90 day supply ICD-9 code 250.02 150 Each 11   • Glucosamine-Chondroitin-Vit C 1192-2598-38 MG/30ML LIQD Take 30 mL by mouth every day. 1 Bottle 0     Current Facility-Administered Medications   Medication Dose Route Frequency Provider Last Rate Last Dose   • acetaminophen (TYLENOL) tablet 500 mg  500 mg Oral Q6HRS PRN Frandy Pereira M.D.           Allergies: Cough syrup m [cough cold-flu relief]; Dextromethorphan; and Asa [aspirin]    Blood pressure 112/72, pulse (!) 123, resp. rate 16, height 1.549 m (5' 1\"), weight 102.5 kg (226 lb), SpO2 96 %, not currently breastfeeding.      Review of Systems   Constitutional: Negative.    HENT: Negative.    Eyes: Negative for pain and discharge.   Respiratory: Negative.    Cardiovascular: Negative.    Gastrointestinal: Negative.    Musculoskeletal: Negative.    Skin: Negative.    Neurological: Negative.  "   Endo/Heme/Allergies: Negative for environmental allergies. Does not bruise/bleed easily.   Psychiatric/Behavioral: Negative.        Physical Exam   Constitutional: She is oriented to person, place, and time and well-developed, well-nourished, and in no distress.   HENT:   Head: Normocephalic and atraumatic.   Eyes: Pupils are equal, round, and reactive to light.   Neck: Normal range of motion. Neck supple. No tracheal deviation present.   Cardiovascular: Normal rate, regular rhythm and normal heart sounds.    Pulmonary/Chest: Effort normal and breath sounds normal. No respiratory distress. She has no wheezes. She has no rales.   Musculoskeletal: She exhibits no edema.   Neurological: She is alert and oriented to person, place, and time.   Skin: Skin is warm and dry.   Psychiatric: Mood, memory, affect and judgment normal.   Vitals reviewed.      Diagnoses/Plan:    1. Morbid obesity with BMI of 40.0-44.9, adult ([facility])  Patient continues with weight loss efforts.  She follows with Dr. Rai for weight management.    2. Obstructive sleep apnea  She is encouraged to continue CPAP nightly.  Clean mask and tubing weekly.  Replace supplies as insurance will allow.  She is encouraged to use her machine anytime she is sleeping including during naps.  She is encouraged to increase length of time on machine to 8 hours per night.  She will follow-up in 6 months for compliance check, sooner if needed    3. Mild persistent asthma, uncomplicated  's are stable, she denies pulmonary complaints or URI since she was last seen.  She continues to use albuterol if necessary but rarely requires use.        This dictation was created using voice recognition software. The accuracy of the dictation is limited to the abilities of the software. I expect there may be some errors of grammar and possibly content.

## 2018-12-28 LAB
ALBUMIN SERPL-MCNC: 4.3 G/DL (ref 3.5–4.8)
ALBUMIN/CREAT UR: 70.8 MG/G CREAT (ref 0–30)
ALBUMIN/GLOB SERPL: 2 {RATIO} (ref 1.2–2.2)
ALP SERPL-CCNC: 82 IU/L (ref 39–117)
ALT SERPL-CCNC: 30 IU/L (ref 0–32)
AST SERPL-CCNC: 26 IU/L (ref 0–40)
BILIRUB SERPL-MCNC: 0.5 MG/DL (ref 0–1.2)
BUN SERPL-MCNC: 22 MG/DL (ref 8–27)
BUN SERPL-MCNC: 22 MG/DL (ref 8–27)
BUN/CREAT SERPL: 32 (ref 12–28)
BUN/CREAT SERPL: 35 (ref 12–28)
CALCIUM SERPL-MCNC: 9.1 MG/DL (ref 8.7–10.3)
CALCIUM SERPL-MCNC: 9.3 MG/DL (ref 8.7–10.3)
CHLORIDE SERPL-SCNC: 104 MMOL/L (ref 96–106)
CHLORIDE SERPL-SCNC: 106 MMOL/L (ref 96–106)
CHOLEST SERPL-MCNC: 125 MG/DL (ref 100–199)
CO2 SERPL-SCNC: 23 MMOL/L (ref 20–29)
CO2 SERPL-SCNC: 23 MMOL/L (ref 20–29)
CREAT SERPL-MCNC: 0.62 MG/DL (ref 0.57–1)
CREAT SERPL-MCNC: 0.68 MG/DL (ref 0.57–1)
CREAT UR-MCNC: 135 MG/DL
ERYTHROCYTE [DISTWIDTH] IN BLOOD BY AUTOMATED COUNT: 14 % (ref 12.3–15.4)
GLOBULIN SER CALC-MCNC: 2.2 G/DL (ref 1.5–4.5)
GLUCOSE SERPL-MCNC: 137 MG/DL (ref 65–99)
GLUCOSE SERPL-MCNC: 142 MG/DL (ref 65–99)
HBA1C MFR BLD: 7.1 % (ref 4.8–5.6)
HCT VFR BLD AUTO: 41 % (ref 34–46.6)
HDLC SERPL-MCNC: 40 MG/DL
HGB BLD-MCNC: 13.7 G/DL (ref 11.1–15.9)
IF AFRICAN AMERICAN  100797: 100 ML/MIN/1.73
IF AFRICAN AMERICAN  100797: 103 ML/MIN/1.73
IF NON AFRICAN AMER 100791: 87 ML/MIN/1.73
IF NON AFRICAN AMER 100791: 90 ML/MIN/1.73
LABORATORY COMMENT REPORT: NORMAL
LDLC SERPL CALC-MCNC: 60 MG/DL (ref 0–99)
MAGNESIUM SERPL-MCNC: 2 MG/DL (ref 1.6–2.3)
MCH RBC QN AUTO: 28.4 PG (ref 26.6–33)
MCHC RBC AUTO-ENTMCNC: 33.4 G/DL (ref 31.5–35.7)
MCV RBC AUTO: 85 FL (ref 79–97)
MICROALBUMIN UR-MCNC: 95.6 UG/ML
NRBC BLD AUTO-RTO: NORMAL %
PLATELET # BLD AUTO: 201 X10E3/UL (ref 150–379)
POTASSIUM SERPL-SCNC: 4.4 MMOL/L (ref 3.5–5.2)
POTASSIUM SERPL-SCNC: 4.5 MMOL/L (ref 3.5–5.2)
PROT SERPL-MCNC: 6.5 G/DL (ref 6–8.5)
RBC # BLD AUTO: 4.82 X10E6/UL (ref 3.77–5.28)
SODIUM SERPL-SCNC: 142 MMOL/L (ref 134–144)
SODIUM SERPL-SCNC: 143 MMOL/L (ref 134–144)
TRIGL SERPL-MCNC: 127 MG/DL (ref 0–149)
TSH SERPL DL<=0.005 MIU/L-ACNC: 0.13 UIU/ML (ref 0.45–4.5)
VLDLC SERPL CALC-MCNC: 25 MG/DL (ref 5–40)
WBC # BLD AUTO: 7 X10E3/UL (ref 3.4–10.8)

## 2019-01-04 ENCOUNTER — OFFICE VISIT (OUTPATIENT)
Dept: MEDICAL GROUP | Facility: MEDICAL CENTER | Age: 74
End: 2019-01-04
Payer: MEDICARE

## 2019-01-04 VITALS
HEART RATE: 85 BPM | RESPIRATION RATE: 12 BRPM | TEMPERATURE: 98.2 F | OXYGEN SATURATION: 98 % | HEIGHT: 61 IN | SYSTOLIC BLOOD PRESSURE: 108 MMHG | BODY MASS INDEX: 43.23 KG/M2 | WEIGHT: 229 LBS | DIASTOLIC BLOOD PRESSURE: 52 MMHG

## 2019-01-04 DIAGNOSIS — E11.9 DM TYPE 2, GOAL HBA1C < 8% (HCC): ICD-10-CM

## 2019-01-04 DIAGNOSIS — M85.89 OSTEOPENIA OF MULTIPLE SITES: ICD-10-CM

## 2019-01-04 DIAGNOSIS — E66.01 MORBID OBESITY WITH BMI OF 40.0-44.9, ADULT (HCC): ICD-10-CM

## 2019-01-04 DIAGNOSIS — Z12.31 BREAST CANCER SCREENING BY MAMMOGRAM: ICD-10-CM

## 2019-01-04 DIAGNOSIS — I50.32 CHRONIC DIASTOLIC CONGESTIVE HEART FAILURE (HCC): ICD-10-CM

## 2019-01-04 DIAGNOSIS — E03.2 HYPOTHYROIDISM DUE TO NON-MEDICATION EXOGENOUS SUBSTANCES: ICD-10-CM

## 2019-01-04 DIAGNOSIS — I10 ESSENTIAL HYPERTENSION: ICD-10-CM

## 2019-01-04 DIAGNOSIS — E78.5 DYSLIPIDEMIA, GOAL LDL BELOW 100: ICD-10-CM

## 2019-01-04 PROCEDURE — 99214 OFFICE O/P EST MOD 30 MIN: CPT | Performed by: FAMILY MEDICINE

## 2019-01-04 RX ORDER — LOSARTAN POTASSIUM 25 MG/1
25 TABLET ORAL DAILY
Qty: 90 TAB | Refills: 3 | Status: SHIPPED | OUTPATIENT
Start: 2019-01-04 | End: 2019-12-20

## 2019-01-04 NOTE — PROGRESS NOTES
Diabetes Focused Exam:    Chief Complaint   Patient presents with   • Diabetes Mellitus   • Hypertension   • Hyperlipidemia   • Hypothyroidism      Subjective:   HPI  Ragini Reaves is a 73 y.o. female who presents for follow up of chronic conditions of diabetes mellitus, hypertension and hyperlipidemia. She indicates that she is feeling well and denies any symptoms referable to the above diagnoses. Specifically denies chest pain, palpitations, dyspnea, orthopnea, PND or peripheral edema. Also denies polyuria, polydipsia, urinary complaints, abdominal complaints, myalgias, numbness, weakness or other related symptoms.  Urine protein was moderately high.  Changed to losartan.  She is having a cough from the benazepril.    She saw that her TSH is suppressed.  Discussed taking 1/2 of the 150 mcg tablet one day a week, full tablet all remaining days.  We will recheck in four months.    HRA questions reviewed, doing well.  Denies increased SOB or chest pain, heart failure is stable on her current medications.  She has been compliant on her CPAP.  She feels it helps her.    Due for bone density testing.  Also due for mammogram.  Order discussed and placed.    The patient is taking ASA every day 81 mg and taking all other medications as prescribed. Patient denies any side effects of medication.  DM: A1c goal <7  Glucose monitoring frequency: daily to bid   Fasting sugars: . Post-prandial sugars: 177  Hypoglycemic episodes one, slightly symptomatic  Diabetic complications: none  ACR Albumin/Creatinine Ratio goal <30  HTN: Blood pressure goal <140/<80. Currently Rx ACE/ARB: Yes  Hyperlipidemia:Cholesterol goal LDL <100, total/HDL <5. Currently Rx Statin: Yes  Last eye exam 7/2018  Denies visual blurring, double vision, eye pain and floaters  Last monofilament foot exam: today   Denies foot pain, numbness, calluses, ulcers    See medications and orders placed in encounter report.  Past medical history, family  history, social history reviewed and updated as documented in medical record.  Current medications including changes today:  Current Outpatient Prescriptions   Medication Sig Dispense Refill   • losartan (COZAAR) 25 MG Tab Take 1 Tab by mouth every day. 90 Tab 3   • linagliptin (TRADJENTA) 5 MG Tab tablet Take 1 Tab by mouth every day. 90 Tab 3   • levothyroxine (SYNTHROID) 150 MCG Tab Take 1 Tab by mouth every day. 90 Tab 3   • potassium chloride ER (KLOR-CON 10) 10 MEQ tablet Take 1 Tab by mouth every day. 90 Tab 1   • FREESTYLE LITE strip 1 STRIP BY OTHER ROUTE 2 TIMES A DAY. 100 Strip 5   • simvastatin (ZOCOR) 5 MG Tab TAKE 1 TAB BY MOUTH EVERY EVENING. 90 Tab 3   • spironolactone (ALDACTONE) 25 MG Tab Take 0.5 Tabs by mouth every day. 90 Tab 3   • furosemide (LASIX) 40 MG Tab Take 1 Tab by mouth every day.     • VENTOLIN  (90 Base) MCG/ACT Aero Soln inhalation aerosol Inhale 2 Puffs by mouth every 6 hours as needed for Shortness of Breath. 1 Inhaler 6   • FREESTYLE LANCETS Misc INJECT 1 EACH AS INSTRUCTED 2 TIMES A DAY *E11.9* 100 Each 4   • FREESTYLE LANCETS Misc INJECT 1 EACH AS INSTRUCTED 2 TIMES A DAY *E11.9* 100 Each 4   • docusate sodium (COLACE) 100 MG Cap Take 1 Cap by mouth 2 times a day. 60 Cap 1   • metFORMIN (GLUCOPHAGE) 500 MG Tab TAKE 1 TAB BY MOUTH 2 TIMES A DAY, WITH MEALS. 180 Tab 3   • Lutein 10 MG Tab Take  by mouth.     • aspirin 81 MG tablet Take 1 Tab by mouth every day. 90 Tab 3   • coenzyme Q-10 30 MG capsule Take 1 Cap by mouth every day. 30 Cap 11   • ascorbic acid (VITAMIN C) 500 MG tablet Take 1 Tab by mouth every day. 30 Tab 11   • Lactobacillus (DIGESTIVE HEALTH PROBIOTIC) Cap Take 1 Cap by mouth every day. 30 Cap 11   • Multiple Vitamins-Minerals (ULTRA WOMENS PACK) Misc Take 1 Each by mouth every day. 30 Each 11   • Misc. Devices Misc This is an order for overnight pulse ox on room air.  Dx. Nocturnal hypoxia  G47.34, mild persistent asthma J45.30       SPENCER 99 months   NPI  [id number] 1 Each 0   • Misc. Devices Misc This is an order for overnight pulse oxygen study  Dx nocturnal hypoxia G47.34, please do the test on room air.       SPENCER 99 months   NPI [id number] 1 Each 0   • eucalyptus/peppermint oil (PONARIS NASAL) SOLN Spray 10 Drops in nose every 6 hours as needed. 1 Bottle 0   • Blood Glucose Monitoring Suppl SUPPLIES MISC Test strips order: Test strips for Abbott Freestyle Lite meter. Sig: use daily and prn 90 day supply ICD-9 code 250.02 150 Each 11   • Glucosamine-Chondroitin-Vit C 8330-1356-14 MG/30ML LIQD Take 30 mL by mouth every day. 1 Bottle 0     Current Facility-Administered Medications   Medication Dose Route Frequency Provider Last Rate Last Dose   • acetaminophen (TYLENOL) tablet 500 mg  500 mg Oral Q6HRS PRN Frandy Pereira M.D.         Allergies:   Allergies   Allergen Reactions   • Cough Syrup M [Cough Cold-Flu Relief] Swelling     Severe facial swelling   • Dextromethorphan Swelling   • Asa [Aspirin]      Stomach bleeding.       Social History   Substance Use Topics   • Smoking status: Never Smoker   • Smokeless tobacco: Never Used   • Alcohol use No     Depression Screening    Little interest or pleasure in doing things?   0  Feeling down, depressed , or hopeless?  0  Trouble falling or staying asleep, or sleeping too much?   0  Feeling tired or having little energy?   0  Poor appetite or overeating?   0  Feeling bad about yourself - or that you are a failure or have let yourself or your family down?  0  Trouble concentrating on things, such as reading the newspaper or watching television?  0  Moving or speaking so slowly that other people could have noticed.  Or the opposite - being so fidgety or restless that you have been moving around a lot more than usual?   0  Thoughts that you would be better off dead, or of hurting yourself?   0  Patient Health Questionnaire Score:  0    If depressive symptoms identified deferred to follow up visit unless specifically  addressed in assessment and plan.    Interpretation of PHQ-9 Total Score   Score Severity   1-4 No Depression   5-9 Mild Depression   10-14 Moderate Depression   15-19 Moderately Severe Depression   20-27 Severe Depression      Screening for Cognitive Impairment    Three Minute Recall (leader, season, table)  3/3    Jose clock face with all 12 numbers and set the hands to show 10 past 11.   5/5    Cognitive concerns identified deferred for follow up unless specifically addressed in assessment and plan.    Fall Risk Assessment    Has the patient had two or more falls in the last year or any fall with injury in the last year?   no    Safety Assessment    Throw rugs on floor.   yes, one in kitchen, stuck down  Handrails on all stairs.   yes, also elevator in her building  Good lighting in all hallways.   yes  Difficulty hearing.   no  Patient counseled about all safety risks that were identified. yes    Functional Assessment ADLs    Are there any barriers preventing you from cooking for yourself or meeting nutritional needs?    no    Are there any barriers preventing you from driving safely or obtaining transportation?   no.    Are there any barriers preventing you from using a telephone or calling for help?   no.    Are there any barriers preventing you from shopping?   no.    Are there any barriers preventing you from taking care of your own finances?   no.    Are there any barriers preventing you from managing your medications?   no.    Are there any barriers preventing you from showering, bathing or dressing yourself?  no.    Are you currently engaging in any exercise or physical activity?   yes, uses pedalling machine.     What is your perception of your health?   good.      Health Maintenance Summary                BONE DENSITY Overdue 8/13/2018      Done 8/13/2013 DS-BONE DENSITY STUDY (DEXA)     Patient has more history with this topic...    Annual Wellness Visit Overdue 12/22/2018      Done 12/21/2017 Visit  Dx: Medicare annual wellness visit, subsequent     Patient has more history with this topic...    IMM ZOSTER VACCINES Postponed 2/1/2019 Originally 1/12/1995. System: vaccine not available, other system reasons    IMM HEP B VACCINE Postponed 1/1/2020 Originally 1/12/1964. Provider advises postponing for medical reasons    A1C SCREENING Next Due 6/27/2019      Done 12/27/2018 HEMOGLOBIN A1C      Patient has more history with this topic...    RETINAL SCREENING Next Due 7/3/2019      Done 7/3/2018 Dr. Carey     Patient has more history with this topic...    MAMMOGRAM Next Due 10/4/2019      Done 10/4/2017 MA-MAMMO SCREENING BILAT W/TOMOSYNTHESIS W/CAD     Patient has more history with this topic...    FASTING LIPID PROFILE Next Due 12/27/2019      Done 12/27/2018 LIPID PANEL     Patient has more history with this topic...    URINE ACR / MICROALBUMIN Next Due 12/27/2019      Done 12/27/2018 MICROALB/CREAT RATIO RAND. UR      Patient has more history with this topic...    SERUM CREATININE Next Due 12/27/2019      Done 12/27/2018 BASIC METABOLIC PANEL (8)      Patient has more history with this topic...    DIABETES MONOFILAMENT / LE EXAM Next Due 1/4/2020      Done 1/4/2019 AMB DIABETIC MONOFILAMENT LOWER EXTREMITY EXAM     Patient has more history with this topic...    IMM DTaP/Tdap/Td Vaccine Next Due 11/6/2022      Done 11/6/2012 Imm Admin: Tdap Vaccine    COLONOSCOPY Next Due 5/21/2023      Done 5/21/2013 Surg:COLONOSCOPY WITH BIOPSY     Patient has more history with this topic...          Patient Care Team:  Chapito Marques M.D. as PCP - General (Family Medicine)  Taras Herring D.P.M. as Consulting Physician (Podiatry)  Tyson Carey M.D. as Consulting Physician (Ophthalmology)  Frandy Pereira M.D. as Consulting Physician (Cardiology)  [employer]    Exercise: she is engaging in limited exercise.  Uses bicycle peddler  Health Maintenance/Immunizations: discussed    ROS  Pertinent  ROS findings as above. All  "other systems reviewed and are negative.      Objective:     OBJECTIVE:  /52   Pulse 85   Temp 36.8 °C (98.2 °F)   Resp 12   Ht 1.549 m (5' 1\")   Wt 103.9 kg (229 lb)   SpO2 98%   BMI 43.27 kg/m²  Body mass index is 43.27 kg/m². BMI: severely obese  General: No apparent distress, conversant, cooperative and pleasant with the examination.  Psych: Alert and oriented x4, judgment and insight normal  Neck: No JVD or bruits, no adenopathy, supple  Thyroid: normal to inspection and palpation  Lungs: negative findings: normal respiratory rate and rhythm, lungs clear to auscultation  Heart: negative findings: regular rate and rhythm, S1 normal, S2 normal, no murmurs, clicks, or gallops  Abdomen: Soft, nontender, no hepatosplenomegaly or masses, normal bowel sounds  Skin: No rashes, no cyanosis, no lesions or ulcers  Extremities: No cyanosis clubbing or edema.   Feet are examined  DP pulses palpable and strong bilaterally.  Sensation normal to monofilament testing bilaterally.  No ulcerations or fissures appreciated.    POC labs:   Lab Results   Component Value Date/Time    POCGLUCOSE 109 (H) 04/14/2009 05:27 PM          Last labs    Lab Results   Component Value Date/Time    CHOLSTRLTOT 125 12/27/2018 10:02 AM    LDL 60 12/27/2018 10:02 AM    HDL 40 12/27/2018 10:02 AM    TRIGLYCERIDE 127 12/27/2018 10:02 AM       Lab Results   Component Value Date/Time    SODIUM 143 12/27/2018 10:06 AM    POTASSIUM 4.5 12/27/2018 10:06 AM    GLUCOSE 142 (H) 12/27/2018 10:06 AM    BUNCREATRAT 35 (H) 12/27/2018 10:06 AM     Lab Results   Component Value Date/Time    HBA1C 7.1 (H) 12/27/2018 10:02 AM    HBA1C 7.2 (H) 06/29/2018 08:48 AM    HBA1C 7.4 (H) 01/03/2018 10:12 AM    HBA1C 8.7 (H) 09/08/2015 07:49 AM    HBA1C 8.4 (H) 05/05/2015 08:32 AM    HBA1C 9.4 (H) 01/06/2015 08:37 AM     Lab Results   Component Value Date/Time    MICRALB 95.6 12/27/2018 10:02 AM          Assessment/Plan:   Medications, refills, and referrals per " orders.   1. Uncontrolled type 2 diabetes mellitus without complication, without long-term current use of insulin ([facility])  linagliptin (TRADJENTA) 5 MG Tab tablet    Diabetic Monofilament Lower Extremity Exam    HEMOGLOBIN A1C    COMP METABOLIC PANEL    CBC WITHOUT DIFFERENTIAL   2. DM type 2, goal HbA1c < 8% ([facility])  linagliptin (TRADJENTA) 5 MG Tab tablet    Diabetic Monofilament Lower Extremity Exam    HEMOGLOBIN A1C    COMP METABOLIC PANEL   3. Essential hypertension  losartan (COZAAR) 25 MG Tab    COMP METABOLIC PANEL    CBC WITHOUT DIFFERENTIAL   4. Dyslipidemia, goal LDL below 100  COMP METABOLIC PANEL    Lipid Profile   5. Hypothyroidism due to non-medication exogenous substances  TSH   6. Morbid obesity with BMI of 40.0-44.9, adult ([facility])     7. Chronic diastolic congestive heart failure ([facility])  CBC WITHOUT DIFFERENTIAL   8. Breast cancer screening by mammogram  MA-SCREENING MAMMO BILAT W/TOMOSYNTHESIS W/CAD   9. Osteopenia of multiple sites  DS-BONE DENSITY STUDY (DEXA)     DM2 A1c is not at goal  This is very close  Patient to monitor sugars: bid frequency  Discussed diet, exercise, disease management and weight loss goals.   Education and advise provided today:All medications, side effects and compliance (discussed carefully)  Annual eye examinations at Ophthalmology  Foot care discussed and Podiatry visits  Glycohemoglobin and other lab monitoring  Home glucose monitoring emphasized  Labs immediately prior to next visit  Long term diabetic complications  Low cholesterol diet, weight control and daily exercise  Diabetic diet discussed, following well    Followup:   Do labs and RTC 4 months, sooner should new symptoms or problems arise.

## 2019-01-15 ENCOUNTER — HOSPITAL ENCOUNTER (OUTPATIENT)
Dept: RADIOLOGY | Facility: MEDICAL CENTER | Age: 74
End: 2019-01-15
Attending: FAMILY MEDICINE
Payer: MEDICARE

## 2019-01-15 DIAGNOSIS — Z12.31 BREAST CANCER SCREENING BY MAMMOGRAM: ICD-10-CM

## 2019-01-15 DIAGNOSIS — M85.89 OSTEOPENIA OF MULTIPLE SITES: ICD-10-CM

## 2019-01-15 PROCEDURE — 77063 BREAST TOMOSYNTHESIS BI: CPT

## 2019-01-15 PROCEDURE — 77080 DXA BONE DENSITY AXIAL: CPT

## 2019-01-17 DIAGNOSIS — R92.8 ABNORMAL MAMMOGRAM OF RIGHT BREAST: ICD-10-CM

## 2019-01-22 DIAGNOSIS — R91.8 OPACITIES OF BOTH LUNGS PRESENT ON CHEST X-RAY: ICD-10-CM

## 2019-01-22 DIAGNOSIS — R06.02 EXERTIONAL SHORTNESS OF BREATH: ICD-10-CM

## 2019-01-22 RX ORDER — SPIRONOLACTONE 25 MG/1
TABLET ORAL
Qty: 90 TAB | Refills: 3 | Status: SHIPPED | OUTPATIENT
Start: 2019-01-22 | End: 2020-01-05

## 2019-01-23 ENCOUNTER — NON-PROVIDER VISIT (OUTPATIENT)
Dept: MEDICAL GROUP | Facility: MEDICAL CENTER | Age: 74
End: 2019-01-23
Payer: MEDICARE

## 2019-01-23 VITALS — HEIGHT: 61 IN | WEIGHT: 232.4 LBS | BODY MASS INDEX: 43.88 KG/M2

## 2019-01-23 PROCEDURE — G0447 BEHAVIOR COUNSEL OBESITY 15M: HCPCS | Performed by: NURSE PRACTITIONER

## 2019-01-23 NOTE — PROGRESS NOTES
"1/23/2019     Visit #10       No ref. provider found      74 y.o.           Time in/Out:  11:03-11:20 am     ASSESS:    Vitals:    01/23/19 1110   Weight: 105.4 kg (232 lb 6.4 oz)   Height: 1.549 m (5' 1\")            Wt Readings from Last 2 Encounters:   01/23/19 105.4 kg (232 lb 6.4 oz)   01/04/19 103.9 kg (229 lb)            Body mass index is 43.91 kg/m².       Difference:  + 5.6 lbs      Starting weight:  233.5 lbs      Difference:  - 1.1 lbs     Current Dietary/Exercise Habits: Pt stated she figured she gained weight because \"The holidays got me and it was my birthday\". The pt was told something was seen on her mammogram and she is having a repeat tomorrow. Has been stressed about this. Not exercising regularly because of this. Just been inside a lot. Continues checking her BS, a few higher post prandial after holiday foods/meals otherwise generally average 100-135. No longer on oxygen, CPAP at night and she is tracking how long she is on her CPAP for at night.       Estimated Stage of Change:    Preparation as evidenced by stating she will resume exercise after her mammogram.      ADVISE:      Physical Activity:  Ragini has decided to wait until after her mammogram tomorrow to restart exercise. I encouraged her not to wait too long and that she can obtain benefits like stress reduction as well which would benefit her now.      Dietary Guidelines: No new dietary recommendations on today's visit.     The patient has been advised of how weight management and physical activity impacts their health and will help to reduce complications and health risk factors.        AGREE:  Visit # 10 Goals:  1. Restart exercise after mammogram       ASSIST:  Ragini has not been consistent with her exercise. Given the stress she has had recently, I recommended she consider not waiting too long to restart especially as it can help as a stress reliever. Overall her blood sugars are well controlled. I am not sure how much more " she is willing to change or adjust in regards to her diet. Her TSH was recently suppressed and was instructed to adjust her Synthroid doses. Hypothyroidism likely also making it more difficult for her to lose weight. We will review a diety recall on her next visit to review what she is currently doing.       ARRANGE:     Return for follow-up in 1 month     The patient was assisted in making follow-up appointment per orders.

## 2019-01-24 ENCOUNTER — HOSPITAL ENCOUNTER (OUTPATIENT)
Dept: RADIOLOGY | Facility: MEDICAL CENTER | Age: 74
End: 2019-01-24
Attending: FAMILY MEDICINE
Payer: MEDICARE

## 2019-01-24 DIAGNOSIS — R92.8 ABNORMAL MAMMOGRAM: ICD-10-CM

## 2019-01-24 DIAGNOSIS — R92.8 ABNORMAL MAMMOGRAM OF RIGHT BREAST: ICD-10-CM

## 2019-01-24 PROCEDURE — G0279 TOMOSYNTHESIS, MAMMO: HCPCS | Mod: RT

## 2019-01-24 PROCEDURE — 76642 ULTRASOUND BREAST LIMITED: CPT | Mod: RT

## 2019-01-25 ENCOUNTER — TELEPHONE (OUTPATIENT)
Dept: RADIOLOGY | Facility: MEDICAL CENTER | Age: 74
End: 2019-01-25

## 2019-01-30 ENCOUNTER — HOSPITAL ENCOUNTER (OUTPATIENT)
Dept: RADIOLOGY | Facility: MEDICAL CENTER | Age: 74
End: 2019-01-30
Attending: FAMILY MEDICINE
Payer: MEDICARE

## 2019-01-30 DIAGNOSIS — R92.8 ABNORMAL FINDINGS ON DIAGNOSTIC IMAGING OF BREAST: ICD-10-CM

## 2019-01-30 DIAGNOSIS — R92.1 BREAST CALCIFICATION, RIGHT: ICD-10-CM

## 2019-02-07 ENCOUNTER — OFFICE VISIT (OUTPATIENT)
Dept: CARDIOLOGY | Facility: MEDICAL CENTER | Age: 74
End: 2019-02-07
Payer: MEDICARE

## 2019-02-07 VITALS
HEIGHT: 61 IN | SYSTOLIC BLOOD PRESSURE: 120 MMHG | OXYGEN SATURATION: 96 % | BODY MASS INDEX: 43.8 KG/M2 | HEART RATE: 86 BPM | WEIGHT: 232 LBS | DIASTOLIC BLOOD PRESSURE: 60 MMHG

## 2019-02-07 DIAGNOSIS — I10 ESSENTIAL HYPERTENSION: ICD-10-CM

## 2019-02-07 DIAGNOSIS — E03.2 HYPOTHYROIDISM DUE TO NON-MEDICATION EXOGENOUS SUBSTANCES: ICD-10-CM

## 2019-02-07 DIAGNOSIS — E66.01 MORBID OBESITY WITH BMI OF 40.0-44.9, ADULT (HCC): ICD-10-CM

## 2019-02-07 DIAGNOSIS — I50.32 CHRONIC DIASTOLIC CONGESTIVE HEART FAILURE (HCC): ICD-10-CM

## 2019-02-07 DIAGNOSIS — G47.33 OBSTRUCTIVE SLEEP APNEA: ICD-10-CM

## 2019-02-07 DIAGNOSIS — E78.5 DYSLIPIDEMIA, GOAL LDL BELOW 100: ICD-10-CM

## 2019-02-07 PROCEDURE — 99214 OFFICE O/P EST MOD 30 MIN: CPT | Performed by: INTERNAL MEDICINE

## 2019-02-07 ASSESSMENT — ENCOUNTER SYMPTOMS: BACK PAIN: 1

## 2019-02-07 NOTE — PROGRESS NOTES
Cardiology Follow-up Consultation Note    Date of note:   2/7/2019  Primary Care Provider: Chapito Marques M.D.    Name:             Ragini Reaves   YOB: 1945  MRN:               [id number]    CC: shortness of breath.     Patient ID/HPI:   Ragini Reaves is a 74 y.o. female whose current medical problems include NIDDM, YASMANI on home O2 at night transitioning to CPAP, hypothyroidism, hypertension, dyslipidemia, fatty liver, and obesity with BMI of 44 who presents for follow-up.     Last clinic visit: 1/23/2018  She has been on lasix 40mg PO daily for years. Of note she is very claustrophobic. She does have a history of phen phen use in the 1990s for 4-5 months.       She has lost over 50 pounds intentionally, and plans to lose more.       At our clinic visit,  2/15/2018:  She saw Dr. Newsome from pulmonary who arranged for repeat PFTs and sleep study. PFTs looked great, however she did have mild sleep apnea and was started on CPAP which has been ordered, but she hasn't started it yet.    She had a relatively normal echocardiogram and a normal stress test.     She reports her shortness of breath is improved, and she can walk well as long as she's not hurrying.     No further exertional chest pain.  No longer taking motrin, tylenol is working.     At our visit, 8/22/2018:  In terms of obesity, she is continuing to lose weight.    In terms of CHF, her lasix dose was decreased to 20mg PO daily, and she did have significant leg swelling, she is back up to the increased dose of 40mg PO Daily.     diabetes control improving, hgbA1c 7.2    Finally on CPAP for YASMANI.       Interim History:  In terms of YASMANI, remains on CPAP.    In terms of obesity, no weight loss due to the holidays.    Recently had breast calcifications on a mammogram, one removed by biopsy, but unfortunately will need surgery to remove the others.     In terms of CHF, weight stable, LE swelling stable. Not walking due to the  cold.         Review of Systems   HENT: Positive for tinnitus.    Cardiovascular: Positive for leg swelling.   Musculoskeletal: Positive for back pain.     All other systems reviewed and discussed using a comprehensive questionnaire and are negative.       Past Medical History:   Diagnosis Date   • Cataract immature 7/2012    Dr. Carey   • Chickenpox    • DM type 2, goal HbA1c < 8% ([facility])    • Dyslipidemia, goal LDL below 130    • Frequent urination    • Hypertension    • Hypothyroidism    • Mild intermittent asthma 2004    perfumes or smoke triggers   • Nocturnal hypoxemia     on 2.5 liter nightly   • Obesity    • Osteoarthritis    • Osteopenia    • 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   • Ringing in ears    • Shortness of breath    • Sleep apnea    • Tonsillitis    • Toothache    • Type II or unspecified type diabetes mellitus without mention of complication, uncontrolled    • Uterine fibroid    • Wears glasses          Past Surgical History:   Procedure Laterality Date   • COLONOSCOPY WITH BIOPSY  5/21/2013    Performed by Mauro Garcia M.D. at ENDOSCOPY [facility]   • OTHER      THYROIDECTOMY  2009   • TONSILLECTOMY           Current Outpatient Prescriptions   Medication Sig Dispense Refill   • Glucosamine-Chondroitin (GLUCOSAMINE CHONDR COMPLEX PO) Take  by mouth.     • Wheat Dextrin (EQ FIBER POWDER PO) Take  by mouth.     • spironolactone (ALDACTONE) 25 MG Tab TAKE 1 TABLET BY MOUTH EVERY DAY 90 Tab 3   • losartan (COZAAR) 25 MG Tab Take 1 Tab by mouth every day. 90 Tab 3   • linagliptin (TRADJENTA) 5 MG Tab tablet Take 1 Tab by mouth every day. 90 Tab 3   • levothyroxine (SYNTHROID) 150 MCG Tab Take 1 Tab by mouth every day. (Patient taking differently: Take 150 mcg by mouth every day. 1 tablet daily 6 days a week and 1/2 tablet 1 day a week) 90 Tab 3   • potassium chloride ER (KLOR-CON 10) 10 MEQ tablet Take 1 Tab by mouth every day. 90 Tab 1   • simvastatin (ZOCOR) 5 MG Tab TAKE 1 TAB BY  MOUTH EVERY EVENING. 90 Tab 3   • furosemide (LASIX) 40 MG Tab Take 1 Tab by mouth every day.     • docusate sodium (COLACE) 100 MG Cap Take 1 Cap by mouth 2 times a day. (Patient taking differently: Take 200 mg by mouth every day.) 60 Cap 1   • metFORMIN (GLUCOPHAGE) 500 MG Tab TAKE 1 TAB BY MOUTH 2 TIMES A DAY, WITH MEALS. 180 Tab 3   • Lutein 10 MG Tab Take  by mouth.     • aspirin 81 MG tablet Take 1 Tab by mouth every day. 90 Tab 3   • coenzyme Q-10 30 MG capsule Take 1 Cap by mouth every day. 30 Cap 11   • ascorbic acid (VITAMIN C) 500 MG tablet Take 1 Tab by mouth every day. 30 Tab 11   • Lactobacillus (DIGESTIVE HEALTH PROBIOTIC) Cap Take 1 Cap by mouth every day. 30 Cap 11   • Multiple Vitamins-Minerals (ULTRA WOMENS PACK) Misc Take 1 Each by mouth every day. 30 Each 11   • FREESTYLE LITE strip 1 STRIP BY OTHER ROUTE 2 TIMES A DAY. 100 Strip 5   • VENTOLIN  (90 Base) MCG/ACT Aero Soln inhalation aerosol Inhale 2 Puffs by mouth every 6 hours as needed for Shortness of Breath. 1 Inhaler 6   • FREESTYLE LANCETS Misc INJECT 1 EACH AS INSTRUCTED 2 TIMES A DAY *E11.9* 100 Each 4   • FREESTYLE LANCETS Misc INJECT 1 EACH AS INSTRUCTED 2 TIMES A DAY *E11.9* 100 Each 4   • eucalyptus/peppermint oil (PONARIS NASAL) SOLN Spray 10 Drops in nose every 6 hours as needed. 1 Bottle 0   • Blood Glucose Monitoring Suppl SUPPLIES MISC Test strips order: Test strips for Abbott Freestyle Lite meter. Sig: use daily and prn 90 day supply ICD-9 code 250.02 150 Each 11   • Glucosamine-Chondroitin-Vit C 9994-3768-92 MG/30ML LIQD Take 30 mL by mouth every day. 1 Bottle 0     Current Facility-Administered Medications   Medication Dose Route Frequency Provider Last Rate Last Dose   • acetaminophen (TYLENOL) tablet 500 mg  500 mg Oral Q6HRS PRN Frandy Pereira M.D.             Allergies   Allergen Reactions   • Cough Syrup M [Cough Cold-Flu Relief] Swelling     Severe facial swelling   • Dextromethorphan Swelling   • Asa  "[Aspirin]      Stomach bleeding.          Family History   Problem Relation Age of Onset   • Lung Disease Brother    • Cancer Mother         pelvic   • Stroke Maternal Grandmother          Social History     Social History   • Marital status:      Spouse name: N/A   • Number of children: N/A   • Years of education: N/A     Occupational History   • Not on file.     Social History Main Topics   • Smoking status: Never Smoker   • Smokeless tobacco: Never Used   • Alcohol use No   • Drug use: No   • Sexual activity: No     Other Topics Concern   • Not on file     Social History Narrative   • No narrative on file         Physical Exam:  Ambulatory Vitals  Blood pressure 120/60, pulse 86, height 1.549 m (5' 1\"), weight 105.2 kg (232 lb), SpO2 96 %, not currently breastfeeding.   Oxygen Therapy:  Pulse Oximetry: 96 %  BP Readings from Last 4 Encounters:   02/07/19 120/60   01/04/19 108/52   12/20/18 112/72   12/10/18 118/68       Weight/BMI: Body mass index is 43.84 kg/m².  Wt Readings from Last 4 Encounters:   02/07/19 105.2 kg (232 lb)   01/23/19 105.4 kg (232 lb 6.4 oz)   01/04/19 103.9 kg (229 lb)   12/20/18 102.5 kg (226 lb)       General: No apparent distress  Eyes: nl conjunctiva  ENT: OP clear  Neck: JVP <8 cm H2O, no carotid bruits  Lungs: normal respiratory effort, CTAB  Heart: RRR, 1/6 systolic murmur at RUSB, no rubs or gallops,trace right leg edema, 2+ left leg edema, chronic. No LV/RV heave on cardiac palpatation. 2+ bilateral radial pulses.  2+ bilateral dp pulses.   Abdomen: soft, non tender, non distended, no masses, normal bowel sounds.  No HSM. Severe central obesity  Extremities/MSK: no clubbing, no cyanosis  Neurological: No focal sensory deficits  Psychiatric: Appropriate affect, A/O x 3, intact judgement.   Skin: Warm extremities    Exam repeated in full and unchanged except for as noted above.        Lab Data Review:  Lab Results   Component Value Date/Time    CHOLSTRLTOT 125 12/27/2018 " 10:02 AM    LDL 60 12/27/2018 10:02 AM    HDL 40 12/27/2018 10:02 AM    TRIGLYCERIDE 127 12/27/2018 10:02 AM       Lab Results   Component Value Date/Time    SODIUM 143 12/27/2018 10:06 AM    POTASSIUM 4.5 12/27/2018 10:06 AM    CHLORIDE 106 12/27/2018 10:06 AM    CO2 23 12/27/2018 10:06 AM    GLUCOSE 142 (H) 12/27/2018 10:06 AM    BUN 22 12/27/2018 10:06 AM    CREATININE 0.62 12/27/2018 10:06 AM    BUNCREATRAT 35 (H) 12/27/2018 10:06 AM     Lab Results   Component Value Date/Time    ALKPHOSPHAT 82 12/27/2018 10:02 AM    ASTSGOT 26 12/27/2018 10:02 AM    ALTSGPT 30 12/27/2018 10:02 AM    TBILIRUBIN 0.5 12/27/2018 10:02 AM      Lab Results   Component Value Date/Time    WBC 7.0 12/27/2018 10:02 AM    WBC 8.0 02/01/2005 07:00 PM     Lab Results   Component Value Date/Time    HBA1C 7.1 (H) 12/27/2018 10:02 AM    HBA1C 8.7 (H) 09/08/2015 07:49 AM     No components found for: TROP          Cardiac Imaging and Procedures Review:    EKG dated 11/29/17 : My personal interpretation is NSR.        Nuclear perfusion imaging 12/13/2017:  CONCLUSIONS AND IMPRESSIONS:  1.  Negative cardiac PET scan.  No evidence of ischemia or infarction.  2.  Resting ejection fraction of 84%, stress ejection fraction of 91%.  No   segmental wall motion abnormalities noted.  3.  No significant ST segment changes.  Occasional PVCs noted.  4.  Shortness of breath was experienced in this study.    TTE 12/2017:  CONCLUSIONS  1. Normal right and left ventricular size and function.   2. Normal regional wall motion.  3. Mild to moderate aortic insufficiency which is eccentric and   directed anteriorly. Mild aortic valve calcification.   4. Normal estimated right atrial pressure.     No prior study is available for comparison.       Radiology test Review:  CXR:   FINDINGS:  The heart is normal in size.  Moderate diffuse interstitial opacities.  No airspace consolidation.  No pleural effusions are appreciated.  Sternotomy wires demonstrated.       Sleep  study:  Assessment:  Mild obstructive sleep apnea hypopnea with an apnea hypopnea index of 10.5 events per hour and a lowest arterial oxygen saturation of 87% on room air. Marked fragmentation of sleep related in part to laboratory and treatment effects but chronic insomnia might be considered. There did seem to be some response to CPAP therapy but the study was limited by the collection of only 21 minutes of sleep time in the treatment phase of the split-night study.     Recommendations:  Clinical correlation is required. If the patient presented with significant daytime somnolence or if the mild arterial oxygen desaturation seen in this study is deemed clinically significant, treatment may be considered. Airway pressurization could be initiated through a titration polysomnogram with the use of auto titrating CPAP. Alternative treatments for sleep-disordered breathing include reconstructive otolaryngologic surgery, dental appliances and weight loss. If any these latter treatment options are selected, a follow-up polysomnogram is suggested to assess the efficacy of therapy. Behavioral measures including avoidance of sedatives, alcohol and supine body position may be of additional benefit.     PFTs:  SPIROMETRY:  1. FVC was 2.20 L, 78 % of predicted  2. FEV1 was 2.06 L, 97 % of predicted   3. FEV1/FVC ratio was 94 %  4. There was no significant response to bronchodilators      LUNG VOLUMES:  1. RV was  91 % of predicted   2. TLC was 89 % of predicted         DIFFUSION CAPACITY:  1.Diffusion capacity yes173 % of predicted         IMPRESSION:  The patient has mild decrease in FVC to 78% predicted probably secondary to the patient's elevated BMI of 40.3. Total lung capacity was 89% predicted which preclude restrictive ventilatory defect by definition. The rest of the pulmonary function test was within normal limits. Clinical correlation is required.       Medical Decision Makin. Angina of effort  (CMS-HCC)  Resolved, negative stress test  -ED precautions discussed, has ntg prn  -continue aspirin for primary prevention given risk factors  -continue statin, last LDL at goal        2. Uncontrolled type 2 diabetes mellitus without complication, without long-term current use of insulin (CMS-HCC)  Per Dr. Marques, last hgbA1c 7.2, improving with diet control     3. Essential hypertension  Well controlled  -continue spironolactone, lasix, benazepril. Bisoprolol stopped to assist with weight loss.       4. Dyslipidemia, goal LDL below 70  Simvastatin, last LDL 54     5. Morbid obesity with BMI of 40.0-44.9, adult (CMS-HCC)  Working on weight loss with Dr. Dillard.      6. Dyspnea on exertion  Improved  -continue weight loss, CPAP     7. Primary thyroid cancer (CMS-HCC)  Resected with no e/o recurrence. This is why she has sternal wires     8. Chronic diastolic heart failure  patient has been on lasix stably for years, assumed HFpEF. NYHA class II-III. No recent 6MWT.   -continue lasix/spironolactone  -discussed importance of daily weights, and ED precautions for dehydration or heart failure.    9. Aortic insufficiency  Mild, repeat echo after 3 years    10. Pre-operative evaluation  Based on RCRI risk scoring, the patient is moderate risk of cardiovascular complications for low to moderate risk surgery.  She is medically optimized and surgery should proceed without further cardiac work-up.  While it is ideal for aspirin 81mg PO daily to be continued throughout surgery, it can be stopped yeyo-operatively if it would significantly increase bleeding risk as she has no known clinical atherosclerosis.           Return in about 6 months (around 8/7/2019).      Frandy Pereira MD  [facility]  [facility], [address]  [address] [address]  Phone: [phone]  Fax: [phone]

## 2019-02-11 ENCOUNTER — OFFICE VISIT (OUTPATIENT)
Dept: HEALTH INFORMATION MANAGEMENT | Facility: MEDICAL CENTER | Age: 74
End: 2019-02-11
Payer: MEDICARE

## 2019-02-11 VITALS
SYSTOLIC BLOOD PRESSURE: 118 MMHG | HEART RATE: 90 BPM | OXYGEN SATURATION: 93 % | HEIGHT: 61 IN | WEIGHT: 230.3 LBS | DIASTOLIC BLOOD PRESSURE: 66 MMHG | BODY MASS INDEX: 43.48 KG/M2

## 2019-02-11 DIAGNOSIS — E66.01 MORBID OBESITY WITH BMI OF 40.0-44.9, ADULT (HCC): ICD-10-CM

## 2019-02-11 DIAGNOSIS — G47.33 OBSTRUCTIVE SLEEP APNEA: ICD-10-CM

## 2019-02-11 DIAGNOSIS — I10 ESSENTIAL HYPERTENSION: ICD-10-CM

## 2019-02-11 DIAGNOSIS — K76.0 FATTY LIVER: ICD-10-CM

## 2019-02-11 PROBLEM — N63.0 BREAST LUMP IN FEMALE: Status: ACTIVE | Noted: 2019-02-11

## 2019-02-11 PROCEDURE — 99214 OFFICE O/P EST MOD 30 MIN: CPT | Performed by: INTERNAL MEDICINE

## 2019-02-11 NOTE — PROGRESS NOTES
"Bariatric Medicine Follow Up  Chief Complaint   Patient presents with   • Weight Gain       History of Present Illness:   Ragini Reaves is a 74 y.o. female who presents for weight management follow-up and to help address co-morbidities related to overweight, including T2DM, HLD, HTN, metabolic syndrome.    During the patient's last visit, the following were discussed and recommended:  Weight Goal: 3-5% wt loss each month (pt goal weight is 145 lb)  Diet: High Protein/Low Carb Meals and 2 snacks between meals daily  64+ oz water per day  Avoid sweet drinks and sodas  Track daily intake in written form, bring in next visit for review  Physical Activity:  Start home machine workouts!  Risk level for moderate/vigorous exercise program: Moderate given relative inactivity currently  New Rx: Patient declines anti-obesity medication  Behavior change: Continue tracking, mindful eating, increase activity level  Follow-up: 2 months  IBT    Struggled with wt gain over the holidays, now losing again.  Not tracking intake but more careful.  Protein powder from [employer] qam.  Trying to eat more vegetables and protein but not really keeping track.    Sleeping better with CPAP, no longer on O2 in addition.  Tracking hrs on CPAP.  Fasting glucose 120s on average.  Was changed from ace inhibitor to losartan, coughing less.  Synthroid dose reduced slightly.  Needs breast lumpectomy soon.    Exercise:   None  Has home machine  Trying to be more active     Review of Systems   Denies lightheadedness, diaphoresis.  All other ROS were reviewed and are otherwise unchanged from my previous visit with patient.    Physical Exam:    /66 (BP Location: Left arm, Patient Position: Sitting, BP Cuff Size: Large adult)   Pulse 90   Ht 1.549 m (5' 1\")   Wt 104.5 kg (230 lb 4.8 oz)   SpO2 93%   BMI 43.51 kg/m²         Weight change since last visit: +4 lb (-4 lb total)  Waist Circum change since last visit: +1.75 in (-0.5 in " total)    Constitutional: Oriented to person, place, and time and well-developed, well-nourished, and in no distress.    Head: Normocephalic.   Musculoskeletal: Normal range of motion. No edema.   Neurological: Alert and oriented to person, place, and time. No muscle weakness.  Gait normal.   Skin: Warm and dry. Not diaphoretic.   Psychiatric: Mood, memory, affect and judgment normal.     Laboratory:   Recent labs reviewed.      ASSESSMENT/PLAN:  Body mass index is 43.51 kg/m².    Obesity Stage (Tranquillity): 2; Class 3    1. Uncontrolled type 2 diabetes mellitus without complication, without long-term current use of insulin (HCC)     2. Essential hypertension     3. Fatty liver     4. Morbid obesity with BMI of 40.0-44.9, adult (HCC)     5. Obstructive sleep apnea       The patient continues to struggle with weight loss over the last year having lost just 4 pounds.  However, her fasting glucose is under good control, blood pressure very well controlled and sleep stable.  Continue to encourage increasing exercise, use of stimulus narrowing and keeping food journal.  Continue to monitor fatty liver.    The patient and I have discussed at length and agree to the following recommendations, which are all addressing the above diagnoses:    Weight Goal: 3-5% wt loss each month (pt goal weight is 145 lb)  Diet: [employer] Protein powder qam with PB powder  Written log before next visit  Physical Activity: Increase exercise, set goals next visit  Risk level for moderate/vigorous exercise program: Moderate  New Rx: None  Behavior change: Commit to change  Follow-up: 2 mos  IBT    Patient's body mass index is 43.51 kg/m². Exercise and nutrition counseling were performed at this visit.

## 2019-02-13 ENCOUNTER — TELEPHONE (OUTPATIENT)
Dept: MEDICAL GROUP | Facility: MEDICAL CENTER | Age: 74
End: 2019-02-13

## 2019-02-13 DIAGNOSIS — N63.0 BREAST LUMP IN FEMALE: ICD-10-CM

## 2019-02-13 DIAGNOSIS — R92.8 ABNORMAL MAMMOGRAM OF RIGHT BREAST: ICD-10-CM

## 2019-02-13 NOTE — TELEPHONE ENCOUNTER
1. Caller Name: ana        Call Back Number: [phone] (home)       2. Message: Patient needs a referral for a surgeon to get her right breast bx done the imaging dept was not able to do the bx. And she wanted you to know her test strips medicare will over cover her to test once daily because she is not on insulin.     3. Patient approves office to leave a detailed voicemail/MyChart message: yes

## 2019-02-15 NOTE — TELEPHONE ENCOUNTER
Understood, does she need me to send in a new test strip request?  I know that Medicare will not cover more than 50/month if you are not insulin treated.  I have placed a referral to Dr. Franchesca Whitley who is a very good surgeon and does a great deal of breast surgery.

## 2019-02-20 ENCOUNTER — NON-PROVIDER VISIT (OUTPATIENT)
Dept: MEDICAL GROUP | Facility: MEDICAL CENTER | Age: 74
End: 2019-02-20
Payer: MEDICARE

## 2019-02-20 VITALS — HEIGHT: 61 IN | BODY MASS INDEX: 44.07 KG/M2 | WEIGHT: 233.4 LBS

## 2019-02-20 PROCEDURE — G0447 BEHAVIOR COUNSEL OBESITY 15M: HCPCS | Performed by: NURSE PRACTITIONER

## 2019-02-20 NOTE — PROGRESS NOTES
"2/20/2019     Visit #11       No ref. provider found      74 y.o.           Time in/Out:  5818-3020    ASSESS:    Vitals:    02/20/19 1054   Weight: 105.9 kg (233 lb 6.4 oz)   Height: 1.549 m (5' 1\")            Wt Readings from Last 2 Encounters:   02/20/19 105.9 kg (233 lb 6.4 oz)   02/11/19 104.5 kg (230 lb 4.8 oz)            Body mass index is 44.1 kg/m².       Difference:  + 1 lb     Starting weight:  233.4 lbs     Difference:  -0.1 lbs      Current Dietary/Exercise Habits:  Ragini had a feeling she gained weight. States she was eating more sweets over the holidays and not exercising as much due to the weather. Cont's to check blood sugars regularly. Feels she is doing well as far as her nutrition goes. Reports a few higher blood sugars in the morning, but able to identify what is causing them. Noticed it was high when she had hot cocoa or when she has a snack late at night since she doesn't go to bed until early morning hours.     Estimated Stage of Change:    Contemplation as evidenced by wanting to lose weight, but not engaging in regular PA.      ADVISE:      Physical Activity:  Ragini realizes she has not been as active due to the weather. Recommended she break up sedentary time during the day every 30-60 min so she is at least moving more. She is hoping the weather will get better soon so she can get back to walking. Discussed how exercise can also help to lower her blood sugars.      Dietary Guidelines:  Recommended limiting late night snack to 15g carbohydrate and making sure to add protein. If she is going to have hot cocoa to consider SF option and adding protein with it. Drink water or walk to help lower blood sugar.     The patient has been advised of how weight management and physical activity impacts their health and will help to reduce complications and health risk factors.        AGREE:  Visit # 11 Goals:  1) Ensure protein included if having a CHO snack   2) Drink water (crystal lite ok) or " walk to help lower blood sugars when high  3) Break up sedentary time, start walking again when weather improves       ASSIST:  Ragini has not been walking regularly like she normally does and has regained to her starting weight. Stressed the importance of finding physical activity - she will need to find a way to do so even when the weather is poor. We will discuss resuming her home exercise machine on her next visit as that could be another option for her. We will do a 24 hr diet recall on her next visit as well and see if she is open to suggestions or changes with her dietary intake as this will need to be of more focus if she is not exercising and still desires to lose weight.        ARRANGE:     Return for follow-up in 4 weeks      The patient was assisted in making follow-up appointment per orders.

## 2019-03-11 DIAGNOSIS — Z01.812 PRE-OPERATIVE LABORATORY EXAMINATION: ICD-10-CM

## 2019-03-11 DIAGNOSIS — Z01.810 PRE-OPERATIVE CARDIOVASCULAR EXAMINATION: ICD-10-CM

## 2019-03-11 LAB
ANION GAP SERPL CALC-SCNC: 8 MMOL/L (ref 0–11.9)
BUN SERPL-MCNC: 23 MG/DL (ref 8–22)
CALCIUM SERPL-MCNC: 9.7 MG/DL (ref 8.5–10.5)
CHLORIDE SERPL-SCNC: 103 MMOL/L (ref 96–112)
CO2 SERPL-SCNC: 28 MMOL/L (ref 20–33)
CREAT SERPL-MCNC: 0.63 MG/DL (ref 0.5–1.4)
EKG IMPRESSION: NORMAL
GLUCOSE SERPL-MCNC: 133 MG/DL (ref 65–99)
POTASSIUM SERPL-SCNC: 3.7 MMOL/L (ref 3.6–5.5)
SODIUM SERPL-SCNC: 139 MMOL/L (ref 135–145)

## 2019-03-11 PROCEDURE — 93005 ELECTROCARDIOGRAM TRACING: CPT

## 2019-03-11 PROCEDURE — 83036 HEMOGLOBIN GLYCOSYLATED A1C: CPT

## 2019-03-11 PROCEDURE — 93010 ELECTROCARDIOGRAM REPORT: CPT | Performed by: INTERNAL MEDICINE

## 2019-03-11 PROCEDURE — 80048 BASIC METABOLIC PNL TOTAL CA: CPT

## 2019-03-11 PROCEDURE — 36415 COLL VENOUS BLD VENIPUNCTURE: CPT

## 2019-03-11 RX ORDER — ACETAMINOPHEN 500 MG
500 TABLET ORAL 2 TIMES DAILY
COMMUNITY

## 2019-03-12 LAB
EST. AVERAGE GLUCOSE BLD GHB EST-MCNC: 154 MG/DL
HBA1C MFR BLD: 7 % (ref 0–5.6)

## 2019-03-13 ENCOUNTER — ANESTHESIA EVENT (OUTPATIENT)
Dept: SURGERY | Facility: MEDICAL CENTER | Age: 74
End: 2019-03-13
Payer: MEDICARE

## 2019-03-13 ENCOUNTER — APPOINTMENT (OUTPATIENT)
Dept: RADIOLOGY | Facility: MEDICAL CENTER | Age: 74
End: 2019-03-13
Attending: SURGERY
Payer: MEDICARE

## 2019-03-13 ENCOUNTER — HOSPITAL ENCOUNTER (OUTPATIENT)
Facility: MEDICAL CENTER | Age: 74
End: 2019-03-13
Attending: SURGERY | Admitting: SURGERY
Payer: MEDICARE

## 2019-03-13 ENCOUNTER — ANESTHESIA (OUTPATIENT)
Dept: SURGERY | Facility: MEDICAL CENTER | Age: 74
End: 2019-03-13
Payer: MEDICARE

## 2019-03-13 VITALS
HEART RATE: 71 BPM | WEIGHT: 233.25 LBS | BODY MASS INDEX: 44.04 KG/M2 | RESPIRATION RATE: 16 BRPM | OXYGEN SATURATION: 97 % | TEMPERATURE: 97.8 F | HEIGHT: 61 IN

## 2019-03-13 DIAGNOSIS — N63.10 MASS OF RIGHT BREAST: Primary | ICD-10-CM

## 2019-03-13 LAB
GLUCOSE BLD-MCNC: 121 MG/DL (ref 65–99)
PATHOLOGY CONSULT NOTE: NORMAL

## 2019-03-13 PROCEDURE — 19285 PERQ DEV BREAST 1ST US IMAG: CPT | Mod: RT

## 2019-03-13 PROCEDURE — 160029 HCHG SURGERY MINUTES - 1ST 30 MINS LEVEL 4: Performed by: SURGERY

## 2019-03-13 PROCEDURE — 700111 HCHG RX REV CODE 636 W/ 250 OVERRIDE (IP)

## 2019-03-13 PROCEDURE — 88307 TISSUE EXAM BY PATHOLOGIST: CPT

## 2019-03-13 PROCEDURE — 88360 TUMOR IMMUNOHISTOCHEM/MANUAL: CPT | Mod: 91

## 2019-03-13 PROCEDURE — 160041 HCHG SURGERY MINUTES - EA ADDL 1 MIN LEVEL 4: Performed by: SURGERY

## 2019-03-13 PROCEDURE — 82962 GLUCOSE BLOOD TEST: CPT

## 2019-03-13 PROCEDURE — 160002 HCHG RECOVERY MINUTES (STAT): Performed by: SURGERY

## 2019-03-13 PROCEDURE — 160048 HCHG OR STATISTICAL LEVEL 1-5: Performed by: SURGERY

## 2019-03-13 PROCEDURE — 76098 X-RAY EXAM SURGICAL SPECIMEN: CPT | Mod: RT

## 2019-03-13 PROCEDURE — 700102 HCHG RX REV CODE 250 W/ 637 OVERRIDE(OP): Performed by: ANESTHESIOLOGY

## 2019-03-13 PROCEDURE — 700111 HCHG RX REV CODE 636 W/ 250 OVERRIDE (IP): Performed by: ANESTHESIOLOGY

## 2019-03-13 PROCEDURE — 700101 HCHG RX REV CODE 250

## 2019-03-13 PROCEDURE — 700105 HCHG RX REV CODE 258: Performed by: ANESTHESIOLOGY

## 2019-03-13 PROCEDURE — A6402 STERILE GAUZE <= 16 SQ IN: HCPCS | Performed by: SURGERY

## 2019-03-13 PROCEDURE — 160009 HCHG ANES TIME/MIN: Performed by: SURGERY

## 2019-03-13 PROCEDURE — 160035 HCHG PACU - 1ST 60 MINS PHASE I: Performed by: SURGERY

## 2019-03-13 PROCEDURE — 700101 HCHG RX REV CODE 250: Performed by: ANESTHESIOLOGY

## 2019-03-13 PROCEDURE — A9270 NON-COVERED ITEM OR SERVICE: HCPCS | Performed by: ANESTHESIOLOGY

## 2019-03-13 PROCEDURE — 501838 HCHG SUTURE GENERAL: Performed by: SURGERY

## 2019-03-13 PROCEDURE — 160046 HCHG PACU - 1ST 60 MINS PHASE II: Performed by: SURGERY

## 2019-03-13 RX ORDER — OXYCODONE HCL 5 MG/5 ML
5 SOLUTION, ORAL ORAL
Status: COMPLETED | OUTPATIENT
Start: 2019-03-13 | End: 2019-03-13

## 2019-03-13 RX ORDER — DIPHENHYDRAMINE HYDROCHLORIDE 50 MG/ML
12.5 INJECTION INTRAMUSCULAR; INTRAVENOUS
Status: DISCONTINUED | OUTPATIENT
Start: 2019-03-13 | End: 2019-03-18 | Stop reason: HOSPADM

## 2019-03-13 RX ORDER — BUPIVACAINE HYDROCHLORIDE AND EPINEPHRINE 5; 5 MG/ML; UG/ML
INJECTION, SOLUTION PERINEURAL
Status: DISCONTINUED | OUTPATIENT
Start: 2019-03-13 | End: 2019-03-13 | Stop reason: HOSPADM

## 2019-03-13 RX ORDER — LIDOCAINE AND PRILOCAINE 25; 25 MG/G; MG/G
CREAM TOPICAL
Status: COMPLETED
Start: 2019-03-13 | End: 2019-03-13

## 2019-03-13 RX ORDER — MEPERIDINE HYDROCHLORIDE 25 MG/ML
12.5 INJECTION INTRAMUSCULAR; INTRAVENOUS; SUBCUTANEOUS
Status: DISCONTINUED | OUTPATIENT
Start: 2019-03-13 | End: 2019-03-18 | Stop reason: HOSPADM

## 2019-03-13 RX ORDER — HALOPERIDOL 5 MG/ML
1 INJECTION INTRAMUSCULAR
Status: DISCONTINUED | OUTPATIENT
Start: 2019-03-13 | End: 2019-03-18 | Stop reason: HOSPADM

## 2019-03-13 RX ORDER — ALPRAZOLAM 0.25 MG/1
.25-.5 TABLET ORAL
Status: DISCONTINUED | OUTPATIENT
Start: 2019-03-13 | End: 2019-03-13 | Stop reason: HOSPADM

## 2019-03-13 RX ORDER — OXYCODONE HYDROCHLORIDE AND ACETAMINOPHEN 5; 325 MG/1; MG/1
1-2 TABLET ORAL EVERY 6 HOURS PRN
Qty: 20 TAB | Refills: 0 | Status: SHIPPED | OUTPATIENT
Start: 2019-03-13 | End: 2019-03-18

## 2019-03-13 RX ORDER — LEVOTHYROXINE SODIUM 0.15 MG/1
150 TABLET ORAL
COMMUNITY
End: 2020-03-03

## 2019-03-13 RX ORDER — CELECOXIB 200 MG/1
400 CAPSULE ORAL ONCE
Status: CANCELLED | OUTPATIENT
Start: 2019-03-13 | End: 2019-03-13

## 2019-03-13 RX ORDER — KETOROLAC TROMETHAMINE 30 MG/ML
INJECTION, SOLUTION INTRAMUSCULAR; INTRAVENOUS PRN
Status: DISCONTINUED | OUTPATIENT
Start: 2019-03-13 | End: 2019-03-13 | Stop reason: SURG

## 2019-03-13 RX ORDER — ACETAMINOPHEN 500 MG
1000 TABLET ORAL ONCE
Status: CANCELLED | OUTPATIENT
Start: 2019-03-13 | End: 2019-03-13

## 2019-03-13 RX ORDER — OXYCODONE HCL 5 MG/5 ML
10 SOLUTION, ORAL ORAL
Status: COMPLETED | OUTPATIENT
Start: 2019-03-13 | End: 2019-03-13

## 2019-03-13 RX ORDER — CEFAZOLIN SODIUM 1 G/3ML
INJECTION, POWDER, FOR SOLUTION INTRAMUSCULAR; INTRAVENOUS PRN
Status: DISCONTINUED | OUTPATIENT
Start: 2019-03-13 | End: 2019-03-13 | Stop reason: SURG

## 2019-03-13 RX ORDER — LIDOCAINE AND PRILOCAINE 25; 25 MG/G; MG/G
CREAM TOPICAL ONCE
Status: COMPLETED | OUTPATIENT
Start: 2019-03-13 | End: 2019-03-13

## 2019-03-13 RX ORDER — HYDROMORPHONE HYDROCHLORIDE 1 MG/ML
0.2 INJECTION, SOLUTION INTRAMUSCULAR; INTRAVENOUS; SUBCUTANEOUS
Status: DISCONTINUED | OUTPATIENT
Start: 2019-03-13 | End: 2019-03-18 | Stop reason: HOSPADM

## 2019-03-13 RX ORDER — DOCUSATE SODIUM 100 MG/1
300 CAPSULE, LIQUID FILLED ORAL DAILY
COMMUNITY

## 2019-03-13 RX ORDER — HYDRALAZINE HYDROCHLORIDE 20 MG/ML
5 INJECTION INTRAMUSCULAR; INTRAVENOUS
Status: DISCONTINUED | OUTPATIENT
Start: 2019-03-13 | End: 2019-03-18 | Stop reason: HOSPADM

## 2019-03-13 RX ORDER — DEXAMETHASONE SODIUM PHOSPHATE 4 MG/ML
INJECTION, SOLUTION INTRA-ARTICULAR; INTRALESIONAL; INTRAMUSCULAR; INTRAVENOUS; SOFT TISSUE PRN
Status: DISCONTINUED | OUTPATIENT
Start: 2019-03-13 | End: 2019-03-13 | Stop reason: SURG

## 2019-03-13 RX ORDER — SODIUM CHLORIDE, SODIUM LACTATE, POTASSIUM CHLORIDE, CALCIUM CHLORIDE 600; 310; 30; 20 MG/100ML; MG/100ML; MG/100ML; MG/100ML
INJECTION, SOLUTION INTRAVENOUS
Status: DISCONTINUED | OUTPATIENT
Start: 2019-03-13 | End: 2019-03-13 | Stop reason: SURG

## 2019-03-13 RX ORDER — LORAZEPAM 2 MG/ML
0.5 INJECTION INTRAMUSCULAR
Status: DISCONTINUED | OUTPATIENT
Start: 2019-03-13 | End: 2019-03-18 | Stop reason: HOSPADM

## 2019-03-13 RX ORDER — HYDROMORPHONE HYDROCHLORIDE 1 MG/ML
0.1 INJECTION, SOLUTION INTRAMUSCULAR; INTRAVENOUS; SUBCUTANEOUS
Status: DISCONTINUED | OUTPATIENT
Start: 2019-03-13 | End: 2019-03-18 | Stop reason: HOSPADM

## 2019-03-13 RX ORDER — ONDANSETRON 2 MG/ML
4 INJECTION INTRAMUSCULAR; INTRAVENOUS
Status: DISCONTINUED | OUTPATIENT
Start: 2019-03-13 | End: 2019-03-18 | Stop reason: HOSPADM

## 2019-03-13 RX ORDER — ONDANSETRON 2 MG/ML
INJECTION INTRAMUSCULAR; INTRAVENOUS PRN
Status: DISCONTINUED | OUTPATIENT
Start: 2019-03-13 | End: 2019-03-13 | Stop reason: SURG

## 2019-03-13 RX ORDER — GABAPENTIN 300 MG/1
600 CAPSULE ORAL ONCE
Status: CANCELLED | OUTPATIENT
Start: 2019-03-13 | End: 2019-03-13

## 2019-03-13 RX ORDER — HYDROMORPHONE HYDROCHLORIDE 1 MG/ML
0.4 INJECTION, SOLUTION INTRAMUSCULAR; INTRAVENOUS; SUBCUTANEOUS
Status: DISCONTINUED | OUTPATIENT
Start: 2019-03-13 | End: 2019-03-18 | Stop reason: HOSPADM

## 2019-03-13 RX ORDER — SODIUM CHLORIDE, SODIUM LACTATE, POTASSIUM CHLORIDE, CALCIUM CHLORIDE 600; 310; 30; 20 MG/100ML; MG/100ML; MG/100ML; MG/100ML
INJECTION, SOLUTION INTRAVENOUS ONCE
Status: COMPLETED | OUTPATIENT
Start: 2019-03-13 | End: 2019-03-13

## 2019-03-13 RX ORDER — SODIUM CHLORIDE, SODIUM LACTATE, POTASSIUM CHLORIDE, CALCIUM CHLORIDE 600; 310; 30; 20 MG/100ML; MG/100ML; MG/100ML; MG/100ML
INJECTION, SOLUTION INTRAVENOUS CONTINUOUS
Status: DISCONTINUED | OUTPATIENT
Start: 2019-03-13 | End: 2019-03-18 | Stop reason: HOSPADM

## 2019-03-13 RX ORDER — LIDOCAINE HYDROCHLORIDE 10 MG/ML
INJECTION, SOLUTION INFILTRATION; PERINEURAL
Status: COMPLETED
Start: 2019-03-13 | End: 2019-03-13

## 2019-03-13 RX ADMIN — KETOROLAC TROMETHAMINE 30 MG: 30 INJECTION, SOLUTION INTRAMUSCULAR at 14:47

## 2019-03-13 RX ADMIN — FENTANYL CITRATE 100 MCG: 50 INJECTION, SOLUTION INTRAMUSCULAR; INTRAVENOUS at 14:02

## 2019-03-13 RX ADMIN — SODIUM CHLORIDE, SODIUM LACTATE, POTASSIUM CHLORIDE, CALCIUM CHLORIDE: 600; 310; 30; 20 INJECTION, SOLUTION INTRAVENOUS at 10:06

## 2019-03-13 RX ADMIN — SUGAMMADEX 200 MG: 100 INJECTION, SOLUTION INTRAVENOUS at 14:47

## 2019-03-13 RX ADMIN — LIDOCAINE AND PRILOCAINE 1 APPLICATION: 25; 25 CREAM TOPICAL at 09:43

## 2019-03-13 RX ADMIN — LIDOCAINE HYDROCHLORIDE 80 MG: 20 INJECTION, SOLUTION INFILTRATION; PERINEURAL at 14:02

## 2019-03-13 RX ADMIN — MIDAZOLAM HYDROCHLORIDE 2 MG: 1 INJECTION, SOLUTION INTRAMUSCULAR; INTRAVENOUS at 13:55

## 2019-03-13 RX ADMIN — ROCURONIUM BROMIDE 40 MG: 10 INJECTION, SOLUTION INTRAVENOUS at 14:02

## 2019-03-13 RX ADMIN — PROPOFOL 150 MG: 10 INJECTION, EMULSION INTRAVENOUS at 14:02

## 2019-03-13 RX ADMIN — DEXAMETHASONE SODIUM PHOSPHATE 8 MG: 4 INJECTION, SOLUTION INTRAMUSCULAR; INTRAVENOUS at 14:15

## 2019-03-13 RX ADMIN — Medication 0.5 ML: at 10:05

## 2019-03-13 RX ADMIN — CEFAZOLIN 2 G: 330 INJECTION, POWDER, FOR SOLUTION INTRAMUSCULAR; INTRAVENOUS at 13:55

## 2019-03-13 RX ADMIN — ONDANSETRON 4 MG: 2 INJECTION INTRAMUSCULAR; INTRAVENOUS at 14:10

## 2019-03-13 RX ADMIN — SODIUM CHLORIDE, POTASSIUM CHLORIDE, SODIUM LACTATE AND CALCIUM CHLORIDE: 600; 310; 30; 20 INJECTION, SOLUTION INTRAVENOUS at 13:55

## 2019-03-13 RX ADMIN — OXYCODONE HYDROCHLORIDE 10 MG: 5 SOLUTION ORAL at 15:19

## 2019-03-13 RX ADMIN — LIDOCAINE HYDROCHLORIDE 0.5 ML: 10 INJECTION, SOLUTION INFILTRATION; PERINEURAL at 10:05

## 2019-03-13 ASSESSMENT — PAIN SCALES - GENERAL: PAIN_LEVEL: 4

## 2019-03-13 NOTE — OP REPORT
DATE OF SERVICE:  03/13/2019    PREOPERATIVE DIAGNOSIS:  Suspicious abnormality on screening mammogram, not   amendable to stereotactic biopsy.    POSTOPERATIVE DIAGNOSIS:  Suspicious abnormality on screening mammogram, not   amendable to stereotactic biopsy.    PROCEDURE:  Wire localized excisional biopsy, right breast.    SURGEON:  Karuna Hussein MD    ANESTHESIOLOGIST:  Baldemar Esteban MD    ANESTHESIA:  GET.    FINDINGS:  Microcalcifications within biopsy specimen on x-ray.  Subcentimeter   firm irregular mass at target area adjacent to the guidewire.    COMPLICATIONS:  None.    ESTIMATED BLOOD LOSS:  5 mL    SPECIMENS:  Excisional biopsy, right breast.    INDICATIONS:  Patient is a 74-year-old female.  She was found to have a small   suspicious abnormality on screening mammogram in the inferomedial quadrant of   her right breast.  Because of patient's body habitus, she was not able to   undergo stereotactic biopsy and the lesion was not adequately identified on   ultrasound.  Excisional biopsy was indicated.    PROCEDURE IN DETAIL:  Patient was consented preprocedure.  She was taken to   radiology suite for guidewire placement adjacent to the abnormality in the   medial lower quadrant of the right breast.  Postprocedure imaging showed good   placement of the guidewire.  She was then taken to the operating room and   placed in the supine position.  Anesthesia was induced and LMA was placed.    The right breast including the guidewire was then carefully prepped and   draped.  The guidewire exited the medial inferior aspect of the right breast.    After anesthetizing the dermis with 10 mL of 0.5% lidocaine with epinephrine,   a 3 cm elliptical incision was made in the inferomedial right breast to   include the guidewire.  It was deepened down through the dermis and then flaps   were raised medially and laterally, 2 cm.  Breast tissue was excised   circumferentially around the guidewire in the trajectory of the  guidewire.    This was done sharply and with electrocautery to achieve hemostasis.    Trajectory of the guidewire was inferior and medial to the incision.  After   the specimen was excised, palpated, there was a subcentimeter irregular   density adjacent within the specimen.  Specimen was marked for margin with a   short stitch superior, long stitch laterally, and the skin anterior.  Specimen   was x-rayed.  There were microcalcifications identified as well as abnormal   target lesion within the biopsy specimen.  It was then sent for confirmatory   reading to radiology.  The wound was irrigated.  Hemostasis was achieved with   electrocautery.  The dermis was closed with interrupted 0 Vicryl and the skin   closed with a subcuticular stitch using 4-0 Monocryl.  Steri-Strips and dry   gauze dressing was then applied.  All lap, sponge, and instrument counts were   correct at the end of the case x2.  The patient tolerated the procedure well.    She was awakened from anesthesia, extubated, taken to the postanesthesia care   unit in good condition.       ____________________________________     MD JURGEN Chris / [facility]    DD:  03/13/2019 14:56:23  DT:  03/13/2019 15:15:16    D#:  [id number]  Job#:  [id number]

## 2019-03-13 NOTE — ANESTHESIA QCDR
2019 [facility] Clinical Data Registry (for Quality Improvement)     Postoperative nausea/vomiting risk protocol (Adult = 18 yrs and Pediatric 3-17 yrs)- (430 and 463)  General inhalation anesthetic (NOT TIVA) with PONV risk factors: Yes  Provision of anti-emetic therapy with at least 2 different classes of agents: Yes   Patient DID NOT receive anti-emetic therapy and reason is documented in Medical Record:  N/A    Multimodal Pain Management- (AQI59)  Patient undergoing Elective Surgery (i.e. Outpatient, or ASC, or Prescheduled Surgery prior to Hospital Admission): Yes  Use of Multimodal Pain Management, two or more drugs and/or interventions, NOT including systemic opioids: Yes   Exception: Documented allergy to multiple classes of analgesics:  N/A    PACU assessment of acute postoperative pain prior to Anesthesia Care End- Applies to Patients Age = 18- (ABG7)  Initial PACU pain score is which of the following: < 7/10  Patient unable to report pain score: N/A    Post-anesthetic transfer of care checklist/protocol to PACU/ICU- (426 and 427)  Upon conclusion of case, patient transferred to which of the following locations: PACU/Non-ICU  Use of transfer checklist/protocol: Yes  Exclusion: Service Performed in Patient Hospital Room (and thus did not require transfer): N/A    PACU Reintubation- (AQI31)  General anesthesia requiring endotracheal intubation (ETT) along with subsequent extubation in OR or PACU: Yes  Required reintubation in the PACU: No   Extubation was a planned trial documented in the medical record prior to removal of the original airway device:  N/A    Unplanned admission to ICU related to anesthesia service up through end of PACU care- (MD51)  Unplanned admission to ICU (not initially anticipated at anesthesia start time): No

## 2019-03-13 NOTE — ANESTHESIA PREPROCEDURE EVALUATION
Relevant Problems   (+) Chronic diastolic congestive heart failure (HCC)   (+) DM type 2, goal HbA1c < 8% (HCC)   (+) Essential hypertension   (+) Fatty liver   (+) Hypothyroidism due to non-medication exogenous substances   (+) Obstructive sleep apnea   (+) Uncontrolled type 2 diabetes mellitus without complication, without long-term current use of insulin (HCC)       Physical Exam    Airway   Mallampati: III  TM distance: >3 FB  Neck ROM: full       Cardiovascular - normal exam  Rhythm: regular  Rate: normal  (-) murmur     Dental - normal exam         Pulmonary - normal exam  Breath sounds clear to auscultation     Abdominal    Neurological - normal exam                 Anesthesia Plan    ASA 3   ASA physical status 3 criteria: COPD - poorly controlled, hypertension - poorly controlled, diabetes - poorly controlled and morbid obesity - BMI greater than or equal to 40    Plan - general       Airway plan will be LMA        Induction: intravenous    Postoperative Plan: Postoperative administration of opioids is intended.        Informed Consent:    Anesthetic plan and risks discussed with patient.         "Subjective:      Lauro Daley is a 43 y.o. male who presents with Flank Pain            HPI  Lauro is a 43 year old male who is here for left flank pain x 2 days. States just started a \"colon cleanse\" 2 days ago and is now having left flank pain that radiates to left side of abdomen. Has nausea without vomiting. States \"no constipation\" with bowel movement 2x/day. Taking \"some kind of stimulant or laxative\" but unsure of this. Denies problems of urination or h/o kidney stones.    PMH:  has a past medical history of Asthma.  MEDS:   Current outpatient prescriptions:   •  predniSONE (DELTASONE) 10 MG TABS, Take 1 Tab by mouth every day. Take with food 40 mg po daily X3, 30 mg daily X 3 , 20 mg daily X 3, 10 mg daily X 3 , 5 mg daily X 3. In tapering dose fashion. (Patient not taking: Reported on 1/1/2017), Disp: 32 Each, Rfl: 0  •  albuterol (VENTOLIN OR PROVENTIL) 108 (90 BASE) MCG/ACT AERS, Inhale 1-2 Puffs by mouth every four hours as needed for Shortness of Breath (wheeze). (Patient not taking: Reported on 1/1/2017), Disp: 1 Inhaler, Rfl: 3  •  azithromycin (ZITHROMAX) 500 MG tablet, Take 0.5 Tabs by mouth every day. (Patient not taking: Reported on 1/1/2017), Disp: 4 Tab, Rfl: 0  ALLERGIES:   Allergies   Allergen Reactions   • Sulfa Drugs Rash     body     SURGHX:   Past Surgical History   Procedure Laterality Date   • Other       sleep apnea surgery     SOCHX:  reports that he has never smoked. His smokeless tobacco use includes Chew. He reports that he does not drink alcohol or use illicit drugs.  FH: Family history was reviewed, no pertinent findings to report    Review of Systems   Constitutional: Negative for fever, chills and malaise/fatigue.   Respiratory: Negative for cough and shortness of breath.    Cardiovascular: Negative for chest pain, palpitations and orthopnea.   Gastrointestinal: Positive for nausea and abdominal pain. Negative for vomiting, diarrhea, constipation and blood in stool. "   Genitourinary: Positive for flank pain. Negative for dysuria, urgency, frequency and hematuria.   Musculoskeletal: Positive for myalgias and back pain.   Neurological: Negative for weakness.          Objective:     /80 mmHg  Pulse 105  Temp(Src) 37 °C (98.6 °F)  Resp 16  Ht 1.829 m (6')  Wt 97.523 kg (215 lb)  BMI 29.15 kg/m2  SpO2 95%     Physical Exam   Constitutional: He is oriented to person, place, and time. He appears well-developed and well-nourished. No distress.   HENT:   Head: Normocephalic.   Cardiovascular: Normal rate and regular rhythm.    Pulmonary/Chest: Effort normal and breath sounds normal.   Abdominal: Soft. Bowel sounds are normal. He exhibits no distension. There is no hepatosplenomegaly. There is tenderness in the left upper quadrant. There is no rigidity, no rebound, no guarding and no CVA tenderness.       Musculoskeletal: Normal range of motion.        Lumbar back: He exhibits pain. He exhibits normal range of motion, no tenderness, no bony tenderness, no swelling, no edema and no spasm.        Back:    Neurological: He is alert and oriented to person, place, and time.   Skin: Skin is warm and dry. He is not diaphoretic.   Vitals reviewed.    POC Color D.YELLOW Negative Final      POC Appearance CLEAR Negative Final     POC Leukocyte Esterase TRACE Negative Final     POC Nitrites NEG Negative Final     POC Urobiligen NEG Negative (0.2) mg/dL Final     POC Protein TRACE Negative mg/dL Final     POC Urine PH 6.5 5.0 - 8.0 Final     POC Blood NEG Negative Final     POC Specific Gravity 1.020 <1.005 - >1.030 Final     POC Ketones NEG Negative mg/dL Final     POC Biliruben NEG Negative mg/dL Final     POC Glucose NEG Negative mg/dL Final       Abdomen xray:FINDINGS:  Nonspecific nonobstructive bowel gas pattern. No dilated gas-filled loop of small bowel. Moderate amount of stool throughout the colon.  No portal venous gas or pneumatosis.  No definite free intraperitoneal air but  evaluation is limited on supine radiograph          Assessment/Plan:     1. Acute left flank pain    - POCT Urinalysis  - [id number] VIEW; Future    2. Constipation, unspecified constipation type    D/c colon cleanse at this time  Increase water intake  Recommend daily stool softener with laxative prn like Miralax  Encourage fibrous food intake and balanced diet including fruits and vegetables, decrease sugar and processed foods  May introduce a fiber supplement daily  Monitor for severe abdominal pain with fever and inability to have bowel movement, sunny blood with bowel movement, fever, n/v- need to got to ER, patient and wife understand this

## 2019-03-13 NOTE — OR NURSING
Pt's VSS; denies N/V; states pain is at tolerable level. Dressing CDI to chest. D/c orders received. IV dc'd. Pt changed into clothing with assistance. Pt up and voided. Discharge instructions given; pt and family verbalized understanding and questions answered. Patient states ready to d/c home. Prescriptions given. Pt dc'd in w/c with CNA in stable condition.

## 2019-03-13 NOTE — DISCHARGE INSTRUCTIONS
"  ACTIVITY: Rest and take it easy for the first 24 hours.  A responsible adult is recommended to remain with you during that time.  It is normal to feel sleepy.  We encourage you to not do anything that requires balance, judgment or coordination.    MILD FLU-LIKE SYMPTOMS ARE NORMAL. YOU MAY EXPERIENCE GENERALIZED MUSCLE ACHES, THROAT IRRITATION, HEADACHE AND/OR SOME NAUSEA.    FOR 24 HOURS DO NOT:  Drive, operate machinery or run household appliances.  Drink beer or alcoholic beverages.   Make important decisions or sign legal documents.    SPECIAL INSTRUCTIONS:   Regular Diet     Ok To Shower, keep incisions as dry as possible, do not submerge.   Keep dressing dry and in place     Take over the counter stool softeners as needed for constipation     No strenuous activity of lifting >20 lbs for six weeks.   Follow up with Dr. Hussein in office in 10-14 days    DIET: To avoid nausea, slowly advance diet as tolerated, avoiding spicy or greasy foods for the first day.  Add more substantial food to your diet according to your physician's instructions.  Babies can be fed formula or breast milk as soon as they are hungry.  INCREASE FLUIDS AND FIBER TO AVOID CONSTIPATION.    SURGICAL DRESSING/BATHING: See above \"special instructions\"    FOLLOW-UP APPOINTMENT:  A follow-up appointment should be arranged with your doctor in 10-14 days; call to schedule.    You should CALL YOUR PHYSICIAN if you develop:  Fever greater than 101 degrees F.  Pain not relieved by medication, or persistent nausea or vomiting.  Excessive bleeding (blood soaking through dressing) or unexpected drainage from the wound.  Extreme redness or swelling around the incision site, drainage of pus or foul smelling drainage.  Inability to urinate or empty your bladder within 8 hours.  Problems with breathing or chest pain.    You should call 911 if you develop problems with breathing or chest pain.  If you are unable to contact your doctor or surgical center, " you should go to the nearest emergency room or urgent care center.  Physician's telephone #: [phone]    If any questions arise, call your doctor.  If your doctor is not available, please feel free to call the Surgical Center at [phone].  The Center is open Monday through Friday from 7AM to 7PM.  You can also call the HEALTH HOTLINE open 24 hours/day, 7 days/week and speak to a nurse at [phone], or toll free at [phone].    A registered nurse may call you a few days after your surgery to see how you are doing after your procedure.    MEDICATIONS: Resume taking daily medication.  Take prescribed pain medication with food.  If no medication is prescribed, you may take non-aspirin pain medication if needed.  PAIN MEDICATION CAN BE VERY CONSTIPATING.  Take a stool softener or laxative such as senokot, pericolace, or milk of magnesia if needed.    Prescription given for Percocet (Pain).  Last pain medication given at 3:19    If your physician has prescribed pain medication that includes Acetaminophen (Tylenol), do not take additional Acetaminophen (Tylenol) while taking the prescribed medication.    Depression / Suicide Risk    As you are discharged from this [facility] facility, it is important to learn how to keep safe from harming yourself.    Recognize the warning signs:  · Abrupt changes in personality, positive or negative- including increase in energy   · Giving away possessions  · Change in eating patterns- significant weight changes-  positive or negative  · Change in sleeping patterns- unable to sleep or sleeping all the time   · Unwillingness or inability to communicate  · Depression  · Unusual sadness, discouragement and loneliness  · Talk of wanting to die  · Neglect of personal appearance   · Rebelliousness- reckless behavior  · Withdrawal from people/activities they love  · Confusion- inability to concentrate     If you or a loved one observes any of these behaviors or has  concerns about self-harm, here's what you can do:  · Talk about it- your feelings and reasons for harming yourself  · Remove any means that you might use to hurt yourself (examples: pills, rope, extension cords, firearm)  · Get professional help from the community (Mental Health, Substance Abuse, psychological counseling)  · Do not be alone:Call your Safe Contact- someone whom you trust who will be there for you.  · Call your local CRISIS HOTLINE 351-2855 or 406-571-5095  · Call your local Children's Mobile Crisis Response Team [place] (642) 077-4175 or www.[employer]  · Call the toll free National Suicide Prevention Hotlines   · National Suicide Prevention Lifeline 812-653-CVEL (7488)  · National Hope Line Network 800-SUICIDE (028-5278)

## 2019-03-13 NOTE — ANESTHESIA TIME REPORT
Provider Times   Anesthesia Start and Stop Event Times     Date Time Event    3/13/2019 1355 Anesthesia Start     1458 Anesthesia Stop

## 2019-03-13 NOTE — PROGRESS NOTES
Med rec updated and complete  Allergies reviewed  Interviewed pt with daughter at bedside with permission from pt.  Pt had a list of medications, went over list of medications and returned list of medications back to pt.  Pt reports no antibiotics in the last 30 days.

## 2019-03-13 NOTE — ANESTHESIA PROCEDURE NOTES
Airway  Date/Time: 3/13/2019 2:11 PM  Performed by: EVERARDO THOMAS  Authorized by: EVERARDO THOMAS     Location:  OR  Urgency:  Elective  Indications for Airway Management:  Anesthesia  Spontaneous Ventilation: absent    Sedation Level:  Deep  Preoxygenated: Yes    Final Airway Type:  Supraglottic airway  Final Supraglottic Airway:  Standard LMA  Number of Attempts at Approach:  1

## 2019-03-13 NOTE — ANESTHESIA POSTPROCEDURE EVALUATION
Patient: Ragini Reaves    Procedure Summary     Date:  03/13/19 Room / Location:  [place] [address] / SURGERY [facility]    Anesthesia Start:  1355 Anesthesia Stop:  1458    Procedure:  BREAST BIOPSY - WIRE LOCALIZED (Right Breast) Diagnosis:  (RIGHT BREAST MASS)    Surgeon:  Karuna Hussein M.D. Responsible Provider:  Baldemar Esteban M.D.    Anesthesia Type:  general ASA Status:  3          Final Anesthesia Type: general  Last vitals  BP   NIBP: 102/53 (03/13/19 1530)    Temp   36.5 °C (97.7 °F) (03/13/19 1452)    Pulse   Pulse: 75 (03/13/19 1530), Heart Rate (Monitored): 81 (03/13/19 1530)   Resp   14 (03/13/19 1530)    SpO2   91 % (03/13/19 1530)      Anesthesia Post Evaluation    Patient location during evaluation: PACU  Patient participation: complete - patient participated  Level of consciousness: awake and alert    Airway patency: patent  Anesthetic complications: no  Cardiovascular status: hemodynamically stable  Respiratory status: acceptable  Hydration status: euvolemic    PONV: none

## 2019-03-13 NOTE — OR SURGEON
Immediate Post OP Note    PreOp Diagnosis: Suspicious abnormality on mammogram, not amendable to stereotactic biopsy    PostOp Diagnosis: Suspicious abnormality on mammogram, not amendable to stereotactic biopsy    Procedure(s):  BREAST BIOPSY - WIRE LOCALIZED - Wound Class: Clean    Surgeon(s):  Karuna Hussein M.D.    Anesthesiologist/Type of Anesthesia:  Anesthesiologist: Baldemar Esteban M.D./General    Surgical Staff:  Circulator: Mari Jones R.N.  Scrub Person: Garth Jones    Specimens removed if any:  ID Type Source Tests Collected by Time Destination   A : RIGHT BREAST MASS Tissue Breast PATHOLOGY SPECIMEN Karuna Hussein M.D. 3/13/2019  2:26 PM        Estimated Blood Loss: 5cc    Findings: microcalcifications within biopsy specimen on xray.  Sub centimeter firm irregular mass at target area    Complications: none        3/13/2019 2:49 PM Karuna Hussein M.D.

## 2019-03-13 NOTE — ANESTHESIA POSTPROCEDURE EVALUATION
Patient: Ragini Reaves    Procedure Summary     Date:  03/13/19 Room / Location:  [place] [address] / SURGERY [facility]    Anesthesia Start:  1355 Anesthesia Stop:  1458    Procedure:  BREAST BIOPSY - WIRE LOCALIZED (Right Breast) Diagnosis:  (RIGHT BREAST MASS)    Surgeon:  Karuna Hussein M.D. Responsible Provider:  Baldemar Esteban M.D.    Anesthesia Type:  general ASA Status:  3          Final Anesthesia Type: general  Last vitals  BP   NIBP: 102/53 (03/13/19 1530)    Temp   36.5 °C (97.7 °F) (03/13/19 1452)    Pulse   Pulse: 75 (03/13/19 1530), Heart Rate (Monitored): 81 (03/13/19 1530)   Resp   14 (03/13/19 1530)    SpO2   91 % (03/13/19 1530)      Anesthesia Post Evaluation    Patient location during evaluation: PACU  Patient participation: complete - patient participated  Level of consciousness: awake and alert  Pain score: 4    Airway patency: patent  Anesthetic complications: no  Cardiovascular status: hemodynamically stable  Respiratory status: acceptable  Hydration status: euvolemic    PONV: none

## 2019-03-20 ENCOUNTER — NON-PROVIDER VISIT (OUTPATIENT)
Dept: MEDICAL GROUP | Facility: MEDICAL CENTER | Age: 74
End: 2019-03-20
Payer: MEDICARE

## 2019-03-20 VITALS — WEIGHT: 232 LBS | BODY MASS INDEX: 43.8 KG/M2 | HEIGHT: 61 IN

## 2019-03-20 PROCEDURE — G0447 BEHAVIOR COUNSEL OBESITY 15M: HCPCS | Performed by: NURSE PRACTITIONER

## 2019-03-20 NOTE — PROGRESS NOTES
"3/20/2019     Visit #12       No ref. provider found      74 y.o.           Time in/Out:  11:03-11:18 am     ASSESS:    Vitals:    03/20/19 1105   Weight: 105.2 kg (232 lb)   Height: 1.549 m (5' 1\")            Wt Readings from Last 2 Encounters:   03/20/19 105.2 kg (232 lb)   03/13/19 105.8 kg (233 lb 4 oz)            Body mass index is 43.84 kg/m².       Difference:  - 1.4 lbs      Starting weight:  233.5 lbs     Difference:  - 1.5 lbs      Current Dietary/Exercise Habits:  Very relieved her biopsy results came back negative for cancer. Feels she can now get back to focusing on her health. Much less stressed. Fasting blood sugars have been much higher since after the surgery, also ate a lot of cookies last night which she knows didn't help.     Estimated Stage of Change:    Preparation as evidenced by stating she knows she need to get back to walking.      ADVISE:      Physical Activity:  Ragini would like to get back walking which she has not been doing. She has planned to start with a goal of 3 days a week for about 15 minutes / around her block.      Dietary Guidelines:  The pt would like to put her focus on limiting sweets because she knows this is an area she can improve. She has set a goal to limit sweets to twice a month and will have cake when she goes to celebrate the monthly birthdays with friends. Suggested she restart a food journal for accountability.     The patient has been advised of how weight management and physical activity impacts their health and will help to reduce complications and health risk factors.        AGREE:  Visit #12 Goals:  1) Resume walking 3 days a week around the block  2) Limit sweets to twice a month     ASSIST:  Ragini feels that she can now refocus on her weight loss goals now that the results of her biopsy have come back negative. She is going to start getting back into her walking routine and making sure to include non-starchy veggies in her meals.       ARRANGE:  "    Return for follow-up in 1 month      The patient was assisted in making follow-up appointment per orders.

## 2019-03-25 ENCOUNTER — PREP FOR PROCEDURE (OUTPATIENT)
Dept: SCHEDULING | Facility: MEDICAL CENTER | Age: 74
End: 2019-03-25

## 2019-03-25 PROBLEM — N63.10 BREAST MASS, RIGHT: Status: ACTIVE | Noted: 2019-03-25

## 2019-03-26 ENCOUNTER — APPOINTMENT (OUTPATIENT)
Dept: ADMISSIONS | Facility: MEDICAL CENTER | Age: 74
End: 2019-03-26
Attending: SURGERY
Payer: MEDICARE

## 2019-03-28 ENCOUNTER — HOSPITAL ENCOUNTER (OUTPATIENT)
Facility: MEDICAL CENTER | Age: 74
End: 2019-03-28
Attending: SURGERY | Admitting: SURGERY
Payer: MEDICARE

## 2019-03-28 ENCOUNTER — ANESTHESIA (OUTPATIENT)
Dept: SURGERY | Facility: MEDICAL CENTER | Age: 74
End: 2019-03-28
Payer: MEDICARE

## 2019-03-28 ENCOUNTER — ANESTHESIA EVENT (OUTPATIENT)
Dept: SURGERY | Facility: MEDICAL CENTER | Age: 74
End: 2019-03-28
Payer: MEDICARE

## 2019-03-28 VITALS
BODY MASS INDEX: 43.12 KG/M2 | OXYGEN SATURATION: 96 % | WEIGHT: 228.4 LBS | RESPIRATION RATE: 14 BRPM | TEMPERATURE: 98.3 F | HEIGHT: 61 IN | HEART RATE: 74 BPM

## 2019-03-28 DIAGNOSIS — N63.10 BREAST MASS, RIGHT: ICD-10-CM

## 2019-03-28 LAB
GLUCOSE BLD-MCNC: 150 MG/DL (ref 65–99)
PATHOLOGY CONSULT NOTE: NORMAL

## 2019-03-28 PROCEDURE — 160036 HCHG PACU - EA ADDL 30 MINS PHASE I: Performed by: SURGERY

## 2019-03-28 PROCEDURE — 160009 HCHG ANES TIME/MIN: Performed by: SURGERY

## 2019-03-28 PROCEDURE — 160035 HCHG PACU - 1ST 60 MINS PHASE I: Performed by: SURGERY

## 2019-03-28 PROCEDURE — A9270 NON-COVERED ITEM OR SERVICE: HCPCS | Performed by: ANESTHESIOLOGY

## 2019-03-28 PROCEDURE — 88305 TISSUE EXAM BY PATHOLOGIST: CPT

## 2019-03-28 PROCEDURE — 700102 HCHG RX REV CODE 250 W/ 637 OVERRIDE(OP): Performed by: ANESTHESIOLOGY

## 2019-03-28 PROCEDURE — 700101 HCHG RX REV CODE 250: Performed by: ANESTHESIOLOGY

## 2019-03-28 PROCEDURE — 160002 HCHG RECOVERY MINUTES (STAT): Performed by: SURGERY

## 2019-03-28 PROCEDURE — 88307 TISSUE EXAM BY PATHOLOGIST: CPT | Mod: 59

## 2019-03-28 PROCEDURE — 501838 HCHG SUTURE GENERAL: Performed by: SURGERY

## 2019-03-28 PROCEDURE — 160029 HCHG SURGERY MINUTES - 1ST 30 MINS LEVEL 4: Performed by: SURGERY

## 2019-03-28 PROCEDURE — 160046 HCHG PACU - 1ST 60 MINS PHASE II: Performed by: SURGERY

## 2019-03-28 PROCEDURE — 500002 HCHG ADHESIVE, DERMABOND: Performed by: SURGERY

## 2019-03-28 PROCEDURE — 160048 HCHG OR STATISTICAL LEVEL 1-5: Performed by: SURGERY

## 2019-03-28 PROCEDURE — 160025 RECOVERY II MINUTES (STATS): Performed by: SURGERY

## 2019-03-28 PROCEDURE — 700111 HCHG RX REV CODE 636 W/ 250 OVERRIDE (IP)

## 2019-03-28 PROCEDURE — 160041 HCHG SURGERY MINUTES - EA ADDL 1 MIN LEVEL 4: Performed by: SURGERY

## 2019-03-28 PROCEDURE — 700101 HCHG RX REV CODE 250

## 2019-03-28 PROCEDURE — 82962 GLUCOSE BLOOD TEST: CPT

## 2019-03-28 PROCEDURE — 700111 HCHG RX REV CODE 636 W/ 250 OVERRIDE (IP): Performed by: ANESTHESIOLOGY

## 2019-03-28 PROCEDURE — A6402 STERILE GAUZE <= 16 SQ IN: HCPCS | Performed by: SURGERY

## 2019-03-28 RX ORDER — DIPHENHYDRAMINE HYDROCHLORIDE 50 MG/ML
12.5 INJECTION INTRAMUSCULAR; INTRAVENOUS
Status: DISCONTINUED | OUTPATIENT
Start: 2019-03-28 | End: 2019-03-28 | Stop reason: HOSPADM

## 2019-03-28 RX ORDER — HYDROMORPHONE HYDROCHLORIDE 1 MG/ML
0.4 INJECTION, SOLUTION INTRAMUSCULAR; INTRAVENOUS; SUBCUTANEOUS
Status: DISCONTINUED | OUTPATIENT
Start: 2019-03-28 | End: 2019-03-28 | Stop reason: HOSPADM

## 2019-03-28 RX ORDER — HALOPERIDOL 5 MG/ML
1 INJECTION INTRAMUSCULAR
Status: DISCONTINUED | OUTPATIENT
Start: 2019-03-28 | End: 2019-03-28 | Stop reason: HOSPADM

## 2019-03-28 RX ORDER — ONDANSETRON 2 MG/ML
4 INJECTION INTRAMUSCULAR; INTRAVENOUS
Status: DISCONTINUED | OUTPATIENT
Start: 2019-03-28 | End: 2019-03-28 | Stop reason: HOSPADM

## 2019-03-28 RX ORDER — OXYCODONE HCL 5 MG/5 ML
10 SOLUTION, ORAL ORAL
Status: DISCONTINUED | OUTPATIENT
Start: 2019-03-28 | End: 2019-03-28 | Stop reason: HOSPADM

## 2019-03-28 RX ORDER — ACETAMINOPHEN 500 MG
1000 TABLET ORAL ONCE
Status: COMPLETED | OUTPATIENT
Start: 2019-03-28 | End: 2019-03-28

## 2019-03-28 RX ORDER — SODIUM CHLORIDE, SODIUM LACTATE, POTASSIUM CHLORIDE, CALCIUM CHLORIDE 600; 310; 30; 20 MG/100ML; MG/100ML; MG/100ML; MG/100ML
INJECTION, SOLUTION INTRAVENOUS CONTINUOUS
Status: DISCONTINUED | OUTPATIENT
Start: 2019-03-28 | End: 2019-03-28 | Stop reason: HOSPADM

## 2019-03-28 RX ORDER — MEPERIDINE HYDROCHLORIDE 25 MG/ML
6.25 INJECTION INTRAMUSCULAR; INTRAVENOUS; SUBCUTANEOUS
Status: DISCONTINUED | OUTPATIENT
Start: 2019-03-28 | End: 2019-03-28 | Stop reason: HOSPADM

## 2019-03-28 RX ORDER — OXYCODONE HCL 5 MG/5 ML
5 SOLUTION, ORAL ORAL
Status: DISCONTINUED | OUTPATIENT
Start: 2019-03-28 | End: 2019-03-28 | Stop reason: HOSPADM

## 2019-03-28 RX ORDER — ONDANSETRON 2 MG/ML
INJECTION INTRAMUSCULAR; INTRAVENOUS PRN
Status: DISCONTINUED | OUTPATIENT
Start: 2019-03-28 | End: 2019-03-28 | Stop reason: SURG

## 2019-03-28 RX ORDER — BUPIVACAINE HYDROCHLORIDE AND EPINEPHRINE 5; 5 MG/ML; UG/ML
INJECTION, SOLUTION EPIDURAL; INTRACAUDAL; PERINEURAL
Status: DISCONTINUED | OUTPATIENT
Start: 2019-03-28 | End: 2019-03-28 | Stop reason: HOSPADM

## 2019-03-28 RX ORDER — HYDROMORPHONE HYDROCHLORIDE 1 MG/ML
0.2 INJECTION, SOLUTION INTRAMUSCULAR; INTRAVENOUS; SUBCUTANEOUS
Status: DISCONTINUED | OUTPATIENT
Start: 2019-03-28 | End: 2019-03-28 | Stop reason: HOSPADM

## 2019-03-28 RX ORDER — HYDROMORPHONE HYDROCHLORIDE 1 MG/ML
0.1 INJECTION, SOLUTION INTRAMUSCULAR; INTRAVENOUS; SUBCUTANEOUS
Status: DISCONTINUED | OUTPATIENT
Start: 2019-03-28 | End: 2019-03-28 | Stop reason: HOSPADM

## 2019-03-28 RX ORDER — CEFAZOLIN SODIUM 1 G/3ML
INJECTION, POWDER, FOR SOLUTION INTRAMUSCULAR; INTRAVENOUS PRN
Status: DISCONTINUED | OUTPATIENT
Start: 2019-03-28 | End: 2019-03-28 | Stop reason: SURG

## 2019-03-28 RX ORDER — DEXAMETHASONE SODIUM PHOSPHATE 4 MG/ML
INJECTION, SOLUTION INTRA-ARTICULAR; INTRALESIONAL; INTRAMUSCULAR; INTRAVENOUS; SOFT TISSUE PRN
Status: DISCONTINUED | OUTPATIENT
Start: 2019-03-28 | End: 2019-03-28 | Stop reason: SURG

## 2019-03-28 RX ADMIN — ACETAMINOPHEN 1000 MG: 500 TABLET, FILM COATED ORAL at 07:13

## 2019-03-28 RX ADMIN — FENTANYL CITRATE 50 MCG: 50 INJECTION, SOLUTION INTRAMUSCULAR; INTRAVENOUS at 08:19

## 2019-03-28 RX ADMIN — FENTANYL CITRATE 50 MCG: 50 INJECTION, SOLUTION INTRAMUSCULAR; INTRAVENOUS at 08:25

## 2019-03-28 RX ADMIN — PROPOFOL 150 MG: 10 INJECTION, EMULSION INTRAVENOUS at 08:19

## 2019-03-28 RX ADMIN — DEXAMETHASONE SODIUM PHOSPHATE 4 MG: 4 INJECTION, SOLUTION INTRAMUSCULAR; INTRAVENOUS at 08:25

## 2019-03-28 RX ADMIN — ONDANSETRON 4 MG: 2 INJECTION INTRAMUSCULAR; INTRAVENOUS at 08:35

## 2019-03-28 RX ADMIN — SODIUM CHLORIDE, SODIUM LACTATE, POTASSIUM CHLORIDE, CALCIUM CHLORIDE: 600; 310; 30; 20 INJECTION, SOLUTION INTRAVENOUS at 07:13

## 2019-03-28 RX ADMIN — LIDOCAINE HYDROCHLORIDE 100 MG: 20 INJECTION, SOLUTION INTRAVENOUS at 08:19

## 2019-03-28 RX ADMIN — SODIUM CHLORIDE, SODIUM LACTATE, POTASSIUM CHLORIDE, CALCIUM CHLORIDE: 600; 310; 30; 20 INJECTION, SOLUTION INTRAVENOUS at 08:00

## 2019-03-28 RX ADMIN — CEFAZOLIN 2 G: 330 INJECTION, POWDER, FOR SOLUTION INTRAMUSCULAR; INTRAVENOUS at 08:10

## 2019-03-28 NOTE — OP REPORT
DATE OF SERVICE:  03/28/2019    PREOPERATIVE DIAGNOSIS:  Ductal carcinoma in situ, right breast.    POSTOPERATIVE DIAGNOSIS:  Ductal carcinoma in situ, right breast.    PROCEDURE:  Reexcision right breast biopsy site for margin.    SURGEON:  Karuna Hussein MD    ANESTHESIOLOGIST:  Jay Prieto MD    ANESTHESIA:  GET.    FINDINGS:  Seroma cavity within biopsy site.    COMPLICATIONS:  None.    ESTIMATED BLOOD LOSS:  10 mL.    SPECIMENS:  1.  Epidermal scar.  2.  Inferior margin.  3.  Posterior margin.  4.  Lateral margin.  5.  Medial margin.  6.  Superior margin.    COMPLICATIONS:  None.    INDICATIONS:  Patient is a 74-year-old female, a little over a week ago, she   underwent wire localized excisional biopsy, right breast.  Patient had a   suspicious finding on mammogram and did not tolerate a stereotactic biopsy, so   needle biopsy was indicated.  Pathology returned with ductal carcinoma in   situ.  A 2.2 cm lesion grade II with focal necrosis, ER/[place] positive.  There   was DCIS present at the inferior and posterior margins over 1 mm and the   lateral margin free by less than 0.5 mm, superior margin free by 1 mm,   surgical reexcision for adequate margins was indicated.    PROCEDURE IN DETAIL:  Patient was consented preoperatively, taken to operating   room and placed in the supine position.  Anesthesia was induced and LMA was   placed.  Right breast region was then prepped and draped, patient's previous   scar inferomedial aspect of the right breast.  This was reopened with a 15   blade scalpel, deepened down through the dermis and a seroma cavity.  The   biopsy site was encountered.  Using an Allis, the inferior margin of the   biopsy site was grasped, excised with electrocautery, it was marked with a 3-0   silk suture at the inferior margin and sent for permanent section to   pathology.  Additional posterior, lateral, medial, and superior margins were   taken.  All specimens approximately 2 cm in width.  The  wound was then   irrigated out.  Hemostasis was achieved with electrocautery.  The deep layers   were closed with interrupted 2-0 Vicryl sutures.  Dermis closed with   interrupted 3-0 Vicryl and skin closed with a subcuticular stitch using 4-0   Monocryl, Steri-Strips, Tegaderm dressing was then applied.  All lap, sponge   and instrument counts were correct at the end of the case x2.  The patient   tolerated the procedure well.  She was awakened from anesthesia, extubated,   taken to the postanesthesia care unit in good condition.       ____________________________________     MD JURGEN Chris / DIVYA    DD:  03/28/2019 09:13:49  DT:  03/28/2019 10:37:51    D#:  [id number]  Job#:  [id number]

## 2019-03-28 NOTE — ANESTHESIA PREPROCEDURE EVALUATION
[facility] breast cancer    Relevant Problems   (+) Chronic diastolic congestive heart failure (HCC)   (+) DM type 2, goal HbA1c < 8% (HCC)   (+) Dyslipidemia, goal LDL below 100   (+) Essential hypertension   (+) Fatty liver   (+) Hypothyroidism due to non-medication exogenous substances   (+) Mild persistent asthma, uncomplicated   (+) Morbid obesity with BMI of 40.0-44.9, adult (HCC)   (+) Obstructive sleep apnea   (+) Uncontrolled type 2 diabetes mellitus without complication, without long-term current use of insulin (HCC)     MET >4 no CP    Physical Exam    Airway   Mallampati: II  TM distance: >3 FB  Neck ROM: full       Cardiovascular - normal exam  Rhythm: regular  Rate: normal  (-) murmur     Dental - normal exam  (+) upper dentures         Pulmonary - normal exam  Breath sounds clear to auscultation     Abdominal    Neurological - normal exam                 Anesthesia Plan    ASA 3   ASA physical status 3 criteria: morbid obesity - BMI greater than or equal to 40    Plan - general       Airway plan will be LMA        Induction: intravenous    Postoperative Plan: Postoperative administration of opioids is intended.    Pertinent diagnostic labs and testing reviewed    Informed Consent:    Anesthetic plan and risks discussed with patient.    Use of blood products discussed with: patient whom consented to blood products.

## 2019-03-28 NOTE — OR NURSING
0910  RECEIVED PATIENT FROM OR.  REPORT FROM DR. CARLIN.  NO AIRWAY IN PLACE.  RESPIRATIONS ARE EVEN AND UNLABORED.  GAUZE AND TEGADERM TO RIGHT BREAST ARE CDI.      0923  DR. PEOPLES AT BEDSIDE.    1000  DAUGHTER JANINA TO BEDSIDE.    1021  UP TO THE BATHROOM.     1037  SITTING IN RECLINER.    1122  DISCHARGED.  DISCHARGE INSTRUCTIONS GIVEN TO PATIENT AND HER DAUGHTER.  A VERBAL UNDERSTANDING OF ALL INSTRUCTIONS WAS STATED.  PATIENT TAKING PO, VOIDING AND AMBULATING WITHOUT DIFFICULTY.  PATIENT STATES SHE IS READY TO GO HOME.

## 2019-03-28 NOTE — ANESTHESIA POSTPROCEDURE EVALUATION
Patient: Ragini Reaves    Procedure Summary     Date:  03/28/19 Room / Location:  [facility] ROOM 26 / SURGERY SAME DAY [facility]    Anesthesia Start:  0808 Anesthesia Stop:  0912    Procedure:  MASTECTOMY - RE-EXCISION RIGHT BREAST BIOPSY SITE FOR MARGINS (Right Breast) Diagnosis:       Breast mass, right      (DCIS of right breast )    Surgeon:  Karuna Hussein M.D. Responsible Provider:  Jay Prieto M.D.    Anesthesia Type:  general ASA Status:  3          Final Anesthesia Type: general  Last vitals  BP   NIBP: 131/61    Temp   36.8 °C (98.3 °F)    Pulse   Pulse: 95, Heart Rate (Monitored): 96   Resp   12    SpO2   99 %      Anesthesia Post Evaluation     Nurse Pain Score: 0 (NPRS)

## 2019-03-28 NOTE — ANESTHESIA TIME REPORT
Anesthesia Start and Stop Event Times     Date Time Event    3/28/2019 0808 Anesthesia Start     0912 Anesthesia Stop        Responsible Staff  03/28/19    Name Role Begin End    Jay Prieto M.D. Anesth 0808 0912        Preop Diagnosis (Free Text):  Pre-op Diagnosis     DCIS of right breast         Preop Diagnosis (Codes):  Diagnosis Information     Diagnosis Code(s): Breast mass, right [N63.10]        Post op Diagnosis  Breast cancer (HCC)      Premium Reason  Non-Premium    Comments:

## 2019-03-28 NOTE — ANESTHESIA PROCEDURE NOTES
Airway  Date/Time: 3/28/2019 8:19 AM  Performed by: ROB CARLIN  Authorized by: ROB CARLIN     Location:  OR  Urgency:  Elective  Difficult Airway: No    Indications for Airway Management:  Anesthesia  Spontaneous Ventilation: absent    Sedation Level:  Deep  Preoxygenated: Yes    Mask Difficulty Assessment:  1 - vent by mask  Final Airway Type:  Supraglottic airway  Final Supraglottic Airway:  Standard LMA  SGA Size:  4  Number of Attempts at Approach:  1

## 2019-03-28 NOTE — OR SURGEON
Immediate Post OP Note    PreOp Diagnosis: DCIS    PostOp Diagnosis: DCIS    Procedure(s):  MASTECTOMY - RE-EXCISION RIGHT BREAST BIOPSY SITE FOR MARGINS - Wound Class: Clean    Surgeon(s):  Karuna Hussein M.D.    Anesthesiologist/Type of Anesthesia:  Anesthesiologist: Jay Prieto M.D./General    Surgical Staff:  Circulator: Harriet Slade R.N.  Scrub Person: Mono Guerra    Specimens removed if any:  ID Type Source Tests Collected by Time Destination   A : Right Breast Scar  Tissue Breast PATHOLOGY SPECIMEN Karuna Hussein M.D. 3/28/2019  8:28 AM    B : Right breast inferior margin Tissue Breast PATHOLOGY SPECIMEN Karuna Hussein M.D. 3/28/2019  8:30 AM    C : Right breast posterior margin Tissue Breast PATHOLOGY SPECIMEN Karuna Hussein M.D. 3/28/2019  8:39 AM    D : Right Breast lateral margin Tissue Breast PATHOLOGY SPECIMEN Karuna Hussein M.D. 3/28/2019  8:41 AM    E :  Right breast medial margin Tissue Breast PATHOLOGY SPECIMEN Karuna Hussein M.D. 3/28/2019  8:41 AM    F : Right breast superior margin Tissue Breast PATHOLOGY SPECIMEN Karuna Hussein M.D. 3/28/2019  8:45 AM        Estimated Blood Loss: 10cc    Findings: Seroma within biopsy cavity    Complications:  none        3/28/2019 9:10 AM Karuna Hussein M.D.

## 2019-03-28 NOTE — ANESTHESIA QCDR
2019 [facility] Clinical Data Registry (for Quality Improvement)     Postoperative nausea/vomiting risk protocol (Adult = 18 yrs and Pediatric 3-17 yrs)- (430 and 463)  General inhalation anesthetic (NOT TIVA) with PONV risk factors: Yes  Provision of anti-emetic therapy with at least 2 different classes of agents: Yes   Patient DID NOT receive anti-emetic therapy and reason is documented in Medical Record:  N/A    Multimodal Pain Management- (AQI59)  Patient undergoing Elective Surgery (i.e. Outpatient, or ASC, or Prescheduled Surgery prior to Hospital Admission): Yes  Use of Multimodal Pain Management, two or more drugs and/or interventions, NOT including systemic opioids: Yes   Exception: Documented allergy to multiple classes of analgesics:  N/A    PACU assessment of acute postoperative pain prior to Anesthesia Care End- Applies to Patients Age = 18- (ABG7)  Initial PACU pain score is which of the following: < 7/10  Patient unable to report pain score: N/A    Post-anesthetic transfer of care checklist/protocol to PACU/ICU- (426 and 427)  Upon conclusion of case, patient transferred to which of the following locations: PACU/Non-ICU  Use of transfer checklist/protocol: Yes  Exclusion: Service Performed in Patient Hospital Room (and thus did not require transfer): N/A    PACU Reintubation- (AQI31)  General anesthesia requiring endotracheal intubation (ETT) along with subsequent extubation in OR or PACU: No  Required reintubation in the PACU: N/A  Extubation was a planned trial documented in the medical record prior to removal of the original airway device: N/A    Unplanned admission to ICU related to anesthesia service up through end of PACU care- (MD51)  Unplanned admission to ICU (not initially anticipated at anesthesia start time): No

## 2019-03-28 NOTE — DISCHARGE INSTRUCTIONS
ACTIVITY: Rest and take it easy for the first 24 hours.  A responsible adult is recommended to remain with you during that time.  It is normal to feel sleepy.  We encourage you to not do anything that requires balance, judgment or coordination.    MILD FLU-LIKE SYMPTOMS ARE NORMAL. YOU MAY EXPERIENCE GENERALIZED MUSCLE ACHES, THROAT IRRITATION, HEADACHE AND/OR SOME NAUSEA.    FOR 24 HOURS DO NOT:  Drive, operate machinery or run household appliances.  Drink beer or alcoholic beverages.   Make important decisions or sign legal documents.    SPECIAL INSTRUCTIONS: *Ok To Shower, keep incisions as dry as possible, do not submerge.   Keep Plastic dressing in place for as long as possible, keep paper tapes in place.     Take over the counter stool softeners as needed for constipation     No strenuous activity of lifting >20 lbs for six weeks.   Follow up with Dr. Hussein in office in 10-14 days*    DIET: REGULAR DIET  To avoid nausea, slowly advance diet as tolerated, avoiding spicy or greasy foods for the first day.  Add more substantial food to your diet according to your physician's instructions.  Babies can be fed formula or breast milk as soon as they are hungry.  INCREASE FLUIDS AND FIBER TO AVOID CONSTIPATION.    SURGICAL DRESSING/BATHING: *MAY SHOWER TOMORROW*    FOLLOW-UP APPOINTMENT:  A follow-up appointment should be arranged with your doctor in *10-14 DAYS*; call to schedule.    You should CALL YOUR PHYSICIAN if you develop:  Fever greater than 101 degrees F.  Pain not relieved by medication, or persistent nausea or vomiting.  Excessive bleeding (blood soaking through dressing) or unexpected drainage from the wound.  Extreme redness or swelling around the incision site, drainage of pus or foul smelling drainage.  Inability to urinate or empty your bladder within 8 hours.  Problems with breathing or chest pain.    You should call 911 if you develop problems with breathing or chest pain.  If you are unable to  contact your doctor or surgical center, you should go to the nearest emergency room or urgent care center.  Physician's telephone #: *DR. PEOPLES [phone]**    If any questions arise, call your doctor.  If your doctor is not available, please feel free to call the Surgical Center at [phone].  The Center is open Monday through Friday from 7AM to 7PM.  You can also call the HEALTH HOTLINE open 24 hours/day, 7 days/week and speak to a nurse at [phone], or toll free at [phone].    A registered nurse may call you a few days after your surgery to see how you are doing after your procedure.    MEDICATIONS: Resume taking daily medication.  Take prescribed pain medication with food.  If no medication is prescribed, you may take non-aspirin pain medication if needed.  PAIN MEDICATION CAN BE VERY CONSTIPATING.  Take a stool softener or laxative such as senokot, pericolace, or milk of magnesia if needed.    Prescription given for *NONE**.  Last pain medication given at *________________________________.    If your physician has prescribed pain medication that includes Acetaminophen (Tylenol), do not take additional Acetaminophen (Tylenol) while taking the prescribed medication.    Depression / Suicide Risk    As you are discharged from this [facility] facility, it is important to learn how to keep safe from harming yourself.    Recognize the warning signs:  · Abrupt changes in personality, positive or negative- including increase in energy   · Giving away possessions  · Change in eating patterns- significant weight changes-  positive or negative  · Change in sleeping patterns- unable to sleep or sleeping all the time   · Unwillingness or inability to communicate  · Depression  · Unusual sadness, discouragement and loneliness  · Talk of wanting to die  · Neglect of personal appearance   · Rebelliousness- reckless behavior  · Withdrawal from people/activities they love  · Confusion- inability to  concentrate     If you or a loved one observes any of these behaviors or has concerns about self-harm, here's what you can do:  · Talk about it- your feelings and reasons for harming yourself  · Remove any means that you might use to hurt yourself (examples: pills, rope, extension cords, firearm)  · Get professional help from the community (Mental Health, Substance Abuse, psychological counseling)  · Do not be alone:Call your Safe Contact- someone whom you trust who will be there for you.  · Call your local CRISIS HOTLINE 152-5721 or 163-242-3231  · Call your local Children's Mobile Crisis Response Team [place] (504) 456-1466 or www.PsomasFMG  · Call the toll free National Suicide Prevention Hotlines   · National Suicide Prevention Lifeline 175-013-EJMC (5512)  [facility] Line Network 800-SUICIDE (594-6686)Discharge Education for patients on YASMANI (Obstructive Sleep Apnea) Protocol    Prior to receiving sedation or anesthesia, we screen all patients for Obstructive Sleep Apnea.  During your screening, you were identified as having suspected, but not confirmed Obstructive Sleep Apnea(YASMANI).    What is Obstructive Sleep Apnea?  Sleep apnea (AP-ne-ah) is a common disorder which involves breathing pauses that occur during sleep.  These can last from 10 seconds to a minute or longer.  Normal breathing resumes often with a loud snort or choking sound.    Sleep apnea occurs in all age groups and both genders but is more common in men and people over 40 years of age.  It has been estimated that as many as 18 million Americans have sleep apnea.  Most people who have sleep apnea don’t know they have it because it only occurs during sleep.  A family member and/or bed partner may first notice the signs of sleep apnea.  Sleep apnea is a chronic (ongoing) condition that disrupts the quality and quantity of your sleep repeatedly throughout the night.  This often results in excessive daytime sleepiness or fatigue  during the day.  It may also contribute to high blood pressure, heart problems, and complications following medications used for surgery and procedures.    To establish a definitive diagnosis, further testing from a specialist would be needed.  We recommend that you follow up with your primary care physician.    We recommend that you should be with an adult observer for at least 24 hours after your sedation/anesthesia.  If you have a CPAP machine, you should wear it during any sleep period (day or night) for the week following your procedure.  We encourage you to sleep on your side or in a sitting position, even with napping.  Lying flat on your back increases the risk of apnea and airway obstruction during your post procedure recovery period.    It is important to prevent over-sedation that could increase your risk for apnea.  Please take all pain medication as directed by your physician.  If you are not getting pain relief, please contact your physician to discuss possible approaches to relieving pain while minimizing medications that can affect your breathing and oxygen levels.      ·

## 2019-04-11 ENCOUNTER — OFFICE VISIT (OUTPATIENT)
Dept: HEALTH INFORMATION MANAGEMENT | Facility: MEDICAL CENTER | Age: 74
End: 2019-04-11
Payer: MEDICARE

## 2019-04-11 VITALS
OXYGEN SATURATION: 93 % | HEIGHT: 61 IN | HEART RATE: 89 BPM | BODY MASS INDEX: 43.8 KG/M2 | WEIGHT: 232 LBS | DIASTOLIC BLOOD PRESSURE: 64 MMHG | SYSTOLIC BLOOD PRESSURE: 114 MMHG

## 2019-04-11 DIAGNOSIS — K76.0 FATTY LIVER: ICD-10-CM

## 2019-04-11 DIAGNOSIS — G47.33 OBSTRUCTIVE SLEEP APNEA: ICD-10-CM

## 2019-04-11 DIAGNOSIS — I10 ESSENTIAL HYPERTENSION: ICD-10-CM

## 2019-04-11 DIAGNOSIS — E66.01 MORBID OBESITY WITH BMI OF 40.0-44.9, ADULT (HCC): ICD-10-CM

## 2019-04-11 DIAGNOSIS — E78.5 DYSLIPIDEMIA, GOAL LDL BELOW 100: ICD-10-CM

## 2019-04-11 DIAGNOSIS — E88.810 METABOLIC SYNDROME: ICD-10-CM

## 2019-04-11 PROCEDURE — 99214 OFFICE O/P EST MOD 30 MIN: CPT | Performed by: INTERNAL MEDICINE

## 2019-04-11 NOTE — PROGRESS NOTES
Bariatric Medicine Follow Up  Chief Complaint   Patient presents with   • Weight Gain       History of Present Illness:   Ragini Reaves is a 74 y.o. female who presents for weight management follow-up and to help address co-morbidities related to overweight, including T2 DM, HTN, HLD, metabolic syndrome.    During the patient's last visit, the following were discussed and recommended:  Weight Goal: 3-5% wt loss each month (pt goal weight is 145 lb)  Diet: [employer] Protein powder qam with PB powder  Written log before next visit  Physical Activity: Increase exercise, set goals next visit  Risk level for moderate/vigorous exercise program: Moderate  New Rx: None  Behavior change: Commit to change  Follow-up: 2 mos  IBT    Had lumpectomy for breast cancer about 6 weeks ago, gradually improving.  Awaiting oncology's further recommendations for treatment.  Realizes further weight loss will be important to reduce her risk of recurrence.    Still having [employer] protein with PB powder during the day.  Not drinking enough water.  Will set timer so that when she works on computer, drinks water.    Tracking her intake:  Every morning protein powder, fiber powder, probiotic, milk  Snack yogurt  Lunch: Tomato soup, cheese, crackers, onions  Snack hot chocolate or green beans or cheese stick  Dinner: Fish and spinach or egg with crackers or lentils and rice with cottage cheese  Not having dinner every day    Fasting glucose in the 150s since lumpectomy surgery.  Blood pressure controlled on losartan, furosemide.  Cholesterol stable on simvastatin.    Exercise:   Started walking around her block, about a quarter of a mile once per week     Review of Systems   Denies chest pain, lightheadedness, worsening fatigue.  All other ROS were reviewed and are otherwise unchanged from my previous visit with patient.    Physical Exam:    /64 (BP Location: Left arm, Patient Position: Sitting, BP Cuff Size: Large adult)   Pulse 89    "Ht 1.549 m (5' 1\")   Wt 105.2 kg (232 lb)   LMP  (LMP Unknown)   SpO2 93%   BMI 43.84 kg/m²   Waist Measurement   Waist: 53 inch/inches  Weight change since last visit: +2 lb (-2.7 lb total)  Waist Circum change since last visit: +1 in (0 in total)    Constitutional: Oriented to person, place, and time and well-developed, well-nourished, and in no distress.    Head: Normocephalic.   Musculoskeletal: Normal range of motion. No edema.   Neurological: Alert and oriented to person, place, and time. No muscle weakness.  Gait normal.   Skin: Warm and dry. Not diaphoretic.   Psychiatric: Mood, memory, affect and judgment normal.     Laboratory:   Recent labs reviewed.      Dietitian Assessment: I have reviewed the Dietitian's assessment related to this encounter.       ASSESSMENT/PLAN:  Body mass index is 43.84 kg/m².    Obesity Stage ([place]): 3; Class 3    1. Uncontrolled type 2 diabetes mellitus without complication, without long-term current use of insulin ([facility])     2. Essential hypertension     3. Dyslipidemia, goal LDL below 100     4. Fatty liver     5. Obstructive sleep apnea     6. Morbid obesity with BMI of 40.0-44.9, adult ([facility])     7. Metabolic syndrome       The patient has not lost significant weight or waist circumference since she started the program 1 year ago.  Needs to commit to more change, would benefit from anti-obesity medication.  Fasting glucose improved but still not under excellent control.  Blood pressure controlled on current medications.  Sleep stable.  Continue to encourage patient to commit to more lifestyle change such as increasing activity.  Needs to increase protein intake as well.    The patient and I have discussed at length and agree to the following recommendations, which are all addressing the above diagnoses:    Weight Goal: 3-5% wt loss each month (pt goal weight is 145 lb)  Diet: Increase lean meat, fish intake  Bring in written food journal next visit  Will set timer to " drink more water  Physical Activity:  Increase walking, 1/4 mi, 5/wk  Risk level for moderate/vigorous exercise program: moderate  New Rx: None.  Pt defers antiobesity meds.  Behavior change: Increase activity  Follow-up: 2 mos  RD via IBT next wk    Patient's body mass index is 43.84 kg/m². Exercise and nutrition counseling were performed at this visit.

## 2019-04-14 DIAGNOSIS — I50.32 CHRONIC DIASTOLIC CONGESTIVE HEART FAILURE (HCC): ICD-10-CM

## 2019-04-14 DIAGNOSIS — M79.89 LEG SWELLING: ICD-10-CM

## 2019-04-14 DIAGNOSIS — Z79.899 ON POTASSIUM WASTING DIURETIC THERAPY: ICD-10-CM

## 2019-04-16 RX ORDER — POTASSIUM CHLORIDE 750 MG/1
TABLET, FILM COATED, EXTENDED RELEASE ORAL
Qty: 90 TAB | Refills: 3 | Status: SHIPPED | OUTPATIENT
Start: 2019-04-16 | End: 2020-03-31

## 2019-04-17 ENCOUNTER — NON-PROVIDER VISIT (OUTPATIENT)
Dept: MEDICAL GROUP | Facility: MEDICAL CENTER | Age: 74
End: 2019-04-17
Payer: MEDICARE

## 2019-04-17 VITALS — BODY MASS INDEX: 43.92 KG/M2 | WEIGHT: 232.6 LBS | HEIGHT: 61 IN

## 2019-04-17 PROCEDURE — G0447 BEHAVIOR COUNSEL OBESITY 15M: HCPCS | Performed by: NURSE PRACTITIONER

## 2019-04-28 DIAGNOSIS — I10 ESSENTIAL HYPERTENSION: ICD-10-CM

## 2019-04-29 RX ORDER — FUROSEMIDE 40 MG/1
TABLET ORAL
Qty: 90 TAB | Refills: 3 | Status: SHIPPED | OUTPATIENT
Start: 2019-04-29 | End: 2019-06-11

## 2019-04-30 LAB
ALBUMIN SERPL-MCNC: 4.7 G/DL (ref 3.5–4.8)
ALBUMIN/GLOB SERPL: 2 {RATIO} (ref 1.2–2.2)
ALP SERPL-CCNC: 92 IU/L (ref 39–117)
ALT SERPL-CCNC: 26 IU/L (ref 0–32)
AST SERPL-CCNC: 24 IU/L (ref 0–40)
BILIRUB SERPL-MCNC: 0.6 MG/DL (ref 0–1.2)
BUN SERPL-MCNC: 24 MG/DL (ref 8–27)
BUN/CREAT SERPL: 36 (ref 12–28)
CALCIUM SERPL-MCNC: 9.5 MG/DL (ref 8.7–10.3)
CHLORIDE SERPL-SCNC: 100 MMOL/L (ref 96–106)
CHOLEST SERPL-MCNC: 145 MG/DL (ref 100–199)
CO2 SERPL-SCNC: 25 MMOL/L (ref 20–29)
CREAT SERPL-MCNC: 0.67 MG/DL (ref 0.57–1)
ERYTHROCYTE [DISTWIDTH] IN BLOOD BY AUTOMATED COUNT: 13.9 % (ref 12.3–15.4)
GLOBULIN SER CALC-MCNC: 2.3 G/DL (ref 1.5–4.5)
GLUCOSE SERPL-MCNC: 121 MG/DL (ref 65–99)
HBA1C MFR BLD: 7.4 % (ref 4.8–5.6)
HCT VFR BLD AUTO: 46.1 % (ref 34–46.6)
HDLC SERPL-MCNC: 45 MG/DL
HGB BLD-MCNC: 15 G/DL (ref 11.1–15.9)
LABORATORY COMMENT REPORT: NORMAL
LDLC SERPL CALC-MCNC: 78 MG/DL (ref 0–99)
MCH RBC QN AUTO: 28.1 PG (ref 26.6–33)
MCHC RBC AUTO-ENTMCNC: 32.5 G/DL (ref 31.5–35.7)
MCV RBC AUTO: 86 FL (ref 79–97)
NRBC BLD AUTO-RTO: ABNORMAL %
PLATELET # BLD AUTO: 226 X10E3/UL (ref 150–379)
POTASSIUM SERPL-SCNC: 4.3 MMOL/L (ref 3.5–5.2)
PROT SERPL-MCNC: 7 G/DL (ref 6–8.5)
RBC # BLD AUTO: 5.34 X10E6/UL (ref 3.77–5.28)
SODIUM SERPL-SCNC: 140 MMOL/L (ref 134–144)
TRIGL SERPL-MCNC: 110 MG/DL (ref 0–149)
TSH SERPL DL<=0.005 MIU/L-ACNC: 1.29 UIU/ML (ref 0.45–4.5)
VLDLC SERPL CALC-MCNC: 22 MG/DL (ref 5–40)
WBC # BLD AUTO: 8.5 X10E3/UL (ref 3.4–10.8)

## 2019-05-02 ENCOUNTER — PATIENT MESSAGE (OUTPATIENT)
Dept: HEALTH INFORMATION MANAGEMENT | Facility: OTHER | Age: 74
End: 2019-05-02

## 2019-05-02 ENCOUNTER — PATIENT OUTREACH (OUTPATIENT)
Dept: OTHER | Facility: MEDICAL CENTER | Age: 74
End: 2019-05-02

## 2019-05-02 NOTE — PROGRESS NOTES
Nurse Navigation Assessment      DX: 3/28/19: Right breast cancer DCIS grade 2  ER/TN +    POC: s/p lumpectomy.  Scheduled to see Dr. Mason on 5/16/19 for medical oncology referral.    FAMHX: Mother kidney; Mat GF leukemia/GI, personal hx of thyroid CA 18 yrs ago          BARRIERS ASSESSMENT:    TRANSPORTATION drives.  Daughter accompanies her to [employer]  EMPLOYMENT retired    FINANCIAL denies barriers at this time    INSURANCE Medicare/Medicaid   SUPPORT SYSTEM  Adult children live local.  Daughter Josefina Duran will accompany to her appts. Son Andrew Reaves. (pt gives permission to talk to either re: her care)  PSYCHOLOGICAL States she is scared, but she feels this is to be expected.  COMMUNICATION  No barrier noted   FAMILY CARE n/a  SELF CARE lives alone; independent and ambulatory         INTERVENTION:  No radiation referral noted in chart.  Pt unaware of need for radiation.  Phoned [facility] and spoke with NAOMI Booker who will request radiation referral be sent.  ONN letter sent via [employer]  Addressed questions on s/e of aromatase inhibitors

## 2019-05-06 ENCOUNTER — OFFICE VISIT (OUTPATIENT)
Dept: MEDICAL GROUP | Facility: MEDICAL CENTER | Age: 74
End: 2019-05-06
Payer: MEDICARE

## 2019-05-06 VITALS
HEIGHT: 61 IN | RESPIRATION RATE: 14 BRPM | BODY MASS INDEX: 44.18 KG/M2 | SYSTOLIC BLOOD PRESSURE: 122 MMHG | WEIGHT: 234 LBS | DIASTOLIC BLOOD PRESSURE: 68 MMHG | HEART RATE: 96 BPM | TEMPERATURE: 98.2 F | OXYGEN SATURATION: 96 %

## 2019-05-06 DIAGNOSIS — E11.9 DM TYPE 2, GOAL HBA1C < 8% (HCC): ICD-10-CM

## 2019-05-06 DIAGNOSIS — L81.9 ATYPICAL PIGMENTED SKIN LESION: ICD-10-CM

## 2019-05-06 DIAGNOSIS — I10 ESSENTIAL HYPERTENSION: ICD-10-CM

## 2019-05-06 DIAGNOSIS — D05.11 DUCTAL CARCINOMA IN SITU (DCIS) OF RIGHT BREAST: ICD-10-CM

## 2019-05-06 DIAGNOSIS — E78.5 DYSLIPIDEMIA, GOAL LDL BELOW 100: ICD-10-CM

## 2019-05-06 PROBLEM — N63.10 BREAST MASS, RIGHT: Status: RESOLVED | Noted: 2019-03-25 | Resolved: 2019-05-06

## 2019-05-06 PROBLEM — N63.0 BREAST LUMP IN FEMALE: Status: RESOLVED | Noted: 2019-02-11 | Resolved: 2019-05-06

## 2019-05-06 PROCEDURE — 99214 OFFICE O/P EST MOD 30 MIN: CPT | Performed by: FAMILY MEDICINE

## 2019-05-06 ASSESSMENT — PATIENT HEALTH QUESTIONNAIRE - PHQ9: CLINICAL INTERPRETATION OF PHQ2 SCORE: 0

## 2019-05-07 NOTE — TELEPHONE ENCOUNTER
Please resend to pharmacy, medicare will only cover testing once a day for non insulin dependent.

## 2019-05-09 ENCOUNTER — HOSPITAL ENCOUNTER (OUTPATIENT)
Dept: RADIATION ONCOLOGY | Facility: MEDICAL CENTER | Age: 74
End: 2019-05-31
Attending: RADIOLOGY
Payer: MEDICARE

## 2019-05-09 VITALS
WEIGHT: 230.3 LBS | HEIGHT: 61 IN | BODY MASS INDEX: 43.48 KG/M2 | SYSTOLIC BLOOD PRESSURE: 110 MMHG | OXYGEN SATURATION: 93 % | DIASTOLIC BLOOD PRESSURE: 61 MMHG | HEART RATE: 79 BPM

## 2019-05-09 DIAGNOSIS — D05.11 DUCTAL CARCINOMA IN SITU (DCIS) OF RIGHT BREAST: ICD-10-CM

## 2019-05-09 PROCEDURE — 99205 OFFICE O/P NEW HI 60 MIN: CPT | Performed by: RADIOLOGY

## 2019-05-09 PROCEDURE — 99214 OFFICE O/P EST MOD 30 MIN: CPT | Performed by: RADIOLOGY

## 2019-05-09 ASSESSMENT — PAIN SCALES - GENERAL: PAINLEVEL: NO PAIN

## 2019-05-09 NOTE — NON-PROVIDER
"Patient was seen today in clinic with Dr. Pierson for Consult.  Vitals signs and weight were obtained and pain assessment was completed.  Allergies and medications were reviewed with the patient.  Toxicities of treatment assessed.     Vitals/Pain:  Vitals:    05/09/19 1033   BP: 110/61   Pulse: 79   SpO2: 93%   Weight: 104.5 kg (230 lb 4.8 oz)   Height: 1.549 m (5' 1\")   Pain Score: No pain        Allergies:   Asa [aspirin]; Cough syrup m [cough cold-flu relief]; and Dextromethorphan    Current Medications:  Current Outpatient Prescriptions   Medication Sig Dispense Refill   • glucose blood (FREESTYLE LITE) strip 1 Strip by Other route 1 time daily as needed. 100 Strip 6   • furosemide (LASIX) 40 MG Tab TAKE 1 TABLET BY MOUTH EVERY DAY 90 Tab 3   • KLOR-CON 10 10 MEQ tablet TAKE 1 TABLET BY MOUTH EVERY DAY 90 Tab 3   • metFORMIN (GLUCOPHAGE) 500 MG Tab TAKE 1 TAB BY MOUTH 2 TIMES A DAY, WITH MEALS. 180 Tab 3   • levothyroxine (SYNTHROID) 150 MCG Tab Take 150 mcg by mouth Every morning on an empty stomach. Pt takes a 75MCG on Wed, all over days 150MCG     • Fiber Powder Take 1 Package by mouth every day.     • Probiotic Product (PROBIOTIC DAILY PO) Take 1 Package by mouth every day.     • docusate sodium (COLACE) 100 MG Cap Take 200 mg by mouth every day.     • acetaminophen (TYLENOL) 500 MG Tab Take 500 mg by mouth 2 Times a Day.     • NON SPECIFIED Take 300 mg by mouth every day. CISTANCHE     • CALCIUM-MAGNESIUM-VITAMIN D PO Take 1 Tab by mouth every day.     • BIOTIN PO Take 1 Tab by mouth every day.     • COLLAGEN PO Take 1 Tab by mouth every day.     • Glucosamine-Chondroitin (GLUCOSAMINE CHONDR COMPLEX PO) Take 1 Tab by mouth 2 Times a Day.     • spironolactone (ALDACTONE) 25 MG Tab TAKE 1 TABLET BY MOUTH EVERY DAY 90 Tab 3   • losartan (COZAAR) 25 MG Tab Take 1 Tab by mouth every day. 90 Tab 3   • linagliptin (TRADJENTA) 5 MG Tab tablet Take 1 Tab by mouth every day. 90 Tab 3   • simvastatin (ZOCOR) 5 MG Tab " TAKE 1 TAB BY MOUTH EVERY EVENING. 90 Tab 3   • furosemide (LASIX) 40 MG Tab Take 40 mg by mouth every morning.     • Lutein 10 MG Tab Take 10 mg by mouth every evening.     • aspirin 81 MG tablet Take 81 mg by mouth every morning. 90 Tab 3   • coenzyme Q-10 30 MG capsule Take 1 Cap by mouth every day. 30 Cap 11   • ascorbic acid (VITAMIN C) 500 MG tablet Take 1 Tab by mouth every day. 30 Tab 11   • Multiple Vitamins-Minerals (ULTRA WOMENS PACK) Misc Take 1 Tab by mouth every day. 30 Each 11     No current facility-administered medications for this encounter.          PCP:  Jerald Gross, Med Ass't

## 2019-05-09 NOTE — CONSULTS
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 Reaves today in consultation at the request of Dr. Hussein for her breast cancer.  Patient is a 74-year-old woman who by mammography was appreciated to have a 7 mm suspicious area of asymmetry in the right inferior medial aspect of her breast with associated microcalcifications.  By ultrasound this area could not be identified.  Patient's location and body habitus precluded stereotactic biopsy.  She was sent to Dr. Hussein for consideration of wire localized wide local excision.  This was done on March 13, 2019 and confirmed a 2.2 cm ductal carcinoma in situ with low to intermediate grade features.  Tumor was ER SC positive.  The surgical margins were positive in the inferior and posterior segments by 1 mm.  She was taken for reexcision on 3/28/2019 that showed a small area of residual ductal carcinoma in situ with final surgical margins being clear of malignancy.  She presents to me today to further discuss the role of radiation and breast conservation management.  She will see Dr. Mason next week to discuss estrogen blockade.    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   • Ringing in ears    • Shortness of breath    • Sleep apnea     cpap   • Snoring    • Tonsillitis    • Urinary incontinence    • Uterine fibroid    • Wears glasses         PAST SURGICAL HISTORY:  Past Surgical History:   Procedure Laterality Date   • MASTECTOMY Right 3/28/2019    Procedure: MASTECTOMY - RE-EXCISION RIGHT BREAST BIOPSY SITE FOR MARGINS;  Surgeon: Karuna Hussein M.D.;  Location: SURGERY SAME DAY [facility];  Service: General   • BREAST BIOPSY Right 3/13/2019    Procedure: BREAST BIOPSY - WIRE LOCALIZED;  Surgeon: Karuna Hussein M.D.;  Location: SURGERY [facility];  Service: Gen Robotic   • COLONOSCOPY WITH BIOPSY  2013    Performed by Mauro Garcia M.D. at ENDOSCOPY [facility]   • THYROIDECTOMY     • TONSILLECTOMY         GYNECOLOGICAL STATUS:  : 3 Para 2 Ab 1    HORMONE USE:  Contraceptive hormone use 5 years no hormone replacement therapy    CURRENT MEDICATIONS:  Current Outpatient Prescriptions   Medication Sig Dispense Refill   • glucose blood (FREESTYLE LITE) strip 1 Strip by Other route 1 time daily as needed. 100 Strip 6   • furosemide (LASIX) 40 MG Tab TAKE 1 TABLET BY MOUTH EVERY DAY 90 Tab 3   • KLOR-CON 10 10 MEQ tablet TAKE 1 TABLET BY MOUTH EVERY DAY 90 Tab 3   • metFORMIN (GLUCOPHAGE) 500 MG Tab TAKE 1 TAB BY MOUTH 2 TIMES A DAY, WITH MEALS. 180 Tab 3   • levothyroxine (SYNTHROID) 150 MCG Tab Take 150 mcg by mouth Every morning on an empty stomach. Pt takes a 75MCG on Wed, all over days 150MCG     • Fiber Powder Take 1 Package by mouth every day.     • Probiotic Product (PROBIOTIC DAILY PO) Take 1 Package by mouth every day.     • docusate sodium (COLACE) 100 MG Cap Take 200 mg by mouth every day.     • acetaminophen (TYLENOL) 500 MG Tab Take 500 mg by mouth 2 Times a Day.     • NON SPECIFIED Take 300 mg by mouth every day. CISTANCHE     • CALCIUM-MAGNESIUM-VITAMIN D PO Take 1 Tab by mouth every day.     • BIOTIN PO Take 1 Tab by mouth every day.     • COLLAGEN PO Take 1 Tab by mouth every day.     • Glucosamine-Chondroitin (GLUCOSAMINE CHONDR COMPLEX PO) Take 1 Tab by mouth 2 Times a Day.     • spironolactone (ALDACTONE) 25  "MG Tab TAKE 1 TABLET BY MOUTH EVERY DAY 90 Tab 3   • losartan (COZAAR) 25 MG Tab Take 1 Tab by mouth every day. 90 Tab 3   • linagliptin (TRADJENTA) 5 MG Tab tablet Take 1 Tab by mouth every day. 90 Tab 3   • simvastatin (ZOCOR) 5 MG Tab TAKE 1 TAB BY MOUTH EVERY EVENING. 90 Tab 3   • furosemide (LASIX) 40 MG Tab Take 40 mg by mouth every morning.     • Lutein 10 MG Tab Take 10 mg by mouth every evening.     • aspirin 81 MG tablet Take 81 mg by mouth every morning. 90 Tab 3   • coenzyme Q-10 30 MG capsule Take 1 Cap by mouth every day. 30 Cap 11   • ascorbic acid (VITAMIN C) 500 MG tablet Take 1 Tab by mouth every day. 30 Tab 11   • Multiple Vitamins-Minerals (ULTRA WOMENS PACK) Misc Take 1 Tab by mouth every day. 30 Each 11     No current facility-administered medications for this encounter.        ALLERGIES:    Asa [aspirin]; Cough syrup m [cough cold-flu relief]; and Dextromethorphan    FAMILY HISTORY:    Family History   Problem Relation Age of Onset   • Lung Disease Brother    • Cancer Mother         pelvic   • Stroke Maternal Grandmother        SOCIAL HISTORY:    Social History   Substance Use Topics   • Smoking status: Never Smoker   • Smokeless tobacco: Never Used   • Alcohol use No     Patient is retired from  work  Lives with: Independently but is accompanied by her daughter on today's visit    REVIEW OF SYSTEMS:  A complete review of systems was completed in patient's chart on 5/9/2019.  All are negative with relationship to this diagnosis with the exception of:  Patient feels she is healing well from her surgery without any suspicious lesions in the breast or axilla, she denies any musculoskeletal aches or pains, denies any neurologic symptoms.    PHYSICAL EXAM:    Vitals:    05/09/19 1033   BP: 110/61   Pulse: 79   SpO2: 93%   Weight: 104.5 kg (230 lb 4.8 oz)   Height: 1.549 m (5' 1\")   Pain Score: No pain      1= Restricted in physically strenuous activity, but ambulatory and able to carry out work of " a light sedentary nature, e.g., light housework, office work.      GENERAL: No apparent distress.  HEENT:  Pupils are equal, round, and reactive to light.  Extraocular muscles   are intact. Sclerae nonicteric.  Conjunctivae pink.  Oral cavity, tongue   protrudes midline.   NECK:  Supple without evidence of thyromegaly.  NODES:  No peripheral adenopathy of the neck, supraclavicular fossa or axillae   bilaterally.  LUNGS:  Clear to ascultation bilaterally   HEART:  Regular rate and rhythm.  No murmur appreciated  BREAST: No suspicious lesions found in either breast or axilla.  ABDOMEN:  Soft. No evidence of hepatosplenomegaly.  Positive bowel sounds.  EXTREMITIES:  Without Edema.  NEUROLOGIC:  Cranial nerves II through XII were intact. 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  I distinguish for the patient and her daughter invasive ductal carcinoma from ductal carcinoma in situ.  We went over mastectomy versus lumpectomy and radiation.  We went over lumpectomy with or without radiation.  I also discussed and identified favorable features for which she exhibits.  I anticipate hypofractionated radiotherapy with 15 fractions without a boost.  Based on that regimen patient felt comfortable with proceeding with radiation.  She will see Dr. Mason next week to discuss estrogen blockade.  She was given a simulation date for Tuesday at 10 AM      We discussed the risks, benefits and side effects of treatment and the patient is amenable to treatment.  If patient has any questions or concerns, she should feel free to contact  me.    Thank you for the opportunity to participate in her care.  If any questions or comments, please do not hesitate in calling.

## 2019-05-14 ENCOUNTER — HOSPITAL ENCOUNTER (OUTPATIENT)
Dept: RADIATION ONCOLOGY | Facility: MEDICAL CENTER | Age: 74
End: 2019-05-14

## 2019-05-14 PROCEDURE — 77290 THER RAD SIMULAJ FIELD CPLX: CPT | Performed by: RADIOLOGY

## 2019-05-14 PROCEDURE — 77334 RADIATION TREATMENT AID(S): CPT | Performed by: RADIOLOGY

## 2019-05-14 PROCEDURE — 77334 RADIATION TREATMENT AID(S): CPT | Mod: 26 | Performed by: RADIOLOGY

## 2019-05-14 PROCEDURE — 77290 THER RAD SIMULAJ FIELD CPLX: CPT | Mod: 26 | Performed by: RADIOLOGY

## 2019-05-14 PROCEDURE — 77263 THER RADIOLOGY TX PLNG CPLX: CPT | Performed by: RADIOLOGY

## 2019-05-15 PROCEDURE — 77295 3-D RADIOTHERAPY PLAN: CPT | Performed by: RADIOLOGY

## 2019-05-15 PROCEDURE — 77334 RADIATION TREATMENT AID(S): CPT | Performed by: RADIOLOGY

## 2019-05-15 PROCEDURE — 77300 RADIATION THERAPY DOSE PLAN: CPT | Mod: 26 | Performed by: RADIOLOGY

## 2019-05-15 PROCEDURE — 77334 RADIATION TREATMENT AID(S): CPT | Mod: 26 | Performed by: RADIOLOGY

## 2019-05-15 PROCEDURE — 77295 3-D RADIOTHERAPY PLAN: CPT | Mod: 26 | Performed by: RADIOLOGY

## 2019-05-15 PROCEDURE — 77300 RADIATION THERAPY DOSE PLAN: CPT | Performed by: RADIOLOGY

## 2019-05-21 ENCOUNTER — HOSPITAL ENCOUNTER (OUTPATIENT)
Dept: RADIATION ONCOLOGY | Facility: MEDICAL CENTER | Age: 74
End: 2019-05-21

## 2019-05-21 LAB
CHEMOTHERAPY INFUSION START DATE: NORMAL
CHEMOTHERAPY RECORDS: 2.67
CHEMOTHERAPY RECORDS: 4005
CHEMOTHERAPY RECORDS: NORMAL
CHEMOTHERAPY RX CANCER: NORMAL
RAD ONC ARIA COURSE TREATMENT ELAPSED DAYS: NORMAL
RAD ONC ARIA PLAN TREATMENT DATES: NORMAL
RAD ONC ARIA REFERENCE POINT DOSAGE GIVEN TO DATE: 2.67
RAD ONC ARIA REFERENCE POINT DOSAGE GIVEN TO DATE: 2.67
RAD ONC ARIA REFERENCE POINT ID: NORMAL
RAD ONC ARIA REFERENCE POINT ID: NORMAL
RAD ONC ARIA REFERENCE POINT SESSION DOSAGE GIVEN: 2.67
RAD ONC ARIA REFERENCE POINT SESSION DOSAGE GIVEN: 2.67

## 2019-05-21 PROCEDURE — 77417 THER RADIOLOGY PORT IMAGE(S): CPT | Performed by: RADIOLOGY

## 2019-05-21 PROCEDURE — 77412 RADIATION TX DELIVERY LVL 3: CPT | Performed by: RADIOLOGY

## 2019-05-21 PROCEDURE — 77280 THER RAD SIMULAJ FIELD SMPL: CPT | Performed by: RADIOLOGY

## 2019-05-22 LAB
CHEMOTHERAPY INFUSION START DATE: NORMAL
CHEMOTHERAPY RECORDS: 2.67
CHEMOTHERAPY RECORDS: 4005
CHEMOTHERAPY RECORDS: NORMAL
CHEMOTHERAPY RX CANCER: NORMAL
RAD ONC ARIA COURSE TREATMENT ELAPSED DAYS: NORMAL
RAD ONC ARIA PLAN TREATMENT DATES: NORMAL
RAD ONC ARIA REFERENCE POINT DOSAGE GIVEN TO DATE: 5.34
RAD ONC ARIA REFERENCE POINT DOSAGE GIVEN TO DATE: 5.34
RAD ONC ARIA REFERENCE POINT ID: NORMAL
RAD ONC ARIA REFERENCE POINT ID: NORMAL
RAD ONC ARIA REFERENCE POINT SESSION DOSAGE GIVEN: 2.67
RAD ONC ARIA REFERENCE POINT SESSION DOSAGE GIVEN: 2.67

## 2019-05-22 PROCEDURE — 77412 RADIATION TX DELIVERY LVL 3: CPT | Performed by: RADIOLOGY

## 2019-05-23 ENCOUNTER — HOSPITAL ENCOUNTER (OUTPATIENT)
Dept: RADIATION ONCOLOGY | Facility: MEDICAL CENTER | Age: 74
End: 2019-05-23

## 2019-05-23 LAB
CHEMOTHERAPY INFUSION START DATE: NORMAL
CHEMOTHERAPY RECORDS: 2.67
CHEMOTHERAPY RECORDS: 4005
CHEMOTHERAPY RECORDS: NORMAL
CHEMOTHERAPY RX CANCER: NORMAL
RAD ONC ARIA COURSE TREATMENT ELAPSED DAYS: NORMAL
RAD ONC ARIA PLAN TREATMENT DATES: NORMAL
RAD ONC ARIA REFERENCE POINT DOSAGE GIVEN TO DATE: 8.01
RAD ONC ARIA REFERENCE POINT DOSAGE GIVEN TO DATE: 8.01
RAD ONC ARIA REFERENCE POINT ID: NORMAL
RAD ONC ARIA REFERENCE POINT ID: NORMAL
RAD ONC ARIA REFERENCE POINT SESSION DOSAGE GIVEN: 2.67
RAD ONC ARIA REFERENCE POINT SESSION DOSAGE GIVEN: 2.67

## 2019-05-23 PROCEDURE — 77412 RADIATION TX DELIVERY LVL 3: CPT | Performed by: RADIOLOGY

## 2019-05-23 PROCEDURE — 77336 RADIATION PHYSICS CONSULT: CPT | Performed by: RADIOLOGY

## 2019-05-24 ENCOUNTER — HOSPITAL ENCOUNTER (OUTPATIENT)
Dept: RADIATION ONCOLOGY | Facility: MEDICAL CENTER | Age: 74
End: 2019-05-24

## 2019-05-24 LAB
CHEMOTHERAPY INFUSION START DATE: NORMAL
CHEMOTHERAPY RECORDS: 2.67
CHEMOTHERAPY RECORDS: 4005
CHEMOTHERAPY RECORDS: NORMAL
CHEMOTHERAPY RX CANCER: NORMAL
RAD ONC ARIA COURSE TREATMENT ELAPSED DAYS: NORMAL
RAD ONC ARIA PLAN TREATMENT DATES: NORMAL
RAD ONC ARIA REFERENCE POINT DOSAGE GIVEN TO DATE: 10.68
RAD ONC ARIA REFERENCE POINT DOSAGE GIVEN TO DATE: 10.68
RAD ONC ARIA REFERENCE POINT ID: NORMAL
RAD ONC ARIA REFERENCE POINT ID: NORMAL
RAD ONC ARIA REFERENCE POINT SESSION DOSAGE GIVEN: 2.67
RAD ONC ARIA REFERENCE POINT SESSION DOSAGE GIVEN: 2.67

## 2019-05-24 PROCEDURE — 77412 RADIATION TX DELIVERY LVL 3: CPT | Performed by: RADIOLOGY

## 2019-05-28 ENCOUNTER — HOSPITAL ENCOUNTER (OUTPATIENT)
Dept: RADIATION ONCOLOGY | Facility: MEDICAL CENTER | Age: 74
End: 2019-05-28

## 2019-05-28 LAB
CHEMOTHERAPY INFUSION START DATE: NORMAL
CHEMOTHERAPY RECORDS: 2.67
CHEMOTHERAPY RECORDS: 4005
CHEMOTHERAPY RECORDS: NORMAL
CHEMOTHERAPY RX CANCER: NORMAL
RAD ONC ARIA COURSE TREATMENT ELAPSED DAYS: NORMAL
RAD ONC ARIA PLAN TREATMENT DATES: NORMAL
RAD ONC ARIA REFERENCE POINT DOSAGE GIVEN TO DATE: 13.35
RAD ONC ARIA REFERENCE POINT DOSAGE GIVEN TO DATE: 13.35
RAD ONC ARIA REFERENCE POINT ID: NORMAL
RAD ONC ARIA REFERENCE POINT ID: NORMAL
RAD ONC ARIA REFERENCE POINT SESSION DOSAGE GIVEN: 2.67
RAD ONC ARIA REFERENCE POINT SESSION DOSAGE GIVEN: 2.67

## 2019-05-28 PROCEDURE — 77427 RADIATION TX MANAGEMENT X5: CPT | Performed by: RADIOLOGY

## 2019-05-28 PROCEDURE — 77412 RADIATION TX DELIVERY LVL 3: CPT | Performed by: RADIOLOGY

## 2019-05-29 LAB
CHEMOTHERAPY INFUSION START DATE: NORMAL
CHEMOTHERAPY RECORDS: 2.67
CHEMOTHERAPY RECORDS: 4005
CHEMOTHERAPY RECORDS: NORMAL
CHEMOTHERAPY RX CANCER: NORMAL
RAD ONC ARIA COURSE TREATMENT ELAPSED DAYS: NORMAL
RAD ONC ARIA PLAN TREATMENT DATES: NORMAL
RAD ONC ARIA REFERENCE POINT DOSAGE GIVEN TO DATE: 16.02
RAD ONC ARIA REFERENCE POINT DOSAGE GIVEN TO DATE: 16.02
RAD ONC ARIA REFERENCE POINT ID: NORMAL
RAD ONC ARIA REFERENCE POINT ID: NORMAL
RAD ONC ARIA REFERENCE POINT SESSION DOSAGE GIVEN: 2.67
RAD ONC ARIA REFERENCE POINT SESSION DOSAGE GIVEN: 2.67

## 2019-05-29 PROCEDURE — 77412 RADIATION TX DELIVERY LVL 3: CPT | Performed by: RADIOLOGY

## 2019-05-29 PROCEDURE — 77417 THER RADIOLOGY PORT IMAGE(S): CPT | Performed by: RADIOLOGY

## 2019-05-30 ENCOUNTER — HOSPITAL ENCOUNTER (OUTPATIENT)
Dept: RADIATION ONCOLOGY | Facility: MEDICAL CENTER | Age: 74
End: 2019-05-30

## 2019-05-30 LAB
CHEMOTHERAPY INFUSION START DATE: NORMAL
CHEMOTHERAPY RECORDS: 2.67
CHEMOTHERAPY RECORDS: 4005
CHEMOTHERAPY RECORDS: NORMAL
CHEMOTHERAPY RX CANCER: NORMAL
RAD ONC ARIA COURSE TREATMENT ELAPSED DAYS: NORMAL
RAD ONC ARIA PLAN TREATMENT DATES: NORMAL
RAD ONC ARIA REFERENCE POINT DOSAGE GIVEN TO DATE: 18.69
RAD ONC ARIA REFERENCE POINT DOSAGE GIVEN TO DATE: 18.69
RAD ONC ARIA REFERENCE POINT ID: NORMAL
RAD ONC ARIA REFERENCE POINT ID: NORMAL
RAD ONC ARIA REFERENCE POINT SESSION DOSAGE GIVEN: 2.67
RAD ONC ARIA REFERENCE POINT SESSION DOSAGE GIVEN: 2.67

## 2019-05-30 PROCEDURE — 77412 RADIATION TX DELIVERY LVL 3: CPT | Performed by: RADIOLOGY

## 2019-05-31 ENCOUNTER — HOSPITAL ENCOUNTER (OUTPATIENT)
Dept: RADIATION ONCOLOGY | Facility: MEDICAL CENTER | Age: 74
End: 2019-05-31

## 2019-05-31 LAB
CHEMOTHERAPY INFUSION START DATE: NORMAL
CHEMOTHERAPY RECORDS: 2.67
CHEMOTHERAPY RECORDS: 4005
CHEMOTHERAPY RECORDS: NORMAL
CHEMOTHERAPY RX CANCER: NORMAL
RAD ONC ARIA COURSE TREATMENT ELAPSED DAYS: NORMAL
RAD ONC ARIA PLAN TREATMENT DATES: NORMAL
RAD ONC ARIA REFERENCE POINT DOSAGE GIVEN TO DATE: 21.36
RAD ONC ARIA REFERENCE POINT DOSAGE GIVEN TO DATE: 21.36
RAD ONC ARIA REFERENCE POINT ID: NORMAL
RAD ONC ARIA REFERENCE POINT ID: NORMAL
RAD ONC ARIA REFERENCE POINT SESSION DOSAGE GIVEN: 2.67
RAD ONC ARIA REFERENCE POINT SESSION DOSAGE GIVEN: 2.67

## 2019-05-31 PROCEDURE — 77336 RADIATION PHYSICS CONSULT: CPT | Performed by: RADIOLOGY

## 2019-05-31 PROCEDURE — 77412 RADIATION TX DELIVERY LVL 3: CPT | Performed by: RADIOLOGY

## 2019-06-03 ENCOUNTER — HOSPITAL ENCOUNTER (OUTPATIENT)
Dept: RADIATION ONCOLOGY | Facility: MEDICAL CENTER | Age: 74
End: 2019-06-30
Attending: RADIOLOGY
Payer: MEDICARE

## 2019-06-03 ENCOUNTER — HOSPITAL ENCOUNTER (OUTPATIENT)
Dept: RADIATION ONCOLOGY | Facility: MEDICAL CENTER | Age: 74
End: 2019-06-03

## 2019-06-03 LAB
CHEMOTHERAPY INFUSION START DATE: NORMAL
CHEMOTHERAPY RECORDS: 2.67
CHEMOTHERAPY RECORDS: 4005
CHEMOTHERAPY RECORDS: NORMAL
CHEMOTHERAPY RX CANCER: NORMAL
RAD ONC ARIA COURSE TREATMENT ELAPSED DAYS: NORMAL
RAD ONC ARIA PLAN TREATMENT DATES: NORMAL
RAD ONC ARIA REFERENCE POINT DOSAGE GIVEN TO DATE: 24.03
RAD ONC ARIA REFERENCE POINT DOSAGE GIVEN TO DATE: 24.03
RAD ONC ARIA REFERENCE POINT ID: NORMAL
RAD ONC ARIA REFERENCE POINT ID: NORMAL
RAD ONC ARIA REFERENCE POINT SESSION DOSAGE GIVEN: 2.67
RAD ONC ARIA REFERENCE POINT SESSION DOSAGE GIVEN: 2.67

## 2019-06-03 PROCEDURE — 77412 RADIATION TX DELIVERY LVL 3: CPT | Performed by: RADIOLOGY

## 2019-06-04 ENCOUNTER — HOSPITAL ENCOUNTER (OUTPATIENT)
Dept: RADIATION ONCOLOGY | Facility: MEDICAL CENTER | Age: 74
End: 2019-06-04

## 2019-06-04 LAB
CHEMOTHERAPY INFUSION START DATE: NORMAL
CHEMOTHERAPY RECORDS: 2.67
CHEMOTHERAPY RECORDS: 4005
CHEMOTHERAPY RECORDS: NORMAL
CHEMOTHERAPY RX CANCER: NORMAL
RAD ONC ARIA COURSE TREATMENT ELAPSED DAYS: NORMAL
RAD ONC ARIA PLAN TREATMENT DATES: NORMAL
RAD ONC ARIA REFERENCE POINT DOSAGE GIVEN TO DATE: 26.7
RAD ONC ARIA REFERENCE POINT DOSAGE GIVEN TO DATE: 26.7
RAD ONC ARIA REFERENCE POINT ID: NORMAL
RAD ONC ARIA REFERENCE POINT ID: NORMAL
RAD ONC ARIA REFERENCE POINT SESSION DOSAGE GIVEN: 2.67
RAD ONC ARIA REFERENCE POINT SESSION DOSAGE GIVEN: 2.67

## 2019-06-04 PROCEDURE — 77412 RADIATION TX DELIVERY LVL 3: CPT | Performed by: RADIOLOGY

## 2019-06-04 PROCEDURE — 77427 RADIATION TX MANAGEMENT X5: CPT | Performed by: RADIOLOGY

## 2019-06-05 ENCOUNTER — OFFICE VISIT (OUTPATIENT)
Dept: DERMATOLOGY | Facility: IMAGING CENTER | Age: 74
End: 2019-06-05
Payer: MEDICARE

## 2019-06-05 ENCOUNTER — HOSPITAL ENCOUNTER (OUTPATIENT)
Facility: MEDICAL CENTER | Age: 74
End: 2019-06-05
Attending: DERMATOLOGY
Payer: MEDICARE

## 2019-06-05 VITALS — TEMPERATURE: 97.8 F | BODY MASS INDEX: 44.18 KG/M2 | HEIGHT: 61 IN | WEIGHT: 234 LBS

## 2019-06-05 DIAGNOSIS — D48.5 NEOPLASM OF UNCERTAIN BEHAVIOR OF SKIN: ICD-10-CM

## 2019-06-05 DIAGNOSIS — L57.0 ACTINIC KERATOSES: ICD-10-CM

## 2019-06-05 LAB
CHEMOTHERAPY INFUSION START DATE: NORMAL
CHEMOTHERAPY RECORDS: 2.67
CHEMOTHERAPY RECORDS: 4005
CHEMOTHERAPY RECORDS: NORMAL
CHEMOTHERAPY RX CANCER: NORMAL
RAD ONC ARIA COURSE TREATMENT ELAPSED DAYS: NORMAL
RAD ONC ARIA PLAN TREATMENT DATES: NORMAL
RAD ONC ARIA REFERENCE POINT DOSAGE GIVEN TO DATE: 29.37
RAD ONC ARIA REFERENCE POINT DOSAGE GIVEN TO DATE: 29.37
RAD ONC ARIA REFERENCE POINT ID: NORMAL
RAD ONC ARIA REFERENCE POINT ID: NORMAL
RAD ONC ARIA REFERENCE POINT SESSION DOSAGE GIVEN: 2.67
RAD ONC ARIA REFERENCE POINT SESSION DOSAGE GIVEN: 2.67

## 2019-06-05 PROCEDURE — 77417 THER RADIOLOGY PORT IMAGE(S): CPT | Performed by: RADIOLOGY

## 2019-06-05 PROCEDURE — 88341 IMHCHEM/IMCYTCHM EA ADD ANTB: CPT

## 2019-06-05 PROCEDURE — 17000 DESTRUCT PREMALG LESION: CPT | Mod: 59 | Performed by: DERMATOLOGY

## 2019-06-05 PROCEDURE — 11102 TANGNTL BX SKIN SINGLE LES: CPT | Performed by: DERMATOLOGY

## 2019-06-05 PROCEDURE — 88342 IMHCHEM/IMCYTCHM 1ST ANTB: CPT

## 2019-06-05 PROCEDURE — 77412 RADIATION TX DELIVERY LVL 3: CPT | Performed by: RADIOLOGY

## 2019-06-05 PROCEDURE — 17003 DESTRUCT PREMALG LES 2-14: CPT | Performed by: DERMATOLOGY

## 2019-06-05 PROCEDURE — 88305 TISSUE EXAM BY PATHOLOGIST: CPT

## 2019-06-05 ASSESSMENT — ENCOUNTER SYMPTOMS
CHILLS: 0
FEVER: 0

## 2019-06-06 ENCOUNTER — HOSPITAL ENCOUNTER (OUTPATIENT)
Dept: RADIATION ONCOLOGY | Facility: MEDICAL CENTER | Age: 74
End: 2019-06-06

## 2019-06-06 LAB
CHEMOTHERAPY INFUSION START DATE: NORMAL
CHEMOTHERAPY RECORDS: 2.67
CHEMOTHERAPY RECORDS: 4005
CHEMOTHERAPY RECORDS: NORMAL
CHEMOTHERAPY RX CANCER: NORMAL
PATHOLOGY CONSULT NOTE: NORMAL
RAD ONC ARIA COURSE TREATMENT ELAPSED DAYS: NORMAL
RAD ONC ARIA PLAN TREATMENT DATES: NORMAL
RAD ONC ARIA REFERENCE POINT DOSAGE GIVEN TO DATE: 32.04
RAD ONC ARIA REFERENCE POINT DOSAGE GIVEN TO DATE: 32.04
RAD ONC ARIA REFERENCE POINT ID: NORMAL
RAD ONC ARIA REFERENCE POINT ID: NORMAL
RAD ONC ARIA REFERENCE POINT SESSION DOSAGE GIVEN: 2.67
RAD ONC ARIA REFERENCE POINT SESSION DOSAGE GIVEN: 2.67

## 2019-06-06 PROCEDURE — 77412 RADIATION TX DELIVERY LVL 3: CPT | Performed by: RADIOLOGY

## 2019-06-07 ENCOUNTER — HOSPITAL ENCOUNTER (OUTPATIENT)
Dept: RADIATION ONCOLOGY | Facility: MEDICAL CENTER | Age: 74
End: 2019-06-07

## 2019-06-07 LAB
CHEMOTHERAPY INFUSION START DATE: NORMAL
CHEMOTHERAPY RECORDS: 2.67
CHEMOTHERAPY RECORDS: 4005
CHEMOTHERAPY RECORDS: NORMAL
CHEMOTHERAPY RX CANCER: NORMAL
RAD ONC ARIA COURSE TREATMENT ELAPSED DAYS: NORMAL
RAD ONC ARIA PLAN TREATMENT DATES: NORMAL
RAD ONC ARIA REFERENCE POINT DOSAGE GIVEN TO DATE: 34.71
RAD ONC ARIA REFERENCE POINT DOSAGE GIVEN TO DATE: 34.71
RAD ONC ARIA REFERENCE POINT ID: NORMAL
RAD ONC ARIA REFERENCE POINT ID: NORMAL
RAD ONC ARIA REFERENCE POINT SESSION DOSAGE GIVEN: 2.67
RAD ONC ARIA REFERENCE POINT SESSION DOSAGE GIVEN: 2.67

## 2019-06-07 PROCEDURE — 77412 RADIATION TX DELIVERY LVL 3: CPT | Performed by: RADIOLOGY

## 2019-06-07 PROCEDURE — 77336 RADIATION PHYSICS CONSULT: CPT | Performed by: RADIOLOGY

## 2019-06-10 ENCOUNTER — HOSPITAL ENCOUNTER (OUTPATIENT)
Dept: RADIATION ONCOLOGY | Facility: MEDICAL CENTER | Age: 74
End: 2019-06-10

## 2019-06-10 LAB
CHEMOTHERAPY INFUSION START DATE: NORMAL
CHEMOTHERAPY RECORDS: 2.67
CHEMOTHERAPY RECORDS: 4005
CHEMOTHERAPY RECORDS: NORMAL
CHEMOTHERAPY RX CANCER: NORMAL
RAD ONC ARIA COURSE TREATMENT ELAPSED DAYS: NORMAL
RAD ONC ARIA PLAN TREATMENT DATES: NORMAL
RAD ONC ARIA REFERENCE POINT DOSAGE GIVEN TO DATE: 37.38
RAD ONC ARIA REFERENCE POINT DOSAGE GIVEN TO DATE: 37.38
RAD ONC ARIA REFERENCE POINT ID: NORMAL
RAD ONC ARIA REFERENCE POINT ID: NORMAL
RAD ONC ARIA REFERENCE POINT SESSION DOSAGE GIVEN: 2.67
RAD ONC ARIA REFERENCE POINT SESSION DOSAGE GIVEN: 2.67

## 2019-06-10 PROCEDURE — 77417 THER RADIOLOGY PORT IMAGE(S): CPT | Performed by: RADIOLOGY

## 2019-06-10 PROCEDURE — 77412 RADIATION TX DELIVERY LVL 3: CPT | Performed by: RADIOLOGY

## 2019-06-11 ENCOUNTER — OFFICE VISIT (OUTPATIENT)
Dept: HEALTH INFORMATION MANAGEMENT | Facility: MEDICAL CENTER | Age: 74
End: 2019-06-11
Payer: MEDICARE

## 2019-06-11 ENCOUNTER — HOSPITAL ENCOUNTER (OUTPATIENT)
Dept: RADIATION ONCOLOGY | Facility: MEDICAL CENTER | Age: 74
End: 2019-06-11

## 2019-06-11 VITALS
DIASTOLIC BLOOD PRESSURE: 68 MMHG | SYSTOLIC BLOOD PRESSURE: 110 MMHG | WEIGHT: 229.4 LBS | OXYGEN SATURATION: 98 % | HEART RATE: 90 BPM | BODY MASS INDEX: 43.31 KG/M2 | HEIGHT: 61 IN

## 2019-06-11 DIAGNOSIS — E11.9 DM TYPE 2, GOAL HBA1C < 8% (HCC): ICD-10-CM

## 2019-06-11 DIAGNOSIS — E78.5 DYSLIPIDEMIA, GOAL LDL BELOW 100: ICD-10-CM

## 2019-06-11 DIAGNOSIS — E88.810 METABOLIC SYNDROME: ICD-10-CM

## 2019-06-11 DIAGNOSIS — I10 ESSENTIAL HYPERTENSION: ICD-10-CM

## 2019-06-11 DIAGNOSIS — G47.33 OBSTRUCTIVE SLEEP APNEA: ICD-10-CM

## 2019-06-11 LAB
CHEMOTHERAPY INFUSION START DATE: NORMAL
CHEMOTHERAPY RECORDS: 2.67
CHEMOTHERAPY RECORDS: 4005
CHEMOTHERAPY RECORDS: NORMAL
CHEMOTHERAPY RX CANCER: NORMAL
RAD ONC ARIA COURSE TREATMENT ELAPSED DAYS: NORMAL
RAD ONC ARIA PLAN TREATMENT DATES: NORMAL
RAD ONC ARIA REFERENCE POINT DOSAGE GIVEN TO DATE: 40.05
RAD ONC ARIA REFERENCE POINT DOSAGE GIVEN TO DATE: 40.05
RAD ONC ARIA REFERENCE POINT ID: NORMAL
RAD ONC ARIA REFERENCE POINT ID: NORMAL
RAD ONC ARIA REFERENCE POINT SESSION DOSAGE GIVEN: 2.67
RAD ONC ARIA REFERENCE POINT SESSION DOSAGE GIVEN: 2.67

## 2019-06-11 PROCEDURE — 99214 OFFICE O/P EST MOD 30 MIN: CPT | Performed by: INTERNAL MEDICINE

## 2019-06-11 PROCEDURE — 77412 RADIATION TX DELIVERY LVL 3: CPT | Performed by: RADIOLOGY

## 2019-06-11 PROCEDURE — 77427 RADIATION TX MANAGEMENT X5: CPT | Performed by: RADIOLOGY

## 2019-06-11 NOTE — PROGRESS NOTES
"Bariatric Medicine Follow Up  Chief Complaint   Patient presents with   • Weight Gain       History of Present Illness:   Ragini Reaves is a 74 y.o. female who presents for weight management follow-up and to help address co-morbidities caused by overweight, as below.    During the patient's last visit, the following were discussed and recommended:  Weight Goal: 3-5% wt loss each month (pt goal weight is 145 lb)  Diet: Increase lean meat, fish intake  Bring in written food journal next visit  Will set timer to drink more water  Physical Activity:  Increase walking, 1/4 mi, 5/wk  Risk level for moderate/vigorous exercise program: moderate  New Rx: None.  Pt defers antiobesity meds.  Behavior change: Increase activity  Follow-up: 2 mos  RD via IBT next wk    Just completed XRT, fatigued.  Had been treated for breast cancer.  Also had skin lesion removed.    Having one salad per day.  Portions smaller.  Drinking more water.    YASMANI:  Repeat testing with sleep medicine next wk  T2DM: Fasting glucose now around 100, continues to track daily, continues metformin, Tradjenta  HLD: Lipids controlled on simvastatin  HTN: Blood pressure well controlled on Lasix, spironolactone    Exercise:   Has not felt like walking     Review of Systems   Sleep improving.  All other ROS were reviewed and are otherwise unchanged from my previous visit with patient.    Physical Exam:    /68 (BP Location: Left arm, Patient Position: Sitting, BP Cuff Size: Large adult)   Pulse 90   Ht 1.549 m (5' 1\")   Wt 104.1 kg (229 lb 6.4 oz)   LMP  (LMP Unknown)   SpO2 98%   BMI 43.34 kg/m²   Waist Measurement   Waist: 54 inch/inches  Weight change since last visit: -3 lb (-6 lb total)  Waist Circum change since last visit: +1 in (+1.5 in total)    Constitutional: Oriented to person, place, and time and well-developed, well-nourished, and in no distress.    Head: Normocephalic.   Musculoskeletal: Normal range of motion. No edema. "   Neurological: Alert and oriented to person, place, and time. No muscle weakness.  Gait normal.   Skin: Warm and dry. Not diaphoretic.   Psychiatric: Mood, memory, affect and judgment normal.     Laboratory:   Recent labs reviewed.      ASSESSMENT/PLAN:  Body mass index is 43.34 kg/m².    Obesity Stage ([place]): 2; Class 3    1. Obstructive sleep apnea     2. Metabolic syndrome     3. DM type 2, goal HbA1c < 8% ([facility])  HEMOGLOBIN A1C   4. Dyslipidemia, goal LDL below 100     5. Essential hypertension       The patient continues to do well, is reversing her previous weight gain, despite her significant medical illnesses recently and XRT treatment for breast cancer.  It will be important for her to continue to reduce her fat mass percent, which she has not yet done.  She needs to really focus on carbohydrate reduction, increasing activity level.  Sleep improving, with sleep medicine follow-up pending.  Fasting glucose improved.  Lipids controlled.  Blood pressure controlled.    The patient and I have discussed at length and agree to the following recommendations, which are all addressing the above diagnoses:    Weight Goal: 3-5% wt loss each month (pt goal weight is 145 lb)  Diet: Increase lean meat, fish intake  Bring in written food journal next visit  Will set timer to drink more water  Physical Activity:  Resume walking  New Rx: None.  Defers anti-obesity medication  Behavior change: Tracking, increase activity level  Follow-up: 3 mo    Patient's body mass index is 43.34 kg/m². Exercise and nutrition counseling were performed at this visit.

## 2019-06-12 ENCOUNTER — HOSPITAL ENCOUNTER (OUTPATIENT)
Facility: MEDICAL CENTER | Age: 74
End: 2019-06-12
Attending: DERMATOLOGY
Payer: MEDICARE

## 2019-06-12 ENCOUNTER — OFFICE VISIT (OUTPATIENT)
Dept: DERMATOLOGY | Facility: IMAGING CENTER | Age: 74
End: 2019-06-12
Payer: MEDICARE

## 2019-06-12 VITALS
TEMPERATURE: 97.7 F | SYSTOLIC BLOOD PRESSURE: 118 MMHG | HEIGHT: 62 IN | DIASTOLIC BLOOD PRESSURE: 60 MMHG | WEIGHT: 229 LBS | BODY MASS INDEX: 42.14 KG/M2

## 2019-06-12 DIAGNOSIS — D03.59 MELANOMA IN SITU OF TORSO EXCLUDING BREAST (HCC): ICD-10-CM

## 2019-06-12 LAB — PATHOLOGY CONSULT NOTE: NORMAL

## 2019-06-12 PROCEDURE — 12032 INTMD RPR S/A/T/EXT 2.6-7.5: CPT | Mod: 79 | Performed by: DERMATOLOGY

## 2019-06-12 PROCEDURE — 11603 EXC TR-EXT MAL+MARG 2.1-3 CM: CPT | Mod: 79 | Performed by: DERMATOLOGY

## 2019-06-12 PROCEDURE — 88305 TISSUE EXAM BY PATHOLOGIST: CPT

## 2019-06-12 NOTE — Clinical Note
PA for Daria lyn ([place]: [id number])  Tretinoin 0.05% cream  Status: Sent to Plan  Created: June 14th, 2017  Sent: June 14th, 2017    NATHAN Cortez June 14, 2017 3:31 PM      Thank you for involving me in this patient's care.  Shelli Lambert

## 2019-06-12 NOTE — PROGRESS NOTES
"PROCEDURE NOTE:    MALIGNANT LESION - WIDE LOCAL EXCISION    After patient received diagnosis of MIS, further management was discussed, recommending WLE (SLNB not recommended). Patient agreed. Risks, benefits and alternatives of procedure, including, but not limited to scar, bleeding, pain, infection, nerve damage, recurrence of tumor, failed surgery, and need for further surgery, were discussed and written informed consent obtained. Correct site was verified by patient and myself, and verbal time out completed.     Allergies reviewed: Yes  Pacemaker/defibrillator: No  Artificial joints: No  Antibiotics given: No  Blood thinners/anticoagulants: No    Pre-op diagnosis:MIS (requisition number: [id number])  Post-op diagnosis: Same  Site:left mid back  Pre-op size: 11mm scar    /60   Temp 36.5 °C (97.7 °F)   Ht 1.575 m (5' 2\")   Wt 103.9 kg (229 lb)     Procedure: Area of surgery was prepped with alcohol, marked with 7mm margins, and with sterile marking pen. Anesthesia with 1% lidocaine with epinephrine administered with 30 gauge needle. The area was again cleaned with povidine-iodine swab. With sterile technique, a 10 blade scalpel was used to make a fusiform incision around the tumor to the level of the subcutaneous fat (subQ/fascial border). The tumor was removed. Bleeding was minimal, and hemostasis was achieved with pressure, hyfrecation. Specimen was placed into biopsy container and sent to pathology by staff.    Intermediate Closure:  Buried vertical mattress sutures were placed x 12 (4 deep, 8 to dermal layer) with monocryl to close dead space. 4.0 nylon running superficial interrupted sutures were placed x 2 to approximate wound edge.  Vaseline applied to wound with bandage. Patient tolerated procedure well and there were no complications, blood loss was minimal.     Final wound size: 78    Bandage was placed with vaseline, telfa, gauze and tape. Wound care was discussed with the patient, and written " instructions were provided. Patient to return to clinic in 10-14 days for suture removal. Patient to call us if any problems or concerns with the procedure site arise prior to scheduled appointment.     Additional follow-up will be planned for ABBY at suture removal visit    Shelli Lambert M.D.

## 2019-06-18 LAB
CHEMOTHERAPY INFUSION START DATE: NORMAL
CHEMOTHERAPY RECORDS: 2.67
CHEMOTHERAPY RECORDS: 4005
CHEMOTHERAPY RECORDS: NORMAL
CHEMOTHERAPY RX CANCER: NORMAL
RAD ONC ARIA COURSE TREATMENT ELAPSED DAYS: NORMAL
RAD ONC ARIA PLAN TREATMENT DATES: NORMAL
RAD ONC ARIA REFERENCE POINT DOSAGE GIVEN TO DATE: 40.05
RAD ONC ARIA REFERENCE POINT DOSAGE GIVEN TO DATE: 40.05
RAD ONC ARIA REFERENCE POINT ID: NORMAL
RAD ONC ARIA REFERENCE POINT ID: NORMAL

## 2019-06-21 ENCOUNTER — SLEEP CENTER VISIT (OUTPATIENT)
Dept: SLEEP MEDICINE | Facility: MEDICAL CENTER | Age: 74
End: 2019-06-21
Payer: MEDICARE

## 2019-06-21 VITALS
WEIGHT: 230 LBS | RESPIRATION RATE: 16 BRPM | SYSTOLIC BLOOD PRESSURE: 128 MMHG | OXYGEN SATURATION: 94 % | HEART RATE: 88 BPM | DIASTOLIC BLOOD PRESSURE: 72 MMHG | HEIGHT: 62 IN | BODY MASS INDEX: 42.33 KG/M2

## 2019-06-21 DIAGNOSIS — G47.33 OBSTRUCTIVE SLEEP APNEA: ICD-10-CM

## 2019-06-21 PROCEDURE — 99213 OFFICE O/P EST LOW 20 MIN: CPT | Performed by: NURSE PRACTITIONER

## 2019-06-21 RX ORDER — LETROZOLE 2.5 MG/1
2.5 TABLET, FILM COATED ORAL DAILY
COMMUNITY

## 2019-06-21 ASSESSMENT — ENCOUNTER SYMPTOMS
BRUISES/BLEEDS EASILY: 0
CARDIOVASCULAR NEGATIVE: 1
RESPIRATORY NEGATIVE: 1
CONSTITUTIONAL NEGATIVE: 1
EYE DISCHARGE: 0
EYE PAIN: 0
PSYCHIATRIC NEGATIVE: 1

## 2019-06-21 NOTE — PROGRESS NOTES
Chief Complaint   Patient presents with   • Follow-Up     YASMANI, 6 month         HPI: This patient is a 74 y.o. female, who presents for six-month follow-up of YASMANI with compliance check.     PSG indicates mild sleep apnea with an AHI of 10.5, minimum saturation 87%.  Prior to CPAP therapy she was using nocturnal oxygen.  She was ordered CPAP in March.she is compliant with auto CPAP 5 to 10 cm H2O. Compliance download over the past 30 days indicates 97 % compliance, average use of 4 hours 49 minutes per night, AHI of 1.3. Patient reports benefiting greatly from therapy.  She has adjusted well.  Denies EDS or a.m. headache.  She obtained a soclean machine which she finds very beneficial.    Since the time of her last visit she has been treated for breast cancer and skin cancer.    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   • Ringing in ears    • Shortness of breath    • Sleep apnea     cpap   • Snoring    • Tonsillitis    • Urinary incontinence    • Uterine fibroid    • Wears glasses        Social History   Substance Use Topics   • Smoking status: Never Smoker   • Smokeless tobacco: Never Used   • Alcohol use No       Family History   Problem Relation Age of Onset   • Lung Disease Brother    • Cancer Mother         pelvic   • Stroke Maternal Grandmother        Immunization History   Administered Date(s) Administered   • Influenza (IM) Preservative Free 12/03/2012   • Influenza Seasonal Injectable 12/06/2013   • Influenza Vaccine Adult HD 01/12/2016, 10/25/2016, 09/11/2017, 10/05/2018   • Pneumococcal Conjugate Vaccine (Prevnar/PCV-13) 01/12/2016   • Pneumococcal  Vaccine (UF)Historical Data 11/08/2008   • Pneumococcal polysaccharide vaccine (PPSV-23) 12/06/2013   • Tdap Vaccine 11/06/2012       Current medications as of today   Current Outpatient Prescriptions   Medication Sig Dispense Refill   • letrozole (FEMARA) 2.5 MG Tab Take 2.5 mg by mouth every day.     • RISEDRONATE SODIUM PO Take  by mouth.     • glucose blood (FREESTYLE LITE) strip 1 Strip by Other route 1 time daily as needed. 100 Strip 6   • KLOR-CON 10 10 MEQ tablet TAKE 1 TABLET BY MOUTH EVERY DAY 90 Tab 3   • metFORMIN (GLUCOPHAGE) 500 MG Tab TAKE 1 TAB BY MOUTH 2 TIMES A DAY, WITH MEALS. 180 Tab 3   • levothyroxine (SYNTHROID) 150 MCG Tab Take 150 mcg by mouth Every morning on an empty stomach. Pt takes a 75MCG on Wed, all over days 150MCG     • Fiber Powder Take 1 Package by mouth every day.     • Probiotic Product (PROBIOTIC DAILY PO) Take 1 Package by mouth every day.     • acetaminophen (TYLENOL) 500 MG Tab Take 500 mg by mouth 2 Times a Day.     • NON SPECIFIED Take 300 mg by mouth every day. CISTANCHE     • CALCIUM-MAGNESIUM-VITAMIN D PO Take 1 Tab by mouth every day.     • BIOTIN PO Take 1 Tab by mouth every day.     • COLLAGEN PO Take 1 Tab by mouth every day.     • Glucosamine-Chondroitin (GLUCOSAMINE CHONDR COMPLEX PO) Take 1 Tab by mouth 2 Times a Day.     • spironolactone (ALDACTONE) 25 MG Tab TAKE 1 TABLET BY MOUTH EVERY DAY 90 Tab 3   • losartan (COZAAR) 25 MG Tab Take 1 Tab by mouth every day. 90 Tab 3   • linagliptin (TRADJENTA) 5 MG Tab tablet Take 1 Tab by mouth every day. 90 Tab 3   • simvastatin (ZOCOR) 5 MG Tab TAKE 1 TAB BY MOUTH EVERY EVENING. 90 Tab 3   • furosemide (LASIX) 40 MG Tab Take 40 mg by mouth every morning.     • Lutein 10 MG Tab Take 10 mg by mouth every evening.     • aspirin 81 MG tablet Take 81 mg by mouth every morning. 90 Tab 3   • coenzyme Q-10 30 MG capsule Take 1 Cap by mouth every day. 30 Cap 11   • ascorbic acid (VITAMIN C) 500 MG tablet Take 1 Tab by mouth every  "day. 30 Tab 11   • Multiple Vitamins-Minerals (ULTRA WOMENS PACK) Misc Take 1 Tab by mouth every day. 30 Each 11   • docusate sodium (COLACE) 100 MG Cap Take 200 mg by mouth every day.       No current facility-administered medications for this visit.        Allergies: Asa [aspirin]; Cough syrup m [cough cold-flu relief]; and Dextromethorphan    /72 (BP Location: Right arm, Patient Position: Sitting, BP Cuff Size: Large adult)   Pulse 88   Resp 16   Ht 1.575 m (5' 2\")   Wt 104.3 kg (230 lb)   SpO2 94%       Review of Systems   Constitutional: Negative.    HENT: Negative.    Eyes: Negative for pain and discharge.   Respiratory: Negative.    Cardiovascular: Negative.    Endo/Heme/Allergies: Negative for environmental allergies. Does not bruise/bleed easily.   Psychiatric/Behavioral: Negative.        Physical Exam   Constitutional: She is oriented to person, place, and time and well-developed, well-nourished, and in no distress.   HENT:   Head: Normocephalic and atraumatic.   Eyes: Pupils are equal, round, and reactive to light.   Neck: Normal range of motion. Neck supple. No tracheal deviation present.   Cardiovascular: Normal rate, regular rhythm and normal heart sounds.    Pulmonary/Chest: Effort normal and breath sounds normal.   Neurological: She is alert and oriented to person, place, and time.   Skin: Skin is warm and dry.   Psychiatric: Mood, memory, affect and judgment normal.   Vitals reviewed.      Diagnoses/Plan:    1. Obstructive sleep apnea   Continue auto CPAP nightly, Clean mask & tubing weekly, Replace supplies as insurance will allow, RX for new supplies to DME.  She is requesting a new mask, I have ordered mask fitting through DME.      - [facility] [facility]      This dictation was created using voice recognition software. The accuracy of the dictation is limited to the abilities of the software. I expect there may be some errors of grammar and possibly content.     "

## 2019-06-24 ENCOUNTER — OFFICE VISIT (OUTPATIENT)
Dept: DERMATOLOGY | Facility: IMAGING CENTER | Age: 74
End: 2019-06-24
Payer: MEDICARE

## 2019-06-24 DIAGNOSIS — L82.1 SEBORRHEIC KERATOSES: ICD-10-CM

## 2019-06-24 DIAGNOSIS — D22.9 MULTIPLE NEVI: ICD-10-CM

## 2019-06-24 DIAGNOSIS — L57.0 ACTINIC KERATOSES: ICD-10-CM

## 2019-06-24 DIAGNOSIS — Z86.006 HISTORY OF MELANOMA IN SITU: ICD-10-CM

## 2019-06-24 DIAGNOSIS — L30.4 INTERTRIGO: ICD-10-CM

## 2019-06-24 PROCEDURE — 17000 DESTRUCT PREMALG LESION: CPT | Performed by: DERMATOLOGY

## 2019-06-24 PROCEDURE — 99213 OFFICE O/P EST LOW 20 MIN: CPT | Mod: 25 | Performed by: DERMATOLOGY

## 2019-06-24 ASSESSMENT — ENCOUNTER SYMPTOMS
FEVER: 0
CHILLS: 0

## 2019-06-24 NOTE — PROGRESS NOTES
Dermatology Return Patient Visit    Chief Complaint   Patient presents with   • Follow-Up     ABBY        Subjective:     HPI:   Ragini Reaves is a 74 y.o. female presenting for    Follow up ABBY - recent DX/RX MIS on the back  HPI: skin tag   Location: underneath right arm pit   Time present: few months   Painful lesion: No  Itching lesion: Yes  Enlarging lesion: Yes  Anything make it better or worse?no     History of skin cancer: Mis - back, excised 6/12/19  History of precancers/actinic keratoses: yes  History of blistering/severe sunburns:Yes, teenager  Family history of skin cancer:Yes, Details: Uncle, unknown type  Family history of atypical moles:No  H/o breast CA, s/p radiation        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   • Ringing in ears    • Shortness of breath    • Sleep apnea     cpap   • Snoring    • Tonsillitis    • Urinary incontinence    • Uterine fibroid    • Wears glasses        Current Outpatient Prescriptions on File Prior to Visit   Medication Sig Dispense Refill   • letrozole (FEMARA) 2.5 MG Tab Take 2.5 mg by mouth every day.     • RISEDRONATE SODIUM PO Take  by mouth.     • glucose blood (FREESTYLE LITE) strip 1 Strip by Other route 1 time daily as needed. 100 Strip 6   • KLOR-CON 10 10 MEQ tablet TAKE 1 TABLET BY MOUTH EVERY DAY 90 Tab 3   • metFORMIN (GLUCOPHAGE) 500 MG Tab TAKE 1 TAB BY MOUTH 2 TIMES A DAY, WITH MEALS. 180 Tab 3   • levothyroxine (SYNTHROID) 150 MCG Tab Take 150 mcg by mouth Every morning on an empty stomach. Pt takes a 75MCG on Wed, all over days 150MCG     • Fiber Powder Take 1 Package by mouth every day.      • Probiotic Product (PROBIOTIC DAILY PO) Take 1 Package by mouth every day.     • docusate sodium (COLACE) 100 MG Cap Take 200 mg by mouth every day.     • acetaminophen (TYLENOL) 500 MG Tab Take 500 mg by mouth 2 Times a Day.     • NON SPECIFIED Take 300 mg by mouth every day. CISTANCHE     • CALCIUM-MAGNESIUM-VITAMIN D PO Take 1 Tab by mouth every day.     • BIOTIN PO Take 1 Tab by mouth every day.     • COLLAGEN PO Take 1 Tab by mouth every day.     • Glucosamine-Chondroitin (GLUCOSAMINE CHONDR COMPLEX PO) Take 1 Tab by mouth 2 Times a Day.     • spironolactone (ALDACTONE) 25 MG Tab TAKE 1 TABLET BY MOUTH EVERY DAY 90 Tab 3   • losartan (COZAAR) 25 MG Tab Take 1 Tab by mouth every day. 90 Tab 3   • linagliptin (TRADJENTA) 5 MG Tab tablet Take 1 Tab by mouth every day. 90 Tab 3   • simvastatin (ZOCOR) 5 MG Tab TAKE 1 TAB BY MOUTH EVERY EVENING. 90 Tab 3   • furosemide (LASIX) 40 MG Tab Take 40 mg by mouth every morning.     • Lutein 10 MG Tab Take 10 mg by mouth every evening.     • aspirin 81 MG tablet Take 81 mg by mouth every morning. 90 Tab 3   • coenzyme Q-10 30 MG capsule Take 1 Cap by mouth every day. 30 Cap 11   • ascorbic acid (VITAMIN C) 500 MG tablet Take 1 Tab by mouth every day. 30 Tab 11   • Multiple Vitamins-Minerals (ULTRA WOMENS PACK) Misc Take 1 Tab by mouth every day. 30 Each 11     No current facility-administered medications on file prior to visit.        Allergies   Allergen Reactions   • Asa [Aspirin]      Stomach bleeding.   Tolerates low dose ASA   • Cough Syrup M [Cough Cold-Flu Relief] Swelling     Severe facial swelling   • Dextromethorphan Swelling     Facial swellling       Family History   Problem Relation Age of Onset   • Lung Disease Brother    • Cancer Mother         pelvic   • Stroke Maternal Grandmother        Social History     Social History   • Marital status: Single     Spouse name: N/A   • Number of children: N/A   • Years of education: N/A     Occupational History   •  Not on file.     Social History Main Topics   • Smoking status: Never Smoker   • Smokeless tobacco: Never Used   • Alcohol use No   • Drug use: No   • Sexual activity: No     Other Topics Concern   • Not on file     Social History Narrative   • No narrative on file       Review of Systems   Constitutional: Negative for chills and fever.   Skin: Positive for itching. Negative for rash.        Objective:     A full mucocutaneous exam was completed including: scalp, hair, ears, face, eyelids, conjunctiva, lips, gums/tongue/oropharynx, neck, chest, breasts, abdomen, back , left and right upper extremities (including hands/digits and fingernails), left and right lower extremities (including feet/toes, toenails), buttocks and excluding external genitalia (patient refusal,) with the following pertinent findings listed below. Remaining above-listed examined areas within normal limits / negative for rashes or lesions.        There were no vitals taken for this visit.    Physical Exam   Constitutional: She is oriented to person, place, and time and well-developed, well-nourished, and in no distress.   HENT:   Head: Normocephalic and atraumatic.       Right Ear: External ear normal.   Left Ear: External ear normal.   Nose: Nose normal.   Mouth/Throat: Oropharynx is clear and moist.   Eyes: Conjunctivae and lids are normal.   Neck: Normal range of motion. Neck supple.   Cardiovascular: Intact distal pulses.    Pulmonary/Chest: Effort normal.   Neurological: She is alert and oriented to person, place, and time.   Skin: Skin is warm and dry.        Psychiatric: Mood and affect normal.       DATA: none applicable to review    Assessment and Plan:     1. Actinic keratoses  CRYOTHERAPY:  Risks (including, but not limited to: hypo or hyperpigmentation, redness, blister, blood blister, recurrence, need for further treatment, infection, scar) and benefits of cryotherapy discussed. Patient verbally agreed to proceed with treatment. 2  cryotherapy freeze thaw cycles of 10 seconds were applied to 1 residual lesion on the left cheek with cryac. Patient tolerated procedure well. Aftercare instructions given.    2. History of melanoma in situ  Skin cancer education  - discussed importance of sun protective clothing, eyewear  - discussed importance of daily use of broad spectrum sunscreen with SPF 30 or greater, as well as need for reapplication ~every 2 hours when exposed to UVR  - discussed importance of regular self-exams, ideally once per month, every 3 months exams in clinic  - ABCDE's of melanoma discussed  - patient to bring any new or concerning lesions to my attention    3. Multiple nevi  - Benign-appearing nature of lesions discussed. Advised to return to clinic for any new or concerning changes.    4. Seborrheic keratoses  - Benign-appearing nature of lesions discussed. Advised to return to clinic for any new or concerning changes.    5. Intertrigo/?radiation dermatitis under right breast  - educated patient about diagnosis, management options, and expectations of treatment  - recommended zinc oxide 20% paste to affected area qhs  - zeasorb powder during the day  - start loprox 0.77% cream to the affected area twice daily for 1-2 weeks  - once done w/ above can use hydrocortisone 2.5% cream prn when active/flaring  - side effects of topical steroids discussed, including minimal systemic absorption, skin thinning, appearance of stretch marks (striae), easy bruising, telangiectasias, and possible increased hair growth     Followup: Return in about 3 months (around 9/24/2019).    Shelli Lambert M.D.

## 2019-06-28 RX ORDER — LANCETS 28 GAUGE
EACH MISCELLANEOUS
Qty: 100 EACH | Refills: 4 | Status: SHIPPED | OUTPATIENT
Start: 2019-06-28 | End: 2020-03-11 | Stop reason: SDUPTHER

## 2019-07-15 ENCOUNTER — APPOINTMENT (OUTPATIENT)
Dept: DERMATOLOGY | Facility: IMAGING CENTER | Age: 74
End: 2019-07-15
Payer: MEDICARE

## 2019-07-19 ENCOUNTER — OFFICE VISIT (OUTPATIENT)
Dept: CARDIOLOGY | Facility: MEDICAL CENTER | Age: 74
End: 2019-07-19
Payer: MEDICARE

## 2019-07-19 VITALS
OXYGEN SATURATION: 92 % | DIASTOLIC BLOOD PRESSURE: 68 MMHG | SYSTOLIC BLOOD PRESSURE: 122 MMHG | HEART RATE: 90 BPM | WEIGHT: 234 LBS | BODY MASS INDEX: 43.06 KG/M2 | HEIGHT: 62 IN

## 2019-07-19 DIAGNOSIS — K76.0 FATTY LIVER: ICD-10-CM

## 2019-07-19 DIAGNOSIS — I10 ESSENTIAL HYPERTENSION: ICD-10-CM

## 2019-07-19 DIAGNOSIS — E78.5 DYSLIPIDEMIA, GOAL LDL BELOW 100: ICD-10-CM

## 2019-07-19 DIAGNOSIS — E66.01 MORBID OBESITY WITH BMI OF 40.0-44.9, ADULT (HCC): ICD-10-CM

## 2019-07-19 DIAGNOSIS — I50.32 CHRONIC DIASTOLIC CONGESTIVE HEART FAILURE (HCC): ICD-10-CM

## 2019-07-19 PROCEDURE — 99214 OFFICE O/P EST MOD 30 MIN: CPT | Performed by: INTERNAL MEDICINE

## 2019-07-19 NOTE — PROGRESS NOTES
Cardiology Follow-up Consultation Note    Date of note:   7/19/2019  Primary Care Provider: Chapito Marques M.D.    Name:             Ragini Reaves   YOB: 1945  MRN:               [id number]    CC: shortness of breath.     Patient ID/HPI:   Ragini Reaves is a 74 y.o. female whose current medical problems include NIDDM, YASMANI on home O2 at night transitioning to CPAP, hypothyroidism, hypertension, dyslipidemia, fatty liver, and obesity with BMI of 43 who presents for follow-up.     Last clinic visit: 1/23/2018  She has been on lasix 40mg PO daily for years. Of note she is very claustrophobic. She does have a history of phen phen use in the 1990s for 4-5 months.       She has lost over 50 pounds intentionally, and plans to lose more.       At our clinic visit,  2/15/2018:  She saw Dr. Newsome from pulmonary who arranged for repeat PFTs and sleep study. PFTs looked great, however she did have mild sleep apnea and was started on CPAP which has been ordered, but she hasn't started it yet.    She had a relatively normal echocardiogram and a normal stress test.     She reports her shortness of breath is improved, and she can walk well as long as she's not hurrying.     No further exertional chest pain.  No longer taking motrin, tylenol is working.     At our visit, 8/22/2018:  In terms of obesity, she is continuing to lose weight.    In terms of CHF, her lasix dose was decreased to 20mg PO daily, and she did have significant leg swelling, she is back up to the increased dose of 40mg PO Daily.     diabetes control improving, hgbA1c 7.2    Finally on CPAP for YASMANI.     At our visit, 2/7/2019:    In terms of obesity, no weight loss due to the holidays.    Recently had breast calcifications on a mammogram, one removed by biopsy, but unfortunately will need surgery to remove the others.       Interim History:  Diagnosed with treated for breast cancer and skin cancer since our last visit.          In terms of YASMANI, remains on CPAP.    In terms of CHF, weight stable, LE swelling stable. Not walking due to the cold. On lasix 40mg PO daily    Diabetes relatively well controlled.       Review of Systems   Constitution: Negative for chills, fever and night sweats.   HENT: Negative for nosebleeds.    Eyes: Negative for vision loss in left eye and vision loss in right eye.   Respiratory: Negative for hemoptysis.    Gastrointestinal: Negative for hematemesis, hematochezia and melena.   Genitourinary: Negative for hematuria.   Neurological: Negative for focal weakness, numbness and paresthesias.     All other systems reviewed and discussed using a comprehensive questionnaire and are negative.       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   • Ringing in ears    • Shortness of breath    • Sleep apnea     cpap   • Snoring    • Tonsillitis    • Urinary incontinence    • Uterine fibroid    • Wears glasses          Past Surgical History:   Procedure Laterality Date   • MASTECTOMY Right 3/28/2019    Procedure: MASTECTOMY - RE-EXCISION RIGHT BREAST BIOPSY SITE FOR MARGINS;  Surgeon: Karuna Hussein M.D.;  Location: SURGERY SAME DAY [facility];  Service: General   • BREAST BIOPSY Right 3/13/2019    Procedure: BREAST BIOPSY - WIRE LOCALIZED;  Surgeon: Karuna Hussein M.D.;  Location: SURGERY [facility];  Service: Gen Robotic   • COLONOSCOPY WITH BIOPSY  5/21/2013    Performed by Mauro Garcia M.D. at ENDOSCOPY [facility]   • THYROIDECTOMY  2000   • TONSILLECTOMY           Current Outpatient Prescriptions   Medication Sig Dispense Refill   • FREESTYLE LANCETS Misc  USE 1 AS DIRECTED TWICE A DAY *E11.9* 100 Each 4   • glucose blood (FREESTYLE LITE) strip 1 Strip by Other route 1 time daily as needed. 100 Strip 6   • ciclopirox (LOPROX) 0.77 % cream Twice daily to the affected area under the breast, on abdomen for 1-2 weeks as needed (use first) 45 g 2   • hydrocortisone 2.5 % Cream topical cream Apply 1 Application to affected area(s) 2 times a day. To area under breast (use second) as needed for redness/itching/burning 1 Tube 1   • letrozole (FEMARA) 2.5 MG Tab Take 2.5 mg by mouth every day.     • RISEDRONATE SODIUM PO Take 35 mg by mouth.     • KLOR-CON 10 10 MEQ tablet TAKE 1 TABLET BY MOUTH EVERY DAY 90 Tab 3   • metFORMIN (GLUCOPHAGE) 500 MG Tab TAKE 1 TAB BY MOUTH 2 TIMES A DAY, WITH MEALS. 180 Tab 3   • levothyroxine (SYNTHROID) 150 MCG Tab Take 150 mcg by mouth Every morning on an empty stomach. Pt takes a 75MCG on Wed, all over days 150MCG     • Fiber Powder Take 1 Package by mouth every day.     • Probiotic Product (PROBIOTIC DAILY PO) Take 1 Package by mouth every day.     • docusate sodium (COLACE) 100 MG Cap Take 200 mg by mouth every day.     • acetaminophen (TYLENOL) 500 MG Tab Take 500 mg by mouth 2 Times a Day.     • NON SPECIFIED Take 300 mg by mouth every day. CISTANCHE     • CALCIUM-MAGNESIUM-VITAMIN D PO Take 1 Tab by mouth every day.     • BIOTIN PO Take 1 Tab by mouth every day.     • COLLAGEN PO Take 1 Tab by mouth every day.     • Glucosamine-Chondroitin (GLUCOSAMINE CHONDR COMPLEX PO) Take 750 mg by mouth 2 Times a Day.     • spironolactone (ALDACTONE) 25 MG Tab TAKE 1 TABLET BY MOUTH EVERY DAY 90 Tab 3   • losartan (COZAAR) 25 MG Tab Take 1 Tab by mouth every day. 90 Tab 3   • linagliptin (TRADJENTA) 5 MG Tab tablet Take 1 Tab by mouth every day. 90 Tab 3   • simvastatin (ZOCOR) 5 MG Tab TAKE 1 TAB BY MOUTH EVERY EVENING. 90 Tab 3   • furosemide (LASIX) 40 MG Tab Take 40 mg by mouth every morning.     • Lutein 10 MG Tab Take 10 mg by mouth every  "evening.     • aspirin 81 MG tablet Take 81 mg by mouth every morning. 90 Tab 3   • coenzyme Q-10 30 MG capsule Take 1 Cap by mouth every day. 30 Cap 11   • ascorbic acid (VITAMIN C) 500 MG tablet Take 1 Tab by mouth every day. 30 Tab 11   • Multiple Vitamins-Minerals (ULTRA WOMENS PACK) Misc Take 1 Tab by mouth every day. 30 Each 11     No current facility-administered medications for this visit.          Allergies   Allergen Reactions   • Asa [Aspirin]      Stomach bleeding.   Tolerates low dose ASA   • Cough Syrup M [Cough Cold-Flu Relief] Swelling     Severe facial swelling   • Dextromethorphan Swelling     Facial swellling         Family History   Problem Relation Age of Onset   • Lung Disease Brother    • Cancer Mother         pelvic   • Stroke Maternal Grandmother          Social History     Social History   • Marital status: Single     Spouse name: N/A   • Number of children: N/A   • Years of education: N/A     Occupational History   • Not on file.     Social History Main Topics   • Smoking status: Never Smoker   • Smokeless tobacco: Never Used   • Alcohol use No   • Drug use: No   • Sexual activity: No     Other Topics Concern   • Not on file     Social History Narrative   • No narrative on file         Physical Exam:  Ambulatory Vitals  /68 (BP Location: Left arm, Patient Position: Sitting, BP Cuff Size: Large adult)   Pulse 90   Ht 1.575 m (5' 2\")   Wt 106.1 kg (234 lb)   SpO2 92%    Oxygen Therapy:  Pulse Oximetry: 92 %  BP Readings from Last 4 Encounters:   07/19/19 122/68   06/21/19 128/72   06/12/19 118/60   06/11/19 110/68       Weight/BMI: Body mass index is 42.8 kg/m².  Wt Readings from Last 4 Encounters:   07/19/19 106.1 kg (234 lb)   06/21/19 104.3 kg (230 lb)   06/12/19 103.9 kg (229 lb)   06/11/19 104.1 kg (229 lb 6.4 oz)       General: No apparent distress  Eyes: nl conjunctiva  ENT: OP clear  Neck: JVP 6 cm H2O, no carotid bruits  Lungs: normal respiratory effort, CTAB  Heart: RRR, " 2/6 systolic murmur at RUSB, no rubs or gallops, trace edema bilaterally. No LV/RV heave on cardiac palpatation. 2+ bilateral radial pulses.  2+ bilateral dp pulses.   Abdomen: soft, non tender, non distended, no masses, normal bowel sounds.  No HSM. Severe central obesity  Extremities/MSK: no clubbing, no cyanosis  Neurological: No focal sensory deficits  Psychiatric: Appropriate affect, A/O x 3, intact judgement.   Skin: Warm extremities    Exam repeated in full and unchanged except for as noted above.    Lab Data Review:  Lab Results   Component Value Date/Time    CHOLSTRLTOT 145 04/29/2019 07:28 AM    LDL 78 04/29/2019 07:28 AM    HDL 45 04/29/2019 07:28 AM    TRIGLYCERIDE 110 04/29/2019 07:28 AM       Lab Results   Component Value Date/Time    SODIUM 140 04/29/2019 07:28 AM    SODIUM 139 03/11/2019 02:05 PM    POTASSIUM 4.3 04/29/2019 07:28 AM    POTASSIUM 3.7 03/11/2019 02:05 PM    CHLORIDE 100 04/29/2019 07:28 AM    CHLORIDE 103 03/11/2019 02:05 PM    CO2 25 04/29/2019 07:28 AM    CO2 28 03/11/2019 02:05 PM    GLUCOSE 121 (H) 04/29/2019 07:28 AM    GLUCOSE 133 (H) 03/11/2019 02:05 PM    BUN 24 04/29/2019 07:28 AM    BUN 23 (H) 03/11/2019 02:05 PM    CREATININE 0.67 04/29/2019 07:28 AM    CREATININE 0.63 03/11/2019 02:05 PM    BUNCREATRAT 36 (H) 04/29/2019 07:28 AM     Lab Results   Component Value Date/Time    ALKPHOSPHAT 92 04/29/2019 07:28 AM    ASTSGOT 24 04/29/2019 07:28 AM    ALTSGPT 26 04/29/2019 07:28 AM    TBILIRUBIN 0.6 04/29/2019 07:28 AM      Lab Results   Component Value Date/Time    WBC 8.5 04/29/2019 07:28 AM    WBC 8.0 02/01/2005 07:00 PM     Lab Results   Component Value Date/Time    HBA1C 7.4 (H) 04/29/2019 07:28 AM    HBA1C 7.0 (H) 03/11/2019 02:05 PM     No components found for: TROP          Cardiac Imaging and Procedures Review:    EKG dated 3/11/2019: NSR, 1st degree AV block.        Nuclear perfusion imaging 12/13/2017:  CONCLUSIONS AND IMPRESSIONS:  1.  Negative cardiac PET scan.  No  evidence of ischemia or infarction.  2.  Resting ejection fraction of 84%, stress ejection fraction of 91%.  No   segmental wall motion abnormalities noted.  3.  No significant ST segment changes.  Occasional PVCs noted.  4.  Shortness of breath was experienced in this study.    TTE 12/2017:  CONCLUSIONS  1. Normal right and left ventricular size and function.   2. Normal regional wall motion.  3. Mild to moderate aortic insufficiency which is eccentric and   directed anteriorly. Mild aortic valve calcification.   4. Normal estimated right atrial pressure.     No prior study is available for comparison.       Radiology test Review:  CXR:   FINDINGS:  The heart is normal in size.  Moderate diffuse interstitial opacities.  No airspace consolidation.  No pleural effusions are appreciated.  Sternotomy wires demonstrated.       Sleep study:  Assessment:  Mild obstructive sleep apnea hypopnea with an apnea hypopnea index of 10.5 events per hour and a lowest arterial oxygen saturation of 87% on room air. Marked fragmentation of sleep related in part to laboratory and treatment effects but chronic insomnia might be considered. There did seem to be some response to CPAP therapy but the study was limited by the collection of only 21 minutes of sleep time in the treatment phase of the split-night study.     Recommendations:  Clinical correlation is required. If the patient presented with significant daytime somnolence or if the mild arterial oxygen desaturation seen in this study is deemed clinically significant, treatment may be considered. Airway pressurization could be initiated through a titration polysomnogram with the use of auto titrating CPAP. Alternative treatments for sleep-disordered breathing include reconstructive otolaryngologic surgery, dental appliances and weight loss. If any these latter treatment options are selected, a follow-up polysomnogram is suggested to assess the efficacy of therapy. Behavioral  measures including avoidance of sedatives, alcohol and supine body position may be of additional benefit.     PFTs:  SPIROMETRY:  1. FVC was 2.20 L, 78 % of predicted  2. FEV1 was 2.06 L, 97 % of predicted   3. FEV1/FVC ratio was 94 %  4. There was no significant response to bronchodilators      LUNG VOLUMES:  1. RV was  91 % of predicted   2. TLC was 89 % of predicted         DIFFUSION CAPACITY:  1.Diffusion capacity [id number] % of predicted         IMPRESSION:  The patient has mild decrease in FVC to 78% predicted probably secondary to the patient's elevated BMI of 40.3. Total lung capacity was 89% predicted which preclude restrictive ventilatory defect by definition. The rest of the pulmonary function test was within normal limits. Clinical correlation is required.       Medical Decision Makin. Angina of effort (CMS-HCC)  Resolved, negative stress test  -ED precautions discussed, has ntg prn  -continue aspirin for primary prevention given risk factors  -continue statin, last LDL at goal    2. Uncontrolled type 2 diabetes mellitus without complication, without long-term current use of insulin (CMS-HCC)  Per Dr. Marques, last hgbA1c 7.4, improving with diet control     3. Essential hypertension  Well controlled  -continue spironolactone, lasix, losartan.  Bisoprolol stopped to assist with weight loss.       4. Dyslipidemia, goal LDL below 70  Simvastatin, last ldl 78     5. Morbid obesity with BMI of 40.0-44.9, adult (CMS-HCC)  Working on weight loss with Dr. Dillard.      6. Dyspnea on exertion  Improved  -continue weight loss, CPAP     7. Primary thyroid cancer (CMS-HCC)  Resected with no e/o recurrence. This is why she has sternal wires     8. Chronic diastolic heart failure  Patient has been on lasix stably for years, assumed HFpEF. NYHA class II-III. No recent 6MWT.   -continue lasix/spironolactone  -discussed importance of daily weights, and ED precautions for dehydration or heart failure.    9. Aortic  insufficiency  Mild, repeat echo after 3 years, next 12/2020.     10. Pre-operative evaluation  Based on RCRI risk scoring, the patient is moderate risk of cardiovascular complications for low to moderate risk surgery.  She is medically optimized and surgery should proceed without further cardiac work-up.  While it is ideal for aspirin 81mg PO daily to be continued throughout surgery, it can be stopped yeyo-operatively if it would significantly increase bleeding risk as she has no known clinical atherosclerosis.          Return in about 6 months (around 1/19/2020). with YOAN Ponce.       Frandy Pereira MD  [facility]  [facility], [address]  [address] [address]  Phone: [phone]  Fax: [phone]

## 2019-07-25 ENCOUNTER — HOSPITAL ENCOUNTER (OUTPATIENT)
Dept: RADIATION ONCOLOGY | Facility: MEDICAL CENTER | Age: 74
End: 2019-07-31
Attending: RADIOLOGY
Payer: MEDICARE

## 2019-07-25 VITALS
BODY MASS INDEX: 42.62 KG/M2 | TEMPERATURE: 97.6 F | HEART RATE: 87 BPM | SYSTOLIC BLOOD PRESSURE: 147 MMHG | OXYGEN SATURATION: 97 % | WEIGHT: 233 LBS | DIASTOLIC BLOOD PRESSURE: 68 MMHG

## 2019-07-25 PROCEDURE — 99212 OFFICE O/P EST SF 10 MIN: CPT | Performed by: RADIOLOGY

## 2019-07-25 ASSESSMENT — PAIN SCALES - GENERAL: PAINLEVEL: NO PAIN

## 2019-07-25 NOTE — PROGRESS NOTES
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 Reaves today in follow up for her breast cancer.  Patient states the 2 weeks following completion of radiotherapy she did experience more notable erythema in the inframammary fold.  At this point she still has some hyperpigmentation but otherwise is back to her baseline.  During that time.  She was seen by dermatology for a changing \"mole\" on her back.  This was resected and was consistent with a melanoma in situ.  Is now been fully resected.  She is started on her letrozole and feels she is tolerating the medication quite well.    PHYSICAL EXAM:    Vitals:    07/25/19 0746   BP: 147/68   Pulse: 87   Temp: 36.4 °C (97.6 °F)   SpO2: 97%   Weight: 105.7 kg (233 lb)   Pain Score: No pain      0= Fully active, able to carry on all pre-disease performance without restriction.    GENERAL: No apparent distress.  HEENT:  Pupils are equal, round, and reactive to light.  Extraocular muscles   are intact. Sclerae nonicteric.  Conjunctivae pink.  Oral cavity, tongue   protrudes midline.   NECK:  Supple without evidence of thyromegaly.  NODES:  No peripheral adenopathy of the neck, supraclavicular fossa or axillae   bilaterally.  LUNGS:  Clear to ascultation bilaterally   HEART:  Regular rate and rhythm.  No murmur appreciated  ABDOMEN:  Soft. No evidence of hepatosplenomegaly.  Positive bowel sounds.  EXTREMITIES:  Without Edema.  NEUROLOGIC:  Cranial nerves II through XII were intact. Normal stance and gait motor and sensory grossly within normal limits      IMPRESSION:   Stage 0 ([id number]) low to intermediate grade ductal " carcinoma in situ of the right inferior medial quadrant of breast status post breast conservation surgery March 28, 2019 followed by whole breast radiotherapy to 4005 cGy completing in June 2019 currently taking letrozole    RECOMMENDATIONS:   I discussed the patient her findings over a 20 minute time period, 95% of that time dedicated to an ongoing survivorship plan.  While patient continues to follow in medical oncology I will release her from active follow-up in radiation oncology.    If patient has any questions or concerns, she should feel free to contact me.

## 2019-07-25 NOTE — NON-PROVIDER
Patient was seen today in clinic with Dr. Pierson for Follow up.  Vitals signs and weight were obtained and pain assessment was completed.  Allergies and medications were reviewed with the patient.  Toxicities of treatment assessed.     Vitals/Pain:  Vitals:    07/25/19 0746   BP: 147/68   Pulse: 87   Temp: 36.4 °C (97.6 °F)   SpO2: 97%   Weight: 105.7 kg (233 lb)   Pain Score: No pain        Allergies:   Asa [aspirin]; Cough syrup m [cough cold-flu relief]; and Dextromethorphan    Current Medications:  Current Outpatient Prescriptions   Medication Sig Dispense Refill   • FREESTYLE LANCETS Misc USE 1 AS DIRECTED TWICE A DAY *E11.9* 100 Each 4   • glucose blood (FREESTYLE LITE) strip 1 Strip by Other route 1 time daily as needed. 100 Strip 6   • ciclopirox (LOPROX) 0.77 % cream Twice daily to the affected area under the breast, on abdomen for 1-2 weeks as needed (use first) 45 g 2   • hydrocortisone 2.5 % Cream topical cream Apply 1 Application to affected area(s) 2 times a day. To area under breast (use second) as needed for redness/itching/burning 1 Tube 1   • letrozole (FEMARA) 2.5 MG Tab Take 2.5 mg by mouth every day.     • RISEDRONATE SODIUM PO Take 35 mg by mouth.     • KLOR-CON 10 10 MEQ tablet TAKE 1 TABLET BY MOUTH EVERY DAY 90 Tab 3   • metFORMIN (GLUCOPHAGE) 500 MG Tab TAKE 1 TAB BY MOUTH 2 TIMES A DAY, WITH MEALS. 180 Tab 3   • levothyroxine (SYNTHROID) 150 MCG Tab Take 150 mcg by mouth Every morning on an empty stomach. Pt takes a 75MCG on Wed, all over days 150MCG     • Fiber Powder Take 1 Package by mouth every day.     • Probiotic Product (PROBIOTIC DAILY PO) Take 1 Package by mouth every day.     • docusate sodium (COLACE) 100 MG Cap Take 200 mg by mouth every day.     • acetaminophen (TYLENOL) 500 MG Tab Take 500 mg by mouth 2 Times a Day.     • NON SPECIFIED Take 300 mg by mouth every day. CISTANCHE     • CALCIUM-MAGNESIUM-VITAMIN D PO Take 1 Tab by mouth every day.     • BIOTIN PO Take 1 Tab by  mouth every day.     • COLLAGEN PO Take 1 Tab by mouth every day.     • Glucosamine-Chondroitin (GLUCOSAMINE CHONDR COMPLEX PO) Take 750 mg by mouth 2 Times a Day.     • spironolactone (ALDACTONE) 25 MG Tab TAKE 1 TABLET BY MOUTH EVERY DAY 90 Tab 3   • losartan (COZAAR) 25 MG Tab Take 1 Tab by mouth every day. 90 Tab 3   • linagliptin (TRADJENTA) 5 MG Tab tablet Take 1 Tab by mouth every day. 90 Tab 3   • simvastatin (ZOCOR) 5 MG Tab TAKE 1 TAB BY MOUTH EVERY EVENING. 90 Tab 3   • furosemide (LASIX) 40 MG Tab Take 40 mg by mouth every morning.     • Lutein 10 MG Tab Take 10 mg by mouth every evening.     • aspirin 81 MG tablet Take 81 mg by mouth every morning. 90 Tab 3   • coenzyme Q-10 30 MG capsule Take 1 Cap by mouth every day. 30 Cap 11   • ascorbic acid (VITAMIN C) 500 MG tablet Take 1 Tab by mouth every day. 30 Tab 11   • Multiple Vitamins-Minerals (ULTRA WOMENS PACK) Misc Take 1 Tab by mouth every day. 30 Each 11     No current facility-administered medications for this encounter.          PCP:  Jerald Gross, Med Ass't

## 2019-09-10 LAB — HBA1C MFR BLD: 7.2 % (ref 4.8–5.6)

## 2019-09-11 LAB
ALBUMIN SERPL-MCNC: 4.3 G/DL (ref 3.5–4.8)
ALBUMIN/CREAT UR: 26.9 MG/G CREAT (ref 0–30)
ALBUMIN/GLOB SERPL: 1.6 {RATIO} (ref 1.2–2.2)
ALP SERPL-CCNC: 71 IU/L (ref 39–117)
ALT SERPL-CCNC: 24 IU/L (ref 0–32)
AST SERPL-CCNC: 28 IU/L (ref 0–40)
BASOPHILS # BLD AUTO: 0 X10E3/UL (ref 0–0.2)
BASOPHILS NFR BLD AUTO: 0 %
BILIRUB SERPL-MCNC: 0.6 MG/DL (ref 0–1.2)
BUN SERPL-MCNC: 26 MG/DL (ref 8–27)
BUN/CREAT SERPL: 37 (ref 12–28)
CALCIUM SERPL-MCNC: 9.6 MG/DL (ref 8.7–10.3)
CHLORIDE SERPL-SCNC: 102 MMOL/L (ref 96–106)
CHOLEST SERPL-MCNC: 143 MG/DL (ref 100–199)
CO2 SERPL-SCNC: 21 MMOL/L (ref 20–29)
CREAT SERPL-MCNC: 0.71 MG/DL (ref 0.57–1)
CREAT UR-MCNC: 106.5 MG/DL
EOSINOPHIL # BLD AUTO: 0.1 X10E3/UL (ref 0–0.4)
EOSINOPHIL NFR BLD AUTO: 2 %
ERYTHROCYTE [DISTWIDTH] IN BLOOD BY AUTOMATED COUNT: 14.4 % (ref 12.3–15.4)
GLOBULIN SER CALC-MCNC: 2.7 G/DL (ref 1.5–4.5)
GLUCOSE SERPL-MCNC: 129 MG/DL (ref 65–99)
HBA1C MFR BLD: 7.2 % (ref 4.8–5.6)
HCT VFR BLD AUTO: 45.8 % (ref 34–46.6)
HDLC SERPL-MCNC: 34 MG/DL
HGB BLD-MCNC: 15.1 G/DL (ref 11.1–15.9)
IMM GRANULOCYTES # BLD AUTO: 0.1 X10E3/UL (ref 0–0.1)
IMM GRANULOCYTES NFR BLD AUTO: 1 %
IMMATURE CELLS  115398: ABNORMAL
LABORATORY COMMENT REPORT: ABNORMAL
LDLC SERPL CALC-MCNC: 84 MG/DL (ref 0–99)
LYMPHOCYTES # BLD AUTO: 1.6 X10E3/UL (ref 0.7–3.1)
LYMPHOCYTES NFR BLD AUTO: 24 %
MCH RBC QN AUTO: 28.5 PG (ref 26.6–33)
MCHC RBC AUTO-ENTMCNC: 33 G/DL (ref 31.5–35.7)
MCV RBC AUTO: 87 FL (ref 79–97)
MICROALBUMIN UR-MCNC: 28.7 UG/ML
MONOCYTES # BLD AUTO: 0.8 X10E3/UL (ref 0.1–0.9)
MONOCYTES NFR BLD AUTO: 12 %
MORPHOLOGY BLD-IMP: ABNORMAL
NEUTROPHILS # BLD AUTO: 4.1 X10E3/UL (ref 1.4–7)
NEUTROPHILS NFR BLD AUTO: 61 %
NRBC BLD AUTO-RTO: ABNORMAL %
PLATELET # BLD AUTO: 263 X10E3/UL (ref 150–450)
POTASSIUM SERPL-SCNC: 4.5 MMOL/L (ref 3.5–5.2)
PROT SERPL-MCNC: 7 G/DL (ref 6–8.5)
RBC # BLD AUTO: 5.29 X10E6/UL (ref 3.77–5.28)
SODIUM SERPL-SCNC: 141 MMOL/L (ref 134–144)
TRIGL SERPL-MCNC: 126 MG/DL (ref 0–149)
VLDLC SERPL CALC-MCNC: 25 MG/DL (ref 5–40)
WBC # BLD AUTO: 6.7 X10E3/UL (ref 3.4–10.8)

## 2019-09-12 ENCOUNTER — OFFICE VISIT (OUTPATIENT)
Dept: HEALTH INFORMATION MANAGEMENT | Facility: MEDICAL CENTER | Age: 74
End: 2019-09-12
Payer: MEDICARE

## 2019-09-12 ENCOUNTER — OFFICE VISIT (OUTPATIENT)
Dept: MEDICAL GROUP | Facility: MEDICAL CENTER | Age: 74
End: 2019-09-12
Payer: MEDICARE

## 2019-09-12 VITALS
RESPIRATION RATE: 12 BRPM | HEIGHT: 62 IN | DIASTOLIC BLOOD PRESSURE: 62 MMHG | BODY MASS INDEX: 41.77 KG/M2 | HEART RATE: 64 BPM | OXYGEN SATURATION: 96 % | WEIGHT: 227 LBS | SYSTOLIC BLOOD PRESSURE: 110 MMHG | TEMPERATURE: 97.4 F

## 2019-09-12 VITALS
HEART RATE: 84 BPM | SYSTOLIC BLOOD PRESSURE: 114 MMHG | OXYGEN SATURATION: 93 % | BODY MASS INDEX: 41.81 KG/M2 | HEIGHT: 62 IN | WEIGHT: 227.2 LBS | DIASTOLIC BLOOD PRESSURE: 62 MMHG

## 2019-09-12 DIAGNOSIS — Z23 NEED FOR IMMUNIZATION AGAINST INFLUENZA: ICD-10-CM

## 2019-09-12 DIAGNOSIS — G47.33 OBSTRUCTIVE SLEEP APNEA: ICD-10-CM

## 2019-09-12 DIAGNOSIS — E78.5 DYSLIPIDEMIA, GOAL LDL BELOW 100: ICD-10-CM

## 2019-09-12 DIAGNOSIS — C73 PRIMARY THYROID CANCER (HCC): ICD-10-CM

## 2019-09-12 DIAGNOSIS — E11.9 DM TYPE 2, GOAL HBA1C < 8% (HCC): ICD-10-CM

## 2019-09-12 DIAGNOSIS — E11.9 CONTROLLED TYPE 2 DIABETES MELLITUS WITHOUT COMPLICATION, WITHOUT LONG-TERM CURRENT USE OF INSULIN (HCC): ICD-10-CM

## 2019-09-12 DIAGNOSIS — I10 ESSENTIAL HYPERTENSION: ICD-10-CM

## 2019-09-12 DIAGNOSIS — E03.2 HYPOTHYROIDISM DUE TO NON-MEDICATION EXOGENOUS SUBSTANCES: ICD-10-CM

## 2019-09-12 DIAGNOSIS — E66.01 MORBID OBESITY WITH BMI OF 40.0-44.9, ADULT (HCC): ICD-10-CM

## 2019-09-12 PROCEDURE — 99214 OFFICE O/P EST MOD 30 MIN: CPT | Mod: 25 | Performed by: FAMILY MEDICINE

## 2019-09-12 PROCEDURE — 99214 OFFICE O/P EST MOD 30 MIN: CPT | Performed by: INTERNAL MEDICINE

## 2019-09-12 PROCEDURE — 90662 IIV NO PRSV INCREASED AG IM: CPT | Performed by: FAMILY MEDICINE

## 2019-09-12 PROCEDURE — G0008 ADMIN INFLUENZA VIRUS VAC: HCPCS | Performed by: FAMILY MEDICINE

## 2019-09-12 NOTE — LETTER
September 12, 2019        Patient: Ragini Reaves   YOB: 1945   Date of Visit: 9/12/2019           To Whom It May Concern:    It is my medical opinion that Ragini Reaves has an old CPAP machine that does not have a chip.  There is no way to download information from the CPAP.    Sincerely,          Chapito Marques M.D.    [address] [place] [id number]  [phone] (Phone)  [phone] (Fax)

## 2019-09-12 NOTE — PROGRESS NOTES
Diabetes Focused Exam:    Chief Complaint   Patient presents with   • Diabetes Mellitus   • Hepatic Disease   • Hypothyroidism   • Apnea      Subjective:   HPI  Ragini Reaves is a 74 y.o. female who presents for follow up of chronic conditions of diabetes mellitus, hypertension and hyperlipidemia. She indicates that she is feeling well and denies any symptoms referable to the above diagnoses. Specifically denies chest pain, palpitations, dyspnea, orthopnea, PND or peripheral edema. Also denies polyuria, polydipsia, urinary complaints, abdominal complaints, myalgias, numbness, weakness or other related symptoms.   Needs podiatry referral.      Brings in today her tracker for hours of CPAP, she is averaging 5 hours nightly.  Her machine does not have a chip. We have no way to download the information.  She feels her energy level is better, she sleeps better and is less short of breath using the CPAP.  She is trying to get into her DME provider for a mask fit as her current mask is not fitting well..    She has been stable with her thyroid cancer and hypothyroidism.  Thyroidectomy 2000.    She is following with  for her morbid obesity.  She has lost another 7 pounds, congratulated.      The patient is taking ASA every day 81 mg and taking all other medications as prescribed. Patient denies any side effects of medication.  DM: A1c goal <8  Glucose monitoring frequency: bid   Fasting sugars: , usually 90s to 140s. Post-prandial sugars: not checking  Hypoglycemic episodes none  Diabetic complications: none  ACR Albumin/Creatinine Ratio goal <30   HTN: Blood pressure goal <140/<80 . Currently Rx ACE/ARB: Yes  Hyperlipidemia:Cholesterol goal LDL <100, total/HDL <5. Currently Rx Statin: Yes  Last eye exam July last year, has an appointment with her ophthalmologist first week of October.   Denies visual blurring, double vision, eye pain and floaters  Last monofilament foot exam: 1/2019  Denies  foot pain, numbness, calluses, ulcers.  Has thick nails and has toe discomfort.    See medications and orders placed in encounter report.  Past medical history, family history, social history reviewed and updated as documented in medical record.    Current medications including changes today:  Current Outpatient Medications   Medication Sig Dispense Refill   • FREESTYLE LANCETS Misc USE 1 AS DIRECTED TWICE A DAY *E11.9* 100 Each 4   • glucose blood (FREESTYLE LITE) strip 1 Strip by Other route 1 time daily as needed. 100 Strip 6   • ciclopirox (LOPROX) 0.77 % cream Twice daily to the affected area under the breast, on abdomen for 1-2 weeks as needed (use first) 45 g 2   • hydrocortisone 2.5 % Cream topical cream Apply 1 Application to affected area(s) 2 times a day. To area under breast (use second) as needed for redness/itching/burning 1 Tube 1   • letrozole (FEMARA) 2.5 MG Tab Take 2.5 mg by mouth every day.     • RISEDRONATE SODIUM PO Take 35 mg by mouth.     • KLOR-CON 10 10 MEQ tablet TAKE 1 TABLET BY MOUTH EVERY DAY 90 Tab 3   • metFORMIN (GLUCOPHAGE) 500 MG Tab TAKE 1 TAB BY MOUTH 2 TIMES A DAY, WITH MEALS. 180 Tab 3   • levothyroxine (SYNTHROID) 150 MCG Tab Take 150 mcg by mouth Every morning on an empty stomach. Pt takes a 75MCG on Wed, all over days 150MCG     • Fiber Powder Take 1 Package by mouth every day.     • Probiotic Product (PROBIOTIC DAILY PO) Take 1 Package by mouth every day.     • docusate sodium (COLACE) 100 MG Cap Take 200 mg by mouth every day.     • acetaminophen (TYLENOL) 500 MG Tab Take 500 mg by mouth 2 Times a Day.     • NON SPECIFIED Take 300 mg by mouth every day. CISTANCHE     • CALCIUM-MAGNESIUM-VITAMIN D PO Take 1 Tab by mouth every day.     • BIOTIN PO Take 1 Tab by mouth every day.     • COLLAGEN PO Take 1 Tab by mouth every day.     • Glucosamine-Chondroitin (GLUCOSAMINE CHONDR COMPLEX PO) Take 750 mg by mouth 2 Times a Day.     • spironolactone (ALDACTONE) 25 MG Tab TAKE 1  "TABLET BY MOUTH EVERY DAY 90 Tab 3   • losartan (COZAAR) 25 MG Tab Take 1 Tab by mouth every day. 90 Tab 3   • linagliptin (TRADJENTA) 5 MG Tab tablet Take 1 Tab by mouth every day. 90 Tab 3   • simvastatin (ZOCOR) 5 MG Tab TAKE 1 TAB BY MOUTH EVERY EVENING. 90 Tab 3   • furosemide (LASIX) 40 MG Tab Take 40 mg by mouth every morning.     • Lutein 10 MG Tab Take 10 mg by mouth every evening.     • aspirin 81 MG tablet Take 81 mg by mouth every morning. 90 Tab 3   • coenzyme Q-10 30 MG capsule Take 1 Cap by mouth every day. 30 Cap 11   • ascorbic acid (VITAMIN C) 500 MG tablet Take 1 Tab by mouth every day. 30 Tab 11   • Multiple Vitamins-Minerals (ULTRA WOMENS PACK) Misc Take 1 Tab by mouth every day. 30 Each 11     No current facility-administered medications for this visit.      Allergies:   Allergies   Allergen Reactions   • Asa [Aspirin]      Stomach bleeding.   Tolerates low dose ASA   • Cough Syrup M [Cough Cold-Flu Relief] Swelling     Severe facial swelling   • Dextromethorphan Swelling     Facial swellling      Social History     Tobacco Use   • Smoking status: Never Smoker   • Smokeless tobacco: Never Used   Substance Use Topics   • Alcohol use: No     Alcohol/week: 0.0 oz   • Drug use: No     Exercise: she is pushing herself to walk the block 5 days per week.  Around 1/2 mile.  Health Maintenance/Immunizations: discussed, influenza vaccine today.     ROS  Pertinent  ROS findings as above.       Objective:     OBJECTIVE:  /62   Pulse 64   Temp 36.3 °C (97.4 °F)   Resp 12   Ht 1.575 m (5' 2\")   Wt 103 kg (227 lb)   LMP  (LMP Unknown)   SpO2 96%   BMI 41.52 kg/m²  Body mass index is 41.52 kg/m². BMI: severely obese  General: No apparent distress, conversant, cooperative and pleasant with the examination.  Psych: Alert and oriented x4, judgment and insight normal  Neck: No JVD or bruits, no adenopathy, supple  Thyroid: normal to inspection and palpation  Lungs: negative findings: normal " respiratory rate and rhythm, lungs clear to auscultation  Heart: negative findings: regular rate and rhythm, S1 normal, S2 normal, no murmurs, clicks, or gallops  Abdomen: Soft, nontender, no hepatosplenomegaly or masses, normal bowel sounds  Skin: No rashes, no cyanosis, no lesions or ulcers  Extremities: No cyanosis clubbing or edema.   Feet are examined, they are very dry, some callus and onychomycosis     POC labs:   Lab Results   Component Value Date/Time    POCGLUCOSE 150 (H) 03/28/2019 07:07 AM          Last labs    Lab Results   Component Value Date/Time    CHOLSTRLTOT 143 09/09/2019 07:19 AM    LDL 84 09/09/2019 07:19 AM    HDL 34 (L) 09/09/2019 07:19 AM    TRIGLYCERIDE 126 09/09/2019 07:19 AM       Lab Results   Component Value Date/Time    SODIUM 141 09/09/2019 07:19 AM    SODIUM 139 03/11/2019 02:05 PM    POTASSIUM 4.5 09/09/2019 07:19 AM    POTASSIUM 3.7 03/11/2019 02:05 PM    GLUCOSE 129 (H) 09/09/2019 07:19 AM    GLUCOSE 133 (H) 03/11/2019 02:05 PM    BUNCREATRAT 37 (H) 09/09/2019 07:19 AM     Lab Results   Component Value Date/Time    HBA1C 7.2 (H) 09/09/2019 07:19 AM    HBA1C 7.2 (H) 09/09/2019 07:19 AM    HBA1C 7.4 (H) 04/29/2019 07:28 AM    HBA1C 7.0 (H) 03/11/2019 02:05 PM    HBA1C 8.7 (H) 09/08/2015 07:49 AM    HBA1C 8.4 (H) 05/05/2015 08:32 AM    HBA1C 9.4 (H) 01/06/2015 08:37 AM     Lab Results   Component Value Date/Time    MICRALB 28.7 09/09/2019 07:19 AM          Assessment/Plan:   Medications, refills, and referrals per orders.   1. Controlled type 2 diabetes mellitus without complication, without long-term current use of insulin (HCC)  REFERRAL TO PODIATRY   2. DM type 2, goal HbA1c < 8% (HCC)  REFERRAL TO PODIATRY   3. Dyslipidemia, goal LDL below 100     4. Need for immunization against influenza  Influenza Vaccine, High Dose (65+ Only)   5. Obstructive sleep apnea     6. Hypothyroidism due to non-medication exogenous substances     7. Primary thyroid cancer (HCC)       DM2 A1c is at  goal   Patient to monitor sugars: bid frequency  Discussed diet, exercise, disease management and weight loss goals.  She will continue to work with the bariatric specialist..   Education and advise provided today:All medications, side effects and compliance (discussed carefully)  Annual eye examinations at Ophthalmology  Diabetic diet discussed in detail, written exchange diet given  Foot care discussed and Podiatry visits  Glucose meter dispensed to patient  Glycohemoglobin and other lab monitoring  Home glucose monitoring emphasized  Labs immediately prior to next visit  Long term diabetic complications  Low cholesterol diet, weight control and daily exercise    Followup:   Do labs and RTC 4 months, sooner should new symptoms or problems arise.

## 2019-09-12 NOTE — LETTER
[facility]  Chapito Marques M.D.  [address]  [place] [address]  Fax: [phone]   Authorization for Release/Disclosure of   Protected Health Information   Name: JA MAHER : 1945 SSN: [id number]   Address: [address] [place] [address] [address]  [place] [address] Phone:    [phone] (home)    I authorize the entity listed below to release/disclose the PHI below to:   [facility]/Chapito Marques M.D. and Chapito Marques M.D.   Provider or Entity Name:  Dr Jorge   [facility] [place]  [address]  [place] [address] Phone:  [phone]    Fax:  [phone]   Reason for request: continuity of care   Information to be released:    [  ] LAST COLONOSCOPY,  including any PATH REPORT and follow-up  [  ] LAST FIT/COLOGUARD RESULT [  ] LAST DEXA  [  ] LAST MAMMOGRAM  [  ] LAST PAP  [  ] LAST LABS [X ] RETINA EXAM REPORT  [  ] IMMUNIZATION RECORDS  [  ] Release all info      [  ] Check here and initial the line next to each item to release ALL health information INCLUDING  _____ Care and treatment for drug and / or alcohol abuse  _____ HIV testing, infection status, or AIDS  _____ Genetic Testing    DATES OF SERVICE OR TIME PERIOD TO BE DISCLOSED: _____________  I understand and acknowledge that:  * This Authorization may be revoked at any time by you in writing, except if your health information has already been used or disclosed.  * Your health information that will be used or disclosed as a result of you signing this authorization could be re-disclosed by the recipient. If this occurs, your re-disclosed health information may no longer be protected by State or Federal laws.  * You may refuse to sign this Authorization. Your refusal will not affect your ability to obtain treatment.  * This Authorization becomes effective upon signing and will  on (date) __________.      If no date is indicated, this Authorization will  one (1) year from the signature date.    Name:  Ragini Reaves    Signature:   Date:     9/12/2019       PLEASE FAX REQUESTED RECORDS BACK TO: [phone]

## 2019-09-12 NOTE — LETTER
Request for Medical Records    Patient Name: Ragini Reaves    : 1945      Dear Doctor Cherry    The above named patient receives primary care at the [facility] by Chapito Marques M.D..  The patient informs us that you are her eye care Provider.    Please fax a copy of the most recent eye exam to [phone] or answer the  questions below and fax this sheet back to us at the above number.  Attached is a signed Release of Information.      Date of last eye exam: _____________    Retinal eye exam summary:        Please select the choice(s) that apply.    ____ No diabetic retinopathy    ____    Diabetic retinopathy present      Printed Name and Credentials: __________________________________    Signature of Eye Care Provider: _________________________________    We appreciate your assistance and collaboration in providing efficient patient care!    Kindest Regards,    [facility] [address] [facility]  [place] [address] [phone]

## 2019-09-12 NOTE — PROGRESS NOTES
"Bariatric Medicine Follow Up  Chief Complaint   Patient presents with   • Weight Gain       History of Present Illness:   Ragini Reaves is a 74 y.o. female who presents for weight management follow-up and to help address co-morbidities caused by overweight, as below.    During the patient's last visit, the following were discussed and recommended:  Weight Goal: 3-5% wt loss each month (pt goal weight is 145 lb)  Diet: Increase lean meat, fish intake  Bring in written food journal next visit  Will set timer to drink more water  Physical Activity:  Resume walking  New Rx: None.  Defers anti-obesity medication  Behavior change: Tracking, increase activity level  Follow-up: 3 mo    Watching portion sizes but not tracking intake.  Did not bring in food journal.  She tracks her fasting glucose regularly but not food intake.  She is now considering anti-obesity medication, bariatric surgery but is fearful of both.  She would like more information, which we discussed at length today.  She would like to consider phentermine/Topamax, but not start them today.    VDD: Just started on vitamin D supplement via PCP  T2DM: Recent fasting glucose averaging around 120    Exercise:   Walking but inconsistent     Review of Systems   Denies lightheadedness, diaphoresis, weakness.  Had episodes of nausea and vomiting last week but resolved after 2 days.  All other ROS were reviewed and are otherwise unchanged from my previous visit with patient.    Physical Exam:    /62 (BP Location: Left arm, Patient Position: Sitting, BP Cuff Size: Large adult long)   Pulse 84   Ht 1.575 m (5' 2\")   Wt 103.1 kg (227 lb 3.2 oz)   LMP  (LMP Unknown)   SpO2 93%   BMI 41.56 kg/m²   Waist Measurement   Waist: 54 inch/inches  Weight change since last visit: -2 lb (-7 lb total)  Waist Circum change since last visit: 0 in (1.5 in total)    Constitutional: Oriented to person, place, and time and well-developed, well-nourished, and in no " distress.    Head: Normocephalic.   Musculoskeletal: Normal range of motion. No edema.   Neurological: Alert and oriented to person, place, and time. No muscle weakness.  Gait normal.   Skin: Warm and dry. Not diaphoretic.   Psychiatric: Mood, memory, affect and judgment normal.     Laboratory:   Recent labs reviewed.      Dietitian Assessment: Not seeing RD    ASSESSMENT/PLAN:  Body mass index is 41.56 kg/m².    Obesity Stage ([facility]): 3; Class 3    1. Morbid obesity with BMI of 40.0-44.9, adult ([facility])     2. Essential hypertension     3. Dyslipidemia, goal LDL below 100     4. DM type 2, goal HbA1c < 8% ([facility])       The patient has yet to make real consistent lifestyle change that would lead to significant weight loss.  She would benefit greatly from anti-obesity medication and/or bariatric surgery but patient wants to consider.  Discuss further at her next visit.  Fasting glucose control fair.  Lipids controlled with statin.  Blood pressure controlled.    The patient and I have discussed at length and agree to the following recommendations, which are all addressing the above diagnoses:    Weight Goal: 3-5% wt loss each month (pt goal weight is 145 lb)  Diet: Work on CHO reduction  Declines use of meal replacement therapy  Bring in written food journal next visit!  Physical Activity: Increase walking  Risk level for moderate/vigorous exercise program: Moderate given current inactivity  New Rx: Patient to consider generic phentermine/Topamax  Information on these medications given today  Side Effects: consent in chart  Behavior change: Willingness to commit to more significant lifestyle change  Follow-up: 2 months per patient request  Patient also considering referral to bariatric surgery    Patient's body mass index is 41.56 kg/m². Exercise and nutrition counseling were performed at this visit.

## 2019-09-13 ENCOUNTER — TELEPHONE (OUTPATIENT)
Dept: SLEEP MEDICINE | Facility: MEDICAL CENTER | Age: 74
End: 2019-09-13

## 2019-09-13 NOTE — TELEPHONE ENCOUNTER
Pt called stating [employer] was needing some documentation, called [employer] and they will look into it and call me back.

## 2019-09-22 DIAGNOSIS — E78.5 DYSLIPIDEMIA, GOAL LDL BELOW 100: ICD-10-CM

## 2019-09-22 RX ORDER — SIMVASTATIN 5 MG
TABLET ORAL
Qty: 90 TAB | Refills: 3 | Status: SHIPPED | OUTPATIENT
Start: 2019-09-22 | End: 2020-09-09

## 2019-09-30 ENCOUNTER — NON-PROVIDER VISIT (OUTPATIENT)
Dept: HEMATOLOGY ONCOLOGY | Facility: MEDICAL CENTER | Age: 74
End: 2019-09-30
Payer: MEDICARE

## 2019-09-30 ENCOUNTER — OFFICE VISIT (OUTPATIENT)
Dept: HEMATOLOGY ONCOLOGY | Facility: MEDICAL CENTER | Age: 74
End: 2019-09-30
Payer: MEDICARE

## 2019-09-30 VITALS
RESPIRATION RATE: 18 BRPM | WEIGHT: 229.61 LBS | HEIGHT: 62 IN | TEMPERATURE: 98 F | OXYGEN SATURATION: 95 % | BODY MASS INDEX: 42.25 KG/M2 | DIASTOLIC BLOOD PRESSURE: 68 MMHG | SYSTOLIC BLOOD PRESSURE: 116 MMHG | HEART RATE: 85 BPM

## 2019-09-30 DIAGNOSIS — C50.411 MALIGNANT NEOPLASM OF UPPER-OUTER QUADRANT OF RIGHT FEMALE BREAST, UNSPECIFIED ESTROGEN RECEPTOR STATUS (HCC): ICD-10-CM

## 2019-09-30 DIAGNOSIS — C43.59 MALIGNANT MELANOMA OF SKIN OF TRUNK, EXCEPT SCROTUM (HCC): ICD-10-CM

## 2019-09-30 DIAGNOSIS — Z80.0 FAMILY HISTORY OF MALIGNANT NEOPLASM OF GASTROINTESTINAL TRACT: ICD-10-CM

## 2019-09-30 DIAGNOSIS — Z15.01 BREAST CANCER GENETIC SUSCEPTIBILITY: ICD-10-CM

## 2019-09-30 DIAGNOSIS — Z80.0 FAMILY HISTORY OF GASTRIC CANCER: ICD-10-CM

## 2019-09-30 DIAGNOSIS — Z15.01 GENETIC SUSCEPTIBILITY TO MALIGNANT NEOPLASM OF BREAST: ICD-10-CM

## 2019-09-30 DIAGNOSIS — C73 THYROID CANCER (HCC): ICD-10-CM

## 2019-09-30 DIAGNOSIS — Z80.8 FAMILY HISTORY OF MELANOMA: ICD-10-CM

## 2019-09-30 DIAGNOSIS — E03.2 HYPOTHYROIDISM DUE TO NON-MEDICATION EXOGENOUS SUBSTANCES: ICD-10-CM

## 2019-09-30 DIAGNOSIS — Z86.006 HISTORY OF MELANOMA IN SITU: ICD-10-CM

## 2019-09-30 DIAGNOSIS — Z80.51 FAMILY HISTORY OF RENAL CANCER: ICD-10-CM

## 2019-09-30 DIAGNOSIS — C43.59 MALIGNANT MELANOMA OF TORSO EXCLUDING BREAST (HCC): ICD-10-CM

## 2019-09-30 DIAGNOSIS — Z80.51 FAMILY HISTORY OF MALIGNANT NEOPLASM OF KIDNEY: ICD-10-CM

## 2019-09-30 DIAGNOSIS — C73 MALIGNANT NEOPLASM OF THYROID GLAND (HCC): ICD-10-CM

## 2019-09-30 PROCEDURE — 99203 OFFICE O/P NEW LOW 30 MIN: CPT | Performed by: MEDICAL GENETICS

## 2019-09-30 PROCEDURE — 36415 COLL VENOUS BLD VENIPUNCTURE: CPT | Performed by: MEDICAL GENETICS

## 2019-09-30 PROCEDURE — 99000 SPECIMEN HANDLING OFFICE-LAB: CPT | Performed by: MEDICAL GENETICS

## 2019-09-30 NOTE — PROGRESS NOTES
25 ga butterfly needle was used to draw labs.  First vial of blood was obtained on 1st stick from left AC using aseptic technique per protocol.  2nd vial of blood was obtained from left forearm on 1st stick using 25 ga butterfly needle with aseptic technique per protocol.  Pt tolerated procedure well and ambulated from clinic w/o s/s of distress.  [employer] billable stamp #[id number].

## 2019-09-30 NOTE — PROGRESS NOTES
GENETIC RISK ASSESSMENT    Ragini Reaves is a 74 y.o. year old patient who was referred by [facility] for cancer genetic risk assessment.    Chief Complaint: recent diagnosis of breast cancer and history of thyroid and melanoma; family history of renal, melanoma, gastric cancers    HPI: The patient was well until 6 months ago at which time a suspicious physical exam caused the patient to be referred for imaging ). A suspicious (mammogram study identified a (right) breast mass. A biopsy  was performed and a specimen was submitted to pathology.     A diagnosis of breast carcinoma was made.   The patient also has a personal history of melanoma and thyroid cancers (> 5 years ago)  The patient's family history is positive for renal (mother) melanoma (uncle and gastric (GF)  Review of personal and family histories suggested that a cancer genetic risk assessment was in order.    Review of Systems (ROS):  Constitutional normal  Eyes noraml  ENT normal   Respiratory normal  Cardiovascular normal  Gastrointestinal normal  Genitourinary normal  Musculoskeletal normal  Skin normal  Neurological jl  Endocrine normal  Psychiatric normal  Hematologic/lymphatic normal  Allergic/Immunologic ASA and dextromethopan  All other systems reviewed and are negative.    History (Past/Family)  Family History:   Family History   Problem Relation Age of Onset   • Lung Disease Brother    • Cancer Mother         pelvic   • Stroke Maternal Grandmother       3 generation pedigree built   Yes   Wichoer-Reena empiric risk calculation No   Justice II empiric risk calculation  No    Social History:       Social History     Socioeconomic History   • Marital status: Single     Spouse name: Not on file   • Number of children: Not on file   • Years of education: Not on file   • Highest education level: Not on file   Occupational History   • Not on file   Social Needs   • Financial resource strain: Not on file   • Food insecurity:     Worry: Not on  file     Inability: Not on file   • Transportation needs:     Medical: Not on file     Non-medical: Not on file   Tobacco Use   • Smoking status: Never Smoker   • Smokeless tobacco: Never Used   Substance and Sexual Activity   • Alcohol use: No     Alcohol/week: 0.0 oz   • Drug use: No   • Sexual activity: Not Currently   Lifestyle   • Physical activity:     Days per week: Not on file     Minutes per session: Not on file   • Stress: Not on file   Relationships   • Social connections:     Talks on phone: Not on file     Gets together: Not on file     Attends [employer] service: Not on file     Active member of club or organization: Not on file     Attends meetings of clubs or organizations: Not on file     Relationship status: Not on file   • Intimate partner violence:     Fear of current or ex partner: Not on file     Emotionally abused: Not on file     Physically abused: Not on file     Forced sexual activity: Not on file   Other Topics Concern   • Not on file   Social History Narrative   • Not on file       Patient Past History:  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   • Ringing in ears    • Shortness of breath    • Sleep apnea     cpap   • Snoring    • Tonsillitis    • Urinary incontinence    • Uterine fibroid    • Wears glasses            NCCN Testing Criteria Met                            Yes    Physical Exam  Constitutional    LMP  (LMP Unknown)         General appearance: normal   Head Circumference:   ([place])  Eyes    Conjunctiva: clear   Pupils: reactive     ENMT    External inspection: normal   Assessment of Hearing: normal   Lips/cheeks/gums: no  lesions  Neck   External exam: normal   Thyroid: no masses  Respiratory   Auscultation: clear    Cardiovascular   Auscultation: RRR   Edema : none  Chest   Breasts (exam): not done   Palpation:   GI   External exam and palpation: normal     Pelvic (female): not done   External exam (male):   Lymphatic   Palpation in 2 areas: normal    Musculoskeletal   Gait: normal   Digits and Nails: no lesiosn  Skin   Inspection: normal   Palpation: no masses                  Fibrofolliculoma: absent                  Trichodiscoma: absent                   Akrochordon: absent                   CAfe-au-lait:  absent                  Hypopigmentation: absent                  Mucosal freckling:  absent  Neurologic   Cranial nerves: intact   DTR’s: 2+       Assessment and Plan:  Patient with diagnosis of breast, thryoid and melanoma and family history or melanoma, renal (mother) melanoma (uncle) and gastric (GF)    I spent a total of 40 minutes of face to face time with >50% of time spent in counseling and coordination of care discussing matters stated in above note.    Alannah panel ordered      Ministerio Healy M.D.

## 2019-10-01 ENCOUNTER — OFFICE VISIT (OUTPATIENT)
Dept: DERMATOLOGY | Facility: IMAGING CENTER | Age: 74
End: 2019-10-01
Payer: MEDICARE

## 2019-10-01 DIAGNOSIS — Z86.006 HISTORY OF MELANOMA IN SITU: ICD-10-CM

## 2019-10-01 DIAGNOSIS — R22.1 NECK MASS: ICD-10-CM

## 2019-10-01 DIAGNOSIS — L57.0 ACTINIC KERATOSES: ICD-10-CM

## 2019-10-01 DIAGNOSIS — L30.9 DERMATITIS: ICD-10-CM

## 2019-10-01 DIAGNOSIS — L82.1 SEBORRHEIC KERATOSES: ICD-10-CM

## 2019-10-01 DIAGNOSIS — D22.9 MULTIPLE NEVI: ICD-10-CM

## 2019-10-01 DIAGNOSIS — D18.01 CHERRY ANGIOMA: ICD-10-CM

## 2019-10-01 DIAGNOSIS — D23.9 DERMATOFIBROMA: ICD-10-CM

## 2019-10-01 PROCEDURE — 99213 OFFICE O/P EST LOW 20 MIN: CPT | Mod: 25 | Performed by: DERMATOLOGY

## 2019-10-01 PROCEDURE — 17000 DESTRUCT PREMALG LESION: CPT | Performed by: DERMATOLOGY

## 2019-10-01 PROCEDURE — 17003 DESTRUCT PREMALG LES 2-14: CPT | Performed by: DERMATOLOGY

## 2019-10-01 RX ORDER — BETAMETHASONE DIPROPIONATE 0.05 %
1 GEL (GRAM) TOPICAL
Qty: 50 G | Refills: 2 | Status: SHIPPED | OUTPATIENT
Start: 2019-10-01

## 2019-10-01 RX ORDER — CLOBETASOL PROPIONATE 0.05 MG/G
1 GEL TOPICAL 2 TIMES DAILY
Qty: 60 G | Refills: 3 | Status: CANCELLED | OUTPATIENT
Start: 2019-10-01

## 2019-10-01 ASSESSMENT — ENCOUNTER SYMPTOMS
CHILLS: 0
FEVER: 0

## 2019-10-01 NOTE — PROGRESS NOTES
Dermatology Return Patient Visit    Chief Complaint   Patient presents with   • Follow-Up     ABBY       Subjective:     HPI:   Ragini Reaves is a 74 y.o. female presenting for     ABBY since DX/RX MIS on the back in June 2019  States scar has healed well.  Two spots of concern today.    HPI/location: bilateral lower legs, red lesions  Time present: many years  Painful lesion: No  Itching lesion: No  Enlarging lesion: No  Anything make it better or worse? n/a    History of skin cancer: MIS-back, excised 6/12/19   History of precancers/actinic keratoses: yes  History of blistering/severe sunburns:Yes, teenager  Family history of skin cancer:Yes, Details: Uncle, unknown type  Family history of atypical moles:No  H/o breast CA, s/p radiation      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 100 Strip 6   • ciclopirox (LOPROX) 0.77 % cream Twice daily to the affected area under the breast, on abdomen for 1-2 weeks as needed (use first) 45 g 2   • hydrocortisone 2.5 % Cream topical cream Apply 1 Application to affected area(s) 2 times a day. To area under  breast (use second) as needed for redness/itching/burning 1 Tube 1   • letrozole (FEMARA) 2.5 MG Tab Take 2.5 mg by mouth every day.     • RISEDRONATE SODIUM PO Take 35 mg by mouth.     • KLOR-CON 10 10 MEQ tablet TAKE 1 TABLET BY MOUTH EVERY DAY 90 Tab 3   • metFORMIN (GLUCOPHAGE) 500 MG Tab TAKE 1 TAB BY MOUTH 2 TIMES A DAY, WITH MEALS. 180 Tab 3   • levothyroxine (SYNTHROID) 150 MCG Tab Take 150 mcg by mouth Every morning on an empty stomach. Pt takes a 75MCG on Wed, all over days 150MCG     • Fiber Powder Take 1 Package by mouth every day.     • Probiotic Product (PROBIOTIC DAILY PO) Take 1 Package by mouth every day.     • docusate sodium (COLACE) 100 MG Cap Take 200 mg by mouth every day.     • acetaminophen (TYLENOL) 500 MG Tab Take 500 mg by mouth 2 Times a Day.     • NON SPECIFIED Take 300 mg by mouth every day. CISTANCHE     • CALCIUM-MAGNESIUM-VITAMIN D PO Take 1 Tab by mouth every day.     • BIOTIN PO Take 1 Tab by mouth every day.     • COLLAGEN PO Take 1 Tab by mouth every day.     • Glucosamine-Chondroitin (GLUCOSAMINE CHONDR COMPLEX PO) Take 750 mg by mouth 2 Times a Day.     • spironolactone (ALDACTONE) 25 MG Tab TAKE 1 TABLET BY MOUTH EVERY DAY 90 Tab 3   • losartan (COZAAR) 25 MG Tab Take 1 Tab by mouth every day. 90 Tab 3   • linagliptin (TRADJENTA) 5 MG Tab tablet Take 1 Tab by mouth every day. 90 Tab 3   • furosemide (LASIX) 40 MG Tab Take 40 mg by mouth every morning.     • Lutein 10 MG Tab Take 10 mg by mouth every evening.     • aspirin 81 MG tablet Take 81 mg by mouth every morning. 90 Tab 3   • coenzyme Q-10 30 MG capsule Take 1 Cap by mouth every day. 30 Cap 11   • ascorbic acid (VITAMIN C) 500 MG tablet Take 1 Tab by mouth every day. 30 Tab 11   • Multiple Vitamins-Minerals (ULTRA WOMENS PACK) Misc Take 1 Tab by mouth every day. 30 Each 11     No current facility-administered medications on file prior to visit.        Allergies   Allergen Reactions   • Asa [Aspirin]      Stomach  bleeding.   Tolerates low dose ASA   • Cough Syrup M [Cough Cold-Flu Relief] Swelling     Severe facial swelling   • Dextromethorphan Swelling     Facial swellling       Family History   Problem Relation Age of Onset   • Lung Disease Brother    • Cancer Mother         pelvic   • Stroke Maternal Grandmother        Social History     Socioeconomic History   • Marital status: Single     Spouse name: Not on file   • Number of children: Not on file   • Years of education: Not on file   • Highest education level: Not on file   Occupational History   • Not on file   Social Needs   • Financial resource strain: Not on file   • Food insecurity:     Worry: Not on file     Inability: Not on file   • Transportation needs:     Medical: Not on file     Non-medical: Not on file   Tobacco Use   • Smoking status: Never Smoker   • Smokeless tobacco: Never Used   Substance and Sexual Activity   • Alcohol use: No     Alcohol/week: 0.0 oz   • Drug use: No   • Sexual activity: Not Currently   Lifestyle   • Physical activity:     Days per week: Not on file     Minutes per session: Not on file   • Stress: Not on file   Relationships   • Social connections:     Talks on phone: Not on file     Gets together: Not on file     Attends [employer] service: Not on file     Active member of club or organization: Not on file     Attends meetings of clubs or organizations: Not on file     Relationship status: Not on file   • Intimate partner violence:     Fear of current or ex partner: Not on file     Emotionally abused: Not on file     Physically abused: Not on file     Forced sexual activity: Not on file   Other Topics Concern   • Not on file   Social History Narrative   • Not on file       Review of Systems   Constitutional: Negative for chills and fever.   Skin: Positive for itching and rash.        Objective:     A full mucocutaneous exam was completed including: scalp, hair, ears, face, eyelids, conjunctiva, lips, gums/tongue/oropharynx, neck,  chest, breasts, abdomen, back , left and right upper extremities (including hands/digits and fingernails), left and right lower extremities (including feet/toes, toenails), buttocks and excluding external genitalia (patient refusal,) with the following pertinent findings listed below. Remaining above-listed examined areas within normal limits / negative for rashes or lesions.    There were no vitals taken for this visit.    Physical Exam   Constitutional: She is oriented to person, place, and time and well-developed, well-nourished, and in no distress.   HENT:   Head: Normocephalic and atraumatic.       Right Ear: External ear normal.   Left Ear: External ear normal.   Nose: Nose normal.   Mouth/Throat: Oropharynx is clear and moist.   Eyes: Conjunctivae and lids are normal.   Neck: Normal range of motion. Neck supple.   Cardiovascular: Intact distal pulses.   Pulmonary/Chest: Effort normal.   Lymphadenopathy:        Head (right side): Submandibular adenopathy present. No submental, no preauricular and no posterior auricular adenopathy present.        Head (left side): No submental, no submandibular, no preauricular and no posterior auricular adenopathy present.        Right cervical: No superficial cervical adenopathy present.       Left cervical: No superficial cervical adenopathy present.     She has no axillary adenopathy.        Right: No inguinal adenopathy present.        Left: No inguinal adenopathy present.   Neurological: She is alert and oriented to person, place, and time.   Skin: Skin is warm and dry.        Psychiatric: Mood and affect normal.       DATA: none applicable to review    Assessment and Plan:     1. Actinic keratoses  CRYOTHERAPY:  Risks (including, but not limited to: hypo or hyperpigmentation, redness, blister, blood blister, recurrence, need for further treatment, infection, scar) and benefits of cryotherapy discussed. Patient verbally agreed to proceed with treatment. 2 cryotherapy  freeze thaw cycles of 10 seconds were applied to 2 lesions on the face with cryac. Patient tolerated procedure well. Aftercare instructions given.    2. Neck mass - enlarged right submandibular node x ?6 months  - US-SOFT TISSUES OF HEAD - NECK; Future    3. History of melanoma in situ  Skin cancer education  - discussed importance of sun protective clothing, eyewear  - discussed importance of daily use of broad spectrum sunscreen with SPF 30 or greater, as well as need for reapplication ~every 2 hours when exposed to UVR  - discussed importance of regular self-exams, ideally once per month, every 3-4 months exams in clinic  - ABCDE's of melanoma discussed  - patient to bring any new or concerning lesions to my attention    4. Multiple nevi  - Benign-appearing nature of lesions discussed. Advised to return to clinic for any new or concerning changes.    5. Seborrheic keratoses  - Benign-appearing nature of lesions discussed. Advised to return to clinic for any new or concerning changes.    6. Dermatofibroma  - Benign-appearing nature of lesions discussed. Advised to return to clinic for any new or concerning changes.    7. Cherry angioma  - Benign-appearing nature of lesions discussed. Advised to return to clinic for any new or concerning changes.    8. Dermatitis - ?irritant 2/2 cpap vs scalp psoriasis vs other  - instructed to start betamethasone 0.05% gel to affected area of the scalp at night until improved.  Patient instructed to avoid face, axilla, and groin area. Side effects discussed, including skin thinning, appearance of stretch marks (striae), easy bruising, telangiectasias, and possible increased hair growth     Not discussed: intertrigo/?radiation dermatitis under right breast  - cont zinc oxide 20% paste to affected area qhs  - zeasorb powder during the day  - loprox 0.77% cream to the affected area twice daily for 1-2 weeks prn  - can use hydrocortisone 2.5% cream prn when active/flaring  - side  effects of topical steroids discussed, including minimal systemic absorption, skin thinning, appearance of stretch marks (striae), easy bruising, telangiectasias, and possible increased hair growth     Followup: Return in about 3 months (around 1/1/2020).    Shelli Lambert M.D.

## 2019-10-11 ENCOUNTER — HOSPITAL ENCOUNTER (OUTPATIENT)
Dept: RADIOLOGY | Facility: MEDICAL CENTER | Age: 74
End: 2019-10-11
Attending: DERMATOLOGY
Payer: MEDICARE

## 2019-10-11 DIAGNOSIS — R22.1 NECK MASS: ICD-10-CM

## 2019-10-11 PROCEDURE — 76536 US EXAM OF HEAD AND NECK: CPT

## 2019-10-24 ENCOUNTER — TELEPHONE (OUTPATIENT)
Dept: SLEEP MEDICINE | Facility: MEDICAL CENTER | Age: 74
End: 2019-10-24

## 2019-10-24 DIAGNOSIS — G47.33 OSA (OBSTRUCTIVE SLEEP APNEA): ICD-10-CM

## 2019-10-24 NOTE — TELEPHONE ENCOUNTER
Pt is not getting what she needs from [facility] never got back to on previous message.     Switching pt supplies to DME: [facility] PH#: [phone] // FAX#: [phone]     Provided # to pt. She also has a specific mask she wants, please sign new order.    Winsome out.

## 2019-11-12 ENCOUNTER — OFFICE VISIT (OUTPATIENT)
Dept: HEALTH INFORMATION MANAGEMENT | Facility: MEDICAL CENTER | Age: 74
End: 2019-11-12
Payer: MEDICARE

## 2019-11-12 VITALS
OXYGEN SATURATION: 92 % | WEIGHT: 225.6 LBS | DIASTOLIC BLOOD PRESSURE: 76 MMHG | HEART RATE: 81 BPM | HEIGHT: 62 IN | SYSTOLIC BLOOD PRESSURE: 118 MMHG | BODY MASS INDEX: 41.51 KG/M2

## 2019-11-12 DIAGNOSIS — E66.01 MORBID OBESITY WITH BMI OF 40.0-44.9, ADULT (HCC): ICD-10-CM

## 2019-11-12 DIAGNOSIS — G47.33 OBSTRUCTIVE SLEEP APNEA: ICD-10-CM

## 2019-11-12 DIAGNOSIS — E88.810 METABOLIC SYNDROME: ICD-10-CM

## 2019-11-12 PROCEDURE — 99214 OFFICE O/P EST MOD 30 MIN: CPT | Performed by: INTERNAL MEDICINE

## 2019-11-12 ASSESSMENT — PATIENT HEALTH QUESTIONNAIRE - PHQ9: CLINICAL INTERPRETATION OF PHQ2 SCORE: 0

## 2019-11-12 NOTE — PROGRESS NOTES
Bariatric Medicine Follow Up  Chief Complaint   Patient presents with   • Weight Gain       History of Present Illness:   Ragini Reaves is a 74 y.o. female who presents for weight management follow-up and to help address co-morbidities caused by overweight, as below.    During the patient's last visit, the following were discussed and recommended:  Weight Goal: 3-5% wt loss each month (pt goal weight is 145 lb)  Diet: Work on CHO reduction  Declines use of meal replacement therapy  Bring in written food journal next visit!  Physical Activity: Increase walking  Risk level for moderate/vigorous exercise program: Moderate given current inactivity  New Rx: Patient to consider generic phentermine/Topamax  Information on these medications given today  Side Effects: consent in chart  Behavior change: Willingness to commit to more significant lifestyle change    The patient has kept a written food diary since her last visit.  She is starting to have a protein powder with milk for breakfast, applesauce and dried apple slices during the day.  Lunch is usually lettuce with tomato and cheese, dinner may be noodles or another starch with some protein or just crackers.  Estimate around 50% carbohydrate intake.    Does not want to consider bariatric surgery.  She does not want to consider anti-obesity medication.  She does not want to consider meal replacements that are higher quality products for weight loss.  She feels she cannot afford these treatments.    T2DM: Monitors fasting glucose daily, still likes to stay between 100 and 120.  She does not want to reduce her fasting glucose into the 80s.  YASMANI: Sleep stable    Exercise:   None.  Still very sedentary.  Would be open to considering arm weights or using soup cans for upper body exercises and strengthening.     Review of Systems   Denies lightheadedness, worsening fatigue, diaphoresis, hypoglycemia  All other ROS were reviewed and are otherwise unchanged from my  "previous visit with patient.    Physical Exam:    /76 (BP Location: Left arm, Patient Position: Sitting, BP Cuff Size: Large adult)   Pulse 81   Ht 1.575 m (5' 2\")   Wt 102.3 kg (225 lb 9.6 oz)   LMP  (LMP Unknown)   SpO2 92%   BMI 41.26 kg/m²   Waist Measurement   Waist: 56.5 inch/inches  Weight change since last visit: -2 lb   Waist Circum change since last visit: +1.5 in     Constitutional: Oriented to person, place, and time and well-developed, well-nourished, and in no distress.    Head: Normocephalic.   Musculoskeletal: Normal range of motion. No edema.   Neurological: Alert and oriented to person, place, and time. No muscle weakness.  Gait normal.   Skin: Warm and dry. Not diaphoretic.   Psychiatric: Mood, memory, affect and judgment normal.     Laboratory:   Recent labs reviewed.      Dietitian Assessment: None    ASSESSMENT/PLAN:  Body mass index is 41.26 kg/m².    Obesity Stage ([place]): 2; Class 3    1. Morbid obesity with BMI of 40.0-44.9, adult ([facility])     2. Metabolic syndrome     3. Obstructive sleep apnea     4. Uncontrolled type 2 diabetes mellitus without complication, without long-term current use of insulin ([facility])       The patient has kept a food diary, but has not really made other significant changes that would lead to weight loss.  Her fasting glucose remains the same, and she is not ready to make significant lifestyle change to reduce her fasting glucose, medication requirements, CHO intake.  We had a long discussion today about better food choices, which she will work on.  Financial constraints limit her ability to use anti-obesity medication or quality meal replacement products.  Encouraged her to increase her activity level.    The patient and I have discussed at length and agree to the following recommendations, which are all addressing the above diagnoses:    Weight Goal: 3-5% wt loss each month (pt goal weight is 145 lb)  Diet: Reduce carbohydrate intake from 50% to around " 30%  Instead of crackers and noodles, choose more green vegetables  Physical Activity: Soup can upper body exercises with info sheet given  New Rx: Patient declines  Behavior change: Willingness to make more substantial lifestyle change for weight loss   Follow-up: 2 months    Patient's body mass index is 41.26 kg/m². Exercise and nutrition counseling were performed at this visit.

## 2019-12-20 DIAGNOSIS — I10 ESSENTIAL HYPERTENSION: ICD-10-CM

## 2019-12-20 RX ORDER — LOSARTAN POTASSIUM 25 MG/1
25 TABLET ORAL DAILY
Qty: 90 TAB | Refills: 3 | Status: SHIPPED | OUTPATIENT
Start: 2019-12-20 | End: 2020-12-15 | Stop reason: SDUPTHER

## 2020-01-04 DIAGNOSIS — R06.02 EXERTIONAL SHORTNESS OF BREATH: ICD-10-CM

## 2020-01-04 DIAGNOSIS — R91.8 OPACITIES OF BOTH LUNGS PRESENT ON CHEST X-RAY: ICD-10-CM

## 2020-01-05 RX ORDER — SPIRONOLACTONE 25 MG/1
TABLET ORAL
Qty: 90 TAB | Refills: 3 | Status: SHIPPED | OUTPATIENT
Start: 2020-01-05 | End: 2020-12-21

## 2020-01-07 ENCOUNTER — APPOINTMENT (RX ONLY)
Dept: URBAN - METROPOLITAN AREA CLINIC 4 | Facility: CLINIC | Age: 75
Setting detail: DERMATOLOGY
End: 2020-01-07

## 2020-01-07 DIAGNOSIS — D18.0 HEMANGIOMA: ICD-10-CM

## 2020-01-07 DIAGNOSIS — R59.0 LOCALIZED ENLARGED LYMPH NODES: ICD-10-CM

## 2020-01-07 DIAGNOSIS — L40.0 PSORIASIS VULGARIS: ICD-10-CM

## 2020-01-07 DIAGNOSIS — Z85.820 PERSONAL HISTORY OF MALIGNANT MELANOMA OF SKIN: ICD-10-CM

## 2020-01-07 DIAGNOSIS — D22 MELANOCYTIC NEVI: ICD-10-CM

## 2020-01-07 DIAGNOSIS — L82.1 OTHER SEBORRHEIC KERATOSIS: ICD-10-CM

## 2020-01-07 DIAGNOSIS — L81.4 OTHER MELANIN HYPERPIGMENTATION: ICD-10-CM

## 2020-01-07 PROBLEM — D18.01 HEMANGIOMA OF SKIN AND SUBCUTANEOUS TISSUE: Status: ACTIVE | Noted: 2020-01-07

## 2020-01-07 PROBLEM — D23.71 OTHER BENIGN NEOPLASM OF SKIN OF RIGHT LOWER LIMB, INCLUDING HIP: Status: ACTIVE | Noted: 2020-01-07

## 2020-01-07 PROBLEM — D48.5 NEOPLASM OF UNCERTAIN BEHAVIOR OF SKIN: Status: ACTIVE | Noted: 2020-01-07

## 2020-01-07 PROBLEM — D22.5 MELANOCYTIC NEVI OF TRUNK: Status: ACTIVE | Noted: 2020-01-07

## 2020-01-07 PROBLEM — D23.72 OTHER BENIGN NEOPLASM OF SKIN OF LEFT LOWER LIMB, INCLUDING HIP: Status: ACTIVE | Noted: 2020-01-07

## 2020-01-07 PROCEDURE — 99203 OFFICE O/P NEW LOW 30 MIN: CPT | Mod: 25

## 2020-01-07 PROCEDURE — ? ADDITIONAL NOTES

## 2020-01-07 PROCEDURE — 11102 TANGNTL BX SKIN SINGLE LES: CPT

## 2020-01-07 PROCEDURE — ? BIOPSY BY SHAVE METHOD

## 2020-01-07 PROCEDURE — ? COUNSELING

## 2020-01-07 PROCEDURE — ? MONITORING

## 2020-01-07 ASSESSMENT — LOCATION ZONE DERM
LOCATION ZONE: TRUNK
LOCATION ZONE: FACE
LOCATION ZONE: HAND
LOCATION ZONE: ARM
LOCATION ZONE: SCALP

## 2020-01-07 ASSESSMENT — LOCATION DETAILED DESCRIPTION DERM
LOCATION DETAILED: RIGHT SUBMANDIBULAR AREA
LOCATION DETAILED: LEFT RADIAL DORSAL HAND
LOCATION DETAILED: LEFT POSTERIOR SHOULDER
LOCATION DETAILED: LEFT INFERIOR CENTRAL MALAR CHEEK
LOCATION DETAILED: LEFT INFERIOR MEDIAL MIDBACK
LOCATION DETAILED: RIGHT RADIAL DORSAL HAND
LOCATION DETAILED: UPPER STERNUM
LOCATION DETAILED: LOWER STERNUM
LOCATION DETAILED: RIGHT POSTERIOR SHOULDER
LOCATION DETAILED: MIDDLE STERNUM
LOCATION DETAILED: RIGHT SUPERIOR PARIETAL SCALP

## 2020-01-07 ASSESSMENT — LOCATION SIMPLE DESCRIPTION DERM
LOCATION SIMPLE: RIGHT SHOULDER
LOCATION SIMPLE: RIGHT SUBMANDIBULAR AREA
LOCATION SIMPLE: SCALP
LOCATION SIMPLE: LEFT LOWER BACK
LOCATION SIMPLE: CHEST
LOCATION SIMPLE: RIGHT HAND
LOCATION SIMPLE: LEFT SHOULDER
LOCATION SIMPLE: LEFT HAND
LOCATION SIMPLE: LEFT CHEEK

## 2020-01-07 NOTE — PROCEDURE: ADDITIONAL NOTES
Additional Notes: Patient has prescription of Betamethasone Solution at home to use as needed.
Detail Level: Simple
Additional Notes: Melanoma In-situ- Left lower back ~3/2019 Tx with Dr. Lynch [facility]

## 2020-01-07 NOTE — PROCEDURE: MIPS QUALITY
Quality 130: Documentation Of Current Medications In The Medical Record: Current Medications Documented
Quality 226: Preventive Care And Screening: Tobacco Use: Screening And Cessation Intervention: Patient screened for tobacco use and is an ex/non-smoker
Detail Level: Detailed
Quality 137: Melanoma: Continuity Of Care - Recall System: Patient information entered into a recall system that includes: target date for the next exam specified AND a process to follow up with patients regarding missed or unscheduled appointments

## 2020-01-07 NOTE — PROCEDURE: BIOPSY BY SHAVE METHOD
X Size Of Lesion In Cm: 0
Bill For Surgical Tray: no
Depth Of Biopsy: dermis
Electrodesiccation Text: The wound bed was treated with electrodesiccation after the biopsy was performed.
Biopsy Type: H and E
Consent: Written consent was obtained and risks were reviewed including but not limited to scarring, infection, bleeding, scabbing, incomplete removal, nerve damage and allergy to anesthesia.
Type Of Destruction Used: Curettage
Anesthesia Volume In Cc: 0.5
Electrodesiccation And Curettage Text: The wound bed was treated with electrodesiccation and curettage after the biopsy was performed.
Dressing: bandage
Silver Nitrate Text: The wound bed was treated with silver nitrate after the biopsy was performed.
Was A Bandage Applied: Yes
Hemostasis: Drysol
Biopsy Method: Dermablade
Billing Type: Third-Party Bill
Curettage Text: The wound bed was treated with curettage after the biopsy was performed.
Detail Level: Detailed
Lab: [id number]
Post-Care Instructions: I reviewed with the patient in detail post-care instructions. Patient is to keep the biopsy site dry overnight, and then apply bacitracin twice daily until healed. Patient may apply hydrogen peroxide soaks to remove any crusting.
Cryotherapy Text: The wound bed was treated with cryotherapy after the biopsy was performed.
Anesthesia Type: 0.5% lidocaine with 1:200,000 epinephrine and a 1:10 solution of 8.4% sodium bicarbonate
Lab Facility: 
Notification Instructions: Patient will be notified of biopsy results. However, patient instructed to call the office if not contacted within 2 weeks.
Wound Care: Petrolatum

## 2020-01-07 NOTE — HPI: FULL BODY SKIN EXAMINATION
How Severe Are Your Spot(S)?: mild
What Is The Reason For Today's Visit?: Full Body Skin Examination
What Is The Reason For Today's Visit? (Being Monitored For X): concerning skin lesions on an annual basis
Additional History: Melanoma In-situ- Left lower back ~3/2019 Tx at [facility].

## 2020-01-07 NOTE — HPI: SECONDARY COMPLAINT
How Severe Is This Condition?: mild
Additional History: Area has been treated with LN x 2
How Severe Is This Condition?: moderate

## 2020-01-07 NOTE — PROCEDURE: COUNSELING
Quality 137: Melanoma: Continuity Of Care - Recall System: Patient information entered into a recall system that includes: target date for the next exam specified AND a process to follow up with patients regarding missed or unscheduled appointments
Detail Level: Detailed
When Should The Patient Follow-Up For Their Next Full-Body Skin Exam?: 3 Months
Detail Level: Generalized
Detail Level: Zone
Quality 337: Tuberculosis Prevention For Psoriasis And Psoriatic Arthritis Patients On A Biological Immune Response Modifier: No documentation of negative or managed positive TB screen
Quality 410: Psoriasis Clinical Response To Oral Systemic Or Biologic Medications: Psoriasis Assessment Tool NOT Documented

## 2020-01-13 ENCOUNTER — OFFICE VISIT (OUTPATIENT)
Dept: HEALTH INFORMATION MANAGEMENT | Facility: MEDICAL CENTER | Age: 75
End: 2020-01-13
Payer: MEDICARE

## 2020-01-13 VITALS
BODY MASS INDEX: 41.83 KG/M2 | HEIGHT: 62 IN | DIASTOLIC BLOOD PRESSURE: 68 MMHG | HEART RATE: 81 BPM | WEIGHT: 227.3 LBS | SYSTOLIC BLOOD PRESSURE: 118 MMHG | OXYGEN SATURATION: 94 %

## 2020-01-13 DIAGNOSIS — G47.33 OBSTRUCTIVE SLEEP APNEA: ICD-10-CM

## 2020-01-13 DIAGNOSIS — I10 ESSENTIAL HYPERTENSION: ICD-10-CM

## 2020-01-13 DIAGNOSIS — E66.01 MORBID OBESITY WITH BMI OF 40.0-44.9, ADULT (HCC): ICD-10-CM

## 2020-01-13 DIAGNOSIS — E88.810 METABOLIC SYNDROME: ICD-10-CM

## 2020-01-13 PROCEDURE — 99214 OFFICE O/P EST MOD 30 MIN: CPT | Performed by: INTERNAL MEDICINE

## 2020-01-13 ASSESSMENT — PATIENT HEALTH QUESTIONNAIRE - PHQ9: CLINICAL INTERPRETATION OF PHQ2 SCORE: 0

## 2020-01-13 NOTE — PROGRESS NOTES
"Bariatric Medicine Follow Up  Chief Complaint   Patient presents with   • Weight Gain       History of Present Illness:   Ragini Reaves is a 75 y.o. female who presents for weight management follow-up and to help address co-morbidities caused by overweight, as below.    During the patient's last visit, the following were discussed and recommended:  Weight Goal: 3-5% wt loss each month (pt goal weight is 145 lb)  Diet: Reduce carbohydrate intake from 50% to around 30%  Instead of crackers and noodles, choose more green vegetables  Physical Activity: Soup can upper body exercises with info sheet given  New Rx: Patient declines  Behavior change: Willingness to make more substantial lifestyle change for weight loss     The patient has done much better this month.  She keeps a written journal, trying to have a protein powder drink once daily, a lot more vegetables, very little meat.  Some pasta, but much more mindful about quantity of what she is eating.    YASMANI: Sleep stable  T2DM: Fasting glucose 100-1 30    Exercise:   Walking more daily     Review of Systems   Denies lightheadedness, hypoglycemia other than one episode  All other ROS were reviewed and are otherwise unchanged from my previous visit with patient.    Physical Exam:    /68 (BP Location: Left arm, Patient Position: Sitting, BP Cuff Size: Large adult)   Pulse 81   Ht 1.575 m (5' 2\")   Wt 103.1 kg (227 lb 4.8 oz)   LMP  (LMP Unknown)   SpO2 94%   BMI 41.57 kg/m²   Waist Measurement   Waist: 52 inch/inches  Weight change since last visit: +1.5 lb (-2 lb total)  Waist Circum change since last visit: -4.5 in (-2 in total)    Constitutional: Oriented to person, place, and time and well-developed, well-nourished, and in no distress.    Head: Normocephalic.   Musculoskeletal: Normal range of motion. No edema.   Neurological: Alert and oriented to person, place, and time. No muscle weakness.  Gait normal.   Skin: Warm and dry. Not diaphoretic. "   Psychiatric: Mood, memory, affect and judgment normal.     Laboratory:   Recent labs reviewed.      Dietitian Assessment: n/a     ASSESSMENT/PLAN:  Body mass index is 41.57 kg/m².    Obesity Stage ([place]):  2; Class 3    1. Morbid obesity with BMI of 40.0-44.9, adult ([facility])     2. Obstructive sleep apnea     3. Metabolic syndrome     4. Essential hypertension     5. Uncontrolled type 2 diabetes mellitus without complication, without long-term current use of insulin ([facility])       The patient has already lost weight circumference, fat mass percent by really watching total CHO intake and quantity.  Continue increasing activity as she is doing.  Fasting glucose under better control.  Sleep, blood pressure stable.    The patient and I have discussed at length and agree to the following recommendations, which are all addressing the above diagnoses:    Weight Goal: 3-5% wt loss each month (pt goal weight is 145 lb)  Diet: Continue reducing refined CHO intake by 50%  Add more vegetables  Bring in written food journal next visit  Physical Activity: Walking more, set further goals next visit  Rx: None  Titrate down insulin as fasting glucose improves  Behavior change: Increase activity, more mindfulness with food choices  Follow-up: 3 mo    Patient's body mass index is 41.57 kg/m². Exercise and nutrition counseling were performed at this visit.

## 2020-01-16 ENCOUNTER — OFFICE VISIT (OUTPATIENT)
Dept: CARDIOLOGY | Facility: MEDICAL CENTER | Age: 75
End: 2020-01-16
Payer: MEDICARE

## 2020-01-16 VITALS
DIASTOLIC BLOOD PRESSURE: 70 MMHG | SYSTOLIC BLOOD PRESSURE: 110 MMHG | WEIGHT: 231 LBS | OXYGEN SATURATION: 94 % | BODY MASS INDEX: 42.51 KG/M2 | HEART RATE: 93 BPM | HEIGHT: 62 IN

## 2020-01-16 DIAGNOSIS — E78.5 DYSLIPIDEMIA, GOAL LDL BELOW 100: ICD-10-CM

## 2020-01-16 DIAGNOSIS — E66.01 MORBID OBESITY WITH BMI OF 40.0-44.9, ADULT (HCC): ICD-10-CM

## 2020-01-16 DIAGNOSIS — Z79.899 HIGH RISK MEDICATION USE: ICD-10-CM

## 2020-01-16 DIAGNOSIS — I10 ESSENTIAL HYPERTENSION: ICD-10-CM

## 2020-01-16 DIAGNOSIS — I50.32 CHRONIC HEART FAILURE WITH PRESERVED EJECTION FRACTION (HCC): ICD-10-CM

## 2020-01-16 PROCEDURE — 99213 OFFICE O/P EST LOW 20 MIN: CPT | Performed by: PHYSICIAN ASSISTANT

## 2020-01-16 ASSESSMENT — ENCOUNTER SYMPTOMS
DEPRESSION: 0
DIZZINESS: 0
COUGH: 0
NAUSEA: 0
SHORTNESS OF BREATH: 0
PALPITATIONS: 0
PND: 0
ORTHOPNEA: 0

## 2020-01-16 NOTE — PROGRESS NOTES
Chief Complaint   Patient presents with   • Hypertension       Subjective:   Ragini Reaves is a 75 y.o. female who presents today for follow-up.    Patient of Dr. Pereira.  Current medical problems include HfpEF, diabetes without insulin use, obstructive sleep apnea on CPAP, hypothyroidism, hypertension, dyslipidemia, obesity, recent diagnosis of breast cancer.   At last visit she was stable, no changes to her medical regimen.    She recently turned 75 and is feeling really well.  No problems or hospitalizations since her last visit.    She is not very active but denies any chest pain with her daily activities.    She uses a water pill which helps with her water retention, rarely has to use a second dose.  She is worried that she is not absorbing the potassium.    BP at home is normally 110s/60s, no lightheadedness or dizziness.     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   • Ringing in ears    • Shortness of breath    • Sleep apnea     cpap   • Snoring    • Tonsillitis    • Urinary incontinence    • Uterine fibroid    • Wears glasses      Past Surgical History:   Procedure Laterality Date   • MASTECTOMY Right 3/28/2019    Procedure: MASTECTOMY - RE-EXCISION RIGHT BREAST BIOPSY SITE FOR MARGINS;  Surgeon: Karuna Hussein M.D.;  Location: SURGERY SAME DAY [facility];  Service: General   • BREAST BIOPSY Right 3/13/2019    Procedure: BREAST BIOPSY - WIRE LOCALIZED;  Surgeon: Karuna Hussein M.D.;  Location: SURGERY [facility];  Service: Gen Robotic   • COLONOSCOPY WITH BIOPSY  5/21/2013    Performed by Mauro Garcia M.D. at ENDOSCOPY [facility]   •  THYROIDECTOMY  2000   • TONSILLECTOMY       Family History   Problem Relation Age of Onset   • Lung Disease Brother    • Cancer Mother         pelvic   • Stroke Maternal Grandmother      Social History     Socioeconomic History   • Marital status: Single     Spouse name: Not on file   • Number of children: Not on file   • Years of education: Not on file   • Highest education level: Not on file   Occupational History   • Not on file   Social Needs   • Financial resource strain: Not on file   • Food insecurity:     Worry: Not on file     Inability: Not on file   • Transportation needs:     Medical: Not on file     Non-medical: Not on file   Tobacco Use   • Smoking status: Never Smoker   • Smokeless tobacco: Never Used   Substance and Sexual Activity   • Alcohol use: No     Alcohol/week: 0.0 oz   • Drug use: No   • Sexual activity: Not Currently   Lifestyle   • Physical activity:     Days per week: Not on file     Minutes per session: Not on file   • Stress: Not on file   Relationships   • Social connections:     Talks on phone: Not on file     Gets together: Not on file     Attends L[employer] day service: Not on file     Active member of club or organization: Not on file     Attends meetings of clubs or organizations: Not on file     Relationship status: Not on file   • Intimate partner violence:     Fear of current or ex partner: Not on file     Emotionally abused: Not on file     Physically abused: Not on file     Forced sexual activity: Not on file   Other Topics Concern   • Not on file   Social History Narrative   • Not on file     Allergies   Allergen Reactions   • Asa [Aspirin]      Stomach bleeding.   Tolerates low dose ASA   • Cough Syrup M [Cough Cold-Flu Relief] Swelling     Severe facial swelling   • Dextromethorphan Swelling     Facial swellling     Outpatient Encounter Medications as of 1/16/2020   Medication Sig Dispense Refill   • spironolactone (ALDACTONE) 25 MG Tab TAKE 1 TABLET BY MOUTH EVERY DAY 90 Tab 3    • losartan (COZAAR) 25 MG Tab Take 1 Tab by mouth every day. 90 Tab 3   • betamethasone, augmented, (DIPROLENE) 0.05 % gel Apply 1 Application to affected area(s) every bedtime. 50 g 2   • simvastatin (ZOCOR) 5 MG Tab TAKE 1 TABLET BY MOUTH EVERY DAY IN THE EVENING 90 Tab 3   • ciclopirox (LOPROX) 0.77 % cream Twice daily to the affected area under the breast, on abdomen for 1-2 weeks as needed (use first) 45 g 2   • hydrocortisone 2.5 % Cream topical cream Apply 1 Application to affected area(s) 2 times a day. To area under breast (use second) as needed for redness/itching/burning 1 Tube 1   • letrozole (FEMARA) 2.5 MG Tab Take 2.5 mg by mouth every day.     • RISEDRONATE SODIUM PO Take 35 mg by mouth.     • KLOR-CON 10 10 MEQ tablet TAKE 1 TABLET BY MOUTH EVERY DAY 90 Tab 3   • metFORMIN (GLUCOPHAGE) 500 MG Tab TAKE 1 TAB BY MOUTH 2 TIMES A DAY, WITH MEALS. 180 Tab 3   • levothyroxine (SYNTHROID) 150 MCG Tab Take 150 mcg by mouth Every morning on an empty stomach. Pt takes a 75MCG on Wed, all over days 150MCG     • Fiber Powder Take 1 Package by mouth every day.     • Probiotic Product (PROBIOTIC DAILY PO) Take 1 Package by mouth every day.     • docusate sodium (COLACE) 100 MG Cap Take 200 mg by mouth every day.     • acetaminophen (TYLENOL) 500 MG Tab Take 500 mg by mouth 2 Times a Day.     • CALCIUM-MAGNESIUM-VITAMIN D PO Take 1 Tab by mouth every day.     • BIOTIN PO Take 1 Tab by mouth every day.     • COLLAGEN PO Take 1 Tab by mouth every day.     • Glucosamine-Chondroitin (GLUCOSAMINE CHONDR COMPLEX PO) Take 750 mg by mouth 2 Times a Day.     • linagliptin (TRADJENTA) 5 MG Tab tablet Take 1 Tab by mouth every day. 90 Tab 3   • furosemide (LASIX) 40 MG Tab Take 40 mg by mouth every morning.     • Lutein 10 MG Tab Take 10 mg by mouth every evening.     • aspirin 81 MG tablet Take 81 mg by mouth every morning. 90 Tab 3   • coenzyme Q-10 30 MG capsule Take 1 Cap by mouth every day. 30 Cap 11   • ascorbic acid  "(VITAMIN C) 500 MG tablet Take 1 Tab by mouth every day. 30 Tab 11   • Multiple Vitamins-Minerals (ULTRA WOMENS PACK) Misc Take 1 Tab by mouth every day. 30 Each 11   • FREESTYLE LANCETS Misc USE 1 AS DIRECTED TWICE A DAY *E11.9* 100 Each 4   • glucose blood (FREESTYLE LITE) strip 1 Strip by Other route 1 time daily as needed. 100 Strip 6   • NON SPECIFIED Take 300 mg by mouth every day. CISTANCHE       No facility-administered encounter medications on file as of 1/16/2020.      Review of Systems   Constitutional: Negative for malaise/fatigue.   Respiratory: Negative for cough and shortness of breath.    Cardiovascular: Positive for leg swelling. Negative for chest pain, palpitations, orthopnea and PND.   Gastrointestinal: Negative for nausea.   Musculoskeletal: Positive for joint pain.   Neurological: Negative for dizziness.   Psychiatric/Behavioral: Negative for depression.        Objective:   /70 (BP Location: Left arm, Patient Position: Sitting, BP Cuff Size: Adult)   Pulse 93   Ht 1.575 m (5' 2\")   Wt 104.8 kg (231 lb)   LMP  (LMP Unknown)   SpO2 94%   BMI 42.25 kg/m²     Physical Exam   Constitutional: She is oriented to person, place, and time. She appears well-developed and well-nourished. No distress.   Obese female, BMI 42. Appears younger than stated age   HENT:   Head: Normocephalic and atraumatic.   Eyes: Conjunctivae are normal. No scleral icterus.   Neck: Neck supple. No JVD (JVP8cm) present.   Cardiovascular: Normal rate, regular rhythm and intact distal pulses.   No murmur heard.  Pulmonary/Chest: Effort normal and breath sounds normal. No respiratory distress. She has no wheezes. She has no rales.   Abdominal: Soft. She exhibits no distension. There is no tenderness.   Musculoskeletal:         General: Edema (trace pitting edema of left lower leg) present.   Neurological: She is alert and oriented to person, place, and time.   Normal gait   Skin: Skin is warm and dry. She is not " diaphoretic. No pallor.   Hyperpigmentation of left medial tibia   Psychiatric: Judgment normal.   Vitals reviewed.  PET 12/13/2017:  CONCLUSIONS AND IMPRESSIONS:  1.  Negative cardiac PET scan.  No evidence of ischemia or infarction.  2.  Resting ejection fraction of 84%, stress ejection fraction of 91%.  No   segmental wall motion abnormalities noted.  3.  No significant ST segment changes.  Occasional PVCs noted.  4.  Shortness of breath was experienced in this study.     TTE 12/2017:  CONCLUSIONS  1. Normal right and left ventricular size and function.   2. Normal regional wall motion.  3. Mild to moderate aortic insufficiency which is eccentric and   directed anteriorly. Mild aortic valve calcification.   4. Normal estimated right atrial pressure.     No prior study is available for comparison.     Assessment:     1. Chronic heart failure with preserved ejection fraction (HCC)     2. High risk medication use  Basic Metabolic Panel   3. Essential hypertension     4. Morbid obesity with BMI of 40.0-44.9, adult ([facility])     5. Dyslipidemia, goal LDL below 100     6. Uncontrolled type 2 diabetes mellitus without complication, without long-term current use of insulin ([facility])         Medical Decision Making:  Today's Assessment / Status / Plan:   HFpEF, well compensated on exam   - no HF exacerbations, if starts having multiple hospitalizations might be a good candidate for PAp monitor  - continue lasix 40mg with klor con 10meq, extra dose PRN, labs next week after she sees her PCP  - rodrigo 25mg    DMtype 2, controlled   Per Dr. Marques, last hgbA1c 7.4, improving with diet control     Hypertension, controlled  Well controlled  -continue spironolactone, lasix, losartan.        Dyslipidemia  - LDL increasing, given her diabetic status she should be on a high intensity statin, will address this at next visit     Obesity, BMI 42  - weights stable, admits to minimal exercise  - patient of Dr. Dillard      Aortic  insufficiency  - on murmur on exam today  - Mild, repeat echo 12/2020.      Plan: Labs to monitor kidney/electrolytes, consider high intensity statin at next visit.  Follow-up in 6 months, sooner if problems.    Collaborating MD: Dr. Velasco

## 2020-01-23 ENCOUNTER — OFFICE VISIT (OUTPATIENT)
Dept: MEDICAL GROUP | Facility: MEDICAL CENTER | Age: 75
End: 2020-01-23
Payer: MEDICARE

## 2020-01-23 VITALS
RESPIRATION RATE: 14 BRPM | OXYGEN SATURATION: 95 % | HEIGHT: 62 IN | HEART RATE: 92 BPM | TEMPERATURE: 97.1 F | DIASTOLIC BLOOD PRESSURE: 70 MMHG | BODY MASS INDEX: 42.33 KG/M2 | SYSTOLIC BLOOD PRESSURE: 120 MMHG | WEIGHT: 230 LBS

## 2020-01-23 DIAGNOSIS — G47.33 OBSTRUCTIVE SLEEP APNEA: ICD-10-CM

## 2020-01-23 DIAGNOSIS — E11.9 DM TYPE 2, GOAL HBA1C < 8% (HCC): ICD-10-CM

## 2020-01-23 DIAGNOSIS — C43.59 MALIGNANT MELANOMA OF SKIN OF TRUNK, EXCEPT SCROTUM (HCC): ICD-10-CM

## 2020-01-23 DIAGNOSIS — E66.01 MORBID OBESITY WITH BMI OF 40.0-44.9, ADULT (HCC): ICD-10-CM

## 2020-01-23 DIAGNOSIS — E78.5 DYSLIPIDEMIA, GOAL LDL BELOW 100: ICD-10-CM

## 2020-01-23 DIAGNOSIS — R22.1 MASS OF RIGHT SIDE OF NECK: ICD-10-CM

## 2020-01-23 DIAGNOSIS — K11.6 SALIVARY CYST: ICD-10-CM

## 2020-01-23 DIAGNOSIS — M85.89 OSTEOPENIA OF MULTIPLE SITES: ICD-10-CM

## 2020-01-23 DIAGNOSIS — I10 ESSENTIAL HYPERTENSION: ICD-10-CM

## 2020-01-23 DIAGNOSIS — E89.0 POSTSURGICAL HYPOTHYROIDISM: ICD-10-CM

## 2020-01-23 PROCEDURE — 99214 OFFICE O/P EST MOD 30 MIN: CPT | Performed by: FAMILY MEDICINE

## 2020-01-23 RX ORDER — RISEDRONATE SODIUM 35 MG/1
35 TABLET, FILM COATED ORAL
COMMUNITY
Start: 2020-01-17

## 2020-01-23 NOTE — LETTER
[employer]  Chapito Marques M.D.  [address]  [place] [address]  Fax: [phone]   Authorization for Release/Disclosure of   Protected Health Information   Name: RAGINI MAHER : 1945 SSN: [id number]   Address: [address] [place] [address] [address]  [place] [address] Phone:    [phone] (home)    I authorize the entity listed below to release/disclose the PHI below to:   [employer]/Chapito Marques M.D. and Chapito Marques M.D.   Provider or Entity Name:  Tyson Carey M.D.   Address   [facility], [facility]  [address], [place] [address] Phone:      Fax:  [phone]   Reason for request: continuity of care   Information to be released:    [  ] LAST COLONOSCOPY,  including any PATH REPORT and follow-up  [  ] LAST FIT/COLOGUARD RESULT [  ] LAST DEXA  [  ] LAST MAMMOGRAM  [  ] LAST PAP  [  ] LAST LABS [X] RETINA EXAM REPORT  [  ] IMMUNIZATION RECORDS  [X] Release all info      [  ] Check here and initial the line next to each item to release ALL health information INCLUDING  _____ Care and treatment for drug and / or alcohol abuse  _____ HIV testing, infection status, or AIDS  _____ Genetic Testing    DATES OF SERVICE OR TIME PERIOD TO BE DISCLOSED: _____________  I understand and acknowledge that:  * This Authorization may be revoked at any time by you in writing, except if your health information has already been used or disclosed.  * Your health information that will be used or disclosed as a result of you signing this authorization could be re-disclosed by the recipient. If this occurs, your re-disclosed health information may no longer be protected by State or Federal laws.  * You may refuse to sign this Authorization. Your refusal will not affect your ability to obtain treatment.  * This Authorization becomes effective upon signing and will  on (date) __________.      If no date is indicated, this Authorization will  one (1) year from the signature date.    Name: Ragini  Gaby Reaves    Signature:   Date:     1/23/2020       PLEASE FAX REQUESTED RECORDS BACK TO: [phone]

## 2020-01-23 NOTE — PROGRESS NOTES
Diabetes Focused Exam:    Chief Complaint   Patient presents with   • Diabetes Mellitus   • Hypertension   • Hyperlipidemia      Subjective:   HPI  Ragini Reaves is a 75 y.o. female who presents for follow up of chronic conditions of diabetes mellitus, hypertension and hyperlipidemia. She indicates that she is feeling well and denies any symptoms referable to the above diagnoses. Specifically denies chest pain, palpitations, dyspnea, orthopnea, PND or peripheral edema. Also denies polyuria, polydipsia, urinary complaints, abdominal complaints, myalgias, numbness, weakness or other related symptoms.     She has hypothyroidism.  Last TSH in April 2019 was at goal.    She has done well with her breast cancer.  She is on femara and risendronate once weekly has been added by her oncologist.    Her right submandibular region cyst is enlarging and is now larger and more painful.  Referral to ENT discussed and placed.    Still working with Dr. Russell on her morbid obesity.     The patient is taking ASA every day 81 mg and taking all other medications as prescribed. Patient denies any side effects of medication.  DM: A1c goal <7  Glucose monitoring frequency: daily   Fasting sugars: , usually 80s-120. Post-prandial sugars: up to 189, usually 150s or below.  Hypoglycemic episodes once or twice.  Diabetic complications: none  ACR Albumin/Creatinine Ratio goal <30  HTN: Blood pressure goal <140/<80. Currently Rx ACE/ARB: Yes  Hyperlipidemia:Cholesterol goal LDL <100, total/HDL <5. Currently Rx Statin: Yes  Last eye exam December 2019  Denies visual blurring, double vision, eye pain and floaters  Last monofilament foot exam: last week of December.  Denies foot pain, numbness, calluses, ulcers    See medications and orders placed in encounter report.  Past medical history, family history, social history reviewed and updated as documented in medical record.    Current medications including changes  today:  Current Outpatient Medications   Medication Sig Dispense Refill   • risedronate (ACTONEL) 35 MG Tab      • spironolactone (ALDACTONE) 25 MG Tab TAKE 1 TABLET BY MOUTH EVERY DAY 90 Tab 3   • losartan (COZAAR) 25 MG Tab Take 1 Tab by mouth every day. 90 Tab 3   • betamethasone, augmented, (DIPROLENE) 0.05 % gel Apply 1 Application to affected area(s) every bedtime. 50 g 2   • simvastatin (ZOCOR) 5 MG Tab TAKE 1 TABLET BY MOUTH EVERY DAY IN THE EVENING 90 Tab 3   • FREESTYLE LANCETS Misc USE 1 AS DIRECTED TWICE A DAY *E11.9* 100 Each 4   • glucose blood (FREESTYLE LITE) strip 1 Strip by Other route 1 time daily as needed. 100 Strip 6   • ciclopirox (LOPROX) 0.77 % cream Twice daily to the affected area under the breast, on abdomen for 1-2 weeks as needed (use first) 45 g 2   • hydrocortisone 2.5 % Cream topical cream Apply 1 Application to affected area(s) 2 times a day. To area under breast (use second) as needed for redness/itching/burning 1 Tube 1   • letrozole (FEMARA) 2.5 MG Tab Take 2.5 mg by mouth every day.     • KLOR-CON 10 10 MEQ tablet TAKE 1 TABLET BY MOUTH EVERY DAY 90 Tab 3   • metFORMIN (GLUCOPHAGE) 500 MG Tab TAKE 1 TAB BY MOUTH 2 TIMES A DAY, WITH MEALS. 180 Tab 3   • levothyroxine (SYNTHROID) 150 MCG Tab Take 150 mcg by mouth Every morning on an empty stomach. Pt takes a 75MCG on Wed, all over days 150MCG     • Fiber Powder Take 1 Package by mouth every day.     • Probiotic Product (PROBIOTIC DAILY PO) Take 1 Package by mouth every day.     • docusate sodium (COLACE) 100 MG Cap Take 200 mg by mouth every day.     • acetaminophen (TYLENOL) 500 MG Tab Take 500 mg by mouth 2 Times a Day.     • NON SPECIFIED Take 300 mg by mouth every day. CISTANCHE     • CALCIUM-MAGNESIUM-VITAMIN D PO Take 1 Tab by mouth every day.     • BIOTIN PO Take 1 Tab by mouth every day.     • COLLAGEN PO Take 1 Tab by mouth every day.     • Glucosamine-Chondroitin (GLUCOSAMINE CHONDR COMPLEX PO) Take 750 mg by mouth 2  "Times a Day.     • linagliptin (TRADJENTA) 5 MG Tab tablet Take 1 Tab by mouth every day. 90 Tab 3   • furosemide (LASIX) 40 MG Tab Take 40 mg by mouth every morning.     • Lutein 10 MG Tab Take 10 mg by mouth every evening.     • aspirin 81 MG tablet Take 81 mg by mouth every morning. 90 Tab 3   • coenzyme Q-10 30 MG capsule Take 1 Cap by mouth every day. 30 Cap 11   • ascorbic acid (VITAMIN C) 500 MG tablet Take 1 Tab by mouth every day. 30 Tab 11   • Multiple Vitamins-Minerals (ULTRA WOMENS PACK) Misc Take 1 Tab by mouth every day. 30 Each 11     No current facility-administered medications for this visit.      Allergies:   Allergies   Allergen Reactions   • Asa [Aspirin]      Stomach bleeding.   Tolerates low dose ASA   • Cough Syrup M [Cough Cold-Flu Relief] Swelling     Severe facial swelling   • Dextromethorphan Swelling     Facial swellling      Social History     Tobacco Use   • Smoking status: Never Smoker   • Smokeless tobacco: Never Used   Substance Use Topics   • Alcohol use: No     Alcohol/week: 0.0 oz   • Drug use: No     Exercise: walks a little  Health Maintenance/Immunizations: discussed    ROS  Pertinent  ROS findings as above. All other systems reviewed and are negative.      Objective:     OBJECTIVE:  /70 (BP Location: Right arm, Patient Position: Sitting, BP Cuff Size: Adult)   Pulse 92   Temp 36.2 °C (97.1 °F) (Temporal)   Resp 14   Ht 1.575 m (5' 2\")   Wt 104.3 kg (230 lb)   LMP  (LMP Unknown)   SpO2 95%   BMI 42.07 kg/m²  Body mass index is 42.07 kg/m². BMI: severely obese  General: No apparent distress, conversant, cooperative and pleasant with the examination.  Psych: Alert and oriented x4, judgment and insight normal  Neck: No JVD or bruits, no adenopathy, supple.  Right neck 3 cm tender smooth structure near the submandibular gland.  No adenopathy appreciated.    Thyroid: normal to inspection and palpation  Lungs: negative findings: normal respiratory rate and rhythm, lungs " clear to auscultation  Heart: negative findings: regular rate and rhythm, S1 normal, S2 normal, no murmurs, clicks, or gallops  Abdomen: Soft, nontender, no hepatosplenomegaly or masses, normal bowel sounds  Skin: No rashes, no cyanosis, no lesions or ulcers.  Well healed melanoma removal scar.  Extremities: No cyanosis clubbing or edema.       POC labs today:   Lab Results   Component Value Date/Time    POCGLUCOSE 150 (H) 03/28/2019 07:07 AM          Last labs    Lab Results   Component Value Date/Time    CHOLSTRLTOT 143 09/09/2019 07:19 AM    LDL 84 09/09/2019 07:19 AM    HDL 34 (L) 09/09/2019 07:19 AM    TRIGLYCERIDE 126 09/09/2019 07:19 AM       Lab Results   Component Value Date/Time    SODIUM 141 09/09/2019 07:19 AM    SODIUM 139 03/11/2019 02:05 PM    POTASSIUM 4.5 09/09/2019 07:19 AM    POTASSIUM 3.7 03/11/2019 02:05 PM    GLUCOSE 129 (H) 09/09/2019 07:19 AM    GLUCOSE 133 (H) 03/11/2019 02:05 PM    BUNCREATRAT 37 (H) 09/09/2019 07:19 AM     Lab Results   Component Value Date/Time    HBA1C 7.2 (H) 09/09/2019 07:19 AM    HBA1C 7.2 (H) 09/09/2019 07:19 AM    HBA1C 7.4 (H) 04/29/2019 07:28 AM    HBA1C 7.0 (H) 03/11/2019 02:05 PM    HBA1C 8.7 (H) 09/08/2015 07:49 AM    HBA1C 8.4 (H) 05/05/2015 08:32 AM    HBA1C 9.4 (H) 01/06/2015 08:37 AM     Lab Results   Component Value Date/Time    MICRALB 28.7 09/09/2019 07:19 AM          Assessment/Plan:   Medications, refills, and referrals per orders.   1. Uncontrolled type 2 diabetes mellitus without complication, without long-term current use of insulin (HCC)  HEMOGLOBIN A1C    Comp Metabolic Panel    Lipid Profile    CANCELED: Diabetic Monofilament Lower Extremity Exam   2. DM type 2, goal HbA1c < 8% ([facility])  HEMOGLOBIN A1C   3. Dyslipidemia, goal LDL below 100  Comp Metabolic Panel    Lipid Profile    TSH   4. Essential hypertension  Comp Metabolic Panel    CBC WITHOUT DIFFERENTIAL    TSH   5. Postsurgical hypothyroidism  TSH   6. Obstructive sleep apnea  CBC WITHOUT  DIFFERENTIAL   7. Malignant melanoma of skin of trunk, except scrotum (HCC)     8. Morbid obesity with BMI of 40.0-44.9, adult (HCC)     9. Osteopenia of multiple sites  VITAMIN D,25 HYDROXY   10. Mass of right side of neck  REFERRAL TO ENT   11. Salivary cyst  REFERRAL TO ENT     DM2 A1c is not at goal   Patient to monitor sugars: bid frequency  Discussed diet, exercise, disease management and weight loss goals .   Education and advise provided today:All medications, side effects and compliance (discussed carefully)  Annual eye examinations at Ophthalmology  Diabetic diet discussed in detail, written exchange diet given  Foot care discussed and Podiatry visits  Glycohemoglobin and other lab monitoring  Home glucose monitoring emphasized  Labs immediately prior to next visit  Long term diabetic complications  Low cholesterol diet, weight control and daily exercise    Followup:   Do labs and RTC 4 months, sooner should new symptoms or problems arise.

## 2020-02-20 ENCOUNTER — HOSPITAL ENCOUNTER (OUTPATIENT)
Dept: RADIOLOGY | Facility: MEDICAL CENTER | Age: 75
End: 2020-02-20
Attending: INTERNAL MEDICINE
Payer: MEDICARE

## 2020-02-20 DIAGNOSIS — D05.11 CANCER OF BREAST, INTRADUCTAL, RIGHT: ICD-10-CM

## 2020-02-20 PROCEDURE — G0279 TOMOSYNTHESIS, MAMMO: HCPCS

## 2020-02-29 DIAGNOSIS — E89.0 POSTSURGICAL HYPOTHYROIDISM: ICD-10-CM

## 2020-03-03 RX ORDER — LEVOTHYROXINE SODIUM 0.15 MG/1
TABLET ORAL
Qty: 90 TAB | Refills: 0 | Status: SHIPPED | OUTPATIENT
Start: 2020-03-03 | End: 2020-05-18 | Stop reason: SDUPTHER

## 2020-03-10 RX ORDER — BLOOD-GLUCOSE METER
1 KIT MISCELLANEOUS
Qty: 100 STRIP | Refills: 6 | Status: SHIPPED | OUTPATIENT
Start: 2020-03-10 | End: 2021-03-17

## 2020-03-11 RX ORDER — LANCETS 28 GAUGE
1 EACH MISCELLANEOUS 2 TIMES DAILY
Qty: 100 EACH | Refills: 4 | Status: SHIPPED | OUTPATIENT
Start: 2020-03-11 | End: 2020-12-08

## 2020-03-18 DIAGNOSIS — E11.9 DM TYPE 2, GOAL HBA1C < 8% (HCC): ICD-10-CM

## 2020-03-18 RX ORDER — LINAGLIPTIN 5 MG/1
TABLET, FILM COATED ORAL
Qty: 90 TAB | Refills: 3 | Status: SHIPPED | OUTPATIENT
Start: 2020-03-18 | End: 2021-03-11

## 2020-03-25 ENCOUNTER — SLEEP CENTER VISIT (OUTPATIENT)
Dept: SLEEP MEDICINE | Facility: MEDICAL CENTER | Age: 75
End: 2020-03-25
Payer: MEDICARE

## 2020-03-25 VITALS
DIASTOLIC BLOOD PRESSURE: 70 MMHG | OXYGEN SATURATION: 96 % | BODY MASS INDEX: 41.22 KG/M2 | HEART RATE: 112 BPM | HEIGHT: 62 IN | WEIGHT: 224 LBS | SYSTOLIC BLOOD PRESSURE: 132 MMHG

## 2020-03-25 DIAGNOSIS — G47.33 OSA (OBSTRUCTIVE SLEEP APNEA): ICD-10-CM

## 2020-03-25 DIAGNOSIS — E66.01 MORBID OBESITY WITH BMI OF 40.0-44.9, ADULT (HCC): ICD-10-CM

## 2020-03-25 PROCEDURE — 99214 OFFICE O/P EST MOD 30 MIN: CPT | Performed by: NURSE PRACTITIONER

## 2020-03-25 ASSESSMENT — FIBROSIS 4 INDEX: FIB4 SCORE: 1.63

## 2020-03-25 NOTE — PROGRESS NOTES
CC:  Here for f/u sleep and pulmonary issues as listed below    HPI:   Ragini presents today for follow up for YASMANI.  PMH of DM2, hypothyroid, dyslipidemia, hypertension, fatty liver, thyroid cancer, skin cancer, breast cancer, CHF, metabolic syndrome.    She is also seen in our pulmonary office for history of asthma.  Please see last office visit from 12- for further details.    PSG from 2017 indicated an AHI of 10.5 and low oxygenation of 87%.  Currently she is being treated with autoCPAP @ [id number].  Compliance download from the dates 2/23/2020 - 3/23/2020 indicates she is wearing the device 83% for an avg of 4 hours and 9 minutes per night with a reduced AHI of 3.3.  She does tolerate pressure and mask well.  She wakes up refreshed and is less tired throughout the day. She may doze off in the evening for 10 min. She denies morning H/A and sleep better overall. She will continue to clean supplies weekly and change them as insurance allows. BMI is 40.  Watching portion control and losing weight.  Plans to increase walking when it gets warmer.       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   • Chronic diastolic congestive heart failure (HCC) 08/22/2018   • Obstructive sleep apnea 02/01/2018   • Abnormal pulmonary finding 12/21/2017   • Primary thyroid cancer (HCC) 11/29/2017   • DM type 2, goal HbA1c < 8% (HCC)    • Morbid obesity with BMI of 40.0-44.9, adult (HCC) 01/09/2017   • Fatty liver 05/02/2016   • Microalbuminuria 05/02/2016   • Bilateral incipient cataracts 01/12/2016   • Mild persistent asthma, uncomplicated 03/03/2014   • Primary osteoarthritis of both shoulders    • Osteopenia of multiple sites    • Essential hypertension    • Dyslipidemia, goal LDL below 100    • Uncontrolled type 2 diabetes mellitus without complication, without long-term  current use of insulin (HCC)    • Hypothyroidism due to non-medication exogenous substances        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   • Ringing in ears    • Shortness of breath    • Sleep apnea     cpap   • Snoring    • Tonsillitis    • Urinary incontinence    • Uterine fibroid    • Wears glasses        Past Surgical History:   Procedure Laterality Date   • MASTECTOMY Right 3/28/2019    Procedure: MASTECTOMY - RE-EXCISION RIGHT BREAST BIOPSY SITE FOR MARGINS;  Surgeon: Karuna Hussein M.D.;  Location: SURGERY SAME DAY [facility];  Service: General   • BREAST BIOPSY Right 3/13/2019    Procedure: BREAST BIOPSY - WIRE LOCALIZED;  Surgeon: Karuna Hussein M.D.;  Location: SURGERY [facility];  Service: Gen Robotic   • COLONOSCOPY WITH BIOPSY  5/21/2013    Performed by Mauro Garcia M.D. at ENDOSCOPY [facility]   • THYROIDECTOMY  2000   • TONSILLECTOMY         Family History   Problem Relation Age of Onset   • Lung Disease Brother    • Cancer Mother         pelvic   • Stroke Maternal Grandmother        Social History     Socioeconomic History   • Marital status: Single     Spouse name: Not on file   • Number of children: Not on file   • Years of education: Not on file   • Highest education level: Not on file   Occupational History   • Not on file   Social Needs   • Financial resource strain: Not on file   • Food insecurity     Worry: Not on file     Inability: Not on file   • Transportation needs     Medical: Not on file     Non-medical: Not on file   Tobacco Use   • Smoking status: Never Smoker   • Smokeless tobacco: Never Used   Substance and Sexual  Activity   • Alcohol use: No     Alcohol/week: 0.0 oz   • Drug use: No   • Sexual activity: Not Currently   Lifestyle   • Physical activity     Days per week: Not on file     Minutes per session: Not on file   • Stress: Not on file   Relationships   • Social connections     Talks on phone: Not on file     Gets together: Not on file     Attends Buddhist service: Not on file     Active member of club or organization: Not on file     Attends meetings of clubs or organizations: Not on file     Relationship status: Not on file   • Intimate partner violence     Fear of current or ex partner: Not on file     Emotionally abused: Not on file     Physically abused: Not on file     Forced sexual activity: Not on file   Other Topics Concern   • Not on file   Social History Narrative   • Not on file       Current Outpatient Medications   Medication Sig Dispense Refill   • TRADJENTA 5 MG Tab tablet TAKE 1 TABLET BY MOUTH EVERY DAY 90 Tab 3   • FreeStyle Lancets Misc 1 Each by Does not apply route 2 Times a Day. 100 Each 4   • glucose blood (FREESTYLE LITE) strip 1 Strip by Other route 1 time daily as needed. 100 Strip 6   • levothyroxine (SYNTHROID) 150 MCG Tab TAKE 1 TABLET BY MOUTH EVERY DAY 90 Tab 0   • risedronate (ACTONEL) 35 MG Tab      • spironolactone (ALDACTONE) 25 MG Tab TAKE 1 TABLET BY MOUTH EVERY DAY 90 Tab 3   • losartan (COZAAR) 25 MG Tab Take 1 Tab by mouth every day. 90 Tab 3   • betamethasone, augmented, (DIPROLENE) 0.05 % gel Apply 1 Application to affected area(s) every bedtime. 50 g 2   • simvastatin (ZOCOR) 5 MG Tab TAKE 1 TABLET BY MOUTH EVERY DAY IN THE EVENING 90 Tab 3   • ciclopirox (LOPROX) 0.77 % cream Twice daily to the affected area under the breast, on abdomen for 1-2 weeks as needed (use first) 45 g 2   • hydrocortisone 2.5 % Cream topical cream Apply 1 Application to affected area(s) 2 times a day. To area under breast (use second) as needed for redness/itching/burning 1 Tube 1   • letrozole  (FEMARA) 2.5 MG Tab Take 2.5 mg by mouth every day.     • KLOR-CON 10 10 MEQ tablet TAKE 1 TABLET BY MOUTH EVERY DAY 90 Tab 3   • metFORMIN (GLUCOPHAGE) 500 MG Tab TAKE 1 TAB BY MOUTH 2 TIMES A DAY, WITH MEALS. 180 Tab 3   • Fiber Powder Take 1 Package by mouth every day.     • Probiotic Product (PROBIOTIC DAILY PO) Take 1 Package by mouth every day.     • docusate sodium (COLACE) 100 MG Cap Take 200 mg by mouth every day.     • acetaminophen (TYLENOL) 500 MG Tab Take 500 mg by mouth 2 Times a Day.     • NON SPECIFIED Take 300 mg by mouth every day. CISTANCHE     • CALCIUM-MAGNESIUM-VITAMIN D PO Take 1 Tab by mouth every day.     • BIOTIN PO Take 1 Tab by mouth every day.     • COLLAGEN PO Take 1 Tab by mouth every day.     • Glucosamine-Chondroitin (GLUCOSAMINE CHONDR COMPLEX PO) Take 750 mg by mouth 2 Times a Day.     • furosemide (LASIX) 40 MG Tab Take 40 mg by mouth every morning.     • Lutein 10 MG Tab Take 10 mg by mouth every evening.     • aspirin 81 MG tablet Take 81 mg by mouth every morning. 90 Tab 3   • coenzyme Q-10 30 MG capsule Take 1 Cap by mouth every day. 30 Cap 11   • ascorbic acid (VITAMIN C) 500 MG tablet Take 1 Tab by mouth every day. 30 Tab 11   • Multiple Vitamins-Minerals (ULTRA WOMENS PACK) Misc Take 1 Tab by mouth every day. 30 Each 11     No current facility-administered medications for this visit.           Allergies: Asa [aspirin]; Cough syrup m [cough cold-flu relief]; and Dextromethorphan      ROS   Gen: Denies fever, chills, unintentional weight loss, fatigue, night sweats  E/N/T: Denies ear pain, nasal congestion  Resp:Denies Dyspnea, wheezing, cough, sputum production, hemoptysis  CV: Denies chest pain, chest tightness, palpitations, BLE edema  Sleep:Denies morning headache, insomnia, daytime somnolence, snoring, gasping for air, apnea  Neuro: Denies frequent headaches, weakness, dizziness  GI: Denies N/V, acid reflux/heartburn  See HPI.  All other systems reviewed and  "negative      Vital signs for this encounter:  Vitals:    03/25/20 1440   Height: 1.575 m (5' 2\")   Weight: 101.6 kg (224 lb)   Weight % change since last entry.: 0 %   BP: 132/70   Pulse: (!) 112   BMI (Calculated): 40.97                 Physical Exam:   Appearance: well developed, well nourished, no acute distress.  Eyes: PERRL, EOM intact, sclere white, conjunctiva moist.  Ears: no lesions or deformities.  Hearing: grossly intact.  Nose: no lesions or deformities.  Dentition: good dentition.  Oropharynx: tongue normal, posterior pharynx without erythema or exudate.  Neck: supple, trachea midline, no masses.  Respiratory effort: no intercostal retractions or use of accessory muscles.  Lung auscultation: Bilateral diminished   Heart auscultation: no murmur, rub, or gallop.   Extremities: no cyanosis or edema.  Abdomen: soft, non-tender, no masses.  Gait and station: grossly normal   Digits and Nails: no clubbing, cyanosis, petechiae, or nodes.  Cranial nerves: grossly normal.  Motor: no focal deficits observed.  Skin: no rashes, lesions, or ulcers noted.  Orientation: oriented to time, place, and person.  Mood and affect: mood and affect appropriate, normal interaction with examiner.    Assessment   1. YASMANI (obstructive sleep apnea)  DME Mask and Supplies    DME Other    CANCELED: DME Mask and Supplies   2. Morbid obesity with BMI of 40.0-44.9, adult (HCC)         Patient was seen for 25 minutes, more than 50% of time spent in face to face review, counseling, and arranging future evaluation related to compliance, mask and supplies and the need for updated supplies, treatment, weight management. Patient is clinically stable and will proceed with following plan.     PLAN:   Patient Instructions   1) Continue autoCPAP at [id number]  2) Clean mask and supplies weekly and change them as insurance allows  3) Light conditioning and dietary changes encouraged  4) Vaccines: Up to date with Prevnar 13, Pneumovax 23, flu  5) " Return in about 1 year (around 3/25/2021) for follow up with [facility], if not sooner, Compliance.

## 2020-03-25 NOTE — PATIENT INSTRUCTIONS
1) Continue autoCPAP at [id number]  2) Clean mask and supplies weekly and change them as insurance allows  3) Light conditioning and dietary changes encouraged  4) Vaccines: Up to date with Prevnar 13, Pneumovax 23, flu  5) Return in about 1 year (around 3/25/2021) for follow up with [facility], if not sooner, Compliance.      
patient

## 2020-04-01 RX ORDER — POTASSIUM CHLORIDE 750 MG/1
TABLET, FILM COATED, EXTENDED RELEASE ORAL
Qty: 90 TAB | Refills: 2 | Status: SHIPPED | OUTPATIENT
Start: 2020-04-01 | End: 2020-12-22

## 2020-04-09 ENCOUNTER — TELEPHONE (OUTPATIENT)
Dept: MEDICAL GROUP | Facility: MEDICAL CENTER | Age: 75
End: 2020-04-09

## 2020-04-13 ENCOUNTER — APPOINTMENT (OUTPATIENT)
Dept: HEALTH INFORMATION MANAGEMENT | Facility: MEDICAL CENTER | Age: 75
End: 2020-04-13
Payer: MEDICARE

## 2020-05-05 RX ORDER — FUROSEMIDE 40 MG/1
40 TABLET ORAL EVERY MORNING
Qty: 90 TAB | Refills: 2 | Status: SHIPPED | OUTPATIENT
Start: 2020-05-05 | End: 2021-02-03

## 2020-05-16 LAB
25(OH)D3+25(OH)D2 SERPL-MCNC: 35.5 NG/ML (ref 30–100)
ALBUMIN SERPL-MCNC: 4.6 G/DL (ref 3.7–4.7)
ALBUMIN/GLOB SERPL: 2 {RATIO} (ref 1.2–2.2)
ALP SERPL-CCNC: 78 IU/L (ref 39–117)
ALT SERPL-CCNC: 27 IU/L (ref 0–32)
AST SERPL-CCNC: 27 IU/L (ref 0–40)
BASOPHILS # BLD AUTO: 0.1 X10E3/UL (ref 0–0.2)
BASOPHILS NFR BLD AUTO: 1 %
BILIRUB SERPL-MCNC: 0.6 MG/DL (ref 0–1.2)
BUN SERPL-MCNC: 28 MG/DL (ref 8–27)
BUN/CREAT SERPL: 35 (ref 12–28)
CALCIUM SERPL-MCNC: 9.5 MG/DL (ref 8.7–10.3)
CHLORIDE SERPL-SCNC: 100 MMOL/L (ref 96–106)
CHOLEST SERPL-MCNC: 137 MG/DL (ref 100–199)
CO2 SERPL-SCNC: 22 MMOL/L (ref 20–29)
CREAT SERPL-MCNC: 0.8 MG/DL (ref 0.57–1)
EOSINOPHIL # BLD AUTO: 0.1 X10E3/UL (ref 0–0.4)
EOSINOPHIL NFR BLD AUTO: 1 %
ERYTHROCYTE [DISTWIDTH] IN BLOOD BY AUTOMATED COUNT: 13.1 % (ref 11.7–15.4)
GLOBULIN SER CALC-MCNC: 2.3 G/DL (ref 1.5–4.5)
GLUCOSE SERPL-MCNC: 164 MG/DL (ref 65–99)
HBA1C MFR BLD: 7.7 % (ref 4.8–5.6)
HCT VFR BLD AUTO: 46 % (ref 34–46.6)
HDLC SERPL-MCNC: 42 MG/DL
HGB BLD-MCNC: 15.2 G/DL (ref 11.1–15.9)
IMM GRANULOCYTES # BLD AUTO: 0 X10E3/UL (ref 0–0.1)
IMM GRANULOCYTES NFR BLD AUTO: 0 %
IMMATURE CELLS  115398: ABNORMAL
LABORATORY COMMENT REPORT: NORMAL
LDLC SERPL CALC-MCNC: 75 MG/DL (ref 0–99)
LYMPHOCYTES # BLD AUTO: 2.1 X10E3/UL (ref 0.7–3.1)
LYMPHOCYTES NFR BLD AUTO: 30 %
MCH RBC QN AUTO: 28 PG (ref 26.6–33)
MCHC RBC AUTO-ENTMCNC: 33 G/DL (ref 31.5–35.7)
MCV RBC AUTO: 85 FL (ref 79–97)
MONOCYTES # BLD AUTO: 0.8 X10E3/UL (ref 0.1–0.9)
MONOCYTES NFR BLD AUTO: 12 %
MORPHOLOGY BLD-IMP: ABNORMAL
NEUTROPHILS # BLD AUTO: 3.8 X10E3/UL (ref 1.4–7)
NEUTROPHILS NFR BLD AUTO: 56 %
NRBC BLD AUTO-RTO: ABNORMAL %
PLATELET # BLD AUTO: 217 X10E3/UL (ref 150–450)
POTASSIUM SERPL-SCNC: 4.7 MMOL/L (ref 3.5–5.2)
PROT SERPL-MCNC: 6.9 G/DL (ref 6–8.5)
RBC # BLD AUTO: 5.43 X10E6/UL (ref 3.77–5.28)
SODIUM SERPL-SCNC: 139 MMOL/L (ref 134–144)
TRIGL SERPL-MCNC: 100 MG/DL (ref 0–149)
TSH SERPL DL<=0.005 MIU/L-ACNC: 0.69 UIU/ML (ref 0.45–4.5)
VLDLC SERPL CALC-MCNC: 20 MG/DL (ref 5–40)
WBC # BLD AUTO: 7 X10E3/UL (ref 3.4–10.8)

## 2020-05-18 DIAGNOSIS — E89.0 POSTSURGICAL HYPOTHYROIDISM: ICD-10-CM

## 2020-05-18 RX ORDER — LEVOTHYROXINE SODIUM 0.15 MG/1
150 TABLET ORAL
Qty: 90 TAB | Refills: 3 | Status: SHIPPED | OUTPATIENT
Start: 2020-05-18 | End: 2021-07-21

## 2020-05-22 ENCOUNTER — OFFICE VISIT (OUTPATIENT)
Dept: MEDICAL GROUP | Facility: MEDICAL CENTER | Age: 75
End: 2020-05-22
Payer: MEDICARE

## 2020-05-22 VITALS
HEIGHT: 62 IN | TEMPERATURE: 98.1 F | RESPIRATION RATE: 14 BRPM | BODY MASS INDEX: 41.22 KG/M2 | WEIGHT: 224 LBS | OXYGEN SATURATION: 97 % | SYSTOLIC BLOOD PRESSURE: 110 MMHG | DIASTOLIC BLOOD PRESSURE: 60 MMHG | HEART RATE: 96 BPM

## 2020-05-22 DIAGNOSIS — E78.5 DYSLIPIDEMIA, GOAL LDL BELOW 100: ICD-10-CM

## 2020-05-22 DIAGNOSIS — E11.9 DM TYPE 2, GOAL HBA1C < 8% (HCC): ICD-10-CM

## 2020-05-22 DIAGNOSIS — I10 ESSENTIAL HYPERTENSION: ICD-10-CM

## 2020-05-22 PROCEDURE — 99214 OFFICE O/P EST MOD 30 MIN: CPT | Performed by: FAMILY MEDICINE

## 2020-05-22 ASSESSMENT — FIBROSIS 4 INDEX: FIB4 SCORE: 1.8

## 2020-07-10 ENCOUNTER — APPOINTMENT (RX ONLY)
Dept: URBAN - METROPOLITAN AREA CLINIC 4 | Facility: CLINIC | Age: 75
Setting detail: DERMATOLOGY
End: 2020-07-10

## 2020-07-10 DIAGNOSIS — L81.4 OTHER MELANIN HYPERPIGMENTATION: ICD-10-CM

## 2020-07-10 DIAGNOSIS — L98.8 OTHER SPECIFIED DISORDERS OF THE SKIN AND SUBCUTANEOUS TISSUE: ICD-10-CM

## 2020-07-10 DIAGNOSIS — D18.0 HEMANGIOMA: ICD-10-CM

## 2020-07-10 DIAGNOSIS — Z86.006 PERSONAL HISTORY OF MELANOMA IN-SITU: ICD-10-CM

## 2020-07-10 DIAGNOSIS — L82.1 OTHER SEBORRHEIC KERATOSIS: ICD-10-CM

## 2020-07-10 DIAGNOSIS — L30.4 ERYTHEMA INTERTRIGO: ICD-10-CM

## 2020-07-10 DIAGNOSIS — D22 MELANOCYTIC NEVI: ICD-10-CM

## 2020-07-10 DIAGNOSIS — L57.0 ACTINIC KERATOSIS: ICD-10-CM

## 2020-07-10 PROBLEM — D23.71 OTHER BENIGN NEOPLASM OF SKIN OF RIGHT LOWER LIMB, INCLUDING HIP: Status: ACTIVE | Noted: 2020-07-10

## 2020-07-10 PROBLEM — D22.5 MELANOCYTIC NEVI OF TRUNK: Status: ACTIVE | Noted: 2020-07-10

## 2020-07-10 PROBLEM — D48.5 NEOPLASM OF UNCERTAIN BEHAVIOR OF SKIN: Status: ACTIVE | Noted: 2020-07-10

## 2020-07-10 PROBLEM — Z85.820 PERSONAL HISTORY OF MALIGNANT MELANOMA OF SKIN: Status: ACTIVE | Noted: 2020-07-10

## 2020-07-10 PROBLEM — D18.01 HEMANGIOMA OF SKIN AND SUBCUTANEOUS TISSUE: Status: ACTIVE | Noted: 2020-07-10

## 2020-07-10 PROBLEM — D23.72 OTHER BENIGN NEOPLASM OF SKIN OF LEFT LOWER LIMB, INCLUDING HIP: Status: ACTIVE | Noted: 2020-07-10

## 2020-07-10 PROCEDURE — ? COUNSELING

## 2020-07-10 PROCEDURE — 17000 DESTRUCT PREMALG LESION: CPT | Mod: 59

## 2020-07-10 PROCEDURE — 99213 OFFICE O/P EST LOW 20 MIN: CPT | Mod: 25

## 2020-07-10 PROCEDURE — ? LIQUID NITROGEN

## 2020-07-10 PROCEDURE — 11102 TANGNTL BX SKIN SINGLE LES: CPT

## 2020-07-10 PROCEDURE — ? BIOPSY BY SHAVE METHOD

## 2020-07-10 PROCEDURE — ? ADDITIONAL NOTES

## 2020-07-10 ASSESSMENT — LOCATION DETAILED DESCRIPTION DERM
LOCATION DETAILED: MIDDLE STERNUM
LOCATION DETAILED: RIGHT RADIAL DORSAL HAND
LOCATION DETAILED: LEFT INFERIOR MEDIAL MIDBACK
LOCATION DETAILED: LEFT INFERIOR CENTRAL MALAR CHEEK
LOCATION DETAILED: UPPER STERNUM
LOCATION DETAILED: LEFT POSTERIOR SHOULDER
LOCATION DETAILED: LEFT ANTERIOR PROXIMAL THIGH
LOCATION DETAILED: RIGHT POSTERIOR SHOULDER
LOCATION DETAILED: LEFT RADIAL DORSAL HAND
LOCATION DETAILED: RIGHT INFERIOR VERMILION LIP
LOCATION DETAILED: LOWER STERNUM
LOCATION DETAILED: RIGHT ANTERIOR PROXIMAL THIGH
LOCATION DETAILED: RIGHT SUPERIOR HELIX

## 2020-07-10 ASSESSMENT — LOCATION SIMPLE DESCRIPTION DERM
LOCATION SIMPLE: RIGHT SHOULDER
LOCATION SIMPLE: RIGHT HAND
LOCATION SIMPLE: LEFT LOWER BACK
LOCATION SIMPLE: CHEST
LOCATION SIMPLE: RIGHT EAR
LOCATION SIMPLE: LEFT CHEEK
LOCATION SIMPLE: RIGHT LIP
LOCATION SIMPLE: LEFT HAND
LOCATION SIMPLE: LEFT THIGH
LOCATION SIMPLE: LEFT SHOULDER
LOCATION SIMPLE: RIGHT THIGH

## 2020-07-10 ASSESSMENT — LOCATION ZONE DERM
LOCATION ZONE: LEG
LOCATION ZONE: ARM
LOCATION ZONE: HAND
LOCATION ZONE: TRUNK
LOCATION ZONE: LIP
LOCATION ZONE: FACE
LOCATION ZONE: EAR

## 2020-07-10 NOTE — PROCEDURE: ADDITIONAL NOTES
Additional Notes: Melanoma In-situ- Left lower back ~3/2019 Tx with Dr. Lynch [facility]
Detail Level: Simple
Additional Notes: Pt to use ciclopirox she has at home. Also advised to add Desitin.

## 2020-07-20 ENCOUNTER — OFFICE VISIT (OUTPATIENT)
Dept: HEALTH INFORMATION MANAGEMENT | Facility: MEDICAL CENTER | Age: 75
End: 2020-07-20
Payer: MEDICARE

## 2020-07-20 VITALS
HEART RATE: 87 BPM | BODY MASS INDEX: 42.38 KG/M2 | DIASTOLIC BLOOD PRESSURE: 66 MMHG | HEIGHT: 62 IN | WEIGHT: 230.3 LBS | OXYGEN SATURATION: 91 % | SYSTOLIC BLOOD PRESSURE: 124 MMHG

## 2020-07-20 DIAGNOSIS — E08.65 DIABETES MELLITUS DUE TO UNDERLYING CONDITION, UNCONTROLLED, WITH HYPERGLYCEMIA (HCC): ICD-10-CM

## 2020-07-20 DIAGNOSIS — R63.5 WEIGHT GAIN: ICD-10-CM

## 2020-07-20 DIAGNOSIS — E78.5 DYSLIPIDEMIA, GOAL LDL BELOW 100: ICD-10-CM

## 2020-07-20 DIAGNOSIS — E66.01 MORBID OBESITY WITH BMI OF 40.0-44.9, ADULT (HCC): ICD-10-CM

## 2020-07-20 DIAGNOSIS — I10 ESSENTIAL HYPERTENSION: ICD-10-CM

## 2020-07-20 DIAGNOSIS — E88.810 METABOLIC SYNDROME: ICD-10-CM

## 2020-07-20 DIAGNOSIS — G47.33 OBSTRUCTIVE SLEEP APNEA: ICD-10-CM

## 2020-07-20 DIAGNOSIS — Z71.3 DIETARY COUNSELING AND SURVEILLANCE: ICD-10-CM

## 2020-07-20 PROCEDURE — G0447 BEHAVIOR COUNSEL OBESITY 15M: HCPCS | Performed by: INTERNAL MEDICINE

## 2020-07-20 PROCEDURE — 99213 OFFICE O/P EST LOW 20 MIN: CPT | Mod: 25 | Performed by: INTERNAL MEDICINE

## 2020-07-20 ASSESSMENT — FIBROSIS 4 INDEX: FIB4 SCORE: 1.8

## 2020-07-20 NOTE — PROGRESS NOTES
"Bariatric Medicine Follow Up  Chief Complaint   Patient presents with   • Weight Gain       History of Present Illness:   Ragini Reaves is a 75 y.o. female who presents for follow-up to intensive behavioral modification of overweight and to help address co-morbidities caused by overweight, as below.    Obesity Class/Stage: 3/3  During the patient's last visit, the following were discussed and recommended:  Weight Goal: 3-5% wt loss each month (pt goal weight is 145 lb)  Diet: Continue reducing refined CHO intake by 50%  Add more vegetables  Bring in written food journal next visit  Physical Activity: Walking more, set further goals next visit  Rx: None  Titrate down insulin as fasting glucose improves  Behavior change: Increase activity, more mindfulness with food choices    Challenges: Cooking for herself, not exercising due to COVID restrictions  Dietary adherence: Fair, still eating a lot of carbohydrates, sometimes late at night  Exercise: Minimal    Medication use: Patient declines  Medication efficacy/tolerance/side effects: N/A  Medication compliance: N/A    Status of comorbid conditions/other medical diagnoses:  T2DM: Uncontrolled, with fasting glucose 90-1 60, especially after eating late  Continues daily metformin, Tradjenta, not on GLP-1 agonist given history of thyroid cancer  HTN: Controlled, on Lasix, losartan, spironolactone  HLD: Controlled, on statin  YASMANI: Controlled, CPAP nightly    Review of Systems   Pt denies lightheadedness, weakness, worsening fatigue, excessive dry mouth, mood changes, paresthesias.  All other ROS were reviewed and are otherwise unchanged from my previous visit with patient.    Physical Exam:    /66 (BP Location: Left arm, Patient Position: Sitting, BP Cuff Size: Large adult)   Pulse 87   Ht 1.575 m (5' 2\")   Wt 104.5 kg (230 lb 4.8 oz)   LMP  (LMP Unknown)   SpO2 91%   BMI 42.12 kg/m²   Waist Measurement   Waist: 56.25 inch/inches  Weight change since " last visit: +3 lb (0 lb total)  Waist Circum change since last visit: +4 in (+2 in total)    Constitutional: Oriented to person, place, and time and well-developed, well-nourished, and in no distress.    Head: Normocephalic.   Musculoskeletal: Normal range of motion. No edema.   Neurological: Alert and oriented to person, place, and time. No muscle weakness.  Gait normal.   Skin: Warm and dry. Not diaphoretic.   Psychiatric: Mood, memory, affect and judgment normal.     Laboratory:   Recent labs reviewed.      Dietitian Assessment: I have reviewed the Dietitian's assessment related to this encounter.     ASSESSMENT  75 y.o.  female presents for intensive lifestyle intervention for treatment of obesity and co-morbid conditions.  Obesity Stage ([place]) 3; Class 3    1. Metabolic syndrome     2. Diabetes mellitus due to underlying condition, uncontrolled, with hyperglycemia (HCC)     3. Morbid obesity with BMI of 40.0-44.9, adult (HCC)     4. Weight gain     5. Essential hypertension     6. Dyslipidemia, goal LDL below 100     7. Obstructive sleep apnea         The patient continues to struggle with weight loss, has been unable to lose weight in the last year.  She continues to decline anti-obesity medication.  Encouraged tracking intake to better assess total kcal and CHO intake and reduce accordingly.  Continue metformin, Tradjenta for T2DM control.  HTN, lipids, sleep controlled.    PLAN  Weight Goal: 5% wt loss at one month after start (pt goal weight is 145 lb)  Dietary intervention:     MR: Patient declines  High Protein/Low Carb whole food meals and 2 snacks between meals daily  <100 g total carbs daily is goal  Track daily intake with written journal, bring to next visit  64+ oz water per day  Avoid late night eating  Physical Activity: Walking 10 minutes outside daily to start  Rx changes:   Patient declines anti-obesity medication  Behavior modification:   Follow through with consistent changes as  recommended during office visits  Surgical considerations: Patient declined referral to bariatric surgery  Follow-up: one month with MD, weekly to biweekly for preventive obesity counseling    Patient's body mass index is 42.12 kg/m². Exercise and nutrition counseling were performed at this visit.        >>>>>>>>>>>>>      PREVENTIVE SERVICES COUNSELING FOR OBESITY NOTE    O:  Body mass index is 42.12 kg/m²., see also vitals flowsheet for today  Pt struggling with:  Stress management  Cravings  Keeping food diary/tracking consumption  Meal planning  Meal prep  Taking medications as prescribed  A:  Dietary counseling and surveillance for obesity  Z71.3, E66.01, BMI Code: Z 68.41  P:  Counseled pt on reduced kcal, reduced refined CHO whole food meal plan  Advised exercise: Walking  Discussed strategies of self-monitoring with:   food diary  cognitive restructuring  stimulus control  Meal pre-planning  Environmental management  Time: 15

## 2020-09-09 DIAGNOSIS — E78.5 DYSLIPIDEMIA, GOAL LDL BELOW 100: ICD-10-CM

## 2020-09-09 RX ORDER — SIMVASTATIN 5 MG
TABLET ORAL
Qty: 90 TAB | Refills: 3 | Status: SHIPPED | OUTPATIENT
Start: 2020-09-09 | End: 2021-07-23

## 2020-09-22 LAB
ALBUMIN SERPL-MCNC: 4.7 G/DL (ref 3.7–4.7)
ALBUMIN/GLOB SERPL: 2 {RATIO} (ref 1.2–2.2)
ALP SERPL-CCNC: 80 IU/L (ref 39–117)
ALT SERPL-CCNC: 25 IU/L (ref 0–32)
AST SERPL-CCNC: 24 IU/L (ref 0–40)
BILIRUB SERPL-MCNC: 0.6 MG/DL (ref 0–1.2)
BUN SERPL-MCNC: 27 MG/DL (ref 8–27)
BUN/CREAT SERPL: 39 (ref 12–28)
CALCIUM SERPL-MCNC: 8.9 MG/DL (ref 8.7–10.3)
CHLORIDE SERPL-SCNC: 105 MMOL/L (ref 96–106)
CHOLEST SERPL-MCNC: 123 MG/DL (ref 100–199)
CO2 SERPL-SCNC: 21 MMOL/L (ref 20–29)
CREAT SERPL-MCNC: 0.7 MG/DL (ref 0.57–1)
ERYTHROCYTE [DISTWIDTH] IN BLOOD BY AUTOMATED COUNT: 13 % (ref 11.7–15.4)
GLOBULIN SER CALC-MCNC: 2.3 G/DL (ref 1.5–4.5)
GLUCOSE SERPL-MCNC: 156 MG/DL (ref 65–99)
HBA1C MFR BLD: 7.3 % (ref 4.8–5.6)
HCT VFR BLD AUTO: 44 % (ref 34–46.6)
HDLC SERPL-MCNC: 41 MG/DL
HGB BLD-MCNC: 14.8 G/DL (ref 11.1–15.9)
LABORATORY COMMENT REPORT: NORMAL
LDLC SERPL CALC-MCNC: 63 MG/DL (ref 0–99)
MCH RBC QN AUTO: 28.6 PG (ref 26.6–33)
MCHC RBC AUTO-ENTMCNC: 33.6 G/DL (ref 31.5–35.7)
MCV RBC AUTO: 85 FL (ref 79–97)
MICROALBUMIN UR-MCNC: 35.6 UG/ML
NRBC BLD AUTO-RTO: NORMAL %
POTASSIUM SERPL-SCNC: 3.9 MMOL/L (ref 3.5–5.2)
PROT SERPL-MCNC: 7 G/DL (ref 6–8.5)
RBC # BLD AUTO: 5.17 X10E6/UL (ref 3.77–5.28)
SODIUM SERPL-SCNC: 140 MMOL/L (ref 134–144)
TRIGL SERPL-MCNC: 98 MG/DL (ref 0–149)
VLDLC SERPL CALC-MCNC: 19 MG/DL (ref 5–40)
WBC # BLD AUTO: 7.6 X10E3/UL (ref 3.4–10.8)

## 2020-09-28 ENCOUNTER — OFFICE VISIT (OUTPATIENT)
Dept: MEDICAL GROUP | Facility: MEDICAL CENTER | Age: 75
End: 2020-09-28
Payer: MEDICARE

## 2020-09-28 VITALS
WEIGHT: 228 LBS | SYSTOLIC BLOOD PRESSURE: 114 MMHG | DIASTOLIC BLOOD PRESSURE: 64 MMHG | OXYGEN SATURATION: 92 % | RESPIRATION RATE: 16 BRPM | BODY MASS INDEX: 41.96 KG/M2 | TEMPERATURE: 97.9 F | HEART RATE: 88 BPM | HEIGHT: 62 IN

## 2020-09-28 DIAGNOSIS — E03.2 HYPOTHYROIDISM DUE TO NON-MEDICATION EXOGENOUS SUBSTANCES: ICD-10-CM

## 2020-09-28 DIAGNOSIS — G47.33 OBSTRUCTIVE SLEEP APNEA: ICD-10-CM

## 2020-09-28 DIAGNOSIS — C73 PRIMARY THYROID CANCER (HCC): ICD-10-CM

## 2020-09-28 DIAGNOSIS — K76.0 FATTY LIVER: ICD-10-CM

## 2020-09-28 DIAGNOSIS — E66.01 MORBID OBESITY WITH BMI OF 40.0-44.9, ADULT (HCC): ICD-10-CM

## 2020-09-28 DIAGNOSIS — E78.5 DYSLIPIDEMIA, GOAL LDL BELOW 100: ICD-10-CM

## 2020-09-28 DIAGNOSIS — Z86.006 HISTORY OF MELANOMA IN SITU: ICD-10-CM

## 2020-09-28 DIAGNOSIS — E11.8 CONTROLLED TYPE 2 DIABETES MELLITUS WITH COMPLICATION, WITHOUT LONG-TERM CURRENT USE OF INSULIN (HCC): ICD-10-CM

## 2020-09-28 DIAGNOSIS — E11.9 DM TYPE 2, GOAL HBA1C < 8% (HCC): ICD-10-CM

## 2020-09-28 DIAGNOSIS — I10 ESSENTIAL HYPERTENSION: ICD-10-CM

## 2020-09-28 DIAGNOSIS — I50.32 CHRONIC DIASTOLIC CONGESTIVE HEART FAILURE (HCC): ICD-10-CM

## 2020-09-28 PROBLEM — E88.810 METABOLIC SYNDROME: Status: RESOLVED | Noted: 2019-04-11 | Resolved: 2020-09-28

## 2020-09-28 PROCEDURE — 99214 OFFICE O/P EST MOD 30 MIN: CPT | Performed by: FAMILY MEDICINE

## 2020-09-28 ASSESSMENT — FIBROSIS 4 INDEX: FIB4 SCORE: 1.66

## 2020-09-28 NOTE — PROGRESS NOTES
Diabetes Focused Exam:    Chief Complaint   Patient presents with   • Diabetes Mellitus      Subjective:   HPI  Ragini Reaves is a 75 y.o. female who presents for follow up of chronic conditions of diabetes mellitus, hypertension and hyperlipidemia. She indicates that she is feeling well and denies any symptoms referable to the above diagnoses. Specifically denies chest pain, palpitations, dyspnea, orthopnea, PND or peripheral edema. Also denies polyuria, polydipsia, urinary complaints, abdominal complaints, myalgias, numbness, weakness or other related symptoms.     Her liver enzymes are normal.  Fatty liver is quiet at this time.    The patient is taking ASA every day 81 mg and taking all other medications as prescribed. Patient denies any side effects of medication.  DM: A1c goal <8 Glucose monitoring frequency: bid   Fasting sugars: , usually 110-140. Post-prandial sugars: tend to run 160-185.  Hypoglycemic episodes none  Diabetic complications: microalbumin increased  ACR Albumin/Creatinine Ratio goal <30  HTN: Blood pressure goal <140/<80. Currently Rx ACE/ARB: Yes  Hyperlipidemia:Cholesterol goal LDL <100, total/HDL <5. Currently Rx Statin: Yes  Last eye exam 12/2019  Denies visual blurring, double vision, eye pain and floaters  Last monofilament foot exam: 12/2019  Denies foot pain, numbness, calluses, ulcers    See medications and orders placed in encounter report.    Past medical history, family history, social history reviewed and updated as documented in medical record.    Current medications including changes today:  Current Outpatient Medications   Medication Sig Dispense Refill   • simvastatin (ZOCOR) 5 MG Tab TAKE 1 TABLET BY MOUTH EVERY DAY IN THE EVENING 90 Tab 3   • levothyroxine (SYNTHROID) 150 MCG Tab Take 1 Tab by mouth Every morning on an empty stomach. 90 Tab 3   • furosemide (LASIX) 40 MG Tab Take 1 Tab by mouth every morning. 90 Tab 2   • metFORMIN (GLUCOPHAGE) 500 MG Tab  Take 1 Tab by mouth 2 times a day, with meals. 180 Tab 3   • KLOR-CON 10 10 MEQ tablet TAKE 1 TABLET BY MOUTH EVERY DAY 90 Tab 2   • TRADJENTA 5 MG Tab tablet TAKE 1 TABLET BY MOUTH EVERY DAY 90 Tab 3   • FreeStyle Lancets Misc 1 Each by Does not apply route 2 Times a Day. 100 Each 4   • glucose blood (FREESTYLE LITE) strip 1 Strip by Other route 1 time daily as needed. 100 Strip 6   • risedronate (ACTONEL) 35 MG Tab      • spironolactone (ALDACTONE) 25 MG Tab TAKE 1 TABLET BY MOUTH EVERY DAY 90 Tab 3   • losartan (COZAAR) 25 MG Tab Take 1 Tab by mouth every day. 90 Tab 3   • betamethasone, augmented, (DIPROLENE) 0.05 % gel Apply 1 Application to affected area(s) every bedtime. 50 g 2   • ciclopirox (LOPROX) 0.77 % cream Twice daily to the affected area under the breast, on abdomen for 1-2 weeks as needed (use first) 45 g 2   • hydrocortisone 2.5 % Cream topical cream Apply 1 Application to affected area(s) 2 times a day. To area under breast (use second) as needed for redness/itching/burning 1 Tube 1   • letrozole (FEMARA) 2.5 MG Tab Take 2.5 mg by mouth every day.     • Fiber Powder Take 1 Package by mouth every day.     • Probiotic Product (PROBIOTIC DAILY PO) Take 1 Package by mouth every day.     • docusate sodium (COLACE) 100 MG Cap Take 200 mg by mouth every day.     • acetaminophen (TYLENOL) 500 MG Tab Take 500 mg by mouth 2 Times a Day.     • NON SPECIFIED Take 300 mg by mouth every day. CISTANCHE     • CALCIUM-MAGNESIUM-VITAMIN D PO Take 1 Tab by mouth every day.     • BIOTIN PO Take 1 Tab by mouth every day.     • COLLAGEN PO Take 1 Tab by mouth every day.     • Glucosamine-Chondroitin (GLUCOSAMINE CHONDR COMPLEX PO) Take 750 mg by mouth 2 Times a Day.     • Lutein 10 MG Tab Take 10 mg by mouth every evening.     • aspirin 81 MG tablet Take 81 mg by mouth every morning. 90 Tab 3   • coenzyme Q-10 30 MG capsule Take 1 Cap by mouth every day. 30 Cap 11   • ascorbic acid (VITAMIN C) 500 MG tablet Take 1 Tab  "by mouth every day. 30 Tab 11   • Multiple Vitamins-Minerals (ULTRA WOMENS PACK) Misc Take 1 Tab by mouth every day. 30 Each 11     No current facility-administered medications for this visit.      Allergies:   Allergies   Allergen Reactions   • Asa [Aspirin]      Stomach bleeding.   Tolerates low dose ASA   • Cough Syrup M [Cough Cold-Flu Relief] Swelling     Severe facial swelling   • Dextromethorphan Swelling     Facial swellling      Social History     Tobacco Use   • Smoking status: Never Smoker   • Smokeless tobacco: Never Used   Substance Use Topics   • Alcohol use: No     Alcohol/week: 0.0 oz   • Drug use: No     Exercise: she is walking some, especially when weather is cooled.  Health Maintenance/Immunizations: discussed, will be getting her flu vaccine at [facility].    ROS  Pertinent  ROS findings as above.      Objective:     OBJECTIVE:  /64 (BP Location: Right arm, Patient Position: Sitting, BP Cuff Size: Adult)   Pulse 88   Temp 36.6 °C (97.9 °F) (Temporal)   Resp 16   Ht 1.575 m (5' 2\")   Wt 103.4 kg (228 lb)   LMP  (LMP Unknown)   SpO2 92%   BMI 41.70 kg/m²  Body mass index is 41.7 kg/m². BMI: severely obese  General: No apparent distress, conversant, cooperative and pleasant with the examination.  Psych: Alert and oriented x4, judgment and insight normal  Neck: No JVD or bruits, no adenopathy, supple  Thyroid: normal to inspection and palpation  Lungs: negative findings: normal respiratory rate and rhythm, lungs clear to auscultation  Heart: negative findings: regular rate and rhythm, S1 normal, S2 normal, no murmurs, clicks, or gallops  Abdomen: Soft, nontender, no hepatosplenomegaly or masses, normal bowel sounds  Skin: No rashes, no cyanosis, no lesions or ulcers  Extremities: No cyanosis clubbing or edema.   Feet are examined     POC labs:   Lab Results   Component Value Date/Time    POCGLUCOSE 150 (H) 03/28/2019 07:07 AM          Last labs    Lab Results   Component Value Date/Time    " CHOLSTRLTOT 123 09/21/2020 06:00 AM    LDL 75 05/15/2020 05:25 AM    HDL 41 09/21/2020 06:00 AM    TRIGLYCERIDE 98 09/21/2020 06:00 AM       Lab Results   Component Value Date/Time    SODIUM 140 09/21/2020 06:00 AM    SODIUM 139 03/11/2019 02:05 PM    POTASSIUM 3.9 09/21/2020 06:00 AM    POTASSIUM 3.7 03/11/2019 02:05 PM    GLUCOSE 156 (H) 09/21/2020 06:00 AM    GLUCOSE 133 (H) 03/11/2019 02:05 PM    BUNCREATRAT 39 (H) 09/21/2020 06:00 AM     Lab Results   Component Value Date/Time    HBA1C 7.3 (H) 09/21/2020 06:00 AM    HBA1C 7.7 (H) 05/15/2020 05:25 AM    HBA1C 7.2 (H) 09/09/2019 07:19 AM    HBA1C 7.2 (H) 09/09/2019 07:19 AM    HBA1C 7.0 (H) 03/11/2019 02:05 PM    HBA1C 8.7 (H) 09/08/2015 07:49 AM    HBA1C 8.4 (H) 05/05/2015 08:32 AM    HBA1C 9.4 (H) 01/06/2015 08:37 AM     Lab Results   Component Value Date/Time    MICRALB 35.6 09/21/2020 06:00 AM          Assessment/Plan:   Medications, refills, and referrals per orders.   1. Controlled type 2 diabetes mellitus with complication, without long-term current use of insulin ([facility])     2. DM type 2, goal HbA1c < 8% ([facility])     3. Dyslipidemia, goal LDL below 100     4. Essential hypertension     5. Fatty liver     6. Obstructive sleep apnea     7. Chronic diastolic congestive heart failure (HCC)     8. Hypothyroidism due to non-medication exogenous substances     9. Morbid obesity with BMI of 40.0-44.9, adult ([facility])     10. History of melanoma in situ     11. Primary thyroid cancer (HCC)       DM2 A1c is at goal   Patient to monitor sugars: bid frequency  Discussed diet, exercise, disease management and weight loss goals.   Education and advise provided today:All medications, side effects and compliance (discussed carefully)  Annual eye examinations at Ophthalmology  Diabetic diet discussed in detail, written exchange diet given  Foot care discussed and Podiatry visits  Glycohemoglobin and other lab monitoring  Home glucose monitoring emphasized  Labs immediately prior  to next visit  Low cholesterol diet, weight control and daily exercise    Followup:   Do labs and RTC 6 months, sooner should new symptoms or problems arise.

## 2020-10-13 ENCOUNTER — APPOINTMENT (RX ONLY)
Dept: URBAN - METROPOLITAN AREA CLINIC 4 | Facility: CLINIC | Age: 75
Setting detail: DERMATOLOGY
End: 2020-10-13

## 2020-10-13 DIAGNOSIS — L30.4 ERYTHEMA INTERTRIGO: ICD-10-CM

## 2020-10-13 DIAGNOSIS — D18.0 HEMANGIOMA: ICD-10-CM

## 2020-10-13 DIAGNOSIS — Z00.8 ENCOUNTER FOR OTHER GENERAL EXAMINATION: ICD-10-CM

## 2020-10-13 DIAGNOSIS — L57.0 ACTINIC KERATOSIS: ICD-10-CM

## 2020-10-13 DIAGNOSIS — L82.1 OTHER SEBORRHEIC KERATOSIS: ICD-10-CM

## 2020-10-13 DIAGNOSIS — R23.3 SPONTANEOUS ECCHYMOSES: ICD-10-CM

## 2020-10-13 DIAGNOSIS — L81.4 OTHER MELANIN HYPERPIGMENTATION: ICD-10-CM

## 2020-10-13 DIAGNOSIS — Z86.006 PERSONAL HISTORY OF MELANOMA IN-SITU: ICD-10-CM

## 2020-10-13 DIAGNOSIS — L81.7 PIGMENTED PURPURIC DERMATOSIS: ICD-10-CM

## 2020-10-13 DIAGNOSIS — D22 MELANOCYTIC NEVI: ICD-10-CM

## 2020-10-13 DIAGNOSIS — H01.13 ECZEMATOUS DERMATITIS OF EYELID: ICD-10-CM

## 2020-10-13 PROBLEM — D22.5 MELANOCYTIC NEVI OF TRUNK: Status: ACTIVE | Noted: 2020-10-13

## 2020-10-13 PROBLEM — Z85.820 PERSONAL HISTORY OF MALIGNANT MELANOMA OF SKIN: Status: ACTIVE | Noted: 2020-10-13

## 2020-10-13 PROBLEM — D23.72 OTHER BENIGN NEOPLASM OF SKIN OF LEFT LOWER LIMB, INCLUDING HIP: Status: ACTIVE | Noted: 2020-10-13

## 2020-10-13 PROBLEM — D18.01 HEMANGIOMA OF SKIN AND SUBCUTANEOUS TISSUE: Status: ACTIVE | Noted: 2020-10-13

## 2020-10-13 PROBLEM — H01.139 ECZEMATOUS DERMATITIS OF UNSPECIFIED EYE, UNSPECIFIED EYELID: Status: ACTIVE | Noted: 2020-10-13

## 2020-10-13 PROCEDURE — ? COUNSELING

## 2020-10-13 PROCEDURE — 99214 OFFICE O/P EST MOD 30 MIN: CPT | Mod: 25

## 2020-10-13 PROCEDURE — ? PRESCRIPTION

## 2020-10-13 PROCEDURE — ? LIQUID NITROGEN

## 2020-10-13 PROCEDURE — ? ADDITIONAL NOTES

## 2020-10-13 PROCEDURE — ? REFERRAL CORRESPONDENCE

## 2020-10-13 PROCEDURE — 17000 DESTRUCT PREMALG LESION: CPT

## 2020-10-13 RX ORDER — TACROLIMUS 1 MG/G
OINTMENT TOPICAL BID
Qty: 1 | Refills: 2 | Status: ERX | COMMUNITY
Start: 2020-10-13

## 2020-10-13 RX ADMIN — TACROLIMUS 1: 1 OINTMENT TOPICAL at 00:00

## 2020-10-13 ASSESSMENT — LOCATION DETAILED DESCRIPTION DERM
LOCATION DETAILED: RIGHT RADIAL DORSAL HAND
LOCATION DETAILED: MIDDLE STERNUM
LOCATION DETAILED: RIGHT ANTERIOR PROXIMAL THIGH
LOCATION DETAILED: LOWER STERNUM
LOCATION DETAILED: LEFT ANTERIOR PROXIMAL THIGH
LOCATION DETAILED: RIGHT POSTERIOR SHOULDER
LOCATION DETAILED: LEFT RADIAL DORSAL HAND
LOCATION DETAILED: PERIUMBILICAL SKIN
LOCATION DETAILED: LEFT INFERIOR CENTRAL MALAR CHEEK
LOCATION DETAILED: UPPER STERNUM
LOCATION DETAILED: LEFT DISTAL PRETIBIAL REGION
LOCATION DETAILED: LEFT POSTERIOR SHOULDER
LOCATION DETAILED: LEFT INFERIOR FOREHEAD
LOCATION DETAILED: LEFT MEDIAL EYEBROW
LOCATION DETAILED: LEFT SUPERIOR MEDIAL LOWER BACK

## 2020-10-13 ASSESSMENT — LOCATION SIMPLE DESCRIPTION DERM
LOCATION SIMPLE: LEFT LOWER BACK
LOCATION SIMPLE: LEFT PRETIBIAL REGION
LOCATION SIMPLE: LEFT EYEBROW
LOCATION SIMPLE: LEFT CHEEK
LOCATION SIMPLE: CHEST
LOCATION SIMPLE: LEFT FOREHEAD
LOCATION SIMPLE: LEFT SHOULDER
LOCATION SIMPLE: LEFT THIGH
LOCATION SIMPLE: RIGHT HAND
LOCATION SIMPLE: ABDOMEN
LOCATION SIMPLE: RIGHT THIGH
LOCATION SIMPLE: RIGHT SHOULDER
LOCATION SIMPLE: LEFT HAND

## 2020-10-13 ASSESSMENT — LOCATION ZONE DERM
LOCATION ZONE: TRUNK
LOCATION ZONE: LEG
LOCATION ZONE: HAND
LOCATION ZONE: FACE
LOCATION ZONE: ARM

## 2020-10-13 NOTE — PROCEDURE: MIPS QUALITY
Quality 130: Documentation Of Current Medications In The Medical Record: Current Medications Documented
Quality 137: Melanoma: Continuity Of Care - Recall System: Patient information entered into a recall system that includes: target date for the next exam specified AND a process to follow up with patients regarding missed or unscheduled appointments
Quality 397: Melanoma: Reporting: The pathology report includes a pT Category and statement on thickness and ulceration for pT1, mitotic rate.
Quality 226: Preventive Care And Screening: Tobacco Use: Screening And Cessation Intervention: Patient screened for tobacco use and is an ex/non-smoker
Quality 111:Pneumonia Vaccination Status For Older Adults: Pneumococcal Vaccination not Administered or Previously Received, Reason not Otherwise Specified
Detail Level: Detailed
Quality 138: Melanoma: Coordination Of Care: A treatment plan was communicated to the physicians providing continuing care within one month of diagnosis outlining: diagnosis, tumor thickness and a plan for surgery or alternate care.

## 2020-10-13 NOTE — PROCEDURE: ADDITIONAL NOTES
Additional Notes: Melanoma In-situ- Left lower back ~3/2019 Tx with Dr. Lynch [facility]
Detail Level: Simple
Additional Notes: Pt to use loprox PRN for flares. Also advised to add Desitin for maintenance
Additional Notes: no worrisome findings on exam, advised if become more widespread, would recommend f/u with PCP for additional w/u

## 2020-11-23 ENCOUNTER — OFFICE VISIT (OUTPATIENT)
Dept: HEALTH INFORMATION MANAGEMENT | Facility: MEDICAL CENTER | Age: 75
End: 2020-11-23
Payer: MEDICARE

## 2020-11-23 VITALS
WEIGHT: 233.4 LBS | HEIGHT: 62 IN | BODY MASS INDEX: 42.95 KG/M2 | HEART RATE: 85 BPM | SYSTOLIC BLOOD PRESSURE: 118 MMHG | DIASTOLIC BLOOD PRESSURE: 68 MMHG | OXYGEN SATURATION: 93 %

## 2020-11-23 DIAGNOSIS — Z71.3 DIETARY COUNSELING AND SURVEILLANCE: ICD-10-CM

## 2020-11-23 DIAGNOSIS — G47.33 OBSTRUCTIVE SLEEP APNEA: ICD-10-CM

## 2020-11-23 DIAGNOSIS — I10 ESSENTIAL HYPERTENSION: ICD-10-CM

## 2020-11-23 DIAGNOSIS — E66.01 MORBID OBESITY WITH BMI OF 40.0-44.9, ADULT (HCC): ICD-10-CM

## 2020-11-23 DIAGNOSIS — E11.8 CONTROLLED TYPE 2 DIABETES MELLITUS WITH COMPLICATION, WITHOUT LONG-TERM CURRENT USE OF INSULIN (HCC): ICD-10-CM

## 2020-11-23 DIAGNOSIS — D05.11 DUCTAL CARCINOMA IN SITU (DCIS) OF RIGHT BREAST: ICD-10-CM

## 2020-11-23 DIAGNOSIS — R63.5 WEIGHT GAIN: ICD-10-CM

## 2020-11-23 DIAGNOSIS — E78.5 DYSLIPIDEMIA, GOAL LDL BELOW 100: ICD-10-CM

## 2020-11-23 PROCEDURE — G0447 BEHAVIOR COUNSEL OBESITY 15M: HCPCS | Performed by: INTERNAL MEDICINE

## 2020-11-23 PROCEDURE — 99213 OFFICE O/P EST LOW 20 MIN: CPT | Mod: 25 | Performed by: INTERNAL MEDICINE

## 2020-11-23 ASSESSMENT — FIBROSIS 4 INDEX: FIB4 SCORE: 1.66

## 2020-11-23 NOTE — PROGRESS NOTES
Bariatric Medicine Follow Up  Chief Complaint   Patient presents with   • Weight Gain       History of Present Illness:   Ragini Reaves is a 75 y.o. female who presents for follow-up to intensive behavioral modification of overweight and to help address co-morbidities caused by overweight, as below.    Obesity Stage/Class: 3/3  During the patient's last visit, the following were discussed and recommended:  Weight Goal: 5% wt loss at one month after start (pt goal weight is 145 lb)  Dietary intervention:     MR: Patient declines  High Protein/Low Carb whole food meals and 2 snacks between meals daily  <100 g total carbs daily is goal  Track daily intake with written journal, bring to next visit  64+ oz water per day  Avoid late night eating  Physical Activity: Walking 10 minutes outside daily to start  Rx changes:   Patient declines anti-obesity medication  Behavior modification:   Follow through with consistent changes as recommended during office visits  Surgical considerations: Patient declined referral to bariatric surgery  ___    Challenges:  Cravings, overeating  Dietary adherence: Fair  Not using meal replacements, not tracking intake  She had been keeping a food journal, is no longer doing so  Trying to knit more and craft so she does not snack as much  Admits she is snacking more on refined carbohydrate snacks  Exercise: Walking but very inconsistent, does not go outside much    AntiObesity Medication use: None  Wants to consider AOM if she gets stimulus check  Medication efficacy/tolerance/side effects: N/A  Medication compliance: N/A    Status of comorbid conditions/other medical diagnoses:  HLD: Controlled on statin  HTN: Controlled on spironolactone, losartan  T2DM: Uncontrolled, on metformin, Tradjenta, gradual improvement in A1c  History of DCIS: Controlled, on letrozole  YASMANI: Controlled       Review of Systems   Pt denies lightheadedness, weakness, worsening fatigue, excessive dry mouth, mood  "changes, paresthesias.  All other ROS were reviewed and are otherwise unchanged from my previous visit with patient.    Physical Exam:    /68 (BP Location: Left arm, Patient Position: Sitting, BP Cuff Size: Adult)   Pulse 85   Ht 1.575 m (5' 2\")   Wt 105.9 kg (233 lb 6.4 oz)   LMP  (LMP Unknown)   SpO2 93%   BMI 42.69 kg/m²   Waist Measurement   Waist: 55 inch/inches  Weight change since last visit: +3 lb (+ 4 lb total)  Waist Circum change since last visit: -1.25 in (+1 in total)    Constitutional: Oriented to person, place, and time and well-developed, well-nourished, and in no distress.    Head: Normocephalic.   Musculoskeletal: Normal range of motion. No edema.   Neurological: Alert and oriented to person, place, and time. No muscle weakness.  Gait normal.   Skin: Warm and dry. Not diaphoretic.   Psychiatric: Mood, memory, affect and judgment normal.     Laboratory:   Recent labs reviewed.      Dietitian Assessment: N/A    ASSESSMENT  75 y.o.  female presents for intensive lifestyle intervention for treatment of obesity and co-morbid conditions.  Obesity Stage ([place])/Class: 3/3    1. Weight gain     2. Dyslipidemia, goal LDL below 100     3. Essential hypertension     4. Controlled type 2 diabetes mellitus with complication, without long-term current use of insulin (HCC)     5. Ductal carcinoma in situ (DCIS) of right breast     6. Obstructive sleep apnea     7. Morbid obesity with BMI of 40.0-44.9, adult (HCC)         The patient's weight gain, obesity are uncontrolled.  The patient has made little lifestyle change to improve her weight loss efforts, fasting glucose control but will continue to try motivating the patient more.  She needs to consider additional antiobesity medication, which she is.  Given history of DCIS, needs to improve fat mass percent significantly to reduce her further risk.  Continue to monitor lipids, blood pressure, A1c, sleep.    PLAN  Weight Goal: 145 lb  Dietary " intervention:    MR:  0  High Protein/Low Carb whole food meals and 2 snacks between meals daily  Bring in written journal next visit  64+ oz water per day  Avoid refined carbs snacks  Physical Activity: Set walking goals this month!  Labs: A1c prior to next visit  Rx changes:   Consider phentermine/topiramate if she can afford  No GLP1 agonist given h/o thyroid ca  Side Effects: Risks, benefits, alternatives discussed, consent will need to be signed prior to prescribing.  Surgical considerations: Patient declines referral to bariatric surgery  Follow-up: one month with MD    Patient's body mass index is 42.69 kg/m². Exercise and nutrition counseling were performed at this visit.        >>>>>>>>>>>>>      PREVENTIVE SERVICES COUNSELING FOR OBESITY NOTE    O:  Body mass index is 42.69 kg/m²., see also vitals flowsheet for today  Pt struggling with:  Cravings  Keeping food diary/tracking consumption  Meal planning  Meal prep  A:  Dietary counseling and surveillance for obesity  Z71.3, E66.9, BMI Code: Z68.  41  P:  Counseled pt on reduced kcal, reduced refined CHO whole food meal plan  Advised exercise: Walking, set more specific goals  Discussed strategies of self-monitoring with:   food diary  cognitive restructuring, around hobbies  stimulus control  Environmental management is important  Time: 15 min

## 2020-12-08 RX ORDER — LANCETS 28 GAUGE
1 EACH MISCELLANEOUS 2 TIMES DAILY
Qty: 100 EACH | Refills: 6 | Status: SHIPPED | OUTPATIENT
Start: 2020-12-08 | End: 2022-03-22 | Stop reason: SDUPTHER

## 2020-12-15 DIAGNOSIS — I10 ESSENTIAL HYPERTENSION: ICD-10-CM

## 2020-12-15 RX ORDER — LOSARTAN POTASSIUM 25 MG/1
25 TABLET ORAL DAILY
Qty: 90 TAB | Refills: 3 | Status: SHIPPED | OUTPATIENT
Start: 2020-12-15 | End: 2021-10-18

## 2020-12-15 NOTE — TELEPHONE ENCOUNTER
Received request via: Pharmacy    Was the patient seen in the last year in this department? Yes    Does the patient have an active prescription (recently filled or refills available) for medication(s) requested? No       Requested Prescriptions     Pending Prescriptions Disp Refills   • losartan (COZAAR) 25 MG Tab 90 Tab 3     Sig: Take 1 Tab by mouth every day.

## 2020-12-21 DIAGNOSIS — Z79.899 ON POTASSIUM WASTING DIURETIC THERAPY: ICD-10-CM

## 2020-12-22 RX ORDER — POTASSIUM CHLORIDE 750 MG/1
TABLET, FILM COATED, EXTENDED RELEASE ORAL
Qty: 90 TAB | Refills: 1 | Status: SHIPPED | OUTPATIENT
Start: 2020-12-22 | End: 2021-06-03

## 2021-01-11 DIAGNOSIS — Z23 NEED FOR VACCINATION: ICD-10-CM

## 2021-02-03 RX ORDER — FUROSEMIDE 40 MG/1
TABLET ORAL
Qty: 90 TAB | Refills: 2 | Status: SHIPPED | OUTPATIENT
Start: 2021-02-03 | End: 2021-07-26 | Stop reason: SDUPTHER

## 2021-02-04 ENCOUNTER — HOSPITAL ENCOUNTER (OUTPATIENT)
Dept: RADIOLOGY | Facility: MEDICAL CENTER | Age: 76
End: 2021-02-04
Attending: INTERNAL MEDICINE
Payer: MEDICARE

## 2021-02-04 DIAGNOSIS — M85.89 DISAPPEARING BONE DISEASE: ICD-10-CM

## 2021-02-04 DIAGNOSIS — Z12.31 ENCOUNTER FOR MAMMOGRAM TO ESTABLISH BASELINE MAMMOGRAM: ICD-10-CM

## 2021-02-04 PROCEDURE — 77063 BREAST TOMOSYNTHESIS BI: CPT

## 2021-02-04 PROCEDURE — 77080 DXA BONE DENSITY AXIAL: CPT

## 2021-02-12 ENCOUNTER — APPOINTMENT (RX ONLY)
Dept: URBAN - METROPOLITAN AREA CLINIC 4 | Facility: CLINIC | Age: 76
Setting detail: DERMATOLOGY
End: 2021-02-12

## 2021-02-12 DIAGNOSIS — Z71.89 OTHER SPECIFIED COUNSELING: ICD-10-CM

## 2021-02-12 DIAGNOSIS — L81.4 OTHER MELANIN HYPERPIGMENTATION: ICD-10-CM

## 2021-02-12 DIAGNOSIS — L82.1 OTHER SEBORRHEIC KERATOSIS: ICD-10-CM

## 2021-02-12 DIAGNOSIS — L30.4 ERYTHEMA INTERTRIGO: ICD-10-CM | Status: INADEQUATELY CONTROLLED

## 2021-02-12 DIAGNOSIS — D18.0 HEMANGIOMA: ICD-10-CM

## 2021-02-12 DIAGNOSIS — L57.0 ACTINIC KERATOSIS: ICD-10-CM

## 2021-02-12 DIAGNOSIS — L73.8 OTHER SPECIFIED FOLLICULAR DISORDERS: ICD-10-CM

## 2021-02-12 DIAGNOSIS — Z85.820 PERSONAL HISTORY OF MALIGNANT MELANOMA OF SKIN: ICD-10-CM

## 2021-02-12 DIAGNOSIS — D22 MELANOCYTIC NEVI: ICD-10-CM

## 2021-02-12 DIAGNOSIS — L21.8 OTHER SEBORRHEIC DERMATITIS: ICD-10-CM

## 2021-02-12 PROBLEM — D18.01 HEMANGIOMA OF SKIN AND SUBCUTANEOUS TISSUE: Status: ACTIVE | Noted: 2021-02-12

## 2021-02-12 PROBLEM — D48.5 NEOPLASM OF UNCERTAIN BEHAVIOR OF SKIN: Status: ACTIVE | Noted: 2021-02-12

## 2021-02-12 PROBLEM — D22.5 MELANOCYTIC NEVI OF TRUNK: Status: ACTIVE | Noted: 2021-02-12

## 2021-02-12 PROCEDURE — 11102 TANGNTL BX SKIN SINGLE LES: CPT

## 2021-02-12 PROCEDURE — 17003 DESTRUCT PREMALG LES 2-14: CPT

## 2021-02-12 PROCEDURE — ? COUNSELING

## 2021-02-12 PROCEDURE — 99214 OFFICE O/P EST MOD 30 MIN: CPT | Mod: 25

## 2021-02-12 PROCEDURE — ? SUNSCREEN TREATMENT REGIMEN

## 2021-02-12 PROCEDURE — ? SUNSCREEN RECOMMENDATIONS

## 2021-02-12 PROCEDURE — ? BIOPSY BY SHAVE METHOD

## 2021-02-12 PROCEDURE — ? TREATMENT REGIMEN

## 2021-02-12 PROCEDURE — 17000 DESTRUCT PREMALG LESION: CPT | Mod: 59

## 2021-02-12 PROCEDURE — ? LIQUID NITROGEN

## 2021-02-12 PROCEDURE — ? PRESCRIPTION MEDICATION MANAGEMENT

## 2021-02-12 ASSESSMENT — LOCATION DETAILED DESCRIPTION DERM
LOCATION DETAILED: LEFT SUPERIOR MEDIAL LOWER BACK
LOCATION DETAILED: LEFT INFERIOR CENTRAL MALAR CHEEK
LOCATION DETAILED: LEFT POSTERIOR SHOULDER
LOCATION DETAILED: RIGHT RADIAL DORSAL HAND
LOCATION DETAILED: LEFT RADIAL DORSAL HAND
LOCATION DETAILED: LEFT INFERIOR LID MARGIN
LOCATION DETAILED: LEFT ANTERIOR PROXIMAL THIGH
LOCATION DETAILED: LEFT CENTRAL TEMPLE
LOCATION DETAILED: LOWER STERNUM
LOCATION DETAILED: RIGHT LATERAL ABDOMEN
LOCATION DETAILED: RIGHT BUTTOCK
LOCATION DETAILED: RIGHT POSTERIOR SHOULDER
LOCATION DETAILED: LEFT LATERAL MALAR CHEEK
LOCATION DETAILED: LEFT NASAL SIDEWALL
LOCATION DETAILED: UPPER STERNUM
LOCATION DETAILED: MIDDLE STERNUM

## 2021-02-12 ASSESSMENT — LOCATION SIMPLE DESCRIPTION DERM
LOCATION SIMPLE: LEFT INFERIOR EYELID
LOCATION SIMPLE: RIGHT SHOULDER
LOCATION SIMPLE: LEFT NOSE
LOCATION SIMPLE: LEFT LOWER BACK
LOCATION SIMPLE: LEFT SHOULDER
LOCATION SIMPLE: LEFT THIGH
LOCATION SIMPLE: CHEST
LOCATION SIMPLE: RIGHT HAND
LOCATION SIMPLE: RIGHT BUTTOCK
LOCATION SIMPLE: LEFT CHEEK
LOCATION SIMPLE: LEFT HAND
LOCATION SIMPLE: LEFT TEMPLE
LOCATION SIMPLE: ABDOMEN

## 2021-02-12 ASSESSMENT — LOCATION ZONE DERM
LOCATION ZONE: LEG
LOCATION ZONE: FACE
LOCATION ZONE: NOSE
LOCATION ZONE: ARM
LOCATION ZONE: HAND
LOCATION ZONE: EYELID
LOCATION ZONE: TRUNK

## 2021-02-12 NOTE — PROCEDURE: TREATMENT REGIMEN
Plan: Advised patient to start applying hydrocortisone to the area.
Detail Level: Zone
Initiate Treatment: Hydrocortisone.

## 2021-02-12 NOTE — PROCEDURE: MIPS QUALITY
Quality 130: Documentation Of Current Medications In The Medical Record: Current Medications Documented
Detail Level: Detailed
Quality 138: Melanoma: Coordination Of Care: A treatment plan was communicated to the physicians providing continuing care within one month of diagnosis outlining: diagnosis, tumor thickness and a plan for surgery or alternate care.
Quality 226: Preventive Care And Screening: Tobacco Use: Screening And Cessation Intervention: Patient screened for tobacco use and is an ex/non-smoker
Quality 137: Melanoma: Continuity Of Care - Recall System: Patient information entered into a recall system that includes: target date for the next exam specified AND a process to follow up with patients regarding missed or unscheduled appointments
Quality 397: Melanoma: Reporting: The pathology report includes a pT Category and statement on thickness and ulceration for pT1, mitotic rate.
Quality 111:Pneumonia Vaccination Status For Older Adults: Pneumococcal Vaccination Previously Received

## 2021-03-11 ENCOUNTER — DOCUMENTATION (OUTPATIENT)
Dept: MEDICAL GROUP | Facility: MEDICAL CENTER | Age: 76
End: 2021-03-11

## 2021-03-11 DIAGNOSIS — E11.9 DM TYPE 2, GOAL HBA1C < 8% (HCC): ICD-10-CM

## 2021-03-11 LAB
ALBUMIN SERPL-MCNC: 4.7 G/DL (ref 3.7–4.7)
ALBUMIN/GLOB SERPL: 2 {RATIO} (ref 1.2–2.2)
ALP SERPL-CCNC: 93 IU/L (ref 39–117)
ALT SERPL-CCNC: 26 IU/L (ref 0–32)
AST SERPL-CCNC: 28 IU/L (ref 0–40)
BASOPHILS # BLD AUTO: 0 X10E3/UL (ref 0–0.2)
BASOPHILS NFR BLD AUTO: 1 %
BILIRUB SERPL-MCNC: 0.5 MG/DL (ref 0–1.2)
BUN SERPL-MCNC: 29 MG/DL (ref 8–27)
BUN/CREAT SERPL: 51 (ref 12–28)
CALCIUM SERPL-MCNC: 9.3 MG/DL (ref 8.7–10.3)
CHLORIDE SERPL-SCNC: 104 MMOL/L (ref 96–106)
CHOLEST SERPL-MCNC: 119 MG/DL (ref 100–199)
CO2 SERPL-SCNC: 23 MMOL/L (ref 20–29)
CREAT SERPL-MCNC: 0.57 MG/DL (ref 0.57–1)
EOSINOPHIL # BLD AUTO: 0.1 X10E3/UL (ref 0–0.4)
EOSINOPHIL NFR BLD AUTO: 1 %
ERYTHROCYTE [DISTWIDTH] IN BLOOD BY AUTOMATED COUNT: 12.9 % (ref 11.7–15.4)
GLOBULIN SER CALC-MCNC: 2.3 G/DL (ref 1.5–4.5)
GLUCOSE SERPL-MCNC: 155 MG/DL (ref 65–99)
HBA1C MFR BLD: 7.5 % (ref 4.8–5.6)
HCT VFR BLD AUTO: 46 % (ref 34–46.6)
HDLC SERPL-MCNC: 39 MG/DL
HGB BLD-MCNC: 14.9 G/DL (ref 11.1–15.9)
IMM GRANULOCYTES # BLD AUTO: 0 X10E3/UL (ref 0–0.1)
IMM GRANULOCYTES NFR BLD AUTO: 0 %
IMMATURE CELLS  115398: NORMAL
LABORATORY COMMENT REPORT: ABNORMAL
LDLC SERPL CALC-MCNC: 59 MG/DL (ref 0–99)
LYMPHOCYTES # BLD AUTO: 1.9 X10E3/UL (ref 0.7–3.1)
LYMPHOCYTES NFR BLD AUTO: 29 %
MCH RBC QN AUTO: 28.2 PG (ref 26.6–33)
MCHC RBC AUTO-ENTMCNC: 32.4 G/DL (ref 31.5–35.7)
MCV RBC AUTO: 87 FL (ref 79–97)
MONOCYTES # BLD AUTO: 0.7 X10E3/UL (ref 0.1–0.9)
MONOCYTES NFR BLD AUTO: 11 %
MORPHOLOGY BLD-IMP: NORMAL
NEUTROPHILS # BLD AUTO: 3.8 X10E3/UL (ref 1.4–7)
NEUTROPHILS NFR BLD AUTO: 58 %
NRBC BLD AUTO-RTO: NORMAL %
PLATELET # BLD AUTO: 233 X10E3/UL (ref 150–450)
POTASSIUM SERPL-SCNC: 4.3 MMOL/L (ref 3.5–5.2)
PROT SERPL-MCNC: 7 G/DL (ref 6–8.5)
RBC # BLD AUTO: 5.28 X10E6/UL (ref 3.77–5.28)
SODIUM SERPL-SCNC: 143 MMOL/L (ref 134–144)
TRIGL SERPL-MCNC: 112 MG/DL (ref 0–149)
VLDLC SERPL CALC-MCNC: 21 MG/DL (ref 5–40)
WBC # BLD AUTO: 6.6 X10E3/UL (ref 3.4–10.8)

## 2021-03-11 RX ORDER — LINAGLIPTIN 5 MG/1
TABLET, FILM COATED ORAL
Qty: 90 TABLET | Refills: 3 | Status: SHIPPED | OUTPATIENT
Start: 2021-03-11 | End: 2021-12-06

## 2021-03-17 RX ORDER — BLOOD-GLUCOSE METER
1 KIT MISCELLANEOUS
Qty: 100 STRIP | Refills: 6 | Status: SHIPPED | OUTPATIENT
Start: 2021-03-17 | End: 2021-06-14 | Stop reason: SDUPTHER

## 2021-03-22 ENCOUNTER — OFFICE VISIT (OUTPATIENT)
Dept: MEDICAL GROUP | Facility: MEDICAL CENTER | Age: 76
End: 2021-03-22
Payer: MEDICARE

## 2021-03-22 VITALS
HEART RATE: 99 BPM | BODY MASS INDEX: 42.56 KG/M2 | DIASTOLIC BLOOD PRESSURE: 60 MMHG | WEIGHT: 231.26 LBS | TEMPERATURE: 97 F | OXYGEN SATURATION: 92 % | SYSTOLIC BLOOD PRESSURE: 114 MMHG | HEIGHT: 62 IN

## 2021-03-22 DIAGNOSIS — I10 ESSENTIAL HYPERTENSION: ICD-10-CM

## 2021-03-22 DIAGNOSIS — E11.9 DM TYPE 2, GOAL HBA1C < 8% (HCC): ICD-10-CM

## 2021-03-22 DIAGNOSIS — E11.8 CONTROLLED TYPE 2 DIABETES MELLITUS WITH COMPLICATION, WITHOUT LONG-TERM CURRENT USE OF INSULIN (HCC): ICD-10-CM

## 2021-03-22 DIAGNOSIS — Z85.850 HISTORY OF THYROID CANCER: ICD-10-CM

## 2021-03-22 DIAGNOSIS — K76.0 FATTY LIVER: ICD-10-CM

## 2021-03-22 DIAGNOSIS — Z85.820 HISTORY OF MALIGNANT MELANOMA: ICD-10-CM

## 2021-03-22 DIAGNOSIS — Z85.3 HISTORY OF BILATERAL BREAST CANCER: ICD-10-CM

## 2021-03-22 DIAGNOSIS — E03.2 HYPOTHYROIDISM DUE TO NON-MEDICATION EXOGENOUS SUBSTANCES: ICD-10-CM

## 2021-03-22 DIAGNOSIS — G47.33 OBSTRUCTIVE SLEEP APNEA: ICD-10-CM

## 2021-03-22 DIAGNOSIS — E78.5 DYSLIPIDEMIA, GOAL LDL BELOW 100: ICD-10-CM

## 2021-03-22 DIAGNOSIS — I50.32 CHRONIC DIASTOLIC CONGESTIVE HEART FAILURE (HCC): ICD-10-CM

## 2021-03-22 PROBLEM — C73 PRIMARY THYROID CANCER (HCC): Status: RESOLVED | Noted: 2017-11-29 | Resolved: 2021-03-22

## 2021-03-22 PROCEDURE — 99214 OFFICE O/P EST MOD 30 MIN: CPT | Performed by: FAMILY MEDICINE

## 2021-03-22 ASSESSMENT — FIBROSIS 4 INDEX: FIB4 SCORE: 1.79

## 2021-03-22 ASSESSMENT — PATIENT HEALTH QUESTIONNAIRE - PHQ9: CLINICAL INTERPRETATION OF PHQ2 SCORE: 0

## 2021-03-22 NOTE — PROGRESS NOTES
Diabetes Focused Exam:    Chief Complaint   Patient presents with   • Diabetes Mellitus   • Hypertension   • Hyperlipidemia   • Hypothyroidism   • Obesity      Subjective:   HPI  Ragini Reaves is a 76 y.o. female who presents for follow up of chronic conditions of diabetes mellitus, hypertension and hyperlipidemia. She indicates that she is feeling well and denies any symptoms referable to the above diagnoses. Specifically denies chest pain, palpitations, dyspnea, orthopnea, PND or peripheral edema. Also denies polyuria, polydipsia, urinary complaints, abdominal complaints, myalgias, numbness, weakness or other related symptoms.     Her microalbuminuria is stable.    Needs to recheck with pulmonology.  A mask has not been fit.  She is using her old equipment.  Pulmonology referral discussed and placed.    The patient is taking ASA every day 81 mg and taking all other medications as prescribed. Patient denies any side effects of medication.  DM: A1c goal <8 % Glucose monitoring frequency: daily, sometimes bid   Fasting sugars: 100-150, was higher, now better. Post-prandial sugars: up to 180, not testing much  Hypoglycemic episodes none noted  Diabetic complications: none and microalbuminuria  ACR Albumin/Creatinine Ratio goal <30   HTN: Blood pressure goal <140/<80. Currently Rx ACE/ARB: Yes  Hyperlipidemia:Cholesterol goal LDL <100, total/HDL <5. Currently Rx Statin: Yes  Last eye exam 2/2021  Denies visual blurring, double vision, eye pain and floaters  Last monofilament foot exam: two months ago with podiatry   Denies foot pain, numbness, calluses, ulcers    See medications and orders placed in encounter report.  Past medical history, family history, social history reviewed and updated as documented in medical record.    Current medications including changes today:  Current Outpatient Medications   Medication Sig Dispense Refill   • FREESTYLE LITE strip 1 STRIP BY OTHER ROUTE 1 TIME DAILY AS NEEDED. 100  Strip 6   • metFORMIN (GLUCOPHAGE) 500 MG Tab TAKE 1 TABLET BY MOUTH TWICE A DAY WITH MEALS 180 tablet 3   • TRADJENTA 5 MG Tab tablet TAKE 1 TABLET BY MOUTH EVERY DAY 90 tablet 3   • furosemide (LASIX) 40 MG Tab TAKE 1 TABLET BY MOUTH EVERY DAY IN THE MORNING 90 Tab 2   • KLOR-CON 10 10 MEQ tablet TAKE 1 TABLET BY MOUTH EVERY DAY 90 Tab 1   • spironolactone (ALDACTONE) 25 MG Tab TAKE 1 TABLET BY MOUTH EVERY DAY 90 Tab 2   • losartan (COZAAR) 25 MG Tab Take 1 Tab by mouth every day. 90 Tab 3   • FreeStyle Lancets Misc 1 EACH BY DOES NOT APPLY ROUTE 2 TIMES A DAY. 100 Each 6   • simvastatin (ZOCOR) 5 MG Tab TAKE 1 TABLET BY MOUTH EVERY DAY IN THE EVENING 90 Tab 3   • levothyroxine (SYNTHROID) 150 MCG Tab Take 1 Tab by mouth Every morning on an empty stomach. 90 Tab 3   • risedronate (ACTONEL) 35 MG Tab      • betamethasone, augmented, (DIPROLENE) 0.05 % gel Apply 1 Application to affected area(s) every bedtime. 50 g 2   • ciclopirox (LOPROX) 0.77 % cream Twice daily to the affected area under the breast, on abdomen for 1-2 weeks as needed (use first) 45 g 2   • hydrocortisone 2.5 % Cream topical cream Apply 1 Application to affected area(s) 2 times a day. To area under breast (use second) as needed for redness/itching/burning 1 Tube 1   • letrozole (FEMARA) 2.5 MG Tab Take 2.5 mg by mouth every day.     • Fiber Powder Take 1 Package by mouth every day.     • Probiotic Product (PROBIOTIC DAILY PO) Take 1 Package by mouth every day.     • docusate sodium (COLACE) 100 MG Cap Take 200 mg by mouth every day.     • acetaminophen (TYLENOL) 500 MG Tab Take 500 mg by mouth 2 Times a Day.     • NON SPECIFIED Take 300 mg by mouth every day. CISTANCHE     • CALCIUM-MAGNESIUM-VITAMIN D PO Take 1 Tab by mouth every day.     • BIOTIN PO Take 1 Tab by mouth every day.     • COLLAGEN PO Take 1 Tab by mouth every day.     • Glucosamine-Chondroitin (GLUCOSAMINE CHONDR COMPLEX PO) Take 750 mg by mouth 2 Times a Day.     • Lutein 10 MG  "Tab Take 10 mg by mouth every evening.     • aspirin 81 MG tablet Take 81 mg by mouth every morning. 90 Tab 3   • coenzyme Q-10 30 MG capsule Take 1 Cap by mouth every day. 30 Cap 11   • ascorbic acid (VITAMIN C) 500 MG tablet Take 1 Tab by mouth every day. 30 Tab 11   • Multiple Vitamins-Minerals (ULTRA WOMENS PACK) Misc Take 1 Tab by mouth every day. 30 Each 11     No current facility-administered medications for this visit.     Allergies:   Allergies   Allergen Reactions   • Asa [Aspirin]      Stomach bleeding.   Tolerates low dose ASA   • Cough Syrup M [Cough Cold-Flu Relief] Swelling     Severe facial swelling   • Dextromethorphan Swelling     Facial swellling      Social History     Tobacco Use   • Smoking status: Never Smoker   • Smokeless tobacco: Never Used   Substance Use Topics   • Alcohol use: No     Alcohol/week: 0.0 oz   • Drug use: No     Exercise: walking more.  Trying to be more active.  Health Maintenance/Immunizations: discussed, she is currently up to date    ROS  Pertinent  ROS findings as above.      Objective:     OBJECTIVE:  /60 (BP Location: Right arm, Patient Position: Sitting, BP Cuff Size: Adult)   Pulse 99   Temp 36.1 °C (97 °F) (Temporal)   Ht 1.575 m (5' 2\")   Wt 105 kg (231 lb 4.2 oz)   LMP  (LMP Unknown)   SpO2 92%   BMI 42.30 kg/m²  Body mass index is 42.3 kg/m². BMI: severely obese  Has had good weight loss  General: No apparent distress, conversant, cooperative and pleasant with the examination.  Psych: Alert and oriented x4, judgment and insight normal  Neck: No JVD or bruits, no adenopathy, supple  Thyroid: normal to inspection and palpation  Lungs: negative findings: normal respiratory rate and rhythm, lungs clear to auscultation  Heart: negative findings: regular rate and rhythm, S1 normal, S2 normal, no murmurs, clicks, or gallops  Abdomen: Soft, nontender, no hepatosplenomegaly or masses, normal bowel sounds  Skin: No rashes, no cyanosis, no lesions or " ulcers  Extremities: No cyanosis clubbing or edema.   Feet are examined     POC labs:   Lab Results   Component Value Date/Time    POCGLUCOSE 150 (H) 03/28/2019 07:07 AM          Last labs    Lab Results   Component Value Date/Time    CHOLSTRLTOT 119 03/10/2021 05:57 AM    LDL 75 05/15/2020 05:25 AM    HDL 39 (L) 03/10/2021 05:57 AM    TRIGLYCERIDE 112 03/10/2021 05:57 AM       Lab Results   Component Value Date/Time    SODIUM 143 03/10/2021 05:57 AM    SODIUM 139 03/11/2019 02:05 PM    POTASSIUM 4.3 03/10/2021 05:57 AM    POTASSIUM 3.7 03/11/2019 02:05 PM    GLUCOSE 155 (H) 03/10/2021 05:57 AM    GLUCOSE 133 (H) 03/11/2019 02:05 PM    BUNCREATRAT 51 (H) 03/10/2021 05:57 AM     Lab Results   Component Value Date/Time    HBA1C 7.5 (H) 03/10/2021 05:57 AM    HBA1C 7.3 (H) 09/21/2020 06:00 AM    HBA1C 7.7 (H) 05/15/2020 05:25 AM    HBA1C 7.0 (H) 03/11/2019 02:05 PM    HBA1C 8.7 (H) 09/08/2015 07:49 AM    HBA1C 8.4 (H) 05/05/2015 08:32 AM    HBA1C 9.4 (H) 01/06/2015 08:37 AM     Lab Results   Component Value Date/Time    MICRALB 35.6 09/21/2020 06:00 AM          Assessment/Plan:   Medications, refills, and referrals per orders.   1. Controlled type 2 diabetes mellitus with complication, without long-term current use of insulin (HCC)  HEMOGLOBIN A1C    MICROALBUMIN CREAT RATIO URINE    Comp Metabolic Panel    Lipid Profile    TSH   2. DM type 2, goal HbA1c < 8% ([facility])  TSH   3. Essential hypertension  Comp Metabolic Panel    Lipid Profile    TSH   4. Dyslipidemia, goal LDL below 100  Comp Metabolic Panel    Lipid Profile    TSH   5. Fatty liver  Comp Metabolic Panel    CBC WITHOUT DIFFERENTIAL   6. Hypothyroidism due to non-medication exogenous substances  TSH   7. BMI 40.0-44.9, adult (HCC)     8. History of thyroid cancer     9. History of malignant melanoma     10. History of bilateral breast cancer     11. Chronic diastolic congestive heart failure (HCC)     12. Obstructive sleep apnea  REFERRAL TO PULMONARY AND SLEEP  MEDICINE    CBC WITHOUT DIFFERENTIAL     DM2 A1c is at goal   Patient to monitor sugars: daily frequency  Discussed diet, exercise, disease management and weight loss goals.   Education and advise provided today:All medications, side effects and compliance (discussed carefully)  Annual eye examinations at Ophthalmology  Diabetic diet discussed in detail, written exchange diet given  Foot care discussed and Podiatry visits  Glycohemoglobin and other lab monitoring  Home glucose monitoring demonstrated and taught  Home glucose monitoring emphasized  Labs immediately prior to next visit  Long term diabetic complications  Low cholesterol diet, weight control and daily exercise    Followup:   Do labs and RTC 6 months, sooner should new symptoms or problems arise.

## 2021-03-29 ENCOUNTER — OFFICE VISIT (OUTPATIENT)
Dept: HEALTH INFORMATION MANAGEMENT | Facility: MEDICAL CENTER | Age: 76
End: 2021-03-29
Payer: MEDICARE

## 2021-03-29 VITALS
WEIGHT: 231 LBS | OXYGEN SATURATION: 98 % | DIASTOLIC BLOOD PRESSURE: 76 MMHG | HEIGHT: 62 IN | SYSTOLIC BLOOD PRESSURE: 114 MMHG | HEART RATE: 89 BPM | BODY MASS INDEX: 42.51 KG/M2

## 2021-03-29 DIAGNOSIS — E11.8 CONTROLLED TYPE 2 DIABETES MELLITUS WITH COMPLICATION, WITHOUT LONG-TERM CURRENT USE OF INSULIN (HCC): ICD-10-CM

## 2021-03-29 DIAGNOSIS — E78.5 DYSLIPIDEMIA, GOAL LDL BELOW 100: ICD-10-CM

## 2021-03-29 DIAGNOSIS — G47.33 OBSTRUCTIVE SLEEP APNEA: ICD-10-CM

## 2021-03-29 DIAGNOSIS — I10 ESSENTIAL HYPERTENSION: ICD-10-CM

## 2021-03-29 DIAGNOSIS — E66.01 MORBID OBESITY WITH BMI OF 40.0-44.9, ADULT (HCC): ICD-10-CM

## 2021-03-29 DIAGNOSIS — K76.0 FATTY LIVER: ICD-10-CM

## 2021-03-29 PROBLEM — E08.65 DIABETES MELLITUS DUE TO UNDERLYING CONDITION, UNCONTROLLED, WITH HYPERGLYCEMIA (HCC): Status: RESOLVED | Noted: 2020-07-20 | Resolved: 2021-03-29

## 2021-03-29 PROCEDURE — 99214 OFFICE O/P EST MOD 30 MIN: CPT | Performed by: INTERNAL MEDICINE

## 2021-03-29 RX ORDER — TOPIRAMATE 25 MG/1
25 TABLET ORAL DAILY
Qty: 30 TABLET | Refills: 0 | Status: SHIPPED | OUTPATIENT
Start: 2021-03-29 | End: 2021-04-27

## 2021-03-29 ASSESSMENT — FIBROSIS 4 INDEX: FIB4 SCORE: 1.79

## 2021-03-29 ASSESSMENT — PATIENT HEALTH QUESTIONNAIRE - PHQ9: CLINICAL INTERPRETATION OF PHQ2 SCORE: 0

## 2021-03-29 NOTE — PROGRESS NOTES
"Bariatric Medicine Follow Up  Chief Complaint   Patient presents with   • Weight Gain   • Obesity       History of Present Illness:   Ragini Reaves is a 76 y.o. female who presents for follow-up to intensive behavioral modification of overweight and to help address below co-morbidities caused by overweight.      Weight Management History to Date:  11/23/2020 last visit:  Not using MR  Tracking glucose but not tracking food intake  Declined antiobesity medication  Set walking goals but patient was inconsistent  Declines referral to bariatric surgery    ___    Challenges since last visit:  Still not sleeping much  Dietary adherence:  Fair  Trying to limit amount and snack less  Not tracking refined carb intake  Trying harder to eat more vegetables  Wants to have 1 V8 juice daily, low sodium version  Exercise:  Trying to walk daily but inconsistent, quilting a lot    AntiObesity Medication use: none  Wants to consider phentermine/topiramate discussed with PCP  Medication efficacy/tolerance/side effects: n/a  Medication compliance: n/a    Status of comorbid conditions/other medical diagnoses:  T2DM: Uncontrolled with metformin  HTN: Controlled with losartan, spironolactone  HLD: Controlled with statin  Fatty liver: Uncontrolled, LFTs not elevated  YASMANI: Controlled       Physical Exam:    /76 (BP Location: Left arm, Patient Position: Sitting, BP Cuff Size: Large adult)   Pulse 89   Ht 1.575 m (5' 2\")   Wt 105 kg (231 lb)   LMP  (LMP Unknown)   SpO2 98%   BMI 42.25 kg/m²   Waist Measurement   Waist: 55 inch/inches  Weight change since last visit:  -2 lb (+2 lb total)  Waist Circum change since last visit:  0 in (+1 in total)    Physical findings unchanged from prior exam.    Laboratory:   Recent labs reviewed.     ASSESSMENT  76 y.o.  female presents for intensive lifestyle intervention for treatment of obesity and co-morbid conditions.  Obesity Stage ([place])/Class: 3/3    1. Controlled type 2 " diabetes mellitus with complication, without long-term current use of insulin (HCC)     2. Essential hypertension     3. Dyslipidemia, goal LDL below 100     4. Fatty liver     5. Obstructive sleep apnea     6. Morbid obesity with BMI of 40.0-44.9, adult (HCC)  Phentermine HCl 8 MG Tab       Pt still has not made progress with weight or waist loss since initial visit in 2019.  She has been very reluctant to sustain significant lifestyle changes recommended.  Fasting glucose not under control.  Will start antiobesity meds per pt request, feels ready to do so.  Monitor fasting glucose control, BP, lipids, liver function, sleep.  Continue to work on sleep hygeine.    PLAN  Weight Goal:  145 lb  Dietary intervention:    MR:  0  High Protein/Low Carb whole food meals and 2 snacks between meals daily  Recommend tracking intake   64+ oz water per day  Avoid refined carb snacks  Physical Activity:  Walking 20 min daily to start, more consistency advised  Labs:  n/a  Rx changes:   Start low dose phentermine 8 mg + topiramate 25 mg at 1 pm daily, stays up late  GLP1 agonist contraindicated given h/o thyroid ca  Side Effects: Risks, benefits, alternatives discussed, consent signed.  Behavior modification:   More consistency with above recommendations  Surgical considerations: Patient declines referral to bariatric surgery  Follow-up: one month with MD  See also recent RD notes and follow-up.      Patient's body mass index is 42.25 kg/m². Exercise and nutrition counseling were performed at this visit.

## 2021-04-26 ENCOUNTER — OFFICE VISIT (OUTPATIENT)
Dept: SLEEP MEDICINE | Facility: MEDICAL CENTER | Age: 76
End: 2021-04-26
Payer: MEDICARE

## 2021-04-26 VITALS
BODY MASS INDEX: 43.06 KG/M2 | HEIGHT: 62 IN | DIASTOLIC BLOOD PRESSURE: 68 MMHG | OXYGEN SATURATION: 95 % | SYSTOLIC BLOOD PRESSURE: 122 MMHG | WEIGHT: 234 LBS | HEART RATE: 82 BPM

## 2021-04-26 DIAGNOSIS — I50.32 CHRONIC DIASTOLIC CONGESTIVE HEART FAILURE (HCC): ICD-10-CM

## 2021-04-26 DIAGNOSIS — I10 ESSENTIAL HYPERTENSION: ICD-10-CM

## 2021-04-26 DIAGNOSIS — F51.04 CHRONIC INSOMNIA: ICD-10-CM

## 2021-04-26 DIAGNOSIS — G47.33 OBSTRUCTIVE SLEEP APNEA: ICD-10-CM

## 2021-04-26 PROCEDURE — 99213 OFFICE O/P EST LOW 20 MIN: CPT | Performed by: NURSE PRACTITIONER

## 2021-04-26 ASSESSMENT — FIBROSIS 4 INDEX: FIB4 SCORE: 1.79

## 2021-04-26 NOTE — PROGRESS NOTES
"Chief Complaint   Patient presents with   • Follow-Up     YASMANI-Last seen 03/25/2020       HPI:      Mrs. Reaves is a 75 y/o female patient who is in today for annual YASMANI f/u. PMH includes DM II, hypothyroidism, hypertension, dyslipidemia, asthma, fatty liver, chronic diastolic CHF, right breast DCIS, melanoma, YASMANI, never smoker, tonsillectomy, obesity.    Compliance report from 3/27/21-4/25/21 was downloaded and reviewed with the patient which showed autoCPAP 5-10 cmH2O, 100% compliance, 4 hrs 47 min use (63% compliance), AHI of 1.3. She is tolerating the pressure and mask well, but has not received any new supplies over the last year because [facility] never reached out to her.. She goes to bed between 2-4 am and wakes up between 10-11 am.  She has always been a \"night owl\" since childhood.  She has always struggled with sleep and does not take any sleep aids.  She cannot attribute her issues with staying asleep to anything in particular but states on average about 1-2 times a week she will only be able to sleep for about an hour or 2.  She denies morning headache or snoring.  Patient currently lives in a senior home and does not have many places to go exercise.  She follows up with her PCP for blood pressure management.  She also received both COVID-19 immunizations.  She used to walk but now with COVID-19 she has also been more isolated.  She has lost an kept off about 60 pounds over the last 10 years.  She denies any new health problems or medications.    Sleep Study History:   PSG titration from 11/5/18 indicated YASMANI. Successful CPAP titration. Increased WASO. The final pressure tested during the study was CPAP 6 cm water and at this final pressure the patient was observed in the supine position  and in the REM sleep stage. The apnea hypopnea index improved to 3.1 per hour and O2 juan 85%. The average O2 stauration was 91%. She spent 7 min of sleep time below 89% O2 saturation. Snoring was resolved. "     PSG split night study from 12/16/17 indicated mild obstructive sleep apnea hypopnea with an apnea hypopnea index of 10.5 events per hour and a lowest arterial oxygen saturation of 87% on room air.     ROS:    Constitutional: Denies fevers, Denies weight changes  Eyes: Denies changes in vision, no eye pain  Ears/Nose/Throat/Mouth: Denies nasal congestion or sore throat   Cardiovascular: Denies chest pain or palpitations   Respiratory: Denies shortness of breath , Denies cough  Gastrointestinal/Hepatic: Denies abdominal pain, nausea, vomiting,   Skin/Breast: Denies rash,   Neurological: Denies headache, confusion,   Psychiatric: denies mood disorder   Sleep: denies snoring       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;  Service: General   • BREAST BIOPSY Right 3/13/2019    Procedure: BREAST BIOPSY - WIRE LOCALIZED;  Surgeon: Karuna Hussein M.D.;  Location: SURGERY [facility];  Service: Gen Robotic   • COLONOSCOPY WITH BIOPSY  5/21/2013    Performed by Mauro Garcia M.D. at ENDOSCOPY [facility]   • THYROIDECTOMY  2000   • TONSILLECTOMY         Family History   Problem Relation Age of Onset   • Lung Disease Brother    • Cancer Mother         pelvic   •  Stroke Maternal Grandmother        Social History     Socioeconomic History   • Marital status: Single     Spouse name: Not on file   • Number of children: Not on file   • Years of education: Not on file   • Highest education level: Not on file   Occupational History   • Not on file   Tobacco Use   • Smoking status: Never Smoker   • Smokeless tobacco: Never Used   Substance and Sexual Activity   • Alcohol use: No     Alcohol/week: 0.0 oz   • Drug use: No   • Sexual activity: Not Currently   Other Topics Concern   • Not on file   Social History Narrative   • Not on file     Social Determinants of Health     Financial Resource Strain:    • Difficulty of Paying Living Expenses:    Food Insecurity:    • Worried About Running Out of Food in the Last Year:    • Ran Out of Food in the Last Year:    Transportation Needs:    • Lack of Transportation (Medical):    • Lack of Transportation (Non-Medical):    Physical Activity:    • Days of Exercise per Week:    • Minutes of Exercise per Session:    Stress:    • Feeling of Stress :    Social Connections:    • Frequency of Communication with Friends and Family:    • Frequency of Social Gatherings with Friends and Family:    • Attends [employer] Services:    • Active Member of Clubs or Organizations:    • Attends Club or Organization Meetings:    • Marital Status:    Intimate Partner Violence:    • Fear of Current or Ex-Partner:    • Emotionally Abused:    • Physically Abused:    • Sexually Abused:        Allergies as of 04/26/2021 - Reviewed 04/26/2021   Allergen Reaction Noted   • Asa [aspirin]  04/14/2009   • Cough syrup m [cough cold-flu relief] Swelling 04/14/2009   • Dextromethorphan Swelling 12/06/2013        Vitals:  Vitals:    04/26/21 1346   BP: 122/68   Pulse: 82   SpO2: 95%       Current medications as of today   Current Outpatient Medications   Medication Sig Dispense Refill   • Phentermine HCl 8 MG Tab Take 1 tablet by mouth every day for 30 days. 30 tablet 0   •  topiramate (TOPAMAX) 25 MG Tab Take 1 tablet by mouth every day. Take with phentermine. 30 tablet 0   • FREESTYLE LITE strip 1 STRIP BY OTHER ROUTE 1 TIME DAILY AS NEEDED. 100 Strip 6   • metFORMIN (GLUCOPHAGE) 500 MG Tab TAKE 1 TABLET BY MOUTH TWICE A DAY WITH MEALS 180 tablet 3   • TRADJENTA 5 MG Tab tablet TAKE 1 TABLET BY MOUTH EVERY DAY 90 tablet 3   • furosemide (LASIX) 40 MG Tab TAKE 1 TABLET BY MOUTH EVERY DAY IN THE MORNING 90 Tab 2   • KLOR-CON 10 10 MEQ tablet TAKE 1 TABLET BY MOUTH EVERY DAY 90 Tab 1   • spironolactone (ALDACTONE) 25 MG Tab TAKE 1 TABLET BY MOUTH EVERY DAY 90 Tab 2   • losartan (COZAAR) 25 MG Tab Take 1 Tab by mouth every day. 90 Tab 3   • FreeStyle Lancets Misc 1 EACH BY DOES NOT APPLY ROUTE 2 TIMES A DAY. 100 Each 6   • simvastatin (ZOCOR) 5 MG Tab TAKE 1 TABLET BY MOUTH EVERY DAY IN THE EVENING 90 Tab 3   • levothyroxine (SYNTHROID) 150 MCG Tab Take 1 Tab by mouth Every morning on an empty stomach. 90 Tab 3   • risedronate (ACTONEL) 35 MG Tab      • betamethasone, augmented, (DIPROLENE) 0.05 % gel Apply 1 Application to affected area(s) every bedtime. 50 g 2   • ciclopirox (LOPROX) 0.77 % cream Twice daily to the affected area under the breast, on abdomen for 1-2 weeks as needed (use first) 45 g 2   • hydrocortisone 2.5 % Cream topical cream Apply 1 Application to affected area(s) 2 times a day. To area under breast (use second) as needed for redness/itching/burning 1 Tube 1   • letrozole (FEMARA) 2.5 MG Tab Take 2.5 mg by mouth every day.     • Fiber Powder Take 1 Package by mouth every day.     • Probiotic Product (PROBIOTIC DAILY PO) Take 1 Package by mouth every day.     • docusate sodium (COLACE) 100 MG Cap Take 200 mg by mouth every day.     • acetaminophen (TYLENOL) 500 MG Tab Take 500 mg by mouth 2 Times a Day.     • NON SPECIFIED Take 300 mg by mouth every day. CISTANCHE     • CALCIUM-MAGNESIUM-VITAMIN D PO Take 1 Tab by mouth every day.     • BIOTIN PO Take 1 Tab by mouth  every day.     • COLLAGEN PO Take 1 Tab by mouth every day.     • Glucosamine-Chondroitin (GLUCOSAMINE CHONDR COMPLEX PO) Take 750 mg by mouth 2 Times a Day.     • Lutein 10 MG Tab Take 10 mg by mouth every evening.     • aspirin 81 MG tablet Take 81 mg by mouth every morning. 90 Tab 3   • coenzyme Q-10 30 MG capsule Take 1 Cap by mouth every day. 30 Cap 11   • ascorbic acid (VITAMIN C) 500 MG tablet Take 1 Tab by mouth every day. 30 Tab 11   • Multiple Vitamins-Minerals (ULTRA WOMENS PACK) Misc Take 1 Tab by mouth every day. 30 Each 11     No current facility-administered medications for this visit.         Physical Exam: Limited by COVID-19 precautions.  Appearance: Well developed, well nourished, no acute distress  Eyes: PERRL, EOM intact, sclera white, conjunctiva moist  Ears: no lesions or deformities  Hearing: grossly intact  Nose: no lesions or deformities  Respiratory effort: no intercostal retractions or use of accessory muscles  Extremities: no cyanosis or edema  Abdomen: soft, large  Gait and Station: normal  Digits and nails: no clubbing, cyanosis, petechiae or nodes.  Cranial nerves: grossly intact  Skin: no visible rashes, lesions or ulcers noted  Orientation: Oriented to time, person and place  Mood and affect: mood and affect appropriate, normal interaction with examiner  Judgement: Intact    Assessment:  1. Obstructive sleep apnea  DME Mask and Supplies    DME Other   2. Chronic diastolic congestive heart failure (HCC)     3. Essential hypertension     4. BMI 40.0-44.9, adult (HCC)     5. Chronic insomnia           Plan  Discussed the cardiovascular and neuropsychiatric risks of untreated YASMANI; including but not limited to: HTN, DM, MI, ASCVD, CVA, CHF, traffic accidents.     1. Compliance report from 3/27/21-4/25/21 was downloaded and reviewed with the patient which showed autoCPAP 5-10 cmH2O, 100% compliance, 4 hrs 47 min use (63% compliance), AHI of 1.3. Continue autoCPAP. Patient is compliant and  benefiting from autoCPAP therapy for management of YASMANI. Advised patient to use the CPAP every night for more than four hours for optimal health benefit.     *DME order ([facility]) for mask (FFM or MOC) and supplies was provided today. Continue to clean mask and supplies weekly with soap and water, and change supplies per insurance guidelines.     *DME order for mask fit was provided today due to issues with headgear fit.     2-3. Continue to f/u with PCP for BP management.  4. Encouraged a healthy diet and exercise to help with weight loss and management.   5. Sleep hygiene discussed. Recommend keeping a set sleep/wake schedule. Logging enough hours of sleep. Limiting/Avoiding naps. No caffeine after noon and no heavy meals in the evening.   Chronic insomnia: Advised patient to avoid electronics.  May consider referral to Dr. Russo for CBT-I if needed.  6. Follow up with the appropriate healthcare practitioners for all other medical problems and issues.  7. F/u in 6 months.        LUIZ Gutierrez.      This dictation was created using voice recognition software. The accuracy of the dictation is limited to the abilities of the software. I expect there may be some errors of grammar and possibly content.

## 2021-04-27 RX ORDER — TOPIRAMATE 25 MG/1
TABLET ORAL
Qty: 30 TABLET | Refills: 0 | Status: SHIPPED | OUTPATIENT
Start: 2021-04-27 | End: 2021-05-24

## 2021-04-27 NOTE — TELEPHONE ENCOUNTER
Received request via: Pharmacy    Was the patient seen in the last year in this department? Yes    [facility]    3/29/21  [facility]    4/29/21    Does the patient have an active prescription (recently filled or refills available) for medication(s) requested? yes

## 2021-04-29 ENCOUNTER — OFFICE VISIT (OUTPATIENT)
Dept: HEALTH INFORMATION MANAGEMENT | Facility: MEDICAL CENTER | Age: 76
End: 2021-04-29
Payer: MEDICARE

## 2021-04-29 VITALS
HEIGHT: 62 IN | SYSTOLIC BLOOD PRESSURE: 118 MMHG | HEART RATE: 86 BPM | OXYGEN SATURATION: 95 % | WEIGHT: 230.1 LBS | BODY MASS INDEX: 42.34 KG/M2 | DIASTOLIC BLOOD PRESSURE: 70 MMHG

## 2021-04-29 DIAGNOSIS — I10 ESSENTIAL HYPERTENSION: ICD-10-CM

## 2021-04-29 DIAGNOSIS — R63.5 WEIGHT GAIN: ICD-10-CM

## 2021-04-29 DIAGNOSIS — G47.33 OBSTRUCTIVE SLEEP APNEA: ICD-10-CM

## 2021-04-29 DIAGNOSIS — E11.8 CONTROLLED TYPE 2 DIABETES MELLITUS WITH COMPLICATION, WITHOUT LONG-TERM CURRENT USE OF INSULIN (HCC): ICD-10-CM

## 2021-04-29 DIAGNOSIS — E66.01 MORBID OBESITY WITH BMI OF 40.0-44.9, ADULT (HCC): ICD-10-CM

## 2021-04-29 DIAGNOSIS — E78.5 DYSLIPIDEMIA, GOAL LDL BELOW 100: ICD-10-CM

## 2021-04-29 PROCEDURE — 99214 OFFICE O/P EST MOD 30 MIN: CPT | Performed by: INTERNAL MEDICINE

## 2021-04-29 ASSESSMENT — PATIENT HEALTH QUESTIONNAIRE - PHQ9: CLINICAL INTERPRETATION OF PHQ2 SCORE: 0

## 2021-04-29 ASSESSMENT — FIBROSIS 4 INDEX: FIB4 SCORE: 1.79

## 2021-04-29 NOTE — PROGRESS NOTES
"Bariatric Medicine Follow Up  Chief Complaint   Patient presents with   • Weight Gain       History of Present Illness:   Ragini Reaves is a 76 y.o. female who presents for follow-up to intensive behavioral modification of overweight and to help address below co-morbidities caused by overweight.      Weight Management History to Date:  3/29/2021:  Not using MR  Not really tracking intake  Considering use of antiobesity medication  Once to try phentermine and Topamax      11/23/2020 visit:  Not using MR  Tracking glucose but not tracking food intake  Declined antiobesity medication  Set walking goals but patient was inconsistent  Declines referral to bariatric surgery    ___    Challenges since last visit: Never received phentermine  Dietary adherence: Better  Not tracking intake or using MR  Overall more mindful  Trying to reduce desserts and carb intake  Eating less  Exercise: Some walking but inconsistent    AntiObesity Medication use: Phentermine 8+ Topamax 25 mg daily but did not start  Medication efficacy/tolerance/side effects: N/A  Medication compliance: N/A    Status of comorbid conditions/other medical diagnoses:  T2DM: Controlled on current medications including Metformin, Tradjenta  HTN: Controlled on spironolactone  HLD: Controlled on statin  YASMANI: Controlled       Physical Exam:    /70 (BP Location: Left arm, Patient Position: Sitting, BP Cuff Size: Large adult)   Pulse 86   Ht 1.575 m (5' 2\")   Wt 104 kg (230 lb 1.6 oz)   LMP  (LMP Unknown)   SpO2 95%   BMI 42.09 kg/m²   Waist Measurement   Waist: 53.5 inch/inches  Weight change since last visit: -1 lb (0 lb total)  Waist Circum change since last visit: -1.5 in (- 0.5 in total)    Physical findings unchanged from prior exam.    Laboratory:   Recent labs reviewed.     ASSESSMENT  76 y.o.  female presents for intensive lifestyle intervention for treatment of obesity and co-morbid conditions.  Obesity Stage (Rebecca)/Class: 2/3    1. " Controlled type 2 diabetes mellitus with complication, without long-term current use of insulin ([facility])     2. Essential hypertension     3. Dyslipidemia, goal LDL below 100     4. Obstructive sleep apnea     5. Morbid obesity with BMI of 40.0-44.9, adult ([facility])  Phentermine HCl 8 MG Tab   6. Weight gain         Today, the patient really has not made much change with regard to weight or loss and waist circumference.  However, starting antiobesity medication should help to reverse previous weight gain.  Continue to monitor fasting glucose with weight loss.  Encouraged patient to increase her activity level which she has yet to do.  Continue to monitor blood pressure, lipids, sleep.    PLAN  Weight Goal: Under 200 lb  Dietary intervention:    MR:  0  High Protein/Low Carb whole food meals and 2 snacks between meals daily   64+ oz water per day  Avoid fruits, desserts, sweet drinks  Physical Activity: Walking 20 minutes daily recommended  Labs: N/A  Rx changes:   Start phentermine 8+ Topamax 25 mg daily  Side Effects: Risks, benefits, alternatives discussed, consent signed.  Behavior modification:   Continue to assess patient's readiness to change  Surgical considerations: Patient has declined referral to bariatric surgery  Follow-up: one month with MD  See also recent RD notes and follow-up.      Patient's body mass index is 42.09 kg/m². Exercise and nutrition counseling were performed at this visit.

## 2021-05-24 RX ORDER — TOPIRAMATE 25 MG/1
TABLET ORAL
Qty: 30 TABLET | Refills: 0 | Status: SHIPPED | OUTPATIENT
Start: 2021-05-24 | End: 2021-06-01

## 2021-05-24 NOTE — TELEPHONE ENCOUNTER
Received request via: Pharmacy    Was the patient seen in the last year in this department? Yes    LV  04/29/21  FV  06/01/21    Does the patient have an active prescription (recently filled or refills available) for medication(s) requested? Yes

## 2021-06-01 ENCOUNTER — OFFICE VISIT (OUTPATIENT)
Dept: HEALTH INFORMATION MANAGEMENT | Facility: MEDICAL CENTER | Age: 76
End: 2021-06-01
Payer: MEDICARE

## 2021-06-01 VITALS
WEIGHT: 229.1 LBS | DIASTOLIC BLOOD PRESSURE: 66 MMHG | BODY MASS INDEX: 42.16 KG/M2 | HEART RATE: 92 BPM | SYSTOLIC BLOOD PRESSURE: 116 MMHG | OXYGEN SATURATION: 94 % | HEIGHT: 62 IN

## 2021-06-01 DIAGNOSIS — E66.01 MORBID OBESITY WITH BMI OF 40.0-44.9, ADULT (HCC): ICD-10-CM

## 2021-06-01 DIAGNOSIS — D05.11 DUCTAL CARCINOMA IN SITU (DCIS) OF RIGHT BREAST: ICD-10-CM

## 2021-06-01 DIAGNOSIS — I10 ESSENTIAL HYPERTENSION: ICD-10-CM

## 2021-06-01 DIAGNOSIS — G47.33 OBSTRUCTIVE SLEEP APNEA: ICD-10-CM

## 2021-06-01 DIAGNOSIS — E11.8 CONTROLLED TYPE 2 DIABETES MELLITUS WITH COMPLICATION, WITHOUT LONG-TERM CURRENT USE OF INSULIN (HCC): ICD-10-CM

## 2021-06-01 PROCEDURE — 99214 OFFICE O/P EST MOD 30 MIN: CPT | Performed by: INTERNAL MEDICINE

## 2021-06-01 RX ORDER — TOPIRAMATE 25 MG/1
50 TABLET ORAL DAILY
Qty: 30 TABLET | Refills: 0 | Status: SHIPPED | DISCHARGE
Start: 2021-06-01 | End: 2021-06-28

## 2021-06-01 RX ORDER — PHENTERMINE HYDROCHLORIDE 37.5 MG/1
18.75 TABLET ORAL
Qty: 15 TABLET | Refills: 0 | Status: SHIPPED | OUTPATIENT
Start: 2021-06-01 | End: 2021-07-06

## 2021-06-01 ASSESSMENT — FIBROSIS 4 INDEX: FIB4 SCORE: 1.79

## 2021-06-01 ASSESSMENT — PATIENT HEALTH QUESTIONNAIRE - PHQ9: CLINICAL INTERPRETATION OF PHQ2 SCORE: 0

## 2021-06-01 NOTE — PROGRESS NOTES
"Bariatric Medicine Follow Up  Chief Complaint   Patient presents with   • Weight Gain   • Obesity       History of Present Illness:   Ragini Reaves is a 76 y.o. female who presents for follow-up to intensive behavioral modification of overweight and to help address below co-morbidities caused by overweight.      Weight Management History to Date:  4/29/2021:  No weight loss  Trying to eat less  Not using MR  Not tracking intake  Recommended avoiding fruit, desserts, sweet drinks  Started phentermine 8+ Topamax 25 mg daily  Walking 20 minutes daily recommended      3/29/2021:  Not using MR  Not really tracking intake  Considering use of antiobesity medication  Once to try phentermine and Topamax        11/23/2020 visit:  Not using MR  Tracking glucose but not tracking food intake  Declined antiobesity medication  Set walking goals but patient was inconsistent  Declines referral to bariatric surgery    ___    Challenges since last visit:  Ineffective AOM  Dietary adherence:  Good  Eating less  Avoiding fruit, desserts, sweets more  Not tracking or using MR  Exercise:  Trying to walk more at home    AntiObesity Medication use: phentermine 8 + topiramate 25 mg daily  Medication efficacy/tolerance/side effects: not effective, no side effects  Medication compliance:  As prescribed    Status of comorbid conditions/other medical diagnoses:  T2DM: Uncontrolled on last labs despite Tradjenta, Metformin, has declined GLP-1 agonist  HTN: Controlled with losartan  YASMANI: Controlled with CPAP       Physical Exam:    /66 (BP Location: Left arm, Patient Position: Sitting, BP Cuff Size: Large adult)   Pulse 92   Ht 1.575 m (5' 2\")   Wt 104 kg (229 lb 1.6 oz)   LMP  (LMP Unknown)   SpO2 94%   BMI 41.90 kg/m²   Waist Measurement   Waist: 53.5 inch/inches  Weight change since last visit: -1 lb (0 lb total)  Waist Circum change since last visit: 0 in (-0.5 in total)    Physical findings unchanged from prior " exam.    Laboratory:   Recent labs reviewed.     ASSESSMENT  76 y.o.  female presents for intensive lifestyle intervention for treatment of obesity and co-morbid conditions.  Obesity Stage (Steele)/Class: 2/3    1. Controlled type 2 diabetes mellitus with complication, without long-term current use of insulin (HCC)     2. Morbid obesity with BMI of 40.0-44.9, adult ([facility])  phentermine (ADIPEX-P) 37.5 MG tablet   3. Essential hypertension     4. Obstructive sleep apnea     5. Ductal carcinoma in situ (DCIS) of right breast         The patient has yet to lose significant weight.  Increase antiobesity medication dosing.  Encourage patient to increase activity level.  Continue to monitor blood pressure, sleep.    PLAN  Weight Goal:  <190 lb  Reduce fat mass percent in order to reduce recurrent breast cancer risk  Dietary intervention:    MR:  0  High Protein/Low Carb whole food meals and 2 snacks between meals daily  Patient declines tracking intake  64+ oz water per day  Avoid sweet drinks, desserts, fruit  Physical Activity: Walking at least 20 minutes daily recommended  Labs: N/A  Rx changes:   Increase phentermine to 18.75 mg qam + topiramate 50 mg daily  Side Effects: Risks, benefits, alternatives discussed, consent signed.  Behavior modification:   Continue to assess patient's readiness to change  Surgical considerations: Patient has declined referral to bariatric surgery repeatedly  Follow-up: one month with MD  See also recent RD notes and follow-up.      Patient's body mass index is 41.9 kg/m². Exercise and nutrition counseling were performed at this visit.

## 2021-06-11 ENCOUNTER — APPOINTMENT (RX ONLY)
Dept: URBAN - METROPOLITAN AREA CLINIC 4 | Facility: CLINIC | Age: 76
Setting detail: DERMATOLOGY
End: 2021-06-11

## 2021-06-11 DIAGNOSIS — Z00.8 ENCOUNTER FOR OTHER GENERAL EXAMINATION: ICD-10-CM

## 2021-06-11 DIAGNOSIS — L82.1 OTHER SEBORRHEIC KERATOSIS: ICD-10-CM

## 2021-06-11 DIAGNOSIS — D22 MELANOCYTIC NEVI: ICD-10-CM

## 2021-06-11 DIAGNOSIS — Z71.89 OTHER SPECIFIED COUNSELING: ICD-10-CM

## 2021-06-11 DIAGNOSIS — D18.0 HEMANGIOMA: ICD-10-CM

## 2021-06-11 DIAGNOSIS — Z85.820 PERSONAL HISTORY OF MALIGNANT MELANOMA OF SKIN: ICD-10-CM

## 2021-06-11 DIAGNOSIS — L81.4 OTHER MELANIN HYPERPIGMENTATION: ICD-10-CM

## 2021-06-11 PROBLEM — D22.5 MELANOCYTIC NEVI OF TRUNK: Status: ACTIVE | Noted: 2021-06-11

## 2021-06-11 PROBLEM — D23.71 OTHER BENIGN NEOPLASM OF SKIN OF RIGHT LOWER LIMB, INCLUDING HIP: Status: ACTIVE | Noted: 2021-06-11

## 2021-06-11 PROBLEM — D18.01 HEMANGIOMA OF SKIN AND SUBCUTANEOUS TISSUE: Status: ACTIVE | Noted: 2021-06-11

## 2021-06-11 PROCEDURE — ? SUNSCREEN RECOMMENDATIONS

## 2021-06-11 PROCEDURE — ? COUNSELING

## 2021-06-11 PROCEDURE — ? REFERRAL CORRESPONDENCE

## 2021-06-11 PROCEDURE — 99213 OFFICE O/P EST LOW 20 MIN: CPT

## 2021-06-11 PROCEDURE — ? SUNSCREEN TREATMENT REGIMEN

## 2021-06-11 ASSESSMENT — LOCATION DETAILED DESCRIPTION DERM
LOCATION DETAILED: MIDDLE STERNUM
LOCATION DETAILED: RIGHT POSTERIOR SHOULDER
LOCATION DETAILED: LEFT RADIAL DORSAL HAND
LOCATION DETAILED: LEFT INFERIOR CENTRAL MALAR CHEEK
LOCATION DETAILED: RIGHT RADIAL DORSAL HAND
LOCATION DETAILED: LOWER STERNUM
LOCATION DETAILED: LEFT POSTERIOR SHOULDER
LOCATION DETAILED: UPPER STERNUM
LOCATION DETAILED: LEFT SUPERIOR MEDIAL LOWER BACK

## 2021-06-11 ASSESSMENT — LOCATION SIMPLE DESCRIPTION DERM
LOCATION SIMPLE: LEFT HAND
LOCATION SIMPLE: LEFT SHOULDER
LOCATION SIMPLE: RIGHT HAND
LOCATION SIMPLE: CHEST
LOCATION SIMPLE: RIGHT SHOULDER
LOCATION SIMPLE: LEFT LOWER BACK
LOCATION SIMPLE: LEFT CHEEK

## 2021-06-11 ASSESSMENT — LOCATION ZONE DERM
LOCATION ZONE: FACE
LOCATION ZONE: ARM
LOCATION ZONE: TRUNK
LOCATION ZONE: HAND

## 2021-06-14 RX ORDER — BLOOD-GLUCOSE METER
1 KIT MISCELLANEOUS
Qty: 100 STRIP | Refills: 6 | Status: SHIPPED | OUTPATIENT
Start: 2021-06-14 | End: 2022-03-22 | Stop reason: SDUPTHER

## 2021-06-24 DIAGNOSIS — Z79.899 ON POTASSIUM WASTING DIURETIC THERAPY: ICD-10-CM

## 2021-06-24 RX ORDER — POTASSIUM CHLORIDE 750 MG/1
10 TABLET, FILM COATED, EXTENDED RELEASE ORAL
Qty: 14 TABLET | Refills: 0 | Status: SHIPPED | OUTPATIENT
Start: 2021-06-24 | End: 2021-07-26 | Stop reason: SDUPTHER

## 2021-06-28 RX ORDER — TOPIRAMATE 25 MG/1
TABLET ORAL
Qty: 30 TABLET | Refills: 0 | Status: SHIPPED | OUTPATIENT
Start: 2021-06-28 | End: 2021-07-29 | Stop reason: SDUPTHER

## 2021-06-28 NOTE — TELEPHONE ENCOUNTER
Received request via: Pharmacy    Was the patient seen in the last year in this department? Yes    LV 06/01/21  FV 07/12/21  Does the patient have an active prescription (recently filled or refills available) for medication(s) requested? Yes

## 2021-07-02 DIAGNOSIS — E66.01 MORBID OBESITY WITH BMI OF 40.0-44.9, ADULT (HCC): ICD-10-CM

## 2021-07-06 RX ORDER — PHENTERMINE HYDROCHLORIDE 37.5 MG/1
18.75 TABLET ORAL
Qty: 15 TABLET | Refills: 0 | Status: SHIPPED | OUTPATIENT
Start: 2021-07-06 | End: 2021-07-29 | Stop reason: SDUPTHER

## 2021-07-06 NOTE — TELEPHONE ENCOUNTER
Received request via: Pharmacy    Was the patient seen in the last year in this department? Yes    LV     6/1/21  FV     7/12/21    Does the patient have an active prescription (recently filled or refills available) for medication(s) requested? yes

## 2021-07-21 DIAGNOSIS — E89.0 POSTSURGICAL HYPOTHYROIDISM: ICD-10-CM

## 2021-07-21 LAB
ALBUMIN SERPL-MCNC: 5 G/DL (ref 3.7–4.7)
ALBUMIN/CREAT UR: 23 MG/G CREAT (ref 0–29)
ALBUMIN/GLOB SERPL: 2.2 {RATIO} (ref 1.2–2.2)
ALP SERPL-CCNC: 88 IU/L (ref 48–121)
ALT SERPL-CCNC: 26 IU/L (ref 0–32)
AST SERPL-CCNC: 24 IU/L (ref 0–40)
BASOPHILS # BLD AUTO: 0.1 X10E3/UL (ref 0–0.2)
BASOPHILS NFR BLD AUTO: 1 %
BILIRUB SERPL-MCNC: 0.5 MG/DL (ref 0–1.2)
BUN SERPL-MCNC: 31 MG/DL (ref 8–27)
BUN/CREAT SERPL: 43 (ref 12–28)
CALCIUM SERPL-MCNC: 9.6 MG/DL (ref 8.7–10.3)
CHLORIDE SERPL-SCNC: 104 MMOL/L (ref 96–106)
CHOLEST SERPL-MCNC: 144 MG/DL (ref 100–199)
CO2 SERPL-SCNC: 25 MMOL/L (ref 20–29)
CREAT SERPL-MCNC: 0.72 MG/DL (ref 0.57–1)
CREAT UR-MCNC: 118.2 MG/DL
EOSINOPHIL # BLD AUTO: 0.1 X10E3/UL (ref 0–0.4)
EOSINOPHIL NFR BLD AUTO: 2 %
ERYTHROCYTE [DISTWIDTH] IN BLOOD BY AUTOMATED COUNT: 12.9 % (ref 11.7–15.4)
GLOBULIN SER CALC-MCNC: 2.3 G/DL (ref 1.5–4.5)
GLUCOSE SERPL-MCNC: 136 MG/DL (ref 65–99)
HBA1C MFR BLD: 7.1 % (ref 4.8–5.6)
HCT VFR BLD AUTO: 47.6 % (ref 34–46.6)
HDLC SERPL-MCNC: 42 MG/DL
HGB BLD-MCNC: 15.7 G/DL (ref 11.1–15.9)
IMM GRANULOCYTES # BLD AUTO: 0 X10E3/UL (ref 0–0.1)
IMM GRANULOCYTES NFR BLD AUTO: 0 %
IMMATURE CELLS  115398: ABNORMAL
LABORATORY COMMENT REPORT: NORMAL
LDLC SERPL CALC-MCNC: 82 MG/DL (ref 0–99)
LYMPHOCYTES # BLD AUTO: 2.5 X10E3/UL (ref 0.7–3.1)
LYMPHOCYTES NFR BLD AUTO: 31 %
MCH RBC QN AUTO: 29.1 PG (ref 26.6–33)
MCHC RBC AUTO-ENTMCNC: 33 G/DL (ref 31.5–35.7)
MCV RBC AUTO: 88 FL (ref 79–97)
MICROALBUMIN UR-MCNC: 26.6 UG/ML
MONOCYTES # BLD AUTO: 0.7 X10E3/UL (ref 0.1–0.9)
MONOCYTES NFR BLD AUTO: 9 %
MORPHOLOGY BLD-IMP: ABNORMAL
NEUTROPHILS # BLD AUTO: 4.5 X10E3/UL (ref 1.4–7)
NEUTROPHILS NFR BLD AUTO: 57 %
NRBC BLD AUTO-RTO: ABNORMAL %
PLATELET # BLD AUTO: 239 X10E3/UL (ref 150–450)
POTASSIUM SERPL-SCNC: 4 MMOL/L (ref 3.5–5.2)
PROT SERPL-MCNC: 7.3 G/DL (ref 6–8.5)
RBC # BLD AUTO: 5.39 X10E6/UL (ref 3.77–5.28)
SODIUM SERPL-SCNC: 143 MMOL/L (ref 134–144)
TRIGL SERPL-MCNC: 109 MG/DL (ref 0–149)
TSH SERPL DL<=0.005 MIU/L-ACNC: 4.02 UIU/ML (ref 0.45–4.5)
VLDLC SERPL CALC-MCNC: 20 MG/DL (ref 5–40)
WBC # BLD AUTO: 7.9 X10E3/UL (ref 3.4–10.8)

## 2021-07-21 RX ORDER — LEVOTHYROXINE SODIUM 0.15 MG/1
150 TABLET ORAL
Qty: 30 TABLET | Refills: 0 | Status: SHIPPED | OUTPATIENT
Start: 2021-07-21 | End: 2021-07-21 | Stop reason: SDUPTHER

## 2021-07-21 RX ORDER — LEVOTHYROXINE SODIUM 0.15 MG/1
150 TABLET ORAL
Qty: 90 TABLET | Refills: 3 | Status: SHIPPED | OUTPATIENT
Start: 2021-07-21 | End: 2022-09-19 | Stop reason: SDUPTHER

## 2021-07-21 NOTE — TELEPHONE ENCOUNTER
Please let the patient know she must complete her lab testing as that includes her thyroid test.  I can only renew 1 month of her medication until she gets that testing completed.

## 2021-07-21 NOTE — TELEPHONE ENCOUNTER
Patient did complete her testing yesterday.  TSH is in the normal range.  We will continue her current dosing.  She has an follow-up appointment next week.

## 2021-07-22 ASSESSMENT — ENCOUNTER SYMPTOMS
SHORTNESS OF BREATH: 0
DIZZINESS: 0
COUGH: 0
NAUSEA: 0
DEPRESSION: 0
PND: 0
PALPITATIONS: 0

## 2021-07-23 ENCOUNTER — OFFICE VISIT (OUTPATIENT)
Dept: CARDIOLOGY | Facility: MEDICAL CENTER | Age: 76
End: 2021-07-23
Payer: MEDICARE

## 2021-07-23 VITALS
HEIGHT: 62 IN | DIASTOLIC BLOOD PRESSURE: 74 MMHG | WEIGHT: 225 LBS | SYSTOLIC BLOOD PRESSURE: 132 MMHG | BODY MASS INDEX: 41.41 KG/M2 | RESPIRATION RATE: 14 BRPM | OXYGEN SATURATION: 95 % | HEART RATE: 98 BPM

## 2021-07-23 DIAGNOSIS — E11.8 CONTROLLED TYPE 2 DIABETES MELLITUS WITH COMPLICATION, WITHOUT LONG-TERM CURRENT USE OF INSULIN (HCC): ICD-10-CM

## 2021-07-23 DIAGNOSIS — I10 ESSENTIAL HYPERTENSION: ICD-10-CM

## 2021-07-23 DIAGNOSIS — E66.01 MORBID OBESITY WITH BMI OF 40.0-44.9, ADULT (HCC): ICD-10-CM

## 2021-07-23 DIAGNOSIS — I35.1 NONRHEUMATIC AORTIC VALVE INSUFFICIENCY: ICD-10-CM

## 2021-07-23 DIAGNOSIS — I87.2 VENOUS INSUFFICIENCY: ICD-10-CM

## 2021-07-23 DIAGNOSIS — Z79.899 ON POTASSIUM WASTING DIURETIC THERAPY: ICD-10-CM

## 2021-07-23 PROCEDURE — 99214 OFFICE O/P EST MOD 30 MIN: CPT | Performed by: PHYSICIAN ASSISTANT

## 2021-07-23 RX ORDER — ATORVASTATIN CALCIUM 20 MG/1
20 TABLET, FILM COATED ORAL DAILY
Qty: 90 TABLET | Refills: 3 | Status: SHIPPED | OUTPATIENT
Start: 2021-07-23 | End: 2022-05-23

## 2021-07-23 ASSESSMENT — ENCOUNTER SYMPTOMS
CHILLS: 0
FEVER: 0
ORTHOPNEA: 1

## 2021-07-23 ASSESSMENT — FIBROSIS 4 INDEX: FIB4 SCORE: 1.5

## 2021-07-23 NOTE — PATIENT INSTRUCTIONS
Please finish your Simvastatin prescription then STOP and START Lipitor (atorvastatin).   Do not take these medications together.

## 2021-07-23 NOTE — PROGRESS NOTES
Chief Complaint   Patient presents with   • Hypertension     F/V Dx: Essential hypertension   • Dyslipidemia     F/V Dx: Dyslipidemia, goal LDL below 100       Subjective:   Ragini Reaves is a 76 y.o. female who presents today for annual follow-up.    Patient of Dr. Pereira.  Current medical problems include HFpEF, diabetes without insulin use, obstructive sleep apnea on CPAP, hypothyroidism, hypertension, dyslipidemia, obesity, recent diagnosis of breast cancer.     Ragini follows with Dr. Mendoza, started using phentermine for weight loss. Has lost about 10lbs. Blood pressures at MD offices are about 120 or below.     Ragini is minimal activity/exercise.  She walks in grocery store, but is unable to go up stairs due to muscle weakness.  She denies exertional chest pressure, arm or jaw pain.    She takes spironolactone and Lasix daily and rarely notices leg swelling, sometimes it occurs after she has been on her feet for a long time.  She sleeps on an incline due to difficulty getting deep breaths at night.  This is been longstanding since she was 13.    Sews [employer] as a hobby.     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   • Ringing in ears    • Shortness of breath    • Sleep apnea     cpap   • Snoring    • Tonsillitis    • Urinary incontinence    • Uterine fibroid    • Wears glasses      Past Surgical History:   Procedure Laterality Date   • MASTECTOMY Right 3/28/2019    Procedure: MASTECTOMY - RE-EXCISION RIGHT BREAST BIOPSY SITE FOR MARGINS;  Surgeon: Karuna Hussein M.D.;  Location: SURGERY SAME DAY [facility];  Service: General   • BREAST  BIOPSY Right 3/13/2019    Procedure: BREAST BIOPSY - WIRE LOCALIZED;  Surgeon: Karuna Hussein M.D.;  Location: SURGERY [facility];  Service: Gen Robotic   • COLONOSCOPY WITH BIOPSY  5/21/2013    Performed by Mauro Garcia M.D. at ENDOSCOPY [facility]   • THYROIDECTOMY  2000   • TONSILLECTOMY       Family History   Problem Relation Age of Onset   • Lung Disease Brother    • Cancer Mother         pelvic   • Stroke Maternal Grandmother      Social History     Socioeconomic History   • Marital status: Single     Spouse name: Not on file   • Number of children: Not on file   • Years of education: Not on file   • Highest education level: Not on file   Occupational History   • Not on file   Tobacco Use   • Smoking status: Never Smoker   • Smokeless tobacco: Never Used   Vaping Use   • Vaping Use: Never used   Substance and Sexual Activity   • Alcohol use: No     Alcohol/week: 0.0 oz   • Drug use: No   • Sexual activity: Not Currently   Other Topics Concern   • Not on file   Social History Narrative   • Not on file     Social Determinants of Health     Financial Resource Strain:    • Difficulty of Paying Living Expenses:    Food Insecurity:    • Worried About Running Out of Food in the Last Year:    • Ran Out of Food in the Last Year:    Transportation Needs:    • Lack of Transportation (Medical):    • Lack of Transportation (Non-Medical):    Physical Activity:    • Days of Exercise per Week:    • Minutes of Exercise per Session:    Stress:    • Feeling of Stress :    Social Connections:    • Frequency of Communication with Friends and Family:    • Frequency of Social Gatherings with Friends and Family:    • Attends [employer] Services:    • Active Member of Clubs or Organizations:    • Attends Club or Organization Meetings:    • Marital Status:    Intimate Partner Violence:    • Fear of Current or Ex-Partner:    • Emotionally Abused:    • Physically Abused:    • Sexually Abused:      Allergies   Allergen Reactions    • Asa [Aspirin]      Stomach bleeding.   Tolerates low dose ASA   • Cough Syrup M [Cough Cold-Flu Relief] Swelling     Severe facial swelling   • Dextromethorphan Swelling     Facial swellling     Outpatient Encounter Medications as of 7/23/2021   Medication Sig Dispense Refill   • atorvastatin (LIPITOR) 20 MG Tab Take 1 tablet by mouth every day. 90 tablet 3   • levothyroxine (SYNTHROID) 150 MCG Tab Take 1 tablet by mouth every morning on an empty stomach. 90 tablet 3   • phentermine (ADIPEX-P) 37.5 MG tablet TAKE 0.5 TABLETS BY MOUTH EVERY MORNING BEFORE BREAKFAST FOR 30 DAYS 15 tablet 0   • topiramate (TOPAMAX) 25 MG Tab TAKE 1 TABLET BY MOUTH EVERY DAY WITH PHENTERMINE 30 tablet 0   • potassium chloride ER (KLOR-CON 10) 10 MEQ tablet Take 1 tablet by mouth every day. Please call [phone] for a follow up visit for further refills. 14 tablet 0   • glucose blood (FREESTYLE LITE) strip 1 Strip by Other route 1 time a day as needed. 100 Strip 6   • metFORMIN (GLUCOPHAGE) 500 MG Tab TAKE 1 TABLET BY MOUTH TWICE A DAY WITH MEALS 180 tablet 3   • TRADJENTA 5 MG Tab tablet TAKE 1 TABLET BY MOUTH EVERY DAY 90 tablet 3   • furosemide (LASIX) 40 MG Tab TAKE 1 TABLET BY MOUTH EVERY DAY IN THE MORNING 90 Tab 2   • spironolactone (ALDACTONE) 25 MG Tab TAKE 1 TABLET BY MOUTH EVERY DAY 90 Tab 2   • losartan (COZAAR) 25 MG Tab Take 1 Tab by mouth every day. 90 Tab 3   • FreeStyle Lancets Misc 1 EACH BY DOES NOT APPLY ROUTE 2 TIMES A DAY. 100 Each 6   • risedronate (ACTONEL) 35 MG Tab      • betamethasone, augmented, (DIPROLENE) 0.05 % gel Apply 1 Application to affected area(s) every bedtime. 50 g 2   • ciclopirox (LOPROX) 0.77 % cream Twice daily to the affected area under the breast, on abdomen for 1-2 weeks as needed (use first) 45 g 2   • hydrocortisone 2.5 % Cream topical cream Apply 1 Application to affected area(s) 2 times a day. To area under breast (use second) as needed for redness/itching/burning 1 Tube 1   •  "letrozole (FEMARA) 2.5 MG Tab Take 2.5 mg by mouth every day.     • Fiber Powder Take 1 Package by mouth every day.     • Probiotic Product (PROBIOTIC DAILY PO) Take 1 Package by mouth every day.     • docusate sodium (COLACE) 100 MG Cap Take 200 mg by mouth every day.     • acetaminophen (TYLENOL) 500 MG Tab Take 500 mg by mouth 2 Times a Day.     • NON SPECIFIED Take 300 mg by mouth every day. CISTANCHE     • CALCIUM-MAGNESIUM-VITAMIN D PO Take 1 Tab by mouth every day.     • BIOTIN PO Take 1 Tab by mouth every day.     • COLLAGEN PO Take 1 Tab by mouth every day.     • Glucosamine-Chondroitin (GLUCOSAMINE CHONDR COMPLEX PO) Take 750 mg by mouth 2 Times a Day.     • Lutein 10 MG Tab Take 10 mg by mouth every evening.     • aspirin 81 MG tablet Take 81 mg by mouth every morning. 90 Tab 3   • coenzyme Q-10 30 MG capsule Take 1 Cap by mouth every day. 30 Cap 11   • ascorbic acid (VITAMIN C) 500 MG tablet Take 1 Tab by mouth every day. 30 Tab 11   • Multiple Vitamins-Minerals (ULTRA WOMENS PACK) Misc Take 1 Tab by mouth every day. 30 Each 11   • [DISCONTINUED] simvastatin (ZOCOR) 5 MG Tab TAKE 1 TABLET BY MOUTH EVERY DAY IN THE EVENING 90 Tab 3     No facility-administered encounter medications on file as of 7/23/2021.     Review of Systems   Constitutional: Negative for chills and fever.        No unexpected weight changes   Respiratory: Negative for cough and shortness of breath.    Cardiovascular: Positive for orthopnea. Negative for chest pain, palpitations, leg swelling (well controlled on lasix) and PND.   Gastrointestinal: Negative for nausea.   Neurological: Negative for dizziness.   Psychiatric/Behavioral: Negative for depression.        Objective:   /74 (BP Location: Left arm, Patient Position: Sitting, BP Cuff Size: Adult)   Pulse 98   Resp 14   Ht 1.575 m (5' 2\")   Wt 102 kg (225 lb)   LMP  (LMP Unknown)   SpO2 95%   BMI 41.15 kg/m²     Physical Exam   Constitutional: She is oriented to " person, place, and time. She appears well-developed. No distress.   HENT:   Head: Normocephalic and atraumatic.   Eyes: Conjunctivae are normal. No scleral icterus.   Neck: No JVD present.   JVP 7-8   Cardiovascular: Normal rate and regular rhythm.   No murmur heard.  Sternal scar   Pulmonary/Chest: Effort normal and breath sounds normal. No respiratory distress. She has no wheezes. She has no rales.   Musculoskeletal:      Comments: No pitting edema   Neurological: She is alert and oriented to person, place, and time. No cranial nerve deficit.   Skin: Skin is warm and dry. She is not diaphoretic. No pallor.   Hyperpigmentation of the left pretibial area.  Prominent varicosities in bilateral ankles, lower leg     Psychiatric: Judgment normal.     PET 12/13/2017:  CONCLUSIONS AND IMPRESSIONS:  1.  Negative cardiac PET scan.  No evidence of ischemia or infarction.  2.  Resting ejection fraction of 84%, stress ejection fraction of 91%.  No   segmental wall motion abnormalities noted.  3.  No significant ST segment changes.  Occasional PVCs noted.  4.  Shortness of breath was experienced in this study.     TTE 12/2017:  CONCLUSIONS  1. Normal right and left ventricular size and function.   2. Normal regional wall motion.  3. Mild to moderate aortic insufficiency which is eccentric and   directed anteriorly. Mild aortic valve calcification.   4. Normal estimated right atrial pressure.     No prior study is available for comparison.     Assessment:     1. Nonrheumatic aortic valve insufficiency  EC-ECHOCARDIOGRAM LTD W/O CONT   2. Morbid obesity with BMI of 40.0-44.9, adult ([facility])     3. On potassium wasting diuretic therapy     4. Essential hypertension     5. Venous insufficiency     6. Controlled type 2 diabetes mellitus with complication, without long-term current use of insulin ([facility])         Medical Decision Making:  Today's Assessment / Status / Plan:   HFpEF vs venous insufficiency  - exam consistent with venous  insufficiency   - no HF exacerbations, if starts having multiple hospitalizations might be a good candidate for PAp monitor  - continue lasix 40mg with klor con 10meq, extra dose PRN, labs with her PCP q 6mo  - rodrigo 25mg    DMtype 2, controlled   Per Dr. Marques, last hgbA1c 7.1, improving with diet control  - Metformin, linagliptin    Hypertension, controlled  - on phentermine which can increase BP, letter provided to patient to obtain home BP cuff to monitor  -continue spironolactone, lasix, losartan.        Dyslipidemia, controlled  - simvastatin--> change to atorva given her DM.   - LDL well controlled     Obesity, BMI 42  -Continue weight loss efforts with Dr. Mendoza       Aortic insufficiency  - check limited echo for progression   - if progression would recommend stopping phentermine      Plan: echo for AI. Continue BMP q6mo with PCP. RTC in 1 yr, sooner if problems.

## 2021-07-23 NOTE — LETTER
July 23, 2021        Ragini Reaves    This patient has a medical diagnosis that requires her to monitor blood pressures at home.     Please provide this patient with a automated blood pressure cuff.                             Martha Faith P.A.-C.

## 2021-07-26 ENCOUNTER — OFFICE VISIT (OUTPATIENT)
Dept: MEDICAL GROUP | Facility: MEDICAL CENTER | Age: 76
End: 2021-07-26
Payer: MEDICARE

## 2021-07-26 VITALS
HEART RATE: 97 BPM | OXYGEN SATURATION: 94 % | BODY MASS INDEX: 41.22 KG/M2 | DIASTOLIC BLOOD PRESSURE: 64 MMHG | HEIGHT: 62 IN | TEMPERATURE: 96.7 F | SYSTOLIC BLOOD PRESSURE: 108 MMHG | RESPIRATION RATE: 16 BRPM | WEIGHT: 224 LBS

## 2021-07-26 DIAGNOSIS — K76.0 FATTY LIVER: ICD-10-CM

## 2021-07-26 DIAGNOSIS — R91.8 OPACITIES OF BOTH LUNGS PRESENT ON CHEST X-RAY: ICD-10-CM

## 2021-07-26 DIAGNOSIS — I50.32 CHRONIC DIASTOLIC CONGESTIVE HEART FAILURE (HCC): ICD-10-CM

## 2021-07-26 DIAGNOSIS — E11.8 CONTROLLED TYPE 2 DIABETES MELLITUS WITH COMPLICATION, WITHOUT LONG-TERM CURRENT USE OF INSULIN (HCC): ICD-10-CM

## 2021-07-26 DIAGNOSIS — Z79.899 ON POTASSIUM WASTING DIURETIC THERAPY: ICD-10-CM

## 2021-07-26 DIAGNOSIS — D05.11 DUCTAL CARCINOMA IN SITU (DCIS) OF RIGHT BREAST: ICD-10-CM

## 2021-07-26 DIAGNOSIS — G47.33 OBSTRUCTIVE SLEEP APNEA: ICD-10-CM

## 2021-07-26 DIAGNOSIS — E78.5 DYSLIPIDEMIA, GOAL LDL BELOW 100: ICD-10-CM

## 2021-07-26 DIAGNOSIS — E11.9 DM TYPE 2, GOAL HBA1C < 7.5% (HCC): ICD-10-CM

## 2021-07-26 DIAGNOSIS — I10 ESSENTIAL HYPERTENSION: ICD-10-CM

## 2021-07-26 DIAGNOSIS — R06.02 EXERTIONAL SHORTNESS OF BREATH: ICD-10-CM

## 2021-07-26 DIAGNOSIS — E66.01 MORBID OBESITY WITH BMI OF 40.0-44.9, ADULT (HCC): ICD-10-CM

## 2021-07-26 DIAGNOSIS — E03.2 HYPOTHYROIDISM DUE TO NON-MEDICATION EXOGENOUS SUBSTANCES: ICD-10-CM

## 2021-07-26 DIAGNOSIS — C73 PRIMARY THYROID CANCER (HCC): ICD-10-CM

## 2021-07-26 PROCEDURE — 99214 OFFICE O/P EST MOD 30 MIN: CPT | Performed by: FAMILY MEDICINE

## 2021-07-26 RX ORDER — POTASSIUM CHLORIDE 750 MG/1
10 TABLET, FILM COATED, EXTENDED RELEASE ORAL
Qty: 90 TABLET | Refills: 3 | Status: SHIPPED | OUTPATIENT
Start: 2021-07-26 | End: 2022-05-23

## 2021-07-26 RX ORDER — SPIRONOLACTONE 25 MG/1
25 TABLET ORAL
Qty: 90 TABLET | Refills: 3 | Status: SHIPPED | OUTPATIENT
Start: 2021-07-26 | End: 2022-06-28

## 2021-07-26 RX ORDER — FUROSEMIDE 40 MG/1
40 TABLET ORAL EVERY MORNING
Qty: 90 TABLET | Refills: 3 | Status: SHIPPED | OUTPATIENT
Start: 2021-07-26 | End: 2022-05-23

## 2021-07-26 ASSESSMENT — FIBROSIS 4 INDEX: FIB4 SCORE: 1.5

## 2021-07-26 NOTE — LETTER
July 26, 2021         Ragini Reaves  [address]  [address]  [place] [address]      Dear Ragini:    Below are the results from your recent visit:  Resulted Orders   CBC WITH DIFFERENTIAL   Result Value Ref Range    WBC 7.9 3.4 - 10.8 x10E3/uL    RBC 5.39 (H) 3.77 - 5.28 x10E6/uL    Hemoglobin 15.7 11.1 - 15.9 g/dL    Hematocrit 47.6 (H) 34.0 - 46.6 %    MCV 88 79 - 97 fL    MCH 29.1 26.6 - 33.0 pg    MCHC 33.0 31.5 - 35.7 g/dL    RDW 12.9 11.7 - 15.4 %    Platelet Count 239 150 - 450 x10E3/uL    Neutrophils-Polys 57 Not Estab. %    Lymphocytes 31 Not Estab. %    Monocytes 9 Not Estab. %    Eosinophils 2 Not Estab. %    Basophils 1 Not Estab. %    Neutrophils (Absolute) 4.5 1.4 - 7.0 x10E3/uL    Lymphs (Absolute) 2.5 0.7 - 3.1 x10E3/uL    Monos (Absolute) 0.7 0.1 - 0.9 x10E3/uL    Eos (Absolute) 0.1 0.0 - 0.4 x10E3/uL    Baso (Absolute) 0.1 0.0 - 0.2 x10E3/uL    Immature Granulocytes 0 Not Estab. %    Immature Granulocytes (abs) 0.0 0.0 - 0.1 x10E3/uL   COMP METABOLIC PANEL   Result Value Ref Range    Glucose 136 (H) 65 - 99 mg/dL    Bun 31 (H) 8 - 27 mg/dL    Creatinine 0.72 0.57 - 1.00 mg/dL    GFR If Non  82 >59 mL/min/1.73    GFR If  94 >59 mL/min/1.73    Bun-Creatinine Ratio 43 (H) 12 - 28    Sodium 143 134 - 144 mmol/L    Potassium 4.0 3.5 - 5.2 mmol/L    Chloride 104 96 - 106 mmol/L    Co2 25 20 - 29 mmol/L    Calcium 9.6 8.7 - 10.3 mg/dL    Total Protein 7.3 6.0 - 8.5 g/dL    Albumin 5.0 (H) 3.7 - 4.7 g/dL    Globulin 2.3 1.5 - 4.5 g/dL    A-G Ratio 2.2 1.2 - 2.2    Total Bilirubin 0.5 0.0 - 1.2 mg/dL    Alkaline Phosphatase 88 48 - 121 IU/L    AST(SGOT) 24 0 - 40 IU/L    ALT(SGPT) 26 0 - 32 IU/L   LIPID PANEL   Result Value Ref Range    Cholesterol,Tot 144 100 - 199 mg/dL    Triglycerides 109 0 - 149 mg/dL    HDL 42 >39 mg/dL    VLDL Cholesterol Calc 20 5 - 40 mg/dL    LDL Chol Calc (NIH) 82 0 - 99 mg/dL   MICROALB/CREAT RATIO RAND. UR   Result Value Ref Range     Creatinine, Random Urine 118.2 Not Estab. mg/dL    Microalbumin, Urine Random 26.6 Not Estab. ug/mL    Albumin / Creatinine Ratio 23 0 - 29 mg/g creat      Comment:      Normal:                0 -  29  Moderately increased: 30 - 300  Severely increased:       >300   HEMOGLOBIN A1C   Result Value Ref Range    Glycohemoglobin 7.1 (H) 4.8 - 5.6 %      Comment:      Prediabetes: 5.7 - 6.4  Diabetes: >6.4  Glycemic control for adults with diabetes: <7.0   TSH   Result Value Ref Range    TSH 4.020 0.450 - 4.500 uIU/mL     The test results show that your diabetes control is fairly good.  Your thyroid result is in the normal range.  Your blood count shows no anemia or sign of infection.  Platelets are normal.  Your blood sugar is consistent with your diabetes.  Your kidney function is good.  Your liver enzymes are normal.  Your cholesterol result is excellent.  Your urine protein is normal.    If you have any questions or concerns, please don't hesitate to call.    Sincerely,      Chapito Marques M.D.    Electronically Signed

## 2021-07-26 NOTE — PROGRESS NOTES
Diabetes Focused Exam:    Chief Complaint   Patient presents with   • Diabetes Mellitus   • Hypertension   • Hyperlipidemia      Subjective:   HPI  Ragini Reaves is a 76 y.o. female who presents for follow up of chronic conditions of diabetes mellitus, hypertension and hyperlipidemia. She indicates that she is feeling well and denies any symptoms referable to the above diagnoses. Specifically denies chest pain, palpitations, dyspnea, orthopnea, PND or peripheral edema. Also denies polyuria, polydipsia, urinary complaints, abdominal complaints, myalgias, numbness, weakness or other related symptoms.     Breast cancer treated with letrazole.  History of malignant melanoma.    The patient is taking ASA every day and taking all other medications as prescribed. Patient denies any side effects of medication.  DM: A1c goal <7.5%  Glucose monitoring frequency: daily   Fasting sugars: 107-158. Usually 120-135  Post-prandial sugars: not checked  Hypoglycemic episodes none noted  Diabetic complications: none  ACR Albumin/Creatinine Ratio goal <30   HTN: Blood pressure goal <140/<80. Currently Rx ACE/ARB: Yes  Hyperlipidemia:Cholesterol goal LDL <100, total/HDL <5. Currently Rx Statin: Yes  Last eye exam due December.   Denies visual blurring, double vision, eye pain and floaters  Last monofilament foot exam: 1/2021  Denies foot pain, numbness, calluses, ulcers    See medications and orders placed in encounter report.  Past medical history, family history, social history reviewed and updated as documented in medical record.    Current medications including changes today:  Current Outpatient Medications   Medication Sig Dispense Refill   • potassium chloride ER (KLOR-CON 10) 10 MEQ tablet Take 1 tablet by mouth every day. Please call [phone] for a follow up visit for further refills. 90 tablet 3   • atorvastatin (LIPITOR) 20 MG Tab Take 1 tablet by mouth every day. 90 tablet 3   • levothyroxine (SYNTHROID) 150 MCG  Tab Take 1 tablet by mouth every morning on an empty stomach. 90 tablet 3   • phentermine (ADIPEX-P) 37.5 MG tablet TAKE 0.5 TABLETS BY MOUTH EVERY MORNING BEFORE BREAKFAST FOR 30 DAYS 15 tablet 0   • topiramate (TOPAMAX) 25 MG Tab TAKE 1 TABLET BY MOUTH EVERY DAY WITH PHENTERMINE 30 tablet 0   • glucose blood (FREESTYLE LITE) strip 1 Strip by Other route 1 time a day as needed. 100 Strip 6   • metFORMIN (GLUCOPHAGE) 500 MG Tab TAKE 1 TABLET BY MOUTH TWICE A DAY WITH MEALS 180 tablet 3   • TRADJENTA 5 MG Tab tablet TAKE 1 TABLET BY MOUTH EVERY DAY 90 tablet 3   • furosemide (LASIX) 40 MG Tab TAKE 1 TABLET BY MOUTH EVERY DAY IN THE MORNING 90 Tab 2   • spironolactone (ALDACTONE) 25 MG Tab TAKE 1 TABLET BY MOUTH EVERY DAY 90 Tab 2   • losartan (COZAAR) 25 MG Tab Take 1 Tab by mouth every day. 90 Tab 3   • FreeStyle Lancets Misc 1 EACH BY DOES NOT APPLY ROUTE 2 TIMES A DAY. 100 Each 6   • risedronate (ACTONEL) 35 MG Tab      • betamethasone, augmented, (DIPROLENE) 0.05 % gel Apply 1 Application to affected area(s) every bedtime. 50 g 2   • ciclopirox (LOPROX) 0.77 % cream Twice daily to the affected area under the breast, on abdomen for 1-2 weeks as needed (use first) 45 g 2   • hydrocortisone 2.5 % Cream topical cream Apply 1 Application to affected area(s) 2 times a day. To area under breast (use second) as needed for redness/itching/burning 1 Tube 1   • letrozole (FEMARA) 2.5 MG Tab Take 2.5 mg by mouth every day.     • Fiber Powder Take 1 Package by mouth every day.     • Probiotic Product (PROBIOTIC DAILY PO) Take 1 Package by mouth every day.     • docusate sodium (COLACE) 100 MG Cap Take 200 mg by mouth every day.     • acetaminophen (TYLENOL) 500 MG Tab Take 500 mg by mouth 2 Times a Day.     • NON SPECIFIED Take 300 mg by mouth every day. CISTANCHE     • CALCIUM-MAGNESIUM-VITAMIN D PO Take 1 Tab by mouth every day.     • BIOTIN PO Take 1 Tab by mouth every day.     • COLLAGEN PO Take 1 Tab by mouth every day.   "   • Glucosamine-Chondroitin (GLUCOSAMINE CHONDR COMPLEX PO) Take 750 mg by mouth 2 Times a Day.     • Lutein 10 MG Tab Take 10 mg by mouth every evening.     • aspirin 81 MG tablet Take 81 mg by mouth every morning. 90 Tab 3   • coenzyme Q-10 30 MG capsule Take 1 Cap by mouth every day. 30 Cap 11   • ascorbic acid (VITAMIN C) 500 MG tablet Take 1 Tab by mouth every day. 30 Tab 11   • Multiple Vitamins-Minerals (ULTRA WOMENS PACK) Misc Take 1 Tab by mouth every day. 30 Each 11     No current facility-administered medications for this visit.     Allergies:   Allergies   Allergen Reactions   • Asa [Aspirin]      Stomach bleeding.   Tolerates low dose ASA   • Cough Syrup M [Cough Cold-Flu Relief] Swelling     Severe facial swelling   • Dextromethorphan Swelling     Facial swellling      Social History     Tobacco Use   • Smoking status: Never Smoker   • Smokeless tobacco: Never Used   Vaping Use   • Vaping Use: Never used   Substance Use Topics   • Alcohol use: No     Alcohol/week: 0.0 oz   • Drug use: No     Exercise: walking, especially in the stores.  Health Maintenance/Immunizations: discussed, up to date    ROS  Pertinent  ROS findings as above.      Objective:     OBJECTIVE:  /64   Pulse 97   Temp 35.9 °C (96.7 °F) (Temporal)   Resp 16   Ht 1.575 m (5' 2\")   Wt 102 kg (224 lb)   LMP  (LMP Unknown)   SpO2 94%   BMI 40.97 kg/m²  Body mass index is 40.97 kg/m². BMI: severely obese  General: No apparent distress, conversant, cooperative and pleasant with the examination.  Psych: Alert and oriented x4, judgment and insight normal  Neck: No JVD or bruits, no adenopathy, supple  Thyroid: normal to inspection and palpation  Lungs: negative findings: normal respiratory rate and rhythm, lungs clear to auscultation  Heart: negative findings: regular rate and rhythm, S1 normal, S2 normal, systlic 2/6 murmur  Abdomen: Soft, nontender, no hepatosplenomegaly or masses, normal bowel sounds  Skin: No rashes, no " cyanosis, no lesions or ulcers  Extremities: No cyanosis clubbing or edema.     POC lab:   Lab Results   Component Value Date/Time    POCGLUCOSE 150 (H) 03/28/2019 07:07 AM          Last labs    Lab Results   Component Value Date/Time    CHOLSTRLTOT 144 07/20/2021 05:06 AM    LDL 75 05/15/2020 05:25 AM    HDL 42 07/20/2021 05:06 AM    TRIGLYCERIDE 109 07/20/2021 05:06 AM       Lab Results   Component Value Date/Time    SODIUM 143 07/20/2021 05:06 AM    SODIUM 139 03/11/2019 02:05 PM    POTASSIUM 4.0 07/20/2021 05:06 AM    POTASSIUM 3.7 03/11/2019 02:05 PM    GLUCOSE 136 (H) 07/20/2021 05:06 AM    GLUCOSE 133 (H) 03/11/2019 02:05 PM    BUNCREATRAT 43 (H) 07/20/2021 05:06 AM     Lab Results   Component Value Date/Time    HBA1C 7.1 (H) 07/20/2021 05:06 AM    HBA1C 7.5 (H) 03/10/2021 05:57 AM    HBA1C 7.3 (H) 09/21/2020 06:00 AM    HBA1C 7.0 (H) 03/11/2019 02:05 PM    HBA1C 8.7 (H) 09/08/2015 07:49 AM    HBA1C 8.4 (H) 05/05/2015 08:32 AM    HBA1C 9.4 (H) 01/06/2015 08:37 AM     Lab Results   Component Value Date/Time    MICRALB 26.6 07/20/2021 05:06 AM          Assessment/Plan:   Medications, refills, and referrals per orders.   1. Controlled type 2 diabetes mellitus with complication, without long-term current use of insulin (HCC)     2. DM type 2, goal HbA1c < 7.5% ([facility])     3. Essential hypertension     4. Dyslipidemia, goal LDL below 100     5. Hypothyroidism due to non-medication exogenous substances     6. Fatty liver     7. Morbid obesity with BMI of 40.0-44.9, adult (HCC)     8. Chronic diastolic congestive heart failure (HCC)     9. Ductal carcinoma in situ (DCIS) of right breast     10. On potassium wasting diuretic therapy  potassium chloride ER (KLOR-CON 10) 10 MEQ tablet   11. Opacities of both lungs present on chest x-ray     12. Exertional shortness of breath       DM2 A1c is at goal   Patient to monitor sugars: daily frequency  Discussed diet, exercise, disease management and weight loss goals.    Education and advise provided today:All medications, side effects and compliance (discussed carefully)  Annual eye examinations at Ophthalmology  Diabetic diet discussed in detail, written exchange diet given  Foot care discussed and Podiatry visits  Glycohemoglobin and other lab monitoring  Home glucose monitoring emphasized  Labs immediately prior to next visit  Long term diabetic complications  Low cholesterol diet, weight control and daily exercise    Followup:   Do labs and RTC 4 months, sooner should new symptoms or problems arise.

## 2021-07-29 DIAGNOSIS — E66.01 MORBID OBESITY WITH BMI OF 40.0-44.9, ADULT (HCC): ICD-10-CM

## 2021-07-29 NOTE — TELEPHONE ENCOUNTER
[facility] improvement program has closed down, pt will need her medication refilled. Insurance is also requesting for her TOPAMAX to be a 90 day quantity.    Please refill as you see fit.

## 2021-08-01 RX ORDER — TOPIRAMATE 25 MG/1
25 TABLET ORAL DAILY
Qty: 30 TABLET | Refills: 2 | Status: SHIPPED | OUTPATIENT
Start: 2021-08-01 | End: 2021-08-10

## 2021-08-01 RX ORDER — PHENTERMINE HYDROCHLORIDE 37.5 MG/1
18.75 TABLET ORAL
Qty: 15 TABLET | Refills: 5 | Status: SHIPPED | OUTPATIENT
Start: 2021-08-01 | End: 2022-02-14

## 2021-08-09 PROBLEM — C43.9 MALIGNANT MELANOMA (HCC): Status: ACTIVE | Noted: 2021-08-09

## 2021-08-10 DIAGNOSIS — E66.01 MORBID OBESITY WITH BMI OF 40.0-44.9, ADULT (HCC): ICD-10-CM

## 2021-08-10 RX ORDER — TOPIRAMATE 25 MG/1
25 TABLET ORAL DAILY
Qty: 90 TABLET | Refills: 2 | Status: SHIPPED | OUTPATIENT
Start: 2021-08-10 | End: 2022-06-28

## 2021-08-19 NOTE — PROGRESS NOTES
"  Non face to face prolonged services of clinical data and chart information reviewed for a total time of 30 minutes performed on 9/28 for Ragini Reaves    Reviewed were:    Referral    Previous encounters    Medications    Imaging    Previous procedures    Other Orders    Letters    Notes    Media    Miscellaneous reports    Patient summaries        Counseling, testing information and materials prepared    \"I spent 30 minutes  from 1620 to 1650 reviewing past records, prior to visit pertaining to genetic risk.\"     Ministerio Healy M.D.  Edited    " .

## 2021-09-15 NOTE — TELEPHONE ENCOUNTER
I researched this acct and yes, she does need to have a new in lab titration study because she failed her original 90 day compliance. You actually document in her 6/28/18 and her 8/30/18 notes that she is having difficulties.    Could you please add an addendum on to the 8/30 notes stating you will be ordering a new titration study due to failed usage compliance during the first 90 days.    Please also sign the order, the pt is already aware she needs this.  
Order signed and note addended  
Pt is scheduled for 11/5/18      Will need updated order and notes prior faxed to [facility] once it is completed  
16-Sep-2021 12:44

## 2021-10-08 ENCOUNTER — APPOINTMENT (RX ONLY)
Dept: URBAN - METROPOLITAN AREA CLINIC 4 | Facility: CLINIC | Age: 76
Setting detail: DERMATOLOGY
End: 2021-10-08

## 2021-10-08 DIAGNOSIS — L739 UNSPECIFIED DISEASE OF SEBACEOUS GLANDS: ICD-10-CM

## 2021-10-08 DIAGNOSIS — L21.8 OTHER SEBORRHEIC DERMATITIS: ICD-10-CM

## 2021-10-08 DIAGNOSIS — L82.1 OTHER SEBORRHEIC KERATOSIS: ICD-10-CM

## 2021-10-08 DIAGNOSIS — L82.0 INFLAMED SEBORRHEIC KERATOSIS: ICD-10-CM

## 2021-10-08 DIAGNOSIS — Z00.8 ENCOUNTER FOR OTHER GENERAL EXAMINATION: ICD-10-CM

## 2021-10-08 DIAGNOSIS — L98.8 OTHER SPECIFIED DISORDERS OF THE SKIN AND SUBCUTANEOUS TISSUE: ICD-10-CM

## 2021-10-08 DIAGNOSIS — D18.0 HEMANGIOMA: ICD-10-CM

## 2021-10-08 DIAGNOSIS — Z71.89 OTHER SPECIFIED COUNSELING: ICD-10-CM

## 2021-10-08 DIAGNOSIS — L81.4 OTHER MELANIN HYPERPIGMENTATION: ICD-10-CM

## 2021-10-08 DIAGNOSIS — Z85.820 PERSONAL HISTORY OF MALIGNANT MELANOMA OF SKIN: ICD-10-CM | Status: STABLE

## 2021-10-08 DIAGNOSIS — D22 MELANOCYTIC NEVI: ICD-10-CM

## 2021-10-08 DIAGNOSIS — L57.0 ACTINIC KERATOSIS: ICD-10-CM

## 2021-10-08 PROBLEM — D22.5 MELANOCYTIC NEVI OF TRUNK: Status: ACTIVE | Noted: 2021-10-08

## 2021-10-08 PROBLEM — D22.4 MELANOCYTIC NEVI OF SCALP AND NECK: Status: ACTIVE | Noted: 2021-10-08

## 2021-10-08 PROBLEM — D23.71 OTHER BENIGN NEOPLASM OF SKIN OF RIGHT LOWER LIMB, INCLUDING HIP: Status: ACTIVE | Noted: 2021-10-08

## 2021-10-08 PROBLEM — D48.5 NEOPLASM OF UNCERTAIN BEHAVIOR OF SKIN: Status: ACTIVE | Noted: 2021-10-08

## 2021-10-08 PROBLEM — D22.61 MELANOCYTIC NEVI OF RIGHT UPPER LIMB, INCLUDING SHOULDER: Status: ACTIVE | Noted: 2021-10-08

## 2021-10-08 PROBLEM — D18.01 HEMANGIOMA OF SKIN AND SUBCUTANEOUS TISSUE: Status: ACTIVE | Noted: 2021-10-08

## 2021-10-08 PROBLEM — D23.122 OTHER BENIGN NEOPLASM OF SKIN OF LEFT LOWER EYELID, INCLUDING CANTHUS: Status: ACTIVE | Noted: 2021-10-08

## 2021-10-08 PROCEDURE — ? TREATMENT REGIMEN

## 2021-10-08 PROCEDURE — 99213 OFFICE O/P EST LOW 20 MIN: CPT | Mod: 25

## 2021-10-08 PROCEDURE — 17000 DESTRUCT PREMALG LESION: CPT | Mod: 59

## 2021-10-08 PROCEDURE — ? LIQUID NITROGEN

## 2021-10-08 PROCEDURE — 17110 DESTRUCTION B9 LES UP TO 14: CPT

## 2021-10-08 PROCEDURE — ? SUNSCREEN RECOMMENDATIONS

## 2021-10-08 PROCEDURE — ? SUNSCREEN TREATMENT REGIMEN

## 2021-10-08 PROCEDURE — ? OBSERVATION

## 2021-10-08 PROCEDURE — ? COUNSELING

## 2021-10-08 PROCEDURE — ? REFERRAL CORRESPONDENCE

## 2021-10-08 ASSESSMENT — LOCATION SIMPLE DESCRIPTION DERM
LOCATION SIMPLE: RIGHT SHOULDER
LOCATION SIMPLE: LEFT HAND
LOCATION SIMPLE: CHEST
LOCATION SIMPLE: LEFT INFERIOR EYELID
LOCATION SIMPLE: LEFT SHOULDER
LOCATION SIMPLE: LEFT CHEEK
LOCATION SIMPLE: LEFT FOREHEAD
LOCATION SIMPLE: POSTERIOR SCALP
LOCATION SIMPLE: LEFT LOWER BACK
LOCATION SIMPLE: RIGHT UPPER ARM
LOCATION SIMPLE: RIGHT LIP
LOCATION SIMPLE: RIGHT HAND
LOCATION SIMPLE: SCALP

## 2021-10-08 ASSESSMENT — LOCATION DETAILED DESCRIPTION DERM
LOCATION DETAILED: RIGHT INFERIOR VERMILION LIP
LOCATION DETAILED: RIGHT POSTERIOR SHOULDER
LOCATION DETAILED: LOWER STERNUM
LOCATION DETAILED: LEFT RADIAL DORSAL HAND
LOCATION DETAILED: UPPER STERNUM
LOCATION DETAILED: LEFT INFERIOR CENTRAL MALAR CHEEK
LOCATION DETAILED: LEFT INFERIOR MEDIAL FOREHEAD
LOCATION DETAILED: LEFT SUPERIOR MEDIAL LOWER BACK
LOCATION DETAILED: LEFT INFERIOR FOREHEAD
LOCATION DETAILED: MIDDLE STERNUM
LOCATION DETAILED: LEFT LATERAL INFERIOR EYELID
LOCATION DETAILED: MID-OCCIPITAL SCALP
LOCATION DETAILED: RIGHT RADIAL DORSAL HAND
LOCATION DETAILED: LEFT CENTRAL FRONTAL SCALP
LOCATION DETAILED: LEFT CENTRAL MALAR CHEEK
LOCATION DETAILED: RIGHT ANTERIOR MEDIAL PROXIMAL UPPER ARM
LOCATION DETAILED: LEFT POSTERIOR SHOULDER

## 2021-10-08 ASSESSMENT — LOCATION ZONE DERM
LOCATION ZONE: LIP
LOCATION ZONE: FACE
LOCATION ZONE: EYELID
LOCATION ZONE: HAND
LOCATION ZONE: TRUNK
LOCATION ZONE: ARM
LOCATION ZONE: SCALP

## 2021-10-08 NOTE — PROCEDURE: MIPS QUALITY
Quality 226: Preventive Care And Screening: Tobacco Use: Screening And Cessation Intervention: Patient screened for tobacco use and is an ex/non-smoker
Quality 137: Melanoma: Continuity Of Care - Recall System: Patient information entered into a recall system that includes: target date for the next exam specified AND a process to follow up with patients regarding missed or unscheduled appointments
Detail Level: Detailed
Quality 130: Documentation Of Current Medications In The Medical Record: Current Medications Documented
Quality 111:Pneumonia Vaccination Status For Older Adults: Pneumococcal Vaccination Previously Received
Quality 138: Melanoma: Coordination Of Care: A treatment plan was communicated to the physicians providing continuing care within one month of diagnosis outlining: diagnosis, tumor thickness and a plan for surgery or alternate care.

## 2021-10-08 NOTE — PROCEDURE: COUNSELING
When Should The Patient Follow-Up For Their Next Full-Body Skin Exam?: 3 Months
Quality 137: Melanoma: Continuity Of Care - Recall System: Patient information entered into a recall system that includes: target date for the next exam specified AND a process to follow up with patients regarding missed or unscheduled appointments
Detail Level: Detailed
Detail Level: Generalized
Detail Level: Zone
Detail Level: Simple

## 2021-10-08 NOTE — PROCEDURE: LIQUID NITROGEN
Render Post-Care Instructions In Note?: no
Medical Necessity Clause: This procedure was medically necessary because the lesions that were treated were:
Show Topical Anesthesia Variable?: Yes
Duration Of Freeze Thaw-Cycle (Seconds): 10-15
Number Of Freeze-Thaw Cycles: 3 freeze-thaw cycles
Medical Necessity Information: It is in your best interest to select a reason for this procedure from the list below. All of these items fulfill various CMS LCD requirements except the new and changing color options.
Post-Care Instructions: I reviewed with the patient in detail post-care instructions. Patient is to wear sunprotection, and avoid picking at any of the treated lesions. Pt may apply Vaseline to crusted or scabbing areas.
Detail Level: Detailed
Consent: The patient's consent was obtained including but not limited to risks of crusting, scabbing, blistering, scarring, darker or lighter pigmentary change, recurrence, incomplete removal and infection.
Number Of Freeze-Thaw Cycles: 2 freeze-thaw cycles
Duration Of Freeze Thaw-Cycle (Seconds): 5

## 2021-10-18 DIAGNOSIS — I10 ESSENTIAL HYPERTENSION: ICD-10-CM

## 2021-10-18 RX ORDER — LOSARTAN POTASSIUM 25 MG/1
TABLET ORAL
Qty: 90 TABLET | Refills: 3 | Status: SHIPPED | OUTPATIENT
Start: 2021-10-18 | End: 2022-09-19 | Stop reason: SDUPTHER

## 2021-10-25 ENCOUNTER — OFFICE VISIT (OUTPATIENT)
Dept: SLEEP MEDICINE | Facility: MEDICAL CENTER | Age: 76
End: 2021-10-25
Payer: MEDICARE

## 2021-10-25 VITALS
BODY MASS INDEX: 39.38 KG/M2 | SYSTOLIC BLOOD PRESSURE: 122 MMHG | HEIGHT: 62 IN | WEIGHT: 214 LBS | HEART RATE: 97 BPM | DIASTOLIC BLOOD PRESSURE: 78 MMHG | OXYGEN SATURATION: 96 %

## 2021-10-25 DIAGNOSIS — F51.04 CHRONIC INSOMNIA: ICD-10-CM

## 2021-10-25 DIAGNOSIS — G47.33 OBSTRUCTIVE SLEEP APNEA: ICD-10-CM

## 2021-10-25 DIAGNOSIS — Z23 NEED FOR IMMUNIZATION AGAINST INFLUENZA: ICD-10-CM

## 2021-10-25 DIAGNOSIS — I10 ESSENTIAL HYPERTENSION: ICD-10-CM

## 2021-10-25 DIAGNOSIS — I50.32 CHRONIC DIASTOLIC CONGESTIVE HEART FAILURE (HCC): ICD-10-CM

## 2021-10-25 PROCEDURE — G0008 ADMIN INFLUENZA VIRUS VAC: HCPCS | Performed by: NURSE PRACTITIONER

## 2021-10-25 PROCEDURE — 99213 OFFICE O/P EST LOW 20 MIN: CPT | Mod: 25 | Performed by: NURSE PRACTITIONER

## 2021-10-25 PROCEDURE — 90662 IIV NO PRSV INCREASED AG IM: CPT | Performed by: NURSE PRACTITIONER

## 2021-10-25 ASSESSMENT — FIBROSIS 4 INDEX: FIB4 SCORE: 1.5

## 2021-10-25 NOTE — PROGRESS NOTES
"Chief Complaint   Patient presents with   • Follow-Up     YASMANI-Last seen 04/26/2021       HPI:      Mrs. Reaves is a 77 y/o female patient who is in today for YASMANI f/u. PMH includes DM II, hypothyroidism, hypertension, dyslipidemia, asthma, fatty liver, chronic diastolic CHF, right breast DCIS, melanoma, YASMANI, never smoker, tonsillectomy, obesity.    Patient has a [employer]Med auto CPAP machine.  Compliance report from 9/25/21-10/24/21 was downloaded and reviewed with the patient which showed autoCPAP 5-15 cmH2O, 90% compliance, 4 hrs 39 min use (60% compliance), AHI of 0.5. She is tolerating the pressure and mask well, but has had issues with the headgear causing pain over the back of her head.  She would like to switch possibly the headgear versus the mask, and she would also like to switch the DME company. She goes to bed between 2-4 am and wakes up between 10-11 am.  She has always been a \"night owl\" since childhood.  She has always struggled with sleep and does not take any sleep aids.  She cannot attribute her issues with staying asleep to anything in particular but states on average about 1-2 times a week she will only be able to sleep for about an hour or 2. She denies morning headache or snoring. Patient currently lives in a senior home and does not have many places to go exercise.  She follows up with her PCP for blood pressure management. She has lost an kept off about 60 pounds over the last 10 years.  Patient would like to obtain the flu vaccine today.  She is also planning to get the COVID-19 booster shot on 11/3/2021. She denies any new health problems or medications.    Sleep Study History:   PSG titration from 11/5/18 indicated YASMANI. Successful CPAP titration. Increased WASO. The final pressure tested during the study was CPAP 6 cm water and at this final pressure the patient was observed in the supine position  and in the REM sleep stage. The apnea hypopnea index improved to 3.1 per hour and O2 juan 85%. The " average O2 stauration was 91%. She spent 7 min of sleep time below 89% O2 saturation. Snoring was resolved.      PSG split night study from 12/16/17 indicated mild obstructive sleep apnea hypopnea with an apnea hypopnea index of 10.5 events per hour and a lowest arterial oxygen saturation of 87% on room air.     ROS:    Constitutional: Denies fevers, Denies weight changes  Eyes: Denies changes in vision, no eye pain  Ears/Nose/Throat/Mouth: Denies nasal congestion or sore throat   Cardiovascular: Denies chest pain or palpitations   Respiratory: Denies shortness of breath , Denies cough  Gastrointestinal/Hepatic: Denies abdominal pain, nausea, vomiting,   Skin/Breast: Denies rash,   Neurological: Denies headache, confusion,   Psychiatric: denies mood disorder   Sleep: denies snoring       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   • Ringing in ears    • Shortness of breath    • Sleep apnea     cpap   • Snoring    • Tonsillitis    • Urinary incontinence    • Uterine fibroid    • Wears glasses        Past Surgical History:   Procedure Laterality Date   • MASTECTOMY Right 3/28/2019    Procedure: MASTECTOMY - RE-EXCISION RIGHT BREAST BIOPSY SITE FOR MARGINS;  Surgeon: Karuna Hussein M.D.;  Location: SURGERY SAME DAY [facility];  Service: General   • BREAST BIOPSY Right 3/13/2019    Procedure: BREAST BIOPSY - WIRE LOCALIZED;  Surgeon: Karuna Hussein M.D.;  Location: SURGERY [facility];  Service: Gen Robotic   • COLONOSCOPY WITH BIOPSY  5/21/2013    Performed by Mauro Garcia M.D. at ENDOSCOPY [facility] ([place]   • THYROIDECTOMY  2000   • TONSILLECTOMY         Family  History   Problem Relation Age of Onset   • Lung Disease Brother    • Cancer Mother         pelvic   • Stroke Maternal Grandmother        Social History     Socioeconomic History   • Marital status: Single     Spouse name: Not on file   • Number of children: Not on file   • Years of education: Not on file   • Highest education level: Not on file   Occupational History   • Not on file   Tobacco Use   • Smoking status: Never Smoker   • Smokeless tobacco: Never Used   Vaping Use   • Vaping Use: Never used   Substance and Sexual Activity   • Alcohol use: No     Alcohol/week: 0.0 oz   • Drug use: No   • Sexual activity: Not Currently   Other Topics Concern   • Not on file   Social History Narrative   • Not on file     Social Determinants of Health     Financial Resource Strain:    • Difficulty of Paying Living Expenses:    Food Insecurity:    • Worried About Running Out of Food in the Last Year:    • Ran Out of Food in the Last Year:    Transportation Needs:    • Lack of Transportation (Medical):    • Lack of Transportation (Non-Medical):    Physical Activity:    • Days of Exercise per Week:    • Minutes of Exercise per Session:    Stress:    • Feeling of Stress :    Social Connections:    • Frequency of Communication with Friends and Family:    • Frequency of Social Gatherings with Friends and Family:    • Attends [employer] Services:    • Active Member of Clubs or Organizations:    • Attends Club or Organization Meetings:    • Marital Status:    Intimate Partner Violence:    • Fear of Current or Ex-Partner:    • Emotionally Abused:    • Physically Abused:    • Sexually Abused:        Allergies as of 10/25/2021 - Reviewed 10/25/2021   Allergen Reaction Noted   • Asa [aspirin]  04/14/2009   • Cough syrup m [cough cold-flu relief] Swelling 04/14/2009   • Dextromethorphan Swelling 12/06/2013        Vitals:  Vitals:    10/25/21 1521   BP: 122/78   Pulse: 97   SpO2: 96%       Current medications as of today   Current  Outpatient Medications   Medication Sig Dispense Refill   • losartan (COZAAR) 25 MG Tab TAKE 1 TABLET BY MOUTH EVERY DAY 90 Tablet 3   • topiramate (TOPAMAX) 25 MG Tab TAKE 1 TABLET BY MOUTH EVERY DAY 90 tablet 2   • tacrolimus (PROTOPIC) 0.1 % Ointment Apply  topically 2 times a day.     • Ciclopirox 0.77 % Gel Apply  topically as needed.     • phentermine (ADIPEX-P) 37.5 MG tablet Take 0.5 Tablets by mouth every morning before breakfast for 180 days. 15 tablet 5   • potassium chloride ER (KLOR-CON 10) 10 MEQ tablet Take 1 tablet by mouth every day. Please call [phone] for a follow up visit for further refills. 90 tablet 3   • furosemide (LASIX) 40 MG Tab Take 1 tablet by mouth every morning. 90 tablet 3   • spironolactone (ALDACTONE) 25 MG Tab Take 1 tablet by mouth every day. 90 tablet 3   • atorvastatin (LIPITOR) 20 MG Tab Take 1 tablet by mouth every day. 90 tablet 3   • levothyroxine (SYNTHROID) 150 MCG Tab Take 1 tablet by mouth every morning on an empty stomach. 90 tablet 3   • glucose blood (FREESTYLE LITE) strip 1 Strip by Other route 1 time a day as needed. 100 Strip 6   • metFORMIN (GLUCOPHAGE) 500 MG Tab TAKE 1 TABLET BY MOUTH TWICE A DAY WITH MEALS 180 tablet 3   • TRADJENTA 5 MG Tab tablet TAKE 1 TABLET BY MOUTH EVERY DAY 90 tablet 3   • FreeStyle Lancets Misc 1 EACH BY DOES NOT APPLY ROUTE 2 TIMES A DAY. 100 Each 6   • risedronate (ACTONEL) 35 MG Tab      • betamethasone, augmented, (DIPROLENE) 0.05 % gel Apply 1 Application to affected area(s) every bedtime. 50 g 2   • ciclopirox (LOPROX) 0.77 % cream Twice daily to the affected area under the breast, on abdomen for 1-2 weeks as needed (use first) 45 g 2   • hydrocortisone 2.5 % Cream topical cream Apply 1 Application to affected area(s) 2 times a day. To area under breast (use second) as needed for redness/itching/burning (Patient not taking: Reported on 8/9/2021) 1 Tube 1   • letrozole (FEMARA) 2.5 MG Tab Take 2.5 mg by mouth every day.     •  Fiber Powder Take 1 Package by mouth every day.     • Probiotic Product (PROBIOTIC DAILY PO) Take 1 Package by mouth every day.     • docusate sodium (COLACE) 100 MG Cap Take 200 mg by mouth every day.     • acetaminophen (TYLENOL) 500 MG Tab Take 500 mg by mouth 2 Times a Day.     • NON SPECIFIED Take 300 mg by mouth every day. CISTANCHE (Patient not taking: Reported on 8/9/2021)     • CALCIUM-MAGNESIUM-VITAMIN D PO Take 1 Tab by mouth every day.     • BIOTIN PO Take 1 Tab by mouth every day.     • COLLAGEN PO Take 1 Tab by mouth every day.     • Glucosamine-Chondroitin (GLUCOSAMINE CHONDR COMPLEX PO) Take 750 mg by mouth 2 Times a Day.     • Lutein 10 MG Tab Take 10 mg by mouth every evening.     • aspirin 81 MG tablet Take 81 mg by mouth every morning. 90 Tab 3   • coenzyme Q-10 30 MG capsule Take 1 Cap by mouth every day. 30 Cap 11   • ascorbic acid (VITAMIN C) 500 MG tablet Take 1 Tab by mouth every day. 30 Tab 11   • Multiple Vitamins-Minerals (ULTRA WOMENS PACK) Misc Take 1 Tab by mouth every day. 30 Each 11     No current facility-administered medications for this visit.         Physical Exam: Limited by COVID-19 precautions.  Appearance: Well developed, well nourished, no acute distress  Eyes: PERRL, EOM intact, sclera white, conjunctiva moist  Ears: no lesions or deformities  Hearing: grossly intact  Nose: no lesions or deformities  Respiratory effort: no intercostal retractions or use of accessory muscles  Extremities: no cyanosis or edema  Abdomen: soft   Gait and Station: normal  Digits and nails: no clubbing, cyanosis, petechiae or nodes.  Cranial nerves: grossly intact  Skin: no visible rashes, lesions or ulcers noted  Orientation: Oriented to time, person and place  Mood and affect: mood and affect appropriate, normal interaction with examiner  Judgement: Intact    Assessment:  1. Obstructive sleep apnea  DME Mask and Supplies    DME Other   2. Chronic diastolic congestive heart failure (HCC)     3.  Essential hypertension     4. BMI 39.0-39.9,adult     5. Chronic insomnia     6. Need for immunization against influenza  Influenza Vaccine, High Dose (65+ Only)         Plan  Discussed the cardiovascular and neuropsychiatric risks of untreated YASMANI; including but not limited to: HTN, DM, MI, ASCVD, CVA, CHF, traffic accidents.     1. Compliance report from 9/25/21-10/24/21 was downloaded and reviewed with the patient which showed autoCPAP 5-15 cmH2O, 90% compliance, 4 hrs 39 min use (60% compliance), AHI of 0.5. Continue autoCPAP. Patient is compliant and benefiting from autoCPAP therapy for management of YASMANI. Advised patient to use the CPAP every night for more than four hours for optimal health benefit.    *DME order (CPAP and more) for mask (FFM or MOC) and supplies was provided today. Continue to clean mask and supplies weekly with soap and water, and change supplies per insurance guidelines.     *DME order for mask fit was placed today due to issues with headgear fit causing occipital head pain.     2-3. Continue to f/u with PCP for BP management.  4. Encouraged a healthy diet and light conditioning to help with weight loss and management. If your BMI is 25-29.9 you are overweight. If your BMI is 30 or greater you are obese. To lose weight eat less, move more, or both. Any diet that reduces caloric intake can help with weight loss. Extra weight may reduce your lifespan. Avoid dramatic unsustainable dietary changes that result in the yo-yo effect (down then back up.)  Usually small modifications in diet and exercise are easier to stick with.  5. Sleep hygiene discussed. Recommend keeping a set sleep/wake schedule. Logging enough hours of sleep. Limiting/Avoiding naps. No caffeine after noon and no heavy meals in the evening.   Chronic insomnia: Advised patient to avoid electronics.  May consider referral to Dr. Russo for CBT-I if needed.  6.  Influenza vaccination was administered today.  7. Follow up with the  appropriate healthcare practitioners for all other medical problems.  8. F/u in 6 months for YASMANI, sooner if needed.       LUIZ Gutierrez.      This dictation was created using voice recognition software. The accuracy of the dictation is limited to the abilities of the software. I expect there may be some errors of grammar and possibly content.

## 2021-11-10 ENCOUNTER — HOSPITAL ENCOUNTER (OUTPATIENT)
Dept: CARDIOLOGY | Facility: MEDICAL CENTER | Age: 76
End: 2021-11-10
Attending: PHYSICIAN ASSISTANT
Payer: MEDICARE

## 2021-11-10 DIAGNOSIS — I35.1 NONRHEUMATIC AORTIC VALVE INSUFFICIENCY: ICD-10-CM

## 2021-11-10 LAB
LV EJECT FRACT  99904: 70
LV EJECT FRACT MOD 4C 99902: 75.6

## 2021-11-10 PROCEDURE — 93308 TTE F-UP OR LMTD: CPT | Mod: 26 | Performed by: INTERNAL MEDICINE

## 2021-11-10 PROCEDURE — 93321 DOPPLER ECHO F-UP/LMTD STD: CPT | Mod: 26 | Performed by: INTERNAL MEDICINE

## 2021-11-10 PROCEDURE — 93325 DOPPLER ECHO COLOR FLOW MAPG: CPT

## 2021-11-10 PROCEDURE — 93325 DOPPLER ECHO COLOR FLOW MAPG: CPT | Mod: 26 | Performed by: INTERNAL MEDICINE

## 2021-11-11 LAB
ALBUMIN SERPL-MCNC: 4.3 G/DL (ref 3.7–4.7)
ALBUMIN/GLOB SERPL: 1.9 {RATIO} (ref 1.2–2.2)
ALP SERPL-CCNC: 87 IU/L (ref 44–121)
ALT SERPL-CCNC: 20 IU/L (ref 0–32)
AST SERPL-CCNC: 23 IU/L (ref 0–40)
BILIRUB SERPL-MCNC: 0.5 MG/DL (ref 0–1.2)
BUN SERPL-MCNC: 28 MG/DL (ref 8–27)
BUN/CREAT SERPL: 52 (ref 12–28)
CALCIUM SERPL-MCNC: 8.7 MG/DL (ref 8.7–10.3)
CHLORIDE SERPL-SCNC: 105 MMOL/L (ref 96–106)
CHOLEST SERPL-MCNC: 108 MG/DL (ref 100–199)
CO2 SERPL-SCNC: 24 MMOL/L (ref 20–29)
CREAT SERPL-MCNC: 0.54 MG/DL (ref 0.57–1)
ERYTHROCYTE [DISTWIDTH] IN BLOOD BY AUTOMATED COUNT: 12.8 % (ref 11.7–15.4)
GLOBULIN SER CALC-MCNC: 2.3 G/DL (ref 1.5–4.5)
GLUCOSE SERPL-MCNC: 141 MG/DL (ref 65–99)
HBA1C MFR BLD: 7.5 % (ref 4.8–5.6)
HCT VFR BLD AUTO: 43.8 % (ref 34–46.6)
HDLC SERPL-MCNC: 39 MG/DL
HGB BLD-MCNC: 14.4 G/DL (ref 11.1–15.9)
LABORATORY COMMENT REPORT: ABNORMAL
LDLC SERPL CALC-MCNC: 52 MG/DL (ref 0–99)
MCH RBC QN AUTO: 29.1 PG (ref 26.6–33)
MCHC RBC AUTO-ENTMCNC: 32.9 G/DL (ref 31.5–35.7)
MCV RBC AUTO: 89 FL (ref 79–97)
NRBC BLD AUTO-RTO: NORMAL %
PLATELET # BLD AUTO: 229 X10E3/UL (ref 150–450)
POTASSIUM SERPL-SCNC: 4 MMOL/L (ref 3.5–5.2)
PROT SERPL-MCNC: 6.6 G/DL (ref 6–8.5)
RBC # BLD AUTO: 4.95 X10E6/UL (ref 3.77–5.28)
SODIUM SERPL-SCNC: 142 MMOL/L (ref 134–144)
TRIGL SERPL-MCNC: 84 MG/DL (ref 0–149)
VLDLC SERPL CALC-MCNC: 17 MG/DL (ref 5–40)
WBC # BLD AUTO: 7 X10E3/UL (ref 3.4–10.8)

## 2021-11-15 NOTE — RESULT ENCOUNTER NOTE
Please let Ragini know her echo results look good. Her leaky valve has not progressed. This is something we'll watch every 1-2 years. No changes to meds. Can follow up in July 2022    JA

## 2021-11-16 ENCOUNTER — TELEPHONE (OUTPATIENT)
Dept: CARDIOLOGY | Facility: MEDICAL CENTER | Age: 76
End: 2021-11-16

## 2021-11-16 NOTE — TELEPHONE ENCOUNTER
Called and notified patient of JA results and recommendations. Advised to make sure she calls for a FV, she stated she would. She verbalized understanding and was appreciative of call.

## 2021-11-16 NOTE — TELEPHONE ENCOUNTER
----- Message from Martha Faith P.A.-C. sent at 11/15/2021  3:21 PM PST -----  Please let Ragini know her echo results look good. Her leaky valve has not progressed. This is something we'll watch every 1-2 years. No changes to meds. Can follow up in July 2022    JA

## 2021-11-18 ENCOUNTER — OFFICE VISIT (OUTPATIENT)
Dept: MEDICAL GROUP | Facility: MEDICAL CENTER | Age: 76
End: 2021-11-18
Payer: MEDICARE

## 2021-11-18 VITALS
HEIGHT: 62 IN | DIASTOLIC BLOOD PRESSURE: 52 MMHG | SYSTOLIC BLOOD PRESSURE: 102 MMHG | WEIGHT: 212.8 LBS | TEMPERATURE: 97.9 F | RESPIRATION RATE: 14 BRPM | BODY MASS INDEX: 39.16 KG/M2 | HEART RATE: 88 BPM | OXYGEN SATURATION: 97 %

## 2021-11-18 DIAGNOSIS — E66.9 OBESITY, CLASS II, BMI 35-39.9: ICD-10-CM

## 2021-11-18 DIAGNOSIS — E11.9 DM TYPE 2, GOAL HBA1C < 7.5% (HCC): ICD-10-CM

## 2021-11-18 DIAGNOSIS — K76.0 FATTY LIVER: ICD-10-CM

## 2021-11-18 DIAGNOSIS — E78.5 DYSLIPIDEMIA, GOAL LDL BELOW 100: ICD-10-CM

## 2021-11-18 DIAGNOSIS — E03.2 HYPOTHYROIDISM DUE TO NON-MEDICATION EXOGENOUS SUBSTANCES: ICD-10-CM

## 2021-11-18 DIAGNOSIS — E11.8 CONTROLLED TYPE 2 DIABETES MELLITUS WITH COMPLICATION, WITHOUT LONG-TERM CURRENT USE OF INSULIN (HCC): ICD-10-CM

## 2021-11-18 DIAGNOSIS — I10 ESSENTIAL HYPERTENSION: ICD-10-CM

## 2021-11-18 DIAGNOSIS — J45.30 MILD PERSISTENT ASTHMA, UNCOMPLICATED: ICD-10-CM

## 2021-11-18 PROBLEM — Z85.820 HISTORY OF MALIGNANT MELANOMA OF BACK: Status: ACTIVE | Noted: 2021-11-18

## 2021-11-18 PROBLEM — E66.812 OBESITY, CLASS II, BMI 35-39.9: Status: ACTIVE | Noted: 2021-11-18

## 2021-11-18 PROBLEM — E66.01 MORBID OBESITY WITH BMI OF 40.0-44.9, ADULT (HCC): Status: RESOLVED | Noted: 2017-01-09 | Resolved: 2021-11-18

## 2021-11-18 PROBLEM — C43.9 MALIGNANT MELANOMA (HCC): Status: RESOLVED | Noted: 2021-08-09 | Resolved: 2021-11-18

## 2021-11-18 PROCEDURE — 99214 OFFICE O/P EST MOD 30 MIN: CPT | Performed by: FAMILY MEDICINE

## 2021-11-18 ASSESSMENT — FIBROSIS 4 INDEX: FIB4 SCORE: 1.71

## 2021-11-18 NOTE — PROGRESS NOTES
Diabetes Focused Exam:    Chief Complaint   Patient presents with   • Diabetes Mellitus   • Hypertension   • Hyperlipidemia   • Neck Pain     right sided spasm      Subjective:   HPI  Ragini Reaves is a 76 y.o. female who presents for follow up of chronic conditions of diabetes mellitus, hypertension and hyperlipidemia. She indicates that she is feeling well and denies any symptoms referable to the above diagnoses. Specifically denies chest pain, palpitations, dyspnea, orthopnea, PND or peripheral edema. Also denies polyuria, polydipsia, urinary complaints, abdominal complaints, myalgias, numbness, weakness or other related symptoms.     The phentermine continues to be helping well with her weight loss.  She is no longer morbidly obese.    Says her new CPAP mask works well for her.    Marked right neck spasm, painful.  Has been going on 3-4 months.  Discussed using ice and heat.      The patient is taking ASA every day 81 mg and taking all other medications as prescribed. Patient denies any side effects of medication.  DM: A1c goal <7.5%  Glucose monitoring frequency: daily in am   Fasting sugars: 118-135, rarely 150-160. Post-prandial sugars: up to 200  Hypoglycemic episodes none noted  Diabetic complications: cardiovascular disease  ACR Albumin/Creatinine Ratio goal <30   HTN: Blood pressure goal <140/<80 . Currently Rx ACE/ARB: Yes  Hyperlipidemia:Cholesterol goal LDL <100, total/HDL <5 . Currently Rx Statin: Yes  Last eye exam 2/2021  Denies visual blurring, double vision, eye pain and floaters  Last monofilament foot exam: 1/2021  Denies foot pain, numbness, calluses, ulcers    See medications and orders placed in encounter report.  Past medical history, family history, social history reviewed and updated as documented in medical record.    Current medications including changes today:  Current Outpatient Medications   Medication Sig Dispense Refill   • losartan (COZAAR) 25 MG Tab TAKE 1 TABLET BY MOUTH  EVERY DAY 90 Tablet 3   • topiramate (TOPAMAX) 25 MG Tab TAKE 1 TABLET BY MOUTH EVERY DAY 90 tablet 2   • tacrolimus (PROTOPIC) 0.1 % Ointment Apply  topically 2 times a day.     • phentermine (ADIPEX-P) 37.5 MG tablet Take 0.5 Tablets by mouth every morning before breakfast for 180 days. 15 tablet 5   • furosemide (LASIX) 40 MG Tab Take 1 tablet by mouth every morning. 90 tablet 3   • spironolactone (ALDACTONE) 25 MG Tab Take 1 tablet by mouth every day. 90 tablet 3   • atorvastatin (LIPITOR) 20 MG Tab Take 1 tablet by mouth every day. 90 tablet 3   • levothyroxine (SYNTHROID) 150 MCG Tab Take 1 tablet by mouth every morning on an empty stomach. 90 tablet 3   • metFORMIN (GLUCOPHAGE) 500 MG Tab TAKE 1 TABLET BY MOUTH TWICE A DAY WITH MEALS 180 tablet 3   • TRADJENTA 5 MG Tab tablet TAKE 1 TABLET BY MOUTH EVERY DAY 90 tablet 3   • risedronate (ACTONEL) 35 MG Tab      • letrozole (FEMARA) 2.5 MG Tab Take 2.5 mg by mouth every day.     • docusate sodium (COLACE) 100 MG Cap Take 200 mg by mouth every day.     • acetaminophen (TYLENOL) 500 MG Tab Take 500 mg by mouth 2 Times a Day.     • Glucosamine-Chondroitin (GLUCOSAMINE CHONDR COMPLEX PO) Take 750 mg by mouth 2 Times a Day.     • aspirin 81 MG tablet Take 81 mg by mouth every morning. 90 Tab 3   • coenzyme Q-10 30 MG capsule Take 1 Cap by mouth every day. 30 Cap 11   • Multiple Vitamins-Minerals (ULTRA WOMENS PACK) Misc Take 1 Tab by mouth every day. 30 Each 11   • Ciclopirox 0.77 % Gel Apply  topically as needed.     • potassium chloride ER (KLOR-CON 10) 10 MEQ tablet Take 1 tablet by mouth every day. Please call [phone] for a follow up visit for further refills. 90 tablet 3   • glucose blood (FREESTYLE LITE) strip 1 Strip by Other route 1 time a day as needed. 100 Strip 6   • FreeStyle Lancets Misc 1 EACH BY DOES NOT APPLY ROUTE 2 TIMES A DAY. 100 Each 6   • betamethasone, augmented, (DIPROLENE) 0.05 % gel Apply 1 Application to affected area(s) every  "bedtime. 50 g 2   • Fiber Powder Take 1 Package by mouth every day.     • Probiotic Product (PROBIOTIC DAILY PO) Take 1 Package by mouth every day.     • CALCIUM-MAGNESIUM-VITAMIN D PO Take 1 Tab by mouth every day.     • BIOTIN PO Take 1 Tab by mouth every day.     • COLLAGEN PO Take 1 Tab by mouth every day.     • Lutein 10 MG Tab Take 10 mg by mouth every evening.     • ascorbic acid (VITAMIN C) 500 MG tablet Take 1 Tab by mouth every day. 30 Tab 11     No current facility-administered medications for this visit.     Allergies:   Allergies   Allergen Reactions   • Asa [Aspirin]      Stomach bleeding.   Tolerates low dose ASA   • Cough Syrup M [Cough Cold-Flu Relief] Swelling     Severe facial swelling   • Dextromethorphan Swelling     Facial swellling      Social History     Tobacco Use   • Smoking status: Never Smoker   • Smokeless tobacco: Never Used   Vaping Use   • Vaping Use: Never used   Substance Use Topics   • Alcohol use: No     Alcohol/week: 0.0 oz   • Drug use: No     Exercise: she is able to move around more with this weight loss  Health Maintenance/Immunizations: discussed, up to date    ROS  Pertinent  ROS findings as above.    Objective:     OBJECTIVE:  /52 (BP Location: Right arm, Patient Position: Sitting, BP Cuff Size: Adult)   Pulse 88   Temp 36.6 °C (97.9 °F)   Resp 14   Ht 1.575 m (5' 2\")   Wt 96.5 kg (212 lb 12.8 oz)   LMP  (LMP Unknown)   SpO2 97%   BMI 38.92 kg/m²  Body mass index is 38.92 kg/m². BMI: moderately obese, improved  General: No apparent distress, conversant, cooperative and pleasant with the examination.  Psych: Alert and oriented x4, judgment and insight normal  Neck: No JVD or bruits, no adenopathy, marked right lateral spasm  Thyroid: normal to inspection and palpation  Lungs: negative findings: normal respiratory rate and rhythm, lungs clear to auscultation  Heart: negative findings: regular rate and rhythm, S1 normal, S2 normal, 2/6 aortic murmur.  Abdomen: " Soft, nontender, no hepatosplenomegaly or masses, normal bowel sounds  Skin: No rashes, no cyanosis, no lesions or ulcers  Extremities: No cyanosis clubbing or edema.     Recent echocardiogram reviewed, stable    Hearing examination 8/2/2021, mild reduction in higher pitches, otherwise normal.    POC labs:   Lab Results   Component Value Date/Time    POCGLUCOSE 150 (H) 03/28/2019 07:07 AM          Last labs    Lab Results   Component Value Date/Time    CHOLSTRLTOT 108 11/10/2021 06:10 AM    LDL 75 05/15/2020 05:25 AM    HDL 39 (L) 11/10/2021 06:10 AM    TRIGLYCERIDE 84 11/10/2021 06:10 AM       Lab Results   Component Value Date/Time    SODIUM 142 11/10/2021 06:10 AM    SODIUM 139 03/11/2019 02:05 PM    POTASSIUM 4.0 11/10/2021 06:10 AM    POTASSIUM 3.7 03/11/2019 02:05 PM    GLUCOSE 141 (H) 11/10/2021 06:10 AM    GLUCOSE 133 (H) 03/11/2019 02:05 PM    BUNCREATRAT 52 (H) 11/10/2021 06:10 AM     Lab Results   Component Value Date/Time    HBA1C 7.5 (H) 11/10/2021 06:10 AM    HBA1C 7.1 (H) 07/20/2021 05:06 AM    HBA1C 7.5 (H) 03/10/2021 05:57 AM    HBA1C 7.0 (H) 03/11/2019 02:05 PM    HBA1C 8.7 (H) 09/08/2015 07:49 AM    HBA1C 8.4 (H) 05/05/2015 08:32 AM    HBA1C 9.4 (H) 01/06/2015 08:37 AM     Lab Results   Component Value Date/Time    MICRALB 26.6 07/20/2021 05:06 AM          Assessment/Plan:   Medications, refills, and referrals per orders.   1. Controlled type 2 diabetes mellitus with complication, without long-term current use of insulin (HCC)     2. Dyslipidemia, goal LDL below 100     3. Essential hypertension     4. Hypothyroidism due to non-medication exogenous substances     5. Fatty liver     6. Mild persistent asthma, uncomplicated     7. Obesity, Class II, BMI 35-39.9       DM2 A1c is at goal   Patient to monitor sugars: daily frequency  Discussed diet, exercise, disease management and weight loss goals.   Education and advise provided today:All medications, side effects and compliance (discussed  carefully)  Annual eye examinations at Ophthalmology  Diabetic diet discussed in detail, written exchange diet given  Foot care discussed and Podiatry visits  Glycohemoglobin and other lab monitoring  Home glucose monitoring emphasized  Labs immediately prior to next visit  Long term diabetic complications  Low cholesterol diet, weight control and daily exercise    She will continue the phentermine, has been helpful.  She is following a portion reduction diet.    Followup:   Do labs and RTC 4 months, sooner should new symptoms or problems arise.

## 2021-12-05 DIAGNOSIS — E11.9 DM TYPE 2, GOAL HBA1C < 8% (HCC): ICD-10-CM

## 2021-12-06 RX ORDER — LINAGLIPTIN 5 MG/1
TABLET, FILM COATED ORAL
Qty: 90 TABLET | Refills: 3 | Status: SHIPPED | OUTPATIENT
Start: 2021-12-06 | End: 2022-12-23

## 2022-02-07 ENCOUNTER — HOSPITAL ENCOUNTER (OUTPATIENT)
Dept: RADIOLOGY | Facility: MEDICAL CENTER | Age: 77
End: 2022-02-07
Attending: INTERNAL MEDICINE
Payer: MEDICARE

## 2022-02-07 DIAGNOSIS — Z12.31 VISIT FOR SCREENING MAMMOGRAM: ICD-10-CM

## 2022-02-07 PROCEDURE — 77063 BREAST TOMOSYNTHESIS BI: CPT

## 2022-02-08 ENCOUNTER — APPOINTMENT (RX ONLY)
Dept: URBAN - METROPOLITAN AREA CLINIC 6 | Facility: CLINIC | Age: 77
Setting detail: DERMATOLOGY
End: 2022-02-08

## 2022-02-08 DIAGNOSIS — L82.1 OTHER SEBORRHEIC KERATOSIS: ICD-10-CM

## 2022-02-08 DIAGNOSIS — Z00.8 ENCOUNTER FOR OTHER GENERAL EXAMINATION: ICD-10-CM

## 2022-02-08 DIAGNOSIS — L81.4 OTHER MELANIN HYPERPIGMENTATION: ICD-10-CM

## 2022-02-08 DIAGNOSIS — D18.0 HEMANGIOMA: ICD-10-CM

## 2022-02-08 DIAGNOSIS — D22 MELANOCYTIC NEVI: ICD-10-CM

## 2022-02-08 DIAGNOSIS — Z85.820 PERSONAL HISTORY OF MALIGNANT MELANOMA OF SKIN: ICD-10-CM | Status: STABLE

## 2022-02-08 DIAGNOSIS — L98.8 OTHER SPECIFIED DISORDERS OF THE SKIN AND SUBCUTANEOUS TISSUE: ICD-10-CM

## 2022-02-08 DIAGNOSIS — L57.0 ACTINIC KERATOSIS: ICD-10-CM

## 2022-02-08 PROBLEM — D23.71 OTHER BENIGN NEOPLASM OF SKIN OF RIGHT LOWER LIMB, INCLUDING HIP: Status: ACTIVE | Noted: 2022-02-08

## 2022-02-08 PROBLEM — D18.01 HEMANGIOMA OF SKIN AND SUBCUTANEOUS TISSUE: Status: ACTIVE | Noted: 2022-02-08

## 2022-02-08 PROBLEM — D22.5 MELANOCYTIC NEVI OF TRUNK: Status: ACTIVE | Noted: 2022-02-08

## 2022-02-08 PROBLEM — D22.4 MELANOCYTIC NEVI OF SCALP AND NECK: Status: ACTIVE | Noted: 2022-02-08

## 2022-02-08 PROCEDURE — 17003 DESTRUCT PREMALG LES 2-14: CPT

## 2022-02-08 PROCEDURE — ? COUNSELING

## 2022-02-08 PROCEDURE — ? REFERRAL CORRESPONDENCE

## 2022-02-08 PROCEDURE — 17000 DESTRUCT PREMALG LESION: CPT

## 2022-02-08 PROCEDURE — 99213 OFFICE O/P EST LOW 20 MIN: CPT | Mod: 25

## 2022-02-08 PROCEDURE — ? LIQUID NITROGEN

## 2022-02-08 ASSESSMENT — LOCATION DETAILED DESCRIPTION DERM
LOCATION DETAILED: LEFT SUPERIOR MEDIAL LOWER BACK
LOCATION DETAILED: RIGHT POSTERIOR PARIETAL SCALP
LOCATION DETAILED: UPPER STERNUM
LOCATION DETAILED: LEFT INFERIOR MEDIAL MALAR CHEEK
LOCATION DETAILED: EPIGASTRIC SKIN
LOCATION DETAILED: SUBXIPHOID
LOCATION DETAILED: LEFT RADIAL DORSAL HAND
LOCATION DETAILED: SUPERIOR THORACIC SPINE
LOCATION DETAILED: LEFT ANTERIOR EARLOBE
LOCATION DETAILED: RIGHT RADIAL DORSAL HAND
LOCATION DETAILED: LEFT CENTRAL MALAR CHEEK
LOCATION DETAILED: LEFT INFERIOR LATERAL MALAR CHEEK
LOCATION DETAILED: POSTERIOR MID-PARIETAL SCALP
LOCATION DETAILED: RIGHT INFERIOR FOREHEAD
LOCATION DETAILED: LEFT CENTRAL FRONTAL SCALP
LOCATION DETAILED: RIGHT INFERIOR VERMILION LIP

## 2022-02-08 ASSESSMENT — LOCATION ZONE DERM
LOCATION ZONE: HAND
LOCATION ZONE: EAR
LOCATION ZONE: LIP
LOCATION ZONE: TRUNK
LOCATION ZONE: SCALP
LOCATION ZONE: FACE

## 2022-02-08 ASSESSMENT — LOCATION SIMPLE DESCRIPTION DERM
LOCATION SIMPLE: RIGHT FOREHEAD
LOCATION SIMPLE: RIGHT LIP
LOCATION SIMPLE: LEFT LOWER BACK
LOCATION SIMPLE: LEFT HAND
LOCATION SIMPLE: POSTERIOR SCALP
LOCATION SIMPLE: CHEST
LOCATION SIMPLE: RIGHT HAND
LOCATION SIMPLE: SCALP
LOCATION SIMPLE: LEFT CHEEK
LOCATION SIMPLE: ABDOMEN
LOCATION SIMPLE: LEFT EAR
LOCATION SIMPLE: UPPER BACK

## 2022-02-08 NOTE — PROCEDURE: REASSURANCE
Additional Notes (Optional): Benign
Hide Additional Notes?: No
Detail Level: Detailed
Detail Level: Zone

## 2022-02-08 NOTE — PROCEDURE: MIPS QUALITY
Quality 130: Documentation Of Current Medications In The Medical Record: Current Medications Documented
Detail Level: Detailed
Quality 265: Biopsy Follow-Up: Biopsy results reviewed, communicated, tracked, and documented
Quality 111:Pneumonia Vaccination Status For Older Adults: Pneumococcal Vaccination Previously Received
Quality 226: Preventive Care And Screening: Tobacco Use: Screening And Cessation Intervention: Patient screened for tobacco use and is an ex/non-smoker

## 2022-02-08 NOTE — PROCEDURE: LIQUID NITROGEN
Number Of Freeze-Thaw Cycles: 2 freeze-thaw cycles
Duration Of Freeze Thaw-Cycle (Seconds): 10
Post-Care Instructions: I reviewed with the patient in detail post-care instructions. Patient is to wear sunprotection, and avoid picking at any of the treated lesions. Pt may apply Vaseline to crusted or scabbing areas.
Render Note In Bullet Format When Appropriate: No
Detail Level: Detailed
Show Applicator Variable?: Yes
Consent: The patient's consent was obtained including but not limited to risks of crusting, scabbing, blistering, scarring, darker or lighter pigmentary change, recurrence, incomplete removal and infection.

## 2022-02-14 DIAGNOSIS — E66.01 MORBID OBESITY WITH BMI OF 40.0-44.9, ADULT (HCC): ICD-10-CM

## 2022-02-14 RX ORDER — PHENTERMINE HYDROCHLORIDE 37.5 MG/1
TABLET ORAL
Qty: 15 TABLET | Refills: 5 | Status: SHIPPED | OUTPATIENT
Start: 2022-02-14 | End: 2022-08-29

## 2022-03-16 LAB
ALBUMIN SERPL-MCNC: 4 G/DL (ref 3.7–4.7)
ALBUMIN/CREAT UR: 51 MG/G CREAT (ref 0–29)
ALBUMIN/GLOB SERPL: 1.6 {RATIO} (ref 1.2–2.2)
ALP SERPL-CCNC: 94 IU/L (ref 44–121)
ALT SERPL-CCNC: 24 IU/L (ref 0–32)
AST SERPL-CCNC: 27 IU/L (ref 0–40)
BASOPHILS # BLD AUTO: 0 X10E3/UL (ref 0–0.2)
BASOPHILS NFR BLD AUTO: 1 %
BILIRUB SERPL-MCNC: 0.5 MG/DL (ref 0–1.2)
BUN SERPL-MCNC: 24 MG/DL (ref 8–27)
BUN/CREAT SERPL: 41 (ref 12–28)
CALCIUM SERPL-MCNC: 8.9 MG/DL (ref 8.7–10.3)
CHLORIDE SERPL-SCNC: 105 MMOL/L (ref 96–106)
CHOLEST SERPL-MCNC: 98 MG/DL (ref 100–199)
CO2 SERPL-SCNC: 21 MMOL/L (ref 20–29)
CREAT SERPL-MCNC: 0.59 MG/DL (ref 0.57–1)
CREAT UR-MCNC: 96.2 MG/DL
EGFRCR SERPLBLD CKD-EPI 2021: 93 ML/MIN/1.73
EOSINOPHIL # BLD AUTO: 0.5 X10E3/UL (ref 0–0.4)
EOSINOPHIL NFR BLD AUTO: 6 %
ERYTHROCYTE [DISTWIDTH] IN BLOOD BY AUTOMATED COUNT: 12.9 % (ref 11.7–15.4)
GLOBULIN SER CALC-MCNC: 2.5 G/DL (ref 1.5–4.5)
GLUCOSE SERPL-MCNC: 145 MG/DL (ref 65–99)
HBA1C MFR BLD: 7.3 % (ref 4.8–5.6)
HCT VFR BLD AUTO: 42.6 % (ref 34–46.6)
HDLC SERPL-MCNC: 35 MG/DL
HGB BLD-MCNC: 14 G/DL (ref 11.1–15.9)
IMM GRANULOCYTES # BLD AUTO: 0 X10E3/UL (ref 0–0.1)
IMM GRANULOCYTES NFR BLD AUTO: 0 %
IMMATURE CELLS  115398: ABNORMAL
LABORATORY COMMENT REPORT: ABNORMAL
LDLC SERPL CALC-MCNC: 46 MG/DL (ref 0–99)
LYMPHOCYTES # BLD AUTO: 2.1 X10E3/UL (ref 0.7–3.1)
LYMPHOCYTES NFR BLD AUTO: 26 %
MCH RBC QN AUTO: 28.5 PG (ref 26.6–33)
MCHC RBC AUTO-ENTMCNC: 32.9 G/DL (ref 31.5–35.7)
MCV RBC AUTO: 87 FL (ref 79–97)
MICROALBUMIN UR-MCNC: 49 UG/ML
MONOCYTES # BLD AUTO: 0.8 X10E3/UL (ref 0.1–0.9)
MONOCYTES NFR BLD AUTO: 10 %
MORPHOLOGY BLD-IMP: ABNORMAL
NEUTROPHILS # BLD AUTO: 4.6 X10E3/UL (ref 1.4–7)
NEUTROPHILS NFR BLD AUTO: 57 %
NRBC BLD AUTO-RTO: ABNORMAL %
PLATELET # BLD AUTO: 254 X10E3/UL (ref 150–450)
POTASSIUM SERPL-SCNC: 4.2 MMOL/L (ref 3.5–5.2)
PROT SERPL-MCNC: 6.5 G/DL (ref 6–8.5)
RBC # BLD AUTO: 4.91 X10E6/UL (ref 3.77–5.28)
SODIUM SERPL-SCNC: 140 MMOL/L (ref 134–144)
TRIGL SERPL-MCNC: 84 MG/DL (ref 0–149)
VLDLC SERPL CALC-MCNC: 17 MG/DL (ref 5–40)
WBC # BLD AUTO: 8 X10E3/UL (ref 3.4–10.8)

## 2022-03-22 ENCOUNTER — OFFICE VISIT (OUTPATIENT)
Dept: MEDICAL GROUP | Facility: MEDICAL CENTER | Age: 77
End: 2022-03-22
Payer: MEDICARE

## 2022-03-22 VITALS
BODY MASS INDEX: 38.46 KG/M2 | DIASTOLIC BLOOD PRESSURE: 54 MMHG | SYSTOLIC BLOOD PRESSURE: 94 MMHG | HEART RATE: 116 BPM | HEIGHT: 62 IN | WEIGHT: 209 LBS | OXYGEN SATURATION: 97 % | RESPIRATION RATE: 14 BRPM | TEMPERATURE: 97.7 F

## 2022-03-22 DIAGNOSIS — E78.5 DYSLIPIDEMIA, GOAL LDL BELOW 100: ICD-10-CM

## 2022-03-22 DIAGNOSIS — I10 ESSENTIAL HYPERTENSION: ICD-10-CM

## 2022-03-22 DIAGNOSIS — E03.2 HYPOTHYROIDISM DUE TO NON-MEDICATION EXOGENOUS SUBSTANCES: ICD-10-CM

## 2022-03-22 DIAGNOSIS — I35.1 MODERATE AORTIC INSUFFICIENCY: ICD-10-CM

## 2022-03-22 DIAGNOSIS — E11.8 CONTROLLED TYPE 2 DIABETES MELLITUS WITH COMPLICATION, WITHOUT LONG-TERM CURRENT USE OF INSULIN (HCC): ICD-10-CM

## 2022-03-22 DIAGNOSIS — I50.32 CHRONIC DIASTOLIC CONGESTIVE HEART FAILURE (HCC): ICD-10-CM

## 2022-03-22 DIAGNOSIS — M62.838 SPASM OF CERVICAL PARASPINOUS MUSCLE: ICD-10-CM

## 2022-03-22 DIAGNOSIS — E66.9 OBESITY, CLASS II, BMI 35-39.9: ICD-10-CM

## 2022-03-22 DIAGNOSIS — E11.9 DM TYPE 2, GOAL HBA1C < 7.5% (HCC): ICD-10-CM

## 2022-03-22 PROBLEM — R63.5 WEIGHT GAIN: Status: RESOLVED | Noted: 2020-07-20 | Resolved: 2022-03-22

## 2022-03-22 PROCEDURE — 99214 OFFICE O/P EST MOD 30 MIN: CPT | Performed by: FAMILY MEDICINE

## 2022-03-22 RX ORDER — BLOOD-GLUCOSE METER
1 KIT MISCELLANEOUS
Qty: 100 STRIP | Refills: 6 | Status: SHIPPED | OUTPATIENT
Start: 2022-03-22 | End: 2023-04-04

## 2022-03-22 RX ORDER — LANCETS 28 GAUGE
EACH MISCELLANEOUS
Qty: 100 EACH | Refills: 6 | Status: SHIPPED | OUTPATIENT
Start: 2022-03-22

## 2022-03-22 RX ORDER — LANCETS 28 GAUGE
1 EACH MISCELLANEOUS 2 TIMES DAILY
Qty: 100 EACH | Refills: 6 | Status: SHIPPED | OUTPATIENT
Start: 2022-03-22 | End: 2022-03-22

## 2022-03-22 ASSESSMENT — PATIENT HEALTH QUESTIONNAIRE - PHQ9: CLINICAL INTERPRETATION OF PHQ2 SCORE: 0

## 2022-03-22 ASSESSMENT — FIBROSIS 4 INDEX: FIB4 SCORE: 1.67

## 2022-03-22 NOTE — PROGRESS NOTES
Diabetes Focused Exam:    Chief Complaint   Patient presents with   • Diabetes Follow-up     4m fv   • Neck Pain     R side neck pain. X8m. Not taking anything for pain.   • Hypertension   • Dyslipidemia      Subjective:   HAI Reaves is a 77 y.o. female who presents for follow up of chronic conditions of diabetes mellitus, hypertension and hyperlipidemia. She indicates that she is feeling well and denies any symptoms referable to the above diagnoses. Specifically denies chest pain, palpitations, dyspnea, orthopnea, PND or peripheral edema. Also denies polyuria, polydipsia, urinary complaints, abdominal complaints, myalgias, numbness, weakness or other related symptoms.     Is having strong pain posterior neck occasionally.  There is marked muscle spasm.    She has well controlled diastolic heart failure.  Continues her diuretics and cardiology follow up.    Healing sore on left medial ankle.  No open ulcer.    Continues to have excellent voluntary weight loss using the phentermine, tolerating well, denies palpitations or increased insomnia.    The patient is taking ASA every day 81 mg and taking all other medications as prescribed. Patient denies any side effects of medication.  DM: A1c goal <7.5%  Glucose monitoring frequency: daily   Fasting sugars: , usually 120s-130s. Post-prandial sugars: not checking  Hypoglycemic episodes none noted  Diabetic complications: cardiovascular disease  ACR Albumin/Creatinine Ratio goal <30  HTN: Blood pressure goal <140/<80. Currently Rx ACE/ARB: Yes  Hyperlipidemia:Cholesterol goal LDL <100, total/HDL <5. Currently Rx Statin: Yes  Last eye exam 3/2021, needs to do again  Denies visual blurring, double vision, eye pain and floaters  Last monofilament foot exam: today   Denies foot pain, numbness, calluses, ulcers    See medications and orders placed in encounter report.  Past medical history, family history, social history reviewed and updated as documented  in medical record.    Current medications including changes today:  Current Outpatient Medications   Medication Sig Dispense Refill   • glucose blood (FREESTYLE LITE) strip 1 Strip by Other route 1 time a day as needed. 100 Strip 6   • FreeStyle Lancets Misc 1 Each 2 times a day. 100 Each 6   • metFORMIN (GLUCOPHAGE) 500 MG Tab TAKE 1 TABLET BY MOUTH TWICE A DAY WITH MEALS 180 Tablet 3   • phentermine (ADIPEX-P) 37.5 MG tablet TAKE 1/2 TABLETS BY MOUTH EVERY MORNING BEFORE BREAKFAST 15 Tablet 5   • TRADJENTA 5 MG Tab tablet TAKE 1 TABLET BY MOUTH EVERY DAY 90 Tablet 3   • losartan (COZAAR) 25 MG Tab TAKE 1 TABLET BY MOUTH EVERY DAY 90 Tablet 3   • topiramate (TOPAMAX) 25 MG Tab TAKE 1 TABLET BY MOUTH EVERY DAY 90 tablet 2   • tacrolimus (PROTOPIC) 0.1 % Ointment Apply  topically 2 times a day.     • Ciclopirox 0.77 % Gel Apply  topically as needed.     • potassium chloride ER (KLOR-CON 10) 10 MEQ tablet Take 1 tablet by mouth every day. Please call [phone] for a follow up visit for further refills. 90 tablet 3   • furosemide (LASIX) 40 MG Tab Take 1 tablet by mouth every morning. 90 tablet 3   • spironolactone (ALDACTONE) 25 MG Tab Take 1 tablet by mouth every day. 90 tablet 3   • atorvastatin (LIPITOR) 20 MG Tab Take 1 tablet by mouth every day. 90 tablet 3   • levothyroxine (SYNTHROID) 150 MCG Tab Take 1 tablet by mouth every morning on an empty stomach. 90 tablet 3   • risedronate (ACTONEL) 35 MG Tab      • betamethasone, augmented, (DIPROLENE) 0.05 % gel Apply 1 Application to affected area(s) every bedtime. 50 g 2   • letrozole (FEMARA) 2.5 MG Tab Take 2.5 mg by mouth every day.     • Fiber Powder Take 1 Package by mouth every day.     • Probiotic Product (PROBIOTIC DAILY PO) Take 1 Package by mouth every day.     • docusate sodium (COLACE) 100 MG Cap Take 200 mg by mouth every day.     • acetaminophen (TYLENOL) 500 MG Tab Take 500 mg by mouth 2 Times a Day.     • CALCIUM-MAGNESIUM-VITAMIN D PO Take 1 Tab  "by mouth every day.     • BIOTIN PO Take 1 Tab by mouth every day.     • COLLAGEN PO Take 1 Tab by mouth every day.     • Glucosamine-Chondroitin (GLUCOSAMINE CHONDR COMPLEX PO) Take 750 mg by mouth 2 Times a Day.     • Lutein 10 MG Tab Take 10 mg by mouth every evening.     • aspirin 81 MG tablet Take 81 mg by mouth every morning. 90 Tab 3   • coenzyme Q-10 30 MG capsule Take 1 Cap by mouth every day. 30 Cap 11   • ascorbic acid (VITAMIN C) 500 MG tablet Take 1 Tab by mouth every day. 30 Tab 11   • Multiple Vitamins-Minerals (ULTRA WOMENS PACK) Misc Take 1 Tab by mouth every day. 30 Each 11     No current facility-administered medications for this visit.     Allergies:   Allergies   Allergen Reactions   • Asa [Aspirin]      Stomach bleeding.   Tolerates low dose ASA   • Cough Syrup M [Cough Cold-Flu Relief] Swelling     Severe facial swelling   • Dextromethorphan Swelling     Facial swellling      Social History     Tobacco Use   • Smoking status: Never Smoker   • Smokeless tobacco: Never Used   Vaping Use   • Vaping Use: Never used   Substance Use Topics   • Alcohol use: No     Alcohol/week: 0.0 oz   • Drug use: No     Exercise: she is pushing herself  Health Maintenance/Immunizations: discussed, up to date    ROS  Pertinent  ROS findings as above.denies chest pain or shortness of breath.     Objective:     OBJECTIVE:  BP (!) 94/54 (BP Location: Right arm, Patient Position: Sitting, BP Cuff Size: Adult long)   Pulse (!) 116   Temp 36.5 °C (97.7 °F) (Temporal)   Resp 14   Ht 1.575 m (5' 2\")   Wt 94.8 kg (209 lb)   LMP  (LMP Unknown)   SpO2 97%   BMI 38.23 kg/m²  Body mass index is 38.23 kg/m². BMI: moderately obese  General: No apparent distress, conversant, cooperative and pleasant with the examination.  Psych: Alert and oriented x4, judgment and insight normal  Neck: No JVD or bruits, no adenopathy, supple  Thyroid: normal to inspection and palpation  Lungs: negative findings: normal respiratory rate and " rhythm, lungs clear to auscultation  Heart: negative findings: regular rate and rhythm, S1 normal, S2 normal, no murmurs, clicks, or gallops  Abdomen: Soft, nontender, no hepatosplenomegaly or masses, normal bowel sounds  Skin: No rashes, no cyanosis, no lesions or ulcers.  Has diabetic pigmentation.  Extremities: No cyanosis clubbing or edema.   Feet are examined, sensation normal.  DP pulses good. No ulcers or fissures noted.    POC labs in the past:   Lab Results   Component Value Date/Time    POCGLUCOSE 150 (H) 03/28/2019 07:07 AM          Last labs    Lab Results   Component Value Date/Time    CHOLSTRLTOT 98 (L) 03/15/2022 06:34 AM    LDL 75 05/15/2020 05:25 AM    HDL 35 (L) 03/15/2022 06:34 AM    TRIGLYCERIDE 84 03/15/2022 06:34 AM       Lab Results   Component Value Date/Time    SODIUM 140 03/15/2022 06:34 AM    SODIUM 139 03/11/2019 02:05 PM    POTASSIUM 4.2 03/15/2022 06:34 AM    POTASSIUM 3.7 03/11/2019 02:05 PM    GLUCOSE 145 (H) 03/15/2022 06:34 AM    GLUCOSE 133 (H) 03/11/2019 02:05 PM    BUNCREATRAT 41 (H) 03/15/2022 06:34 AM     Lab Results   Component Value Date/Time    HBA1C 7.3 (H) 03/15/2022 06:34 AM    HBA1C 7.5 (H) 11/10/2021 06:10 AM    HBA1C 7.1 (H) 07/20/2021 05:06 AM    HBA1C 7.0 (H) 03/11/2019 02:05 PM    HBA1C 8.7 (H) 09/08/2015 07:49 AM    HBA1C 8.4 (H) 05/05/2015 08:32 AM    HBA1C 9.4 (H) 01/06/2015 08:37 AM     Lab Results   Component Value Date/Time    MICRALB 49.0 03/15/2022 06:34 AM          Assessment/Plan:   Medications, refills, and referrals per orders.   1. Controlled type 2 diabetes mellitus with complication, without long-term current use of insulin (HCC)  glucose blood (FREESTYLE LITE) strip    FreeStyle Lancets Misc    Diabetic Monofilament Lower Extremity Exam    MICROALBUMIN CREAT RATIO URINE    HEMOGLOBIN A1C    Comp Metabolic Panel    Lipid Profile    TSH   2. DM type 2, goal HbA1c < 7.5% ([facility])  Diabetic Monofilament Lower Extremity Exam   3. Essential hypertension  Comp  Metabolic Panel    TSH    CBC WITHOUT DIFFERENTIAL   4. Dyslipidemia, goal LDL below 100  Comp Metabolic Panel    Lipid Profile    TSH    CBC WITHOUT DIFFERENTIAL   5. Moderate aortic insufficiency     6. Chronic diastolic congestive heart failure (HCC)     7. Spasm of cervical paraspinous muscle  Referral to Physical Therapy   8. Obesity, Class II, BMI 35-39.9     9. Hypothyroidism due to non-medication exogenous substances  TSH     DM2 A1c is at goal   Patient to monitor sugars: daily frequency  Discussed diet, exercise, disease management and weight loss goals.   Education and advise provided today:All medications, side effects and compliance (discussed carefully)  Annual eye examinations at Ophthalmology  Diabetic diet discussed in detail, written exchange diet given  Foot care discussed and Podiatry visits  Glycohemoglobin and other lab monitoring  Home glucose monitoring emphasized  Labs immediately prior to next visit  Long term diabetic complications  Low cholesterol diet, weight control and daily exercise    Followup:   Do labs and RTC 6 months, sooner should new symptoms or problems arise.

## 2022-05-16 ENCOUNTER — APPOINTMENT (RX ONLY)
Dept: URBAN - METROPOLITAN AREA CLINIC 6 | Facility: CLINIC | Age: 77
Setting detail: DERMATOLOGY
End: 2022-05-16

## 2022-05-16 DIAGNOSIS — L82.0 INFLAMED SEBORRHEIC KERATOSIS: ICD-10-CM

## 2022-05-16 DIAGNOSIS — L24 IRRITANT CONTACT DERMATITIS: ICD-10-CM | Status: INADEQUATELY CONTROLLED

## 2022-05-16 PROBLEM — L30.9 DERMATITIS, UNSPECIFIED: Status: ACTIVE | Noted: 2022-05-16

## 2022-05-16 PROCEDURE — ? COUNSELING

## 2022-05-16 PROCEDURE — ? PRESCRIPTION

## 2022-05-16 PROCEDURE — 17110 DESTRUCTION B9 LES UP TO 14: CPT

## 2022-05-16 PROCEDURE — ? LIQUID NITROGEN

## 2022-05-16 PROCEDURE — 99214 OFFICE O/P EST MOD 30 MIN: CPT | Mod: 25

## 2022-05-16 PROCEDURE — ? TREATMENT REGIMEN

## 2022-05-16 RX ORDER — FLUOCINONIDE 0.5 MG/G
1 CREAM TOPICAL BID
Qty: 30 | Refills: 1 | Status: ERX | COMMUNITY
Start: 2022-05-16

## 2022-05-16 RX ADMIN — FLUOCINONIDE 1: 0.5 CREAM TOPICAL at 00:00

## 2022-05-16 ASSESSMENT — LOCATION DETAILED DESCRIPTION DERM
LOCATION DETAILED: RIGHT DISTAL DORSAL FOREARM
LOCATION DETAILED: RIGHT PROXIMAL DORSAL FOREARM
LOCATION DETAILED: RIGHT MID-UPPER BACK
LOCATION DETAILED: LEFT DISTAL DORSAL FOREARM

## 2022-05-16 ASSESSMENT — LOCATION ZONE DERM
LOCATION ZONE: TRUNK
LOCATION ZONE: ARM

## 2022-05-16 ASSESSMENT — LOCATION SIMPLE DESCRIPTION DERM
LOCATION SIMPLE: RIGHT FOREARM
LOCATION SIMPLE: RIGHT UPPER BACK
LOCATION SIMPLE: LEFT FOREARM

## 2022-05-16 NOTE — PROCEDURE: LIQUID NITROGEN
Spray Paint Text: The liquid nitrogen was applied to the skin utilizing a spray paint frosting technique.
Show Topical Anesthesia Variable?: Yes
Consent: The patient's consent was obtained including but not limited to risks of crusting, scabbing, blistering, scarring, darker or lighter pigmentary change, recurrence, incomplete removal and infection.
Add 52 Modifier (Optional): no
Number Of Freeze-Thaw Cycles: 3 freeze-thaw cycles
Post-Care Instructions: I reviewed with the patient in detail post-care instructions. Patient is to wear sunprotection, and avoid picking at any of the treated lesions. Pt may apply Vaseline to crusted or scabbing areas.
Medical Necessity Information: It is in your best interest to select a reason for this procedure from the list below. All of these items fulfill various CMS LCD requirements except the new and changing color options.
Detail Level: Detailed
Duration Of Freeze Thaw-Cycle (Seconds): 10-15
Medical Necessity Clause: This procedure was medically necessary because the lesions that were treated were:

## 2022-05-16 NOTE — HPI: RASH
How Severe Is Your Rash?: mild
Is This A New Presentation, Or A Follow-Up?: Rash
Additional History: Patient reported using the gold bond diabetic lotion. It had solidified and she continued to apply to the arms.

## 2022-05-21 DIAGNOSIS — Z79.899 ON POTASSIUM WASTING DIURETIC THERAPY: ICD-10-CM

## 2022-05-21 DIAGNOSIS — R06.02 EXERTIONAL SHORTNESS OF BREATH: ICD-10-CM

## 2022-05-21 DIAGNOSIS — R91.8 OPACITIES OF BOTH LUNGS PRESENT ON CHEST X-RAY: ICD-10-CM

## 2022-05-23 RX ORDER — FUROSEMIDE 40 MG/1
TABLET ORAL
Qty: 90 TABLET | Refills: 3 | Status: SHIPPED | OUTPATIENT
Start: 2022-05-23 | End: 2023-06-28

## 2022-05-23 RX ORDER — POTASSIUM CHLORIDE 750 MG/1
TABLET, FILM COATED, EXTENDED RELEASE ORAL
Qty: 90 TABLET | Refills: 3 | Status: SHIPPED | OUTPATIENT
Start: 2022-05-23 | End: 2023-04-18

## 2022-06-20 ENCOUNTER — PHYSICAL THERAPY (OUTPATIENT)
Dept: PHYSICAL THERAPY | Facility: REHABILITATION | Age: 77
End: 2022-06-20
Attending: FAMILY MEDICINE
Payer: MEDICARE

## 2022-06-20 DIAGNOSIS — M62.838 SPASM OF CERVICAL PARASPINOUS MUSCLE: ICD-10-CM

## 2022-06-20 PROCEDURE — 97110 THERAPEUTIC EXERCISES: CPT

## 2022-06-20 PROCEDURE — 97161 PT EVAL LOW COMPLEX 20 MIN: CPT

## 2022-06-20 ASSESSMENT — ENCOUNTER SYMPTOMS
PAIN SCALE AT HIGHEST: 9
PAIN SCALE: 2
PAIN SCALE AT LOWEST: 0

## 2022-06-20 NOTE — OP THERAPY EVALUATION
Outpatient Physical Therapy  INITIAL EVALUATION    [facility] Physical Therapy [address]  [address]  [place] [address]  Phone:  [phone]  Fax:  [phone]    Date of Evaluation: 2022    Patient: Ragini Reaves  YOB: 1945  MRN: [id number]     Referring Provider: Chapito Marques M.D.  [address] [place] [address]  [place] [id number]   Referring Diagnosis Spasm of cervical paraspinous muscle [M62.838]     Time Calculation  Start time: 1400  Stop time: 1445 Time Calculation (min): 45 minutes     Chief Complaint: No chief complaint on file.    Visit Diagnoses     ICD-10-CM   1. Spasm of cervical paraspinous muscle  M62.838     Date of onset of impairment: 2020    Subjective:   History of Present Illness:     Mechanism of injury:  Posterior neck pain noted on PCP visit on 3/23/22.  Per reports he pain started April in , got progressively worse, believes that it was worsening with sewing quilts with 2020.  Denies any history of neck pain, denies history of TMJ.  Reports she gets HA, but unrelated.  Can get N/T into hands, but not correlated with pain.     Pain Description:  R suboccipital pain, that can go into upper trapezius and down into shoulder blade.  Pain Irritability:  Moderate  Pain AM/PM Pattern: Day    Better: Nothing  Tried - Hot water (short term)  Worse:  Sewing, sitting computer (4 hours)  Pain:     Current pain ratin    At best pain ratin    At worst pain ratin      Past Medical History:   Diagnosis Date   • Asthma    • Cataract 2019    early stages   • Chickenpox    • Dental disorder     upper denture   • Diabetes (HCC)     oral meds   • Dyslipidemia, goal LDL below 130    • Frequent urination    • History of malignant melanoma of back 2021   • Hypertension    • Hypothyroidism    • Lump of right breast    • Malignant melanoma (HCC) 2021   • Obesity    • Osteoarthritis    • Osteopenia    • Papillary carcinoma  of thyroid (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   • Ringing in ears    • Shortness of breath    • Sleep apnea     cpap   • Snoring    • Tonsillitis    • Urinary incontinence    • Uterine fibroid    • Wears glasses      Past Surgical History:   Procedure Laterality Date   • MASTECTOMY Right 3/28/2019    Procedure: MASTECTOMY - RE-EXCISION RIGHT BREAST BIOPSY SITE FOR MARGINS;  Surgeon: Karuna Hussein M.D.;  Location: SURGERY SAME DAY [facility];  Service: General   • BREAST BIOPSY Right 3/13/2019    Procedure: BREAST BIOPSY - WIRE LOCALIZED;  Surgeon: Karuna Hussein M.D.;  Location: SURGERY [facility];  Service: Gen Robotic   • COLONOSCOPY WITH BIOPSY  5/21/2013    Performed by Mauro Garcia M.D. at ENDOSCOPY [facility]   • THYROIDECTOMY  2000   • TONSILLECTOMY       Social History     Tobacco Use   • Smoking status: Never Smoker   • Smokeless tobacco: Never Used   Substance Use Topics   • Alcohol use: No     Alcohol/week: 0.0 oz     Family and Occupational History     Socioeconomic History   • Marital status: Single     Spouse name: Not on file   • Number of children: Not on file   • Years of education: Not on file   • Highest education level: Not on file   Occupational History   • Not on file       Objective     Postural Observations    Additional Postural Observation Details  Significant forward head, flat c/s, significant t/s kyphosis.     Active Range of Motion     Cervical Spine   Flexion: decreased  Extension: decreased  Retraction: decreased  Left lateral flexion: decreased (painful)  Right lateral flexion: decreased  Left rotation: decreased  Right rotation: decreased (painful)    Joint Play   Spine     Central [place]        C0-1: hypomobile       C2: hypomobile       C3: hypomobile       C4: hypomobile       C5: hypomobile       C6: hypomobile       C7: hypomobile       C8: hypomobile        Strength:      Additional Strength Details  N/T - anticipate significant limitations    General Comments     Spine Comments      Positive response to repeated c/s retraction - pain free right rotation        Therapeutic Exercises (CPT 46989):       Therapeutic Exercise Summary:   HEP  Chin tucks repeated every 30 minutes when sitting or sewing     Performed in supine, sitting, and standing today - 10 minutes    Education   Postural awareness and plan for postural strengthening      Time-based treatments/modalities:    Physical Therapy Timed Treatment Charges  Therapeutic exercise minutes (CPT 92729): 10 minutes      Assessment, Response and Plan:   Impairments: lacks appropriate home exercise program and limited mobility    Assessment details:  Patient demonstrate signs and symptoms that are consistent with cervical derangement, indicated by improvement in symptoms with cervical retraction.  Current impairments include strength, ROM, activity tolerance and pain which are all negatively impacting functional mobility.  Anticipate patient will progress well with physical therapy, if compiant.  Focus of therapy will be postural strengthening, DNF endurance, repeated c/s retractions as well as aerobic exercise starting with nustep.  Goals:   Short Term Goals:   I with HEP  Sewing a quilt without pain  Sit for > 4 hours without pain  Short term goal time span:  2-4 weeks      Long Term Goals:    I with HEP  Sewing a quilt without pain  Sit for > 4 hours without pain  Long term goal time span:  6-8 weeks    Plan:   Therapy options:  Physical therapy treatment to continue  Planned therapy interventions:  Neuromuscular Re-education (CPT 48262), Manual Therapy (CPT 24811), Gait Training (CPT 48000), E Stim Attended (CPT 26913), Therapeutic Activities (CPT 60563) and Therapeutic Exercise (CPT 30285)  Frequency:  2x week  Duration in weeks:  6  Duration in visits:  12  Discussed with:  Patient    Functional Assessment Used        Referring provider co-signature:  I have reviewed this plan of care and my co-signature certifies the need for  services.    Certification Period: 06/20/2022 to  08/08/22    Physician Signature: ________________________________ Date: ______________

## 2022-06-22 ENCOUNTER — PHYSICAL THERAPY (OUTPATIENT)
Dept: PHYSICAL THERAPY | Facility: REHABILITATION | Age: 77
End: 2022-06-22
Attending: FAMILY MEDICINE
Payer: MEDICARE

## 2022-06-22 DIAGNOSIS — M62.838 SPASM OF CERVICAL PARASPINOUS MUSCLE: ICD-10-CM

## 2022-06-22 PROCEDURE — 97110 THERAPEUTIC EXERCISES: CPT

## 2022-06-22 NOTE — OP THERAPY DAILY TREATMENT
"  Outpatient Physical Therapy  DAILY TREATMENT     [facility] Physical [address].  [address]  [place] [address]  Phone:  [phone]  Fax:  [phone]    Date: 06/22/2022    Patient: Ragini Reaves  YOB: 1945  MRN: [id number]     Time Calculation    Start time: 1315  Stop time: 1400 Time Calculation (min): 45 minutes     Chief Complaint: No chief complaint on file.    Visit #: 2    SUBJECTIVE:  \"reyes\"  Patient reports that she wears a Cpap, wanted to make sure that it was contributing factor.  (reviewed with PT, not a factor).  Patient reports performing exercises 6x/day for 10 reps.  Reports noticing she is more flexible and less painful.  Hasn't done any activities specific to test how it tolerate.      Sore into neck today, but not bad.  More feels worked.  Patient reports no issue with sitting since evaluation.    OBJECTIVE:  Forward head  Lack of R rotation (25 degrees at start, 45 degrees at end today)  Poor postural strength          Therapeutic Exercises (CPT 99850):       Therapeutic Exercise Summary:   Chin tucks repeated every 30 minutes when sitting or sewing (HEP)  SNAGS lower c/s to left rotation.  Supine DNF with BP cuff as cue      Eventual  W's   Pull Aparts   Scapular depressions against wall     Performed in supine, sitting, and standing today - 10 minutes    Education   Postural awareness and plan for postural strengthening      Time-based treatments/modalities:    Physical Therapy Timed Treatment Charges  Therapeutic exercise minutes (CPT 93393): 40 minutes    ASSESSMENT:   Focused on DNF activation, postural awareness, and SNAG for lower cervical mobility.  Progressing well.  Likely will add strengthening next visit if needed.     Significant time needed for education about popping sensation not being out of place, but just stiffness and lack of muscular endurance in her neck.  Patient verbalizes understanding.     PLAN/RECOMMENDATIONS: "   Plan for treatment: therapy treatment to continue next visit.  Planned interventions for next visit: continue with current treatment.

## 2022-06-24 ENCOUNTER — PHYSICAL THERAPY (OUTPATIENT)
Dept: PHYSICAL THERAPY | Facility: REHABILITATION | Age: 77
End: 2022-06-24
Attending: FAMILY MEDICINE
Payer: MEDICARE

## 2022-06-24 DIAGNOSIS — M62.838 SPASM OF CERVICAL PARASPINOUS MUSCLE: ICD-10-CM

## 2022-06-24 PROCEDURE — 97110 THERAPEUTIC EXERCISES: CPT

## 2022-06-24 NOTE — OP THERAPY DAILY TREATMENT
"  Outpatient Physical Therapy  DAILY TREATMENT     [facility] Physical Therapy [address].  [address]  [place] [address]  Phone:  [phone]  Fax:  [phone]    Date: 06/24/2022    Patient: Ragini Reaves  YOB: 1945  MRN: [id number]     Time Calculation    Start time: 1030  Stop time: 1115 Time Calculation (min): 45 minutes     Chief Complaint: No chief complaint on file.    Visit #: 3    SUBJECTIVE:  \"reyes\"  Reports that she has some ache into the left neck from the exercises, reports she has significant increased ROM, no longer painful like she was.  Reports she will try sewing on Sunday and see how it goes.     OBJECTIVE:  Forward head  Lack of R rotation WNL  Poor postural strength          Therapeutic Exercises (CPT 38663):       Therapeutic Exercise Summary:   Chin tucks reviewed - 4 sets   Chin tuck holds + Scapular squeezes against wall - 10 sets x 5-10 seconds.  Reports very sore into lower traps - HEP = 10 chin tuck reps while holding scapular squeezes hourly.  SNAGS lower c/s to B rotation (improves to B full rotation)  Supine DNF with BP cuff as cue - 15 minutes       Education   Postural awareness and plan for postural strengthening - reinforced HEP during sewing, set timer for chin tucks.       Time-based treatments/modalities:    Physical Therapy Timed Treatment Charges  Therapeutic exercise minutes (CPT 26842): 39 minutes    ASSESSMENT:   Progressed lower c/s mobility, continue DNF activation, progressed DNF and postural endurance, very deconditioned in lower trapezius.  Reinforced compliant with HEP and eventual strengthening, however not appropriate / tolerated well as this time.     PLAN/RECOMMENDATIONS:   Plan for treatment: therapy treatment to continue next visit.  Planned interventions for next visit: continue with current treatment.       "

## 2022-06-27 ENCOUNTER — APPOINTMENT (OUTPATIENT)
Dept: PHYSICAL THERAPY | Facility: REHABILITATION | Age: 77
End: 2022-06-27
Attending: FAMILY MEDICINE
Payer: MEDICARE

## 2022-06-28 DIAGNOSIS — E66.01 MORBID OBESITY WITH BMI OF 40.0-44.9, ADULT (HCC): ICD-10-CM

## 2022-06-28 DIAGNOSIS — R91.8 OPACITIES OF BOTH LUNGS PRESENT ON CHEST X-RAY: ICD-10-CM

## 2022-06-28 DIAGNOSIS — R06.02 EXERTIONAL SHORTNESS OF BREATH: ICD-10-CM

## 2022-06-28 RX ORDER — TOPIRAMATE 25 MG/1
25 TABLET ORAL DAILY
Qty: 90 TABLET | Refills: 2 | Status: SHIPPED | OUTPATIENT
Start: 2022-06-28 | End: 2023-03-13

## 2022-06-28 RX ORDER — SPIRONOLACTONE 25 MG/1
25 TABLET ORAL
Qty: 90 TABLET | Refills: 3 | Status: SHIPPED | OUTPATIENT
Start: 2022-06-28 | End: 2023-07-22

## 2022-06-29 ENCOUNTER — PHYSICAL THERAPY (OUTPATIENT)
Dept: PHYSICAL THERAPY | Facility: REHABILITATION | Age: 77
End: 2022-06-29
Attending: FAMILY MEDICINE
Payer: MEDICARE

## 2022-06-29 DIAGNOSIS — M62.838 SPASM OF CERVICAL PARASPINOUS MUSCLE: ICD-10-CM

## 2022-06-29 PROCEDURE — 97110 THERAPEUTIC EXERCISES: CPT

## 2022-06-29 NOTE — OP THERAPY DAILY TREATMENT
"  Outpatient Physical Therapy  DAILY TREATMENT     [facility] Physical Therapy [address]  [address]  [place] [address]  Phone:  [phone]  Fax:  [phone]    Date: 06/29/2022    Patient: Ragini Reaves  YOB: 1945  MRN: [id number]     Time Calculation    Start time: 0945  Stop time: 1015 Time Calculation (min): 30 minutes     Chief Complaint: No chief complaint on file.    Visit #: 4    SUBJECTIVE:  \"reyes\"  Sewing on Sunday - Patient denies any pain with it.  Reports most frustrating thing was bending a needle.  Reports a little sore today due to abnormal sleep schedule (usually stays up late and gets up early).     OBJECTIVE:  Forward head  Pain free cervical ROM, but limited, secondary to forward head  Poor postural strength    Cervical ROM  R rot - 45 degrees  L rot - 40 degrees  Flexion - to chin  Extension - 20 degrees          Therapeutic Exercises (CPT 05703):       Therapeutic Exercise Summary:   Chin tucks - 2 ses   Chin tucks with extensions - 10x   Chin tucks with rotations - 10x   Chin tuck holds + Scapular squeezes against wall - 10 sets x 5-10 seconds.  Reports very sore into lower traps - HEP = 10 chin tuck reps while holding scapular squeezes hourly.  SNAGS lower c/s to B rotation (improves to B full rotation)      Education   Postural awareness and plan for postural strengthening - reinforced HEP during sewing, set timer for chin tucks.       Time-based treatments/modalities:    Physical Therapy Timed Treatment Charges  Therapeutic exercise minutes (CPT 00357): 30 minutes    ASSESSMENT:   D/c as patient I with HEP and progressing ROM.  Likely return in 3 months for scapular strengthening, at this time ROM and cervical motor control should be primary focused.  Patient agreeable to plan.    PLAN/RECOMMENDATIONS:   D/c, with education for new script, and then likely return to PT in 3 months.       "

## 2022-06-29 NOTE — OP THERAPY DISCHARGE SUMMARY
Outpatient Physical Therapy  DISCHARGE SUMMARY NOTE      [facility] Therapy [address].  [address]  [place] [address]  Phone:  [phone]  Fax:  [phone]    Date of Visit: 06/29/2022    Patient: Ragini Reaves  YOB: 1945  MRN: [id number]     Referring Provider: Chapito Marques M.D.  [address] [place] [address]  [place] [address]   Referring Diagnosis Other muscle spasm [M62.838]     Your patient is being discharged from Physical Therapy with the following comments:   · Goals met    Comments:  Continue with I HEP for 3 months, then return to PT for scapular strengthening and progression of motor control drills.      Cheng Negrete, PT    Date: 6/29/2022

## 2022-07-06 ENCOUNTER — APPOINTMENT (OUTPATIENT)
Dept: PHYSICAL THERAPY | Facility: REHABILITATION | Age: 77
End: 2022-07-06
Attending: FAMILY MEDICINE
Payer: MEDICARE

## 2022-07-11 ENCOUNTER — APPOINTMENT (OUTPATIENT)
Dept: PHYSICAL THERAPY | Facility: REHABILITATION | Age: 77
End: 2022-07-11
Attending: FAMILY MEDICINE
Payer: MEDICARE

## 2022-07-13 ENCOUNTER — APPOINTMENT (OUTPATIENT)
Dept: PHYSICAL THERAPY | Facility: REHABILITATION | Age: 77
End: 2022-07-13
Attending: FAMILY MEDICINE
Payer: MEDICARE

## 2022-07-18 ENCOUNTER — APPOINTMENT (OUTPATIENT)
Dept: PHYSICAL THERAPY | Facility: REHABILITATION | Age: 77
End: 2022-07-18
Attending: FAMILY MEDICINE
Payer: MEDICARE

## 2022-07-20 ENCOUNTER — APPOINTMENT (OUTPATIENT)
Dept: PHYSICAL THERAPY | Facility: REHABILITATION | Age: 77
End: 2022-07-20
Attending: FAMILY MEDICINE
Payer: MEDICARE

## 2022-08-09 ENCOUNTER — APPOINTMENT (RX ONLY)
Dept: URBAN - METROPOLITAN AREA CLINIC 6 | Facility: CLINIC | Age: 77
Setting detail: DERMATOLOGY
End: 2022-08-09

## 2022-08-09 DIAGNOSIS — Z71.89 OTHER SPECIFIED COUNSELING: ICD-10-CM

## 2022-08-09 DIAGNOSIS — L57.0 ACTINIC KERATOSIS: ICD-10-CM

## 2022-08-09 DIAGNOSIS — R20.2 PARESTHESIA OF SKIN: ICD-10-CM

## 2022-08-09 DIAGNOSIS — L82.1 OTHER SEBORRHEIC KERATOSIS: ICD-10-CM

## 2022-08-09 DIAGNOSIS — L81.4 OTHER MELANIN HYPERPIGMENTATION: ICD-10-CM

## 2022-08-09 DIAGNOSIS — Z85.820 PERSONAL HISTORY OF MALIGNANT MELANOMA OF SKIN: ICD-10-CM

## 2022-08-09 DIAGNOSIS — D18.0 HEMANGIOMA: ICD-10-CM

## 2022-08-09 DIAGNOSIS — L21.8 OTHER SEBORRHEIC DERMATITIS: ICD-10-CM

## 2022-08-09 DIAGNOSIS — L98.8 OTHER SPECIFIED DISORDERS OF THE SKIN AND SUBCUTANEOUS TISSUE: ICD-10-CM

## 2022-08-09 DIAGNOSIS — L28.1 PRURIGO NODULARIS: ICD-10-CM

## 2022-08-09 DIAGNOSIS — D22 MELANOCYTIC NEVI: ICD-10-CM

## 2022-08-09 DIAGNOSIS — Z00.8 ENCOUNTER FOR OTHER GENERAL EXAMINATION: ICD-10-CM

## 2022-08-09 PROBLEM — D22.4 MELANOCYTIC NEVI OF SCALP AND NECK: Status: ACTIVE | Noted: 2022-08-09

## 2022-08-09 PROBLEM — D23.71 OTHER BENIGN NEOPLASM OF SKIN OF RIGHT LOWER LIMB, INCLUDING HIP: Status: ACTIVE | Noted: 2022-08-09

## 2022-08-09 PROBLEM — D22.5 MELANOCYTIC NEVI OF TRUNK: Status: ACTIVE | Noted: 2022-08-09

## 2022-08-09 PROBLEM — D18.01 HEMANGIOMA OF SKIN AND SUBCUTANEOUS TISSUE: Status: ACTIVE | Noted: 2022-08-09

## 2022-08-09 PROCEDURE — ? COUNSELING

## 2022-08-09 PROCEDURE — 17110 DESTRUCTION B9 LES UP TO 14: CPT

## 2022-08-09 PROCEDURE — ? SUNSCREEN TREATMENT REGIMEN

## 2022-08-09 PROCEDURE — 17000 DESTRUCT PREMALG LESION: CPT | Mod: 59

## 2022-08-09 PROCEDURE — 99213 OFFICE O/P EST LOW 20 MIN: CPT | Mod: 25

## 2022-08-09 PROCEDURE — ? REFERRAL CORRESPONDENCE

## 2022-08-09 PROCEDURE — ? TREATMENT REGIMEN

## 2022-08-09 PROCEDURE — ? SUNSCREEN RECOMMENDATIONS

## 2022-08-09 PROCEDURE — ? LIQUID NITROGEN

## 2022-08-09 ASSESSMENT — LOCATION ZONE DERM
LOCATION ZONE: ARM
LOCATION ZONE: TRUNK
LOCATION ZONE: HAND
LOCATION ZONE: LIP
LOCATION ZONE: SCALP
LOCATION ZONE: FACE

## 2022-08-09 ASSESSMENT — LOCATION DETAILED DESCRIPTION DERM
LOCATION DETAILED: RIGHT CENTRAL PARIETAL SCALP
LOCATION DETAILED: UPPER STERNUM
LOCATION DETAILED: LEFT CENTRAL FRONTAL SCALP
LOCATION DETAILED: LEFT SUPERIOR MEDIAL LOWER BACK
LOCATION DETAILED: RIGHT INFERIOR MEDIAL MALAR CHEEK
LOCATION DETAILED: RIGHT MID-UPPER BACK
LOCATION DETAILED: RIGHT INFERIOR VERMILION LIP
LOCATION DETAILED: RIGHT RADIAL DORSAL HAND
LOCATION DETAILED: RIGHT SUPERIOR MEDIAL BUCCAL CHEEK
LOCATION DETAILED: MIDDLE STERNUM
LOCATION DETAILED: LEFT RADIAL DORSAL HAND
LOCATION DETAILED: LOWER STERNUM
LOCATION DETAILED: LEFT POSTERIOR SHOULDER
LOCATION DETAILED: RIGHT POSTERIOR SHOULDER
LOCATION DETAILED: LEFT INFERIOR CENTRAL MALAR CHEEK

## 2022-08-09 ASSESSMENT — LOCATION SIMPLE DESCRIPTION DERM
LOCATION SIMPLE: RIGHT CHEEK
LOCATION SIMPLE: RIGHT SHOULDER
LOCATION SIMPLE: LEFT CHEEK
LOCATION SIMPLE: RIGHT HAND
LOCATION SIMPLE: SCALP
LOCATION SIMPLE: RIGHT UPPER BACK
LOCATION SIMPLE: CHEST
LOCATION SIMPLE: LEFT SHOULDER
LOCATION SIMPLE: LEFT LOWER BACK
LOCATION SIMPLE: LEFT HAND
LOCATION SIMPLE: RIGHT LIP

## 2022-08-09 NOTE — PROCEDURE: MIPS QUALITY
Event Note
Detail Level: Detailed
Quality 226: Preventive Care And Screening: Tobacco Use: Screening And Cessation Intervention: Patient screened for tobacco use and is an ex/non-smoker
Quality 111:Pneumonia Vaccination Status For Older Adults: Pneumococcal vaccine administered on or after patient’s 60th birthday and before the end of the measurement period
Quality 130: Documentation Of Current Medications In The Medical Record: Current Medications Documented
Quality 431: Preventive Care And Screening: Unhealthy Alcohol Use - Screening: Patient not identified as an unhealthy alcohol user when screened for unhealthy alcohol use using a systematic screening method

## 2022-08-09 NOTE — PROCEDURE: LIQUID NITROGEN
Detail Level: Detailed
Show Spray Paint Technique Variable?: Yes
Post-Care Instructions: I reviewed with the patient in detail post-care instructions. Patient is to wear sunprotection, and avoid picking at any of the treated lesions. Pt may apply Vaseline to crusted or scabbing areas.
Duration Of Freeze Thaw-Cycle (Seconds): 10-15
Add 52 Modifier (Optional): no
Medical Necessity Information: It is in your best interest to select a reason for this procedure from the list below. All of these items fulfill various CMS LCD requirements except the new and changing color options.
Spray Paint Text: The liquid nitrogen was applied to the skin utilizing a spray paint frosting technique.
Consent: The patient's consent was obtained including but not limited to risks of crusting, scabbing, blistering, scarring, darker or lighter pigmentary change, recurrence, incomplete removal and infection.
Medical Necessity Clause: This procedure was medically necessary because the lesions that were treated were:
Number Of Freeze-Thaw Cycles: 3 freeze-thaw cycles
Number Of Freeze-Thaw Cycles: 2 freeze-thaw cycles
Duration Of Freeze Thaw-Cycle (Seconds): 10

## 2022-08-09 NOTE — PROCEDURE: TREATMENT REGIMEN
Detail Level: Zone
Plan: Recommended the use of Sarna cream and returning to Physical therapy for spine mobility
Otc Regimen: Recommended the use of OTC Hydrocortisone

## 2022-08-28 DIAGNOSIS — E66.01 MORBID OBESITY WITH BMI OF 40.0-44.9, ADULT (HCC): ICD-10-CM

## 2022-08-29 RX ORDER — PHENTERMINE HYDROCHLORIDE 37.5 MG/1
TABLET ORAL
Qty: 15 TABLET | Refills: 0 | Status: SHIPPED | OUTPATIENT
Start: 2022-08-29 | End: 2022-09-19

## 2022-09-14 LAB
ALBUMIN SERPL-MCNC: 4.5 G/DL (ref 3.7–4.7)
ALBUMIN/CREAT UR: 24 MG/G CREAT (ref 0–29)
ALBUMIN/GLOB SERPL: 2.5 {RATIO} (ref 1.2–2.2)
ALP SERPL-CCNC: 93 IU/L (ref 44–121)
ALT SERPL-CCNC: 22 IU/L (ref 0–32)
AST SERPL-CCNC: 24 IU/L (ref 0–40)
BILIRUB SERPL-MCNC: 0.4 MG/DL (ref 0–1.2)
BUN SERPL-MCNC: 24 MG/DL (ref 8–27)
BUN/CREAT SERPL: 40 (ref 12–28)
CALCIUM SERPL-MCNC: 8.8 MG/DL (ref 8.7–10.3)
CHLORIDE SERPL-SCNC: 102 MMOL/L (ref 96–106)
CHOLEST SERPL-MCNC: 116 MG/DL (ref 100–199)
CO2 SERPL-SCNC: 24 MMOL/L (ref 20–29)
CREAT SERPL-MCNC: 0.6 MG/DL (ref 0.57–1)
CREAT UR-MCNC: 153.6 MG/DL
EGFRCR-CYS SERPLBLD CKD-EPI 2021: 92 ML/MIN/1.73
ERYTHROCYTE [DISTWIDTH] IN BLOOD BY AUTOMATED COUNT: 12.8 % (ref 11.7–15.4)
GLOBULIN SER CALC-MCNC: 1.8 G/DL (ref 1.5–4.5)
GLUCOSE SERPL-MCNC: 156 MG/DL (ref 65–99)
HBA1C MFR BLD: 7.8 % (ref 4.8–5.6)
HCT VFR BLD AUTO: 42.9 % (ref 34–46.6)
HDLC SERPL-MCNC: 45 MG/DL
HGB BLD-MCNC: 14.2 G/DL (ref 11.1–15.9)
LABORATORY COMMENT REPORT: NORMAL
LDLC SERPL CALC-MCNC: 57 MG/DL (ref 0–99)
MCH RBC QN AUTO: 28.3 PG (ref 26.6–33)
MCHC RBC AUTO-ENTMCNC: 33.1 G/DL (ref 31.5–35.7)
MCV RBC AUTO: 86 FL (ref 79–97)
MICROALBUMIN UR-MCNC: 36.2 UG/ML
NRBC BLD AUTO-RTO: NORMAL %
PLATELET # BLD AUTO: 254 X10E3/UL (ref 150–450)
POTASSIUM SERPL-SCNC: 4.2 MMOL/L (ref 3.5–5.2)
PROT SERPL-MCNC: 6.3 G/DL (ref 6–8.5)
RBC # BLD AUTO: 5.01 X10E6/UL (ref 3.77–5.28)
SODIUM SERPL-SCNC: 139 MMOL/L (ref 134–144)
TRIGL SERPL-MCNC: 64 MG/DL (ref 0–149)
TSH SERPL DL<=0.005 MIU/L-ACNC: 1.6 UIU/ML (ref 0.45–4.5)
VLDLC SERPL CALC-MCNC: 14 MG/DL (ref 5–40)
WBC # BLD AUTO: 7.4 X10E3/UL (ref 3.4–10.8)

## 2022-09-19 ENCOUNTER — OFFICE VISIT (OUTPATIENT)
Dept: MEDICAL GROUP | Facility: MEDICAL CENTER | Age: 77
End: 2022-09-19
Payer: MEDICARE

## 2022-09-19 VITALS
DIASTOLIC BLOOD PRESSURE: 56 MMHG | SYSTOLIC BLOOD PRESSURE: 128 MMHG | TEMPERATURE: 97.8 F | WEIGHT: 201.5 LBS | OXYGEN SATURATION: 96 % | HEIGHT: 62 IN | HEART RATE: 92 BPM | BODY MASS INDEX: 37.08 KG/M2

## 2022-09-19 DIAGNOSIS — M19.011 PRIMARY OSTEOARTHRITIS OF BOTH SHOULDERS: ICD-10-CM

## 2022-09-19 DIAGNOSIS — D05.11 DUCTAL CARCINOMA IN SITU (DCIS) OF RIGHT BREAST: ICD-10-CM

## 2022-09-19 DIAGNOSIS — J45.30 MILD PERSISTENT ASTHMA, UNCOMPLICATED: ICD-10-CM

## 2022-09-19 DIAGNOSIS — E11.9 DM TYPE 2, GOAL HBA1C < 7.5% (HCC): ICD-10-CM

## 2022-09-19 DIAGNOSIS — I35.1 NONRHEUMATIC AORTIC VALVE INSUFFICIENCY: ICD-10-CM

## 2022-09-19 DIAGNOSIS — G47.33 OBSTRUCTIVE SLEEP APNEA: ICD-10-CM

## 2022-09-19 DIAGNOSIS — E11.8 CONTROLLED TYPE 2 DIABETES MELLITUS WITH COMPLICATION, WITHOUT LONG-TERM CURRENT USE OF INSULIN (HCC): ICD-10-CM

## 2022-09-19 DIAGNOSIS — Z85.820 HISTORY OF MALIGNANT MELANOMA OF BACK: ICD-10-CM

## 2022-09-19 DIAGNOSIS — E66.9 OBESITY, CLASS II, BMI 35-39.9: ICD-10-CM

## 2022-09-19 DIAGNOSIS — I10 ESSENTIAL HYPERTENSION: ICD-10-CM

## 2022-09-19 DIAGNOSIS — I50.32 CHRONIC DIASTOLIC CONGESTIVE HEART FAILURE (HCC): ICD-10-CM

## 2022-09-19 DIAGNOSIS — Z85.850 HISTORY OF THYROID CANCER: ICD-10-CM

## 2022-09-19 DIAGNOSIS — Z86.006 HISTORY OF MELANOMA IN SITU: ICD-10-CM

## 2022-09-19 DIAGNOSIS — K76.0 FATTY LIVER: ICD-10-CM

## 2022-09-19 DIAGNOSIS — H26.9 BILATERAL INCIPIENT CATARACTS: ICD-10-CM

## 2022-09-19 DIAGNOSIS — E78.5 DYSLIPIDEMIA, GOAL LDL BELOW 100: ICD-10-CM

## 2022-09-19 DIAGNOSIS — E89.0 POSTSURGICAL HYPOTHYROIDISM: ICD-10-CM

## 2022-09-19 DIAGNOSIS — M85.89 OSTEOPENIA OF MULTIPLE SITES: ICD-10-CM

## 2022-09-19 DIAGNOSIS — Z23 NEED FOR VACCINATION: ICD-10-CM

## 2022-09-19 DIAGNOSIS — M62.838 SPASM OF CERVICAL PARASPINOUS MUSCLE: ICD-10-CM

## 2022-09-19 DIAGNOSIS — Z00.00 MEDICARE ANNUAL WELLNESS VISIT, SUBSEQUENT: ICD-10-CM

## 2022-09-19 DIAGNOSIS — M19.012 PRIMARY OSTEOARTHRITIS OF BOTH SHOULDERS: ICD-10-CM

## 2022-09-19 PROBLEM — R09.89 ABNORMAL PULMONARY FINDING: Status: RESOLVED | Noted: 2017-12-21 | Resolved: 2022-09-19

## 2022-09-19 PROCEDURE — G0008 ADMIN INFLUENZA VIRUS VAC: HCPCS | Performed by: FAMILY MEDICINE

## 2022-09-19 PROCEDURE — G0439 PPPS, SUBSEQ VISIT: HCPCS | Performed by: FAMILY MEDICINE

## 2022-09-19 PROCEDURE — 90662 IIV NO PRSV INCREASED AG IM: CPT | Performed by: FAMILY MEDICINE

## 2022-09-19 RX ORDER — ATORVASTATIN CALCIUM 20 MG/1
20 TABLET, FILM COATED ORAL DAILY
Qty: 90 TABLET | Refills: 3 | Status: SHIPPED | OUTPATIENT
Start: 2022-09-19 | End: 2023-09-25 | Stop reason: SDUPTHER

## 2022-09-19 RX ORDER — LEVOTHYROXINE SODIUM 0.15 MG/1
150 TABLET ORAL
Qty: 90 TABLET | Refills: 3 | Status: SHIPPED | OUTPATIENT
Start: 2022-09-19 | End: 2023-11-27

## 2022-09-19 RX ORDER — LOSARTAN POTASSIUM 25 MG/1
25 TABLET ORAL
Qty: 90 TABLET | Refills: 3 | Status: SHIPPED | OUTPATIENT
Start: 2022-09-19 | End: 2023-09-17

## 2022-09-19 ASSESSMENT — PATIENT HEALTH QUESTIONNAIRE - PHQ9: CLINICAL INTERPRETATION OF PHQ2 SCORE: 0

## 2022-09-19 ASSESSMENT — FIBROSIS 4 INDEX: FIB4 SCORE: 1.55

## 2022-09-19 ASSESSMENT — ENCOUNTER SYMPTOMS: GENERAL WELL-BEING: GOOD

## 2022-09-19 ASSESSMENT — ACTIVITIES OF DAILY LIVING (ADL): BATHING_REQUIRES_ASSISTANCE: 0

## 2022-09-19 NOTE — LETTER
September 19, 2022        Patient: Ragini Reaves   YOB: 1945   Date of Visit: 9/19/2022           To Whom It May Concern:    It is my medical opinion that Ragini Reaves has physical limitations due to arthritis in the back and hips.  She would benefit medically from a designated parking spot close to the front door.    If you have any questions or concerns, please don't hesitate to call.    Sincerely,          Chapito Marques M.D.    [address] [address]  [phone] (Phone)  [phone] (Fax)

## 2022-09-19 NOTE — PROGRESS NOTES
Annual Health Assessment Questions:    1.  Are you currently engaging in any exercise or physical activity? Yes    2.  How would you describe your mood or emotional well-being today? good    3.  Have you had any falls in the last year? No    4.  Have you noticed any problems with your balance or had difficulty walking? No    5.  In the last six months have you experienced any leakage of urine? Yes-Nothing unusual    6. DPA/Advanced Directive: Patient has Advanced Directive on file.

## 2022-09-19 NOTE — PROGRESS NOTES
Chief Complaint   Patient presents with    Annual Wellness Visit       HPI:  Ragini Reaves is a 77 y.o. here for Medicare Annual Wellness Visit     Patient Active Problem List    Diagnosis Date Noted    Nonrheumatic aortic valve insufficiency 09/19/2022    Spasm of cervical paraspinous muscle 03/22/2022    Obesity, Class II, BMI 35-39.9 11/18/2021    History of malignant melanoma of back 11/18/2021    DM type 2, goal HbA1c < 7.5% (HCC) 11/18/2021    History of melanoma in situ 06/24/2019    Ductal carcinoma in situ (DCIS) of right breast 03/25/2019    Chronic diastolic congestive heart failure (HCC) 08/22/2018    Obstructive sleep apnea 02/01/2018    Fatty liver 05/02/2016    Bilateral incipient cataracts 01/12/2016    Mild persistent asthma, uncomplicated 03/03/2014    Primary osteoarthritis of both shoulders     Osteopenia of multiple sites     Essential hypertension     Dyslipidemia, goal LDL below 100     History of thyroid cancer 06/21/2012    Controlled type 2 diabetes mellitus with complication, without long-term current use of insulin ([facility])     Postsurgical hypothyroidism        Current Outpatient Medications   Medication Sig Dispense Refill    atorvastatin (LIPITOR) 20 MG Tab Take 1 Tablet by mouth every day. MUST BE SEEN FOR FURTHER REFILLS 90 Tablet 3    losartan (COZAAR) 25 MG Tab Take 1 Tablet by mouth every day. 90 Tablet 3    levothyroxine (SYNTHROID) 150 MCG Tab Take 1 Tablet by mouth every morning on an empty stomach. 90 Tablet 3    topiramate (TOPAMAX) 25 MG Tab TAKE 1 TABLET BY MOUTH EVERY DAY 90 Tablet 2    spironolactone (ALDACTONE) 25 MG Tab TAKE 1 TABLET BY MOUTH EVERY DAY 90 Tablet 3    KLOR-CON 10 10 MEQ tablet TAKE 1 TABLET BY MOUTH EVERY DAY 90 Tablet 3    furosemide (LASIX) 40 MG Tab TAKE 1 TABLET BY MOUTH EVERY DAY IN THE MORNING 90 Tablet 3    glucose blood (FREESTYLE LITE) strip 1 Strip by Other route 1 time a day as needed. 100 Strip 6    FreeStyle Lancets Misc USE TWICE A   Each 6    metFORMIN (GLUCOPHAGE) 500 MG Tab TAKE 1 TABLET BY MOUTH TWICE A DAY WITH MEALS 180 Tablet 3    TRADJENTA 5 MG Tab tablet TAKE 1 TABLET BY MOUTH EVERY DAY 90 Tablet 3    tacrolimus (PROTOPIC) 0.1 % Ointment Apply  topically 2 times a day.      Ciclopirox 0.77 % Gel Apply  topically as needed.      risedronate (ACTONEL) 35 MG Tab Take 35 mg by mouth every 7 days.      betamethasone, augmented, (DIPROLENE) 0.05 % gel Apply 1 Application to affected area(s) every bedtime. 50 g 2    letrozole (FEMARA) 2.5 MG Tab Take 2.5 mg by mouth every day.      Fiber Powder Take 1 Package by mouth every day.      Probiotic Product (PROBIOTIC DAILY PO) Take 1 Package by mouth every day.      docusate sodium (COLACE) 100 MG Cap Take 300 mg by mouth every day.      acetaminophen (TYLENOL) 500 MG Tab Take 500 mg by mouth 2 Times a Day.      CALCIUM-MAGNESIUM-VITAMIN D PO Take 1 Tab by mouth every day.      BIOTIN PO Take 1 Tab by mouth every day.      COLLAGEN PO Take 1 Tab by mouth every day.      Glucosamine-Chondroitin (GLUCOSAMINE CHONDR COMPLEX PO) Take 750 mg by mouth 2 Times a Day.      Lutein 10 MG Tab Take 10 mg by mouth every evening.      aspirin 81 MG tablet Take 81 mg by mouth every morning. 90 Tab 3    coenzyme Q-10 30 MG capsule Take 1 Cap by mouth every day. 30 Cap 11    ascorbic acid (VITAMIN C) 500 MG tablet Take 1 Tab by mouth every day. 30 Tab 11    Multiple Vitamins-Minerals (ULTRA WOMENS PACK) Misc Take 1 Tab by mouth every day. 30 Each 11    phentermine (ADIPEX-P) 37.5 MG tablet TAKE 1/2 TABLETS BY MOUTH EVERY MORNING BEFORE BREAKFAST FOR 30 DAYS E66.01 15 Tablet 5     No current facility-administered medications for this visit.          Current supplements as per medication list.     Allergies: Asa [aspirin], Cough syrup m [cough cold-flu relief], and Dextromethorphan    Current social contact/activities: she is active with friends at her community.     She  reports that she has never smoked.  She has never used smokeless tobacco. She reports that she does not drink alcohol and does not use drugs.  Counseling given: Yes      DPA/Advanced Directive:  Patient does not have an Advanced Directive.  A packet and workshop information was given on Advanced Directives.    ROS:    Gait: Uses no assistive device  Ostomy: No  Other tubes: No  Amputations: No  Chronic oxygen use: No  uses her CPAP  Last eye exam: May this year.    Wears hearing aids: No   : Reports urinary leakage during the last 6 months that has somewhat interfered with their daily activities or sleep.  Stable per patient    Screening:  Discussed obtaining bivalent COVID vaccine.  Plan A1c in March.  No further screening needed per GI letter.  She will be due for repeat bone density testing in February 2023.    Depression Screening  Little interest or pleasure in doing things?  0 - not at all  Feeling down, depressed , or hopeless? 0 - not at all  Patient Health Questionnaire Score: 0     If depressive symptoms identified deferred to follow up visit unless specifically addressed in assessment and plan.    Interpretation of PHQ-9 Total Score   Score Severity   1-4 No Depression   5-9 Mild Depression   10-14 Moderate Depression   15-19 Moderately Severe Depression   20-27 Severe Depression    Screening for Cognitive Impairment  Three Minute Recall (daughter, heaven, mountain) 2/3    Jose clock face with all 12 numbers and set the hands to show 10 past 11.  Yes    Cognitive concerns identified deferred for follow up unless specifically addressed in assessment and plan.    Fall Risk Assessment  Has the patient had two or more falls in the last year or any fall with injury in the last year?  No    Safety Assessment  Throw rugs on floor.  No  Handrails on all stairs.  Yes  Good lighting in all hallways.  Yes  Difficulty hearing.  No  Patient counseled about all safety risks that were identified.    Functional Assessment ADLs  Are there any barriers  preventing you from cooking for yourself or meeting nutritional needs?  No.    Are there any barriers preventing you from driving safely or obtaining transportation?  No.    Are there any barriers preventing you from using a telephone or calling for help?  No.    Are there any barriers preventing you from shopping?  No.    Are there any barriers preventing you from taking care of your own finances?  No.    Are there any barriers preventing you from managing your medications?  No.    Are there any barriers preventing you from showering, bathing or dressing yourself?  No.    Are you currently engaging in any exercise or physical activity?  Yes.  Occasionally   What is your perception of your health?  Good.      Health Maintenance Summary            Overdue - COVID-19 Vaccine (4 - Booster for Pfizer series) Overdue since 1/26/2022 11/03/2021  Imm Admin: PFIZER PURPLE CAP SARS-COV-2 VACCINATION (12+)    02/01/2021  Imm Admin: PFIZER PURPLE CAP SARS-COV-2 VACCINATION (12+)    01/11/2021  Imm Admin: PFIZER PURPLE CAP SARS-COV-2 VACCINATION (12+)              Postponed - RETINAL SCREENING (Yearly) Postponed until 9/19/2023 02/24/2021  REFERRAL FOR RETINAL SCREENING EXAM    12/17/2019  REFERRAL FOR RETINAL SCREENING EXAM    07/03/2018  REFERRAL FOR RETINAL SCREENING EXAM    07/03/2018  Done - Dr. Carey    03/10/2017  REFERRAL FOR RETINAL SCREENING EXAM    Only the first 5 history entries have been loaded, but more history exists.              IMM DTaP/Tdap/Td Vaccine (2 - Td or Tdap) Next due on 11/6/2022 11/06/2012  Imm Admin: Tdap Vaccine              Ordered - A1C SCREENING (Every 6 Months) Ordered on 9/19/2022 09/13/2022  HEMOGLOBIN A1C    03/15/2022  HEMOGLOBIN A1C    11/10/2021  HEMOGLOBIN A1C    07/20/2021  HEMOGLOBIN A1C    03/10/2021  HEMOGLOBIN A1C    Only the first 5 history entries have been loaded, but more history exists.              DIABETES MONOFILAMENT / LE EXAM (Yearly) Next due on  3/22/2023      03/22/2022  Diabetic Monofilament Lower Extremity Exam    01/15/2021  Done - Dr. Bunny Estrada    12/23/2019  Done - Dr. Estrada    01/04/2019  Diabetic Monofilament Lower Extremity Exam    09/11/2017  Diabetic Monofilament LE Exam    Only the first 5 history entries have been loaded, but more history exists.              Ordered - FASTING LIPID PROFILE (Yearly) Ordered on 9/19/2022 09/13/2022  LIPID PANEL    03/15/2022  LIPID PANEL    11/10/2021  LIPID PANEL    07/20/2021  LIPID PANEL    03/10/2021  LIPID PANEL    Only the first 5 history entries have been loaded, but more history exists.              URINE ACR / MICROALBUMIN (Yearly) Next due on 9/13/2023 09/13/2022  MICROALB/CREAT RATIO RAND. UR    03/15/2022  MICROALB/CREAT RATIO RAND. UR    07/20/2021  MICROALB/CREAT RATIO RAND. UR    09/21/2020  MICROALBUM. UR RANDOM    09/09/2019  MICROALB/CREAT RATIO RAND. UR    Only the first 5 history entries have been loaded, but more history exists.              Ordered - SERUM CREATININE (Yearly) Ordered on 9/19/2022 09/13/2022  COMP METABOLIC PANEL    03/15/2022  COMP METABOLIC PANEL    11/10/2021  COMP METABOLIC PANEL    07/20/2021  COMP METABOLIC PANEL    03/10/2021  COMP METABOLIC PANEL    Only the first 5 history entries have been loaded, but more history exists.              Annual Wellness Visit (Every 366 Days) Next due on 9/20/2023 09/19/2022  Visit Dx: Medicare annual wellness visit, subsequent    08/09/2021  Level of Service: [place] ANNUAL WELLNESS VISIT-INCLUDES PPPS SUBSEQUE*    08/05/2019  Done - Completed by Dr. Otis Escoto    12/21/2017  Visit Dx: Medicare annual wellness visit, subsequent    07/29/2015  Done              Ordered - MAMMOGRAM (Every 2 Years) Ordered on 7/15/2022      02/07/2022  MA-SCREENING MAMMO BILAT W/TOMOSYNTHESIS W/CAD    02/04/2021  MA-SCREENING MAMMO BILAT W/TOMOSYNTHESIS W/CAD    02/20/2020  MA-DIAGNOSTIC MAMMO BILAT W/TOMOSYNTHESIS W/CAD     01/24/2019  MA-MAMMO DIAGNOSTIC UNILAT W/MAYRA W/CAD RIGHT    01/15/2019  MA-SCREENING MAMMO BILAT W/TOMOSYNTHESIS W/CAD    Only the first 5 history entries have been loaded, but more history exists.              BONE DENSITY (Every 5 Years) Next due on 2/4/2026 02/04/2021  DS-BONE DENSITY STUDY (DEXA)    01/15/2019  DS-BONE DENSITY STUDY (DEXA)    08/13/2013  DS-BONE DENSITY STUDY (DEXA)    04/11/2008  DS-BONE DENSITY STUDY (DEXA)              IMM PNEUMOCOCCAL VACCINE: 65+ Years (Series Information) Completed      01/12/2016  Imm Admin: Pneumococcal Conjugate Vaccine (Prevnar/PCV-13)    12/06/2013  Imm Admin: Pneumococcal polysaccharide vaccine (PPSV-23)    11/08/2008  Imm Admin: Pneumococcal Vaccine (UF) - HISTORICAL DATA              IMM INFLUENZA (Series Information) Completed      09/19/2022  Imm Admin: Influenza Vaccine Adult HD    10/25/2021  Imm Admin: Influenza Vaccine Adult HD    09/29/2020  Imm Admin: Influenza Vaccine Quad Inj (Pf)    09/12/2019  Imm Admin: Influenza Vaccine Adult HD    10/05/2018  Imm Admin: Influenza Vaccine Adult HD    Only the first 5 history entries have been loaded, but more history exists.              IMM MENINGOCOCCAL ACWY VACCINE (Series Information) Aged Out      No completion history exists for this topic.              Discontinued - PAP SMEAR  Discontinued      08/02/2012  PAP IG (IMAGE GUIDED)              Discontinued - COLORECTAL CANCER SCREENING  Discontinued      05/21/2013  COLONOSCOPY (Previously completed)    05/21/2013  Surgical Procedure: COLONOSCOPY WITH BIOPSY    12/03/2012  COLONOSCOPY (Patient Declined)              Discontinued - IMM HEP B VACCINE  Discontinued      No completion history exists for this topic.              Discontinued - IMM ZOSTER VACCINES  Discontinued      No completion history exists for this topic.              Discontinued - HEPATITIS C SCREENING  Discontinued      No completion history exists for this topic.                     Patient Care Team:  Chapito Marques M.D. as PCP - General (Family Medicine)  Tyson Carey M.D. as Consulting Physician (Ophthalmology)  Frandy Pereira M.D. as Consulting Physician (Cardiovascular Disease (Cardiology))  Jhonatan Pierson M.D. as Consulting Physician (Radiation Oncology)  Bunny Estrada D.P.M. as Consulting Physician (Podiatry)  Shelli Lambert M.D. as Consulting Physician (Dermatology)  [employer] (DME Supplier)        Social History     Tobacco Use    Smoking status: Never    Smokeless tobacco: Never   Vaping Use    Vaping Use: Never used   Substance Use Topics    Alcohol use: No     Alcohol/week: 0.0 oz    Drug use: No     Family History   Problem Relation Age of Onset    Lung Disease Brother     Cancer Mother         pelvic    Stroke Maternal Grandmother      She  has a past medical history of Asthma, Cataract (03/11/2019), Chickenpox, Dental disorder, Diabetes (HCC), Dyslipidemia, goal LDL below 130, Frequent urination, History of malignant melanoma of back (11/18/2021), Hypertension, Hypothyroidism, Lump of right breast, Malignant melanoma (HCC) (8/9/2021), Microalbuminuria (5/2/2016), Obesity, Osteoarthritis, Osteopenia, 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   Past Surgical History:   Procedure Laterality Date    MASTECTOMY Right 3/28/2019    Procedure: MASTECTOMY - RE-EXCISION RIGHT BREAST BIOPSY SITE FOR MARGINS;  Surgeon: Karuna Hussein M.D.;  Location: SURGERY SAME DAY [facility];  Service: General    BREAST BIOPSY Right 3/13/2019    Procedure: BREAST BIOPSY - WIRE LOCALIZED;  Surgeon: Karuna Hussein M.D.;  Location: SURGERY [facility];  Service: Gen Robotic    COLONOSCOPY WITH BIOPSY  5/21/2013    Performed by Mauro Garcia M.D. at ENDOSCOPY [facility]    THYROIDECTOMY  2000    TONSILLECTOMY         Exam:   /56  "(BP Location: Right arm, Patient Position: Sitting, BP Cuff Size: Large adult)   Pulse 92   Temp 36.6 °C (97.8 °F) (Temporal)   Ht 1.575 m (5' 2\")   Wt 91.4 kg (201 lb 8 oz)   SpO2 96%  Body mass index is 36.85 kg/m².    Hearing fair.    Dentition not examined as patient, physician and staff all wearing masks.  Alert, oriented in no acute distress.  Eye contact is good, speech goal directed, affect calm  HEENT:   EOMI, PERRLA.     Neck:       Full range of motion, moderate cervical kyphosis. No JVD or carotid bruits appreciated. No cervical adenopathy appreciated. No thyromegaly or neck masses appreciated.  No retractions appreciated.  Lungs:     Clear to auscultation A&P with good air movement.   Heart:      Regular rate and rhythm normal S1 and S2 without murmur appreciated.  Strong and symmetric radial and DP pulses.  Abd:        Soft, bowel sounds positive, nontender. No bloating noted. No hepatosplenomegaly or mass appreciated. No pulsatile mass appreciated.  Ext:         Extremities show symmetric and full range of motion with normal strength. No cyanosis or clubbing appreciated. No peripheral edema appreciated.  Neuro:    Gait is normal. Patient is lucid, fluent and appropriate. No significant tremor appreciated.  Skin:       Exhibit no rashes, pigmented lesions or ulcerations.      Assessment and Plan. The following treatment and monitoring plan is recommended:      1. Medicare annual wellness visit, subsequent  Her medical problems are generally stable    2. Controlled type 2 diabetes mellitus with complication, without long-term current use of insulin ([facility])/DM type 2, goal HbA1c < 7.5% ([facility])  Her diabetes is currently not in ideal control.  She feels she has slipped a little bit on her diet though she is still continuing to reduce portions and lose weight.  She feels the phentermine continues to be very helpful.  Denies any palpitations or insomnia.  She has been much more active walking into her " complex.  Patient will go back to her assigned diet.  Follow-up lab orders discussed and placed.  Overall stable problem.  - HEMOGLOBIN A1C; Future  - Comp Metabolic Panel; Future  - Lipid Profile; Future  - TSH; Future    3. Essential hypertension  HTN - Chronic condition stable. Currently taking all meds as directed.   She is taking baby aspirin daily.   She is monitoring BP at home.  She monitors intermittently.  Blood pressure has generally been 1 10-1 25 over 50s.  Denies symptoms low BP: light-headed, tunnel-vision, unusual fatigue.   Denies symptoms high BP:pounding headache, visual changes, palpitations, flushed face.   Denies medicine side effects: unusual fatigue, slow heartbeat, foot/leg swelling, cough.  - losartan (COZAAR) 25 MG Tab; Take 1 Tablet by mouth every day.  Dispense: 90 Tablet; Refill: 3  - Comp Metabolic Panel; Future  - Lipid Profile; Future    4. Dyslipidemia, goal LDL below 100  Patient denies chest pain, chest pressure, palpitations or exertional shortness of breath. Patient denies muscle aches or muscle weakness from the atorvastatin medication. Patient is a never smoker. Patient takes 81 mg aspirin daily. Patient has no history of myocardial infarction, stroke or PVD.  The problem is clinically stable.  - atorvastatin (LIPITOR) 20 MG Tab; Take 1 Tablet by mouth every day. MUST BE SEEN FOR FURTHER REFILLS  Dispense: 90 Tablet; Refill: 3  - Comp Metabolic Panel; Future  - Lipid Profile; Future    5. Chronic diastolic congestive heart failure (HCC)/Non-rheumatic aortic valve insufficiency  Patient has a diagnosis of chronic diastolic congestive heart failure.  However, her most recent echocardiogram in November last year does not show any significant heart failure.  Her aortic insufficiency remains moderate.  Stable problems.    6. Fatty liver  Patient does have fatty liver documented via ultrasound  but does not have elevated liver enzymes so does not have a diagnosis of steatohepatitis.   Follow-up lab orders discussed and placed.  Patient avoids alcohol.  Stable problem.  - Comp Metabolic Panel; Future  - CBC WITHOUT DIFFERENTIAL; Future    7. Postsurgical hypothyroidism/History of thyroid cancer  Patient reports good energy level on the medication. Patient denies insomnia, tremor or change in appetite.  Patient is taking the medication on an empty stomach in the morning and waiting at least 30 minutes before eating.  Last TSH in a few days ago was at target.   Follow-up orders discussed and placed.  Stable problem.  - levothyroxine (SYNTHROID) 150 MCG Tab; Take 1 Tablet by mouth every morning on an empty stomach.  Dispense: 90 Tablet; Refill: 3  - TSH; Future    8. Mild persistent asthma, uncomplicated  Stable. Currently using inhalers as prescribed.  She denies side effects.  Denies recent or current exacerbation.   Denies current shortness of breath, chest pain, or cough.  She is not on oxygen therapy.    9. Ductal carcinoma in situ (DCIS) of right breast  Patient is currently being treated for ductal carcinoma in situ of the right breast considered stage 0.  She is on letrozole at the direction of her oncologist.  Denies adverse symptoms.  Stable problem.    10. Osteopenia of multiple sites  Patient does have history of osteopenia.  Her most recent bone density testing in February 2021 did show significant increase in bone density compared to 2019.  She will be due for follow-up in February 2023.  No history of pathologic fracture.  Vitamin D level ordered discussed and placed.  Stable problem.  - VITAMIN D,25 HYDROXY (DEFICIENCY); Future    11. Primary osteoarthritis of both shoulders  Patient does have arthritis and stiffness of both shoulders.  Physical therapy in the past was quite helpful.  She would like to start this again.  We did discuss this in the previous visit.  This is a chronic problem which has received some help from PT.  PT referral placed.  Stable problem.  - Referral to  Physical Therapy    12. Spasm of cervical paraspinous muscle  Patient has chronic spasm of the cervical paraspinous muscles particularly on the left lateral area and the posterior neck.  She has benefited from PT in the past with improved posture and reduced pain.  She would like to continue her PT therapy.  Referral is discussed and placed.  Stable and improving problem.  - Referral to Physical Therapy    13. Bilateral incipient cataracts  Patient's most recent eye exam demonstrated bilateral incipient cataract.  Patient states she was told otherwise her eye health is good.  She notes no significant change to her vision at this time.  She will have an eye exam again yearly.  Stable problem.    14. Obstructive sleep apnea  Patient does have obstructive sleep apnea.  This is in good control with 90% compliance.  She follows with pulmonology on a yearly basis.  She feels this treatment has been helpful to her stable problem.  - CBC WITHOUT DIFFERENTIAL; Future    15. History of malignant melanoma of back/History of melanoma in situ  Patient does have history of malignant melanoma which likely was stage 0.  She continues to follow with dermatology.  No new lesions or changing lesions have been noted.  Stable problem.    16. Obesity, Class II, BMI 35-39.9  Patient continues with very good voluntary weight loss.  She is following a portion control regimen with increased physical activity and continues to have good weight loss.  She is congratulated.  No evidence of illness.  Stable problem.    17. Need for vaccination  High-dose quadrivalent flu vaccine administered today.  - Influenza Vaccine, High Dose (65+ Only)        Services suggested: No services needed at this time  Health Care Screening: Age-appropriate preventive services recommended by USPTF and ACIP covered by Medicare were discussed today. Services ordered if indicated and agreed upon by the patient.  Referrals offered: Community-based lifestyle  interventions to reduce health risks and promote self-management and wellness, fall prevention, nutrition, physical activity, tobacco-use cessation, weight loss, and mental health services as per orders if indicated.    Discussion today about general wellness and lifestyle habits:  discussed  Prevent falls and reduce trip hazards; Cautioned about securing or removing rugs.  Have a working fire alarm and carbon monoxide detector;  has  Engage in regular physical activity and social activities     Follow-up: Return in about 6 months (around 3/19/2023), or if symptoms worsen or fail to improve.

## 2022-09-30 ENCOUNTER — PHYSICAL THERAPY (OUTPATIENT)
Dept: PHYSICAL THERAPY | Facility: REHABILITATION | Age: 77
End: 2022-09-30
Attending: FAMILY MEDICINE
Payer: MEDICARE

## 2022-09-30 DIAGNOSIS — M62.838 SPASM OF CERVICAL PARASPINOUS MUSCLE: ICD-10-CM

## 2022-09-30 PROCEDURE — 97161 PT EVAL LOW COMPLEX 20 MIN: CPT

## 2022-09-30 PROCEDURE — 97110 THERAPEUTIC EXERCISES: CPT

## 2022-09-30 ASSESSMENT — ENCOUNTER SYMPTOMS
PAIN SCALE: 3
PAIN SCALE AT LOWEST: 1

## 2022-09-30 NOTE — OP THERAPY EVALUATION
Outpatient Physical Therapy  INITIAL EVALUATION    [facility] Physical Therapy [address].  [address]  [place] [address]  Phone:  [phone]  Fax:  [phone]    Date of Evaluation: 2022    Patient: Ragini Reaves  YOB: 1945  MRN: [id number]     Referring Provider: Chapito Marques M.D.  [address] [place] [address]  [place] [address]   Referring Diagnosis Primary osteoarthritis of both shoulders [M19.011, M19.012];Spasm of cervical paraspinous muscle [M62.838]     Time Calculation  Start time: 0335  Stop time: 0414 Time Calculation (min): 39 minutes         Chief Complaint: No chief complaint on file.    Visit Diagnoses     ICD-10-CM   1. Spasm of cervical paraspinous muscle  M62.838       Date of onset of impairment: 2020    Subjective   History of Present Illness:     History of chief complaint:  Patient reports 1-2 year history of neck and shoulder pain that started about the beginning of Covid around  due to making quilts.  Patient recently completed PT about 2 months ago with good releif but is returning for more strengthening  Patient underwent thyroidectomy  in  w/ patient reproting a stretch in the front of her neck    Prior level of function:  Retired//puzzles and computer    Pain:     Current pain rating:  3    At best pain ratin    Location:  Bilateral neck to mastoid region w/ bilateral n/t lateral arms bilaterally to lateral 3 phalanges    Aggravating factors:  Computer >15 / n/t in arm  Turn more than 55 degrees to be able to drive without limit      Past Medical History:   Diagnosis Date    Asthma     Cataract 2019    early stages    Chickenpox     Dental disorder     upper denture    Diabetes (HCC)     oral meds    Dyslipidemia, goal LDL below 130     Frequent urination     History of malignant melanoma of back 2021    Hypertension     Hypothyroidism     Lump of right breast     Malignant melanoma (HCC) 2021     "Microalbuminuria 5/2/2016    Obesity     Osteoarthritis     Osteopenia     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    Ringing in ears     Shortness of breath     Sleep apnea     cpap    Snoring     Tonsillitis     Urinary incontinence     Uterine fibroid     Wears glasses      Past Surgical History:   Procedure Laterality Date    MASTECTOMY Right 3/28/2019    Procedure: MASTECTOMY - RE-EXCISION RIGHT BREAST BIOPSY SITE FOR MARGINS;  Surgeon: Karuna Hussein M.D.;  Location: SURGERY SAME DAY [facility];  Service: General    BREAST BIOPSY Right 3/13/2019    Procedure: BREAST BIOPSY - WIRE LOCALIZED;  Surgeon: Karuna Hussein M.D.;  Location: SURGERY [facility];  Service: Gen Robotic    COLONOSCOPY WITH BIOPSY  5/21/2013    Performed by Mauro Garcia M.D. at ENDOSCOPY [facility]    THYROIDECTOMY  2000    TONSILLECTOMY         Precautions:       Objective   Observation and functional movement:  Significant forward head with bilateral forward scap  HOS 6\"    Range of motion and strength:    R:45 erp  L:40 \" won't go\"  Ext:10 deg --  Fle L 1 finger    Palpation and joint mobility:     Hypomobile upper s/c w/ noted guarding sub occipitals and bilateral upper trap        Therapeutic Treatments and Modalities:     Therapeutic Treatment and Modalities Summary: Shoulder clock--70 clock  Scap  retraction with ER #!--hp h/o--supine, sit and wall\" tall on wall with downward gaze  Time-based treatments/modalities:    Physical Therapy Timed Treatment Charges  Therapeutic exercise minutes (CPT 15441): 10 minutes      Assessment, Response and Plan:   Assessment details:  Patient presents with cervical spine derangement syndrome with  poor posture awareness and significant forward head/scapular positioning.  Noted dominant upper trap holding pattern with  poor volitional control of low trap Patient reported reduced arm symptomology with posture coorection symptoms during the evaluation. Patient should respond well to " treatment if compliant with POC.     Prognosis: good    Goals:   Short Term Goals:   Computer >15 / n/t in arm  Turn more than 55 degrees to be able to drive without limit  NPDI: < 15%  Short term goal time span:  2-4 weeks      Long Term Goals:    Computer >30 / n/t in arm    NPDI: < 8%  Long term goal time span:  6-8 weeks    Plan:   Therapy options:  Physical therapy treatment to continue  Planned therapy interventions:  E Stim Unattended (CPT 47725), Therapeutic Exercise (CPT 88849), Mechanical Traction (CPT 83400) and Manual Therapy (CPT 18551)  Frequency:  2x week  Duration in weeks:  8  Duration in visits:  16  Plan details:  Scap progression, psoture trg., roller progression , IASTM, joint mobs, IASTM, traction ??    Functional Assessment Used  PT Functional Assessment Tool Used: mpdi  PT Functional Assessment Score: 20% disability     Referring provider co-signature:  I have reviewed this plan of care and my co-signature certifies the need for services.    Certification Period: 09/30/2022 to  12/02/22    Physician Signature: ________________________________ Date: ______________

## 2022-10-13 ENCOUNTER — PHYSICAL THERAPY (OUTPATIENT)
Dept: PHYSICAL THERAPY | Facility: REHABILITATION | Age: 77
End: 2022-10-13
Attending: FAMILY MEDICINE
Payer: MEDICARE

## 2022-10-13 DIAGNOSIS — M62.838 SPASM OF CERVICAL PARASPINOUS MUSCLE: ICD-10-CM

## 2022-10-13 PROCEDURE — 97014 ELECTRIC STIMULATION THERAPY: CPT

## 2022-10-13 PROCEDURE — 97110 THERAPEUTIC EXERCISES: CPT

## 2022-10-13 PROCEDURE — 97140 MANUAL THERAPY 1/> REGIONS: CPT

## 2022-10-13 NOTE — OP THERAPY DAILY TREATMENT
"  Outpatient Physical Therapy  DAILY TREATMENT     [facility] Physical Therapy [address].  [address]  [place] [address]  Phone:  [phone]  Fax:  [phone]    Date: 10/13/2022    Patient: Ragini Reaves  YOB: 1945  MRN: [id number]     Time Calculation    Start time: 0850  Stop time: 0945 Time Calculation (min): 55 minutes         Chief Complaint: No chief complaint on file.    Visit #: 6    SUBJECTIVE:  Really sore next day after eval, overall trying to work on posture and  feels better    OBJECTIVE:  R: 40 L 45\" just won't go\"          Therapeutic Treatments and Modalities:     Therapeutic Treatment and Modalities Summary: Shoulder clock--70 clock  Stm to upper trap w/ /mfr bilateral scap  Supine plinth bent arm snow angels with focus on scap retraction  Scap  retraction with ER #1----reviewed  Fijian bilateral upper trap/sub occipital 5/5/ mhp x 15'    Time-based treatments/modalities:    Physical Therapy Timed Treatment Charges  Manual therapy minutes (CPT 24375): 20 minutes  Therapeutic exercise minutes (CPT 99747): 20 minutes      Pain rating (1-10) before treatment:  2  Pain rating (1-10) after treatment:  \" better  R 55 L 65\"    ASSESSMENT:   Increased c/s rom with improved scap control but still presents with dominant upper trap holding patterns and forward head with short sub occipital    PLAN/RECOMMENDATIONS:   Scap stab, roller progress, MFR         "

## 2022-10-18 ENCOUNTER — APPOINTMENT (OUTPATIENT)
Dept: PHYSICAL THERAPY | Facility: REHABILITATION | Age: 77
End: 2022-10-18
Attending: FAMILY MEDICINE
Payer: MEDICARE

## 2022-11-02 ENCOUNTER — PHYSICAL THERAPY (OUTPATIENT)
Dept: PHYSICAL THERAPY | Facility: REHABILITATION | Age: 77
End: 2022-11-02
Attending: FAMILY MEDICINE
Payer: MEDICARE

## 2022-11-02 DIAGNOSIS — M62.838 SPASM OF CERVICAL PARASPINOUS MUSCLE: ICD-10-CM

## 2022-11-02 PROCEDURE — 97110 THERAPEUTIC EXERCISES: CPT

## 2022-11-02 PROCEDURE — 97014 ELECTRIC STIMULATION THERAPY: CPT

## 2022-11-02 PROCEDURE — 97140 MANUAL THERAPY 1/> REGIONS: CPT

## 2022-11-02 NOTE — OP THERAPY DAILY TREATMENT
"  Outpatient Physical Therapy  DAILY TREATMENT     [facility] Physical Therapy [address].  [address]  [place] [address]  Phone:  [phone]  Fax:  [phone]    Date: 11/02/2022    Patient: Ragini Reaves  YOB: 1945  MRN: [id number]     Time Calculation      203  255           Chief Complaint: No chief complaint on file.    Visit #: 7    SUBJECTIVE:  About the same with just    OBJECTIVE  R: 50 L 40 -- just sore          Therapeutic Treatments and Modalities:     Therapeutic Treatment and Modalities Summary: Shoulder clock--7 0 clock--reviewed  Supine with 2 pillows and active retraction into pillows  Stm to upper trap w/ /mfr bilateral scap  ART upper trap and rhomboids  Seated scap retraction depression with band loop around elbows// struggled to isolate low trap--to perform on wall or supine on bed\" almost flat\"  Scap  retraction with ER #1----reviewed  Nigerian bilateral upper trap/sub occipital 5/5/ mhp x 15'    Time-based treatments/modalities:           Pain rating (1-10) before treatment:  4 \" just sore\"  Pain rating (1-10) after treatment:  \" better  R 55 L 60\"    ASSESSMENT:   Fair - hep compliance with patient reporting only a few ex. Ea day--cont. Forward head but able to tolerate more c/s extension in supine--cont. To lack segmental control and low trap control    PLAN/RECOMMENDATIONS:   Scap stab, roller progress, MFR         "

## 2022-11-09 ENCOUNTER — PHYSICAL THERAPY (OUTPATIENT)
Dept: PHYSICAL THERAPY | Facility: REHABILITATION | Age: 77
End: 2022-11-09
Attending: FAMILY MEDICINE
Payer: MEDICARE

## 2022-11-09 DIAGNOSIS — M62.838 SPASM OF CERVICAL PARASPINOUS MUSCLE: ICD-10-CM

## 2022-11-09 PROCEDURE — 97140 MANUAL THERAPY 1/> REGIONS: CPT

## 2022-11-09 PROCEDURE — 97110 THERAPEUTIC EXERCISES: CPT

## 2022-11-09 NOTE — OP THERAPY DAILY TREATMENT
"  Outpatient Physical Therapy  DAILY TREATMENT     [facility] Physical Therapy [address]  [address]  [place] [address]  Phone:  [phone]  Fax:  [phone]    Date: 11/09/2022    Patient: Ragini Reaves  YOB: 1945  MRN: [id number]     Time Calculation    Start time: 0200  Stop time: 0245 Time Calculation (min): 45 minutes         Chief Complaint: No chief complaint on file.    Visit #: 8    SUBJECTIVE:  About the same with just    OBJECTIVE  R: 45 L 45 -- just sore          Therapeutic Treatments and Modalities:     Therapeutic Treatment and Modalities Summary: Shoulder clock--7 0 clock--reviewed  Wall tall with gh flexion  Seated scap retraction depression with band loop around elbows// struggled to isolate low trap--hep  Scap  retraction with ER #1----reviewed  Facing wall slides up/off /on with c/s retraction      Time-based treatments/modalities:    Physical Therapy Timed Treatment Charges  Therapeutic exercise minutes (CPT 00453): 25 minutes      Pain rating (1-10) before treatment:  4 \" just sore\"  Pain rating (1-1\"0) after treatment:  \" better  R 57 L 57\"--\"feel good    ASESSMENT:   Progessing well with minimal pain and able top quilt w/o pain    PLAN/RECOMMENDATIONS:   Scap stab, roller progress, MFR--d/c 1 visit w/ hold for two weeks         "

## 2022-11-15 ENCOUNTER — APPOINTMENT (OUTPATIENT)
Dept: PHYSICAL THERAPY | Facility: REHABILITATION | Age: 77
End: 2022-11-15
Attending: FAMILY MEDICINE
Payer: MEDICARE

## 2022-11-21 ENCOUNTER — PATIENT OUTREACH (OUTPATIENT)
Dept: HEALTH INFORMATION MANAGEMENT | Facility: OTHER | Age: 77
End: 2022-11-21
Payer: MEDICARE

## 2022-11-21 ENCOUNTER — DOCUMENTATION (OUTPATIENT)
Dept: HEALTH INFORMATION MANAGEMENT | Facility: OTHER | Age: 77
End: 2022-11-21
Payer: MEDICARE

## 2022-11-21 DIAGNOSIS — I10 ESSENTIAL HYPERTENSION: ICD-10-CM

## 2022-11-21 NOTE — PROGRESS NOTES
MIGUEL Alejandra called the pt to introduce the CCM program to the pt. Pt accepted the program and is set for enrollment on 11/28 @3pm with the CCM RN Maranda.

## 2022-11-22 ENCOUNTER — PHYSICAL THERAPY (OUTPATIENT)
Dept: PHYSICAL THERAPY | Facility: REHABILITATION | Age: 77
End: 2022-11-22
Attending: FAMILY MEDICINE
Payer: MEDICARE

## 2022-11-22 DIAGNOSIS — M62.838 SPASM OF CERVICAL PARASPINOUS MUSCLE: ICD-10-CM

## 2022-11-22 PROCEDURE — 97110 THERAPEUTIC EXERCISES: CPT

## 2022-11-22 NOTE — OP THERAPY DAILY TREATMENT
"  Outpatient Physical Therapy  DAILY TREATMENT     [facility] Physical Therapy [address]  [address]  [place] [address]  Phone:  [phone]  Fax:  [phone]    Date: 11/22/2022    Patient: Ragini Reaves  YOB: 1945  MRN: [id number]     Time Calculation    Start time: 0117  Stop time: 0130 Time Calculation (min): 13 minutes         Chief Complaint: No chief complaint on file.    Visit #: 9    SUBJECTIVE:  Patient reports decreased pain and tolerates sewing and cross-word puzzles with less discomfort--baked  4 dozen this week w/o pain  OBJECTIVE  R: 52 L 60--          Therapeutic Treatments and Modalities:     Therapeutic Treatment and Modalities Summary: Shoulder clock--7 0 clock--reviewed  Wall tall with gh flexion  Seated scap retraction depression with band loop around elbows// struggled to isolate low trap--hep  Scap  retraction with ER #1----reviewed  Facing wall slides up/off /on with c/s retraction      Time-based treatments/modalities:    Physical Therapy Timed Treatment Charges  Therapeutic exercise minutes (CPT 57192): 15 minutes      Pain rating (1-10) before treatment:  no pain\" Pain rating (1-1\"0) after treatment:     ASESSMENT:   All goals met with patient reporting independence with her HEP    PLAN/RECOMMENDATIONS:   D/c  to an independent HEP         "

## 2022-11-22 NOTE — OP THERAPY DISCHARGE SUMMARY
Outpatient Physical Therapy  DISCHARGE SUMMARY NOTE      [address].  [address]  [place] [address]  Phone:  [phone]  Fax:  [phone]    Date of Visit: 11/22/2022    Patient: Ragini Reaves  YOB: 1945  MRN: [id number]     Referring Provider: Chapito Marques M.D.  [address] [place], [place] [address]  [place] [address]   Referring Diagnosis Primary osteoarthritis of both shoulders [M19.011, M19.012];Spasm of cervical paraspinous muscle [M62.838]         Functional Assessment UsedNPDI--18% disability        All goals met--patient is being discharged to an independent [facility].  Your patient is being discharged from Physical Therapy with the following comments:   Goals met      Michael Carranza, PT, DPT, OCS    Date: 11/22/2022

## 2022-11-28 ENCOUNTER — PATIENT OUTREACH (OUTPATIENT)
Dept: HEALTH INFORMATION MANAGEMENT | Facility: OTHER | Age: 77
End: 2022-11-28
Payer: MEDICARE

## 2022-11-28 ENCOUNTER — PATIENT OUTREACH (OUTPATIENT)
Dept: HEALTH INFORMATION MANAGEMENT | Facility: OTHER | Age: 77
End: 2022-11-28

## 2022-11-28 DIAGNOSIS — E11.8 CONTROLLED TYPE 2 DIABETES MELLITUS WITH COMPLICATION, WITHOUT LONG-TERM CURRENT USE OF INSULIN (HCC): ICD-10-CM

## 2022-11-28 PROCEDURE — 99439 CHRNC CARE MGMT STAF EA ADDL: CPT | Performed by: FAMILY MEDICINE

## 2022-11-28 PROCEDURE — 99490 CHRNC CARE MGMT STAFF 1ST 20: CPT | Performed by: FAMILY MEDICINE

## 2022-11-28 SDOH — ECONOMIC STABILITY: FOOD INSECURITY: WITHIN THE PAST 12 MONTHS, YOU WORRIED THAT YOUR FOOD WOULD RUN OUT BEFORE YOU GOT MONEY TO BUY MORE.: NEVER TRUE

## 2022-11-28 SDOH — ECONOMIC STABILITY: INCOME INSECURITY: IN THE LAST 12 MONTHS, WAS THERE A TIME WHEN YOU WERE NOT ABLE TO PAY THE MORTGAGE OR RENT ON TIME?: NO

## 2022-11-28 SDOH — ECONOMIC STABILITY: FOOD INSECURITY: WITHIN THE PAST 12 MONTHS, THE FOOD YOU BOUGHT JUST DIDN'T LAST AND YOU DIDN'T HAVE MONEY TO GET MORE.: NEVER TRUE

## 2022-11-28 SDOH — HEALTH STABILITY: PHYSICAL HEALTH: ON AVERAGE, HOW MANY DAYS PER WEEK DO YOU ENGAGE IN MODERATE TO STRENUOUS EXERCISE (LIKE A BRISK WALK)?: 0 DAYS

## 2022-11-28 SDOH — ECONOMIC STABILITY: HOUSING INSECURITY: IN THE LAST 12 MONTHS, HOW MANY PLACES HAVE YOU LIVED?: 1

## 2022-11-28 SDOH — HEALTH STABILITY: PHYSICAL HEALTH: ON AVERAGE, HOW MANY MINUTES DO YOU ENGAGE IN EXERCISE AT THIS LEVEL?: 0 MIN

## 2022-11-28 SDOH — ECONOMIC STABILITY: HOUSING INSECURITY
IN THE LAST 12 MONTHS, WAS THERE A TIME WHEN YOU DID NOT HAVE A STEADY PLACE TO SLEEP OR SLEPT IN A SHELTER (INCLUDING NOW)?: NO

## 2022-11-28 SDOH — ECONOMIC STABILITY: TRANSPORTATION INSECURITY
IN THE PAST 12 MONTHS, HAS LACK OF TRANSPORTATION KEPT YOU FROM MEETINGS, WORK, OR FROM GETTING THINGS NEEDED FOR DAILY LIVING?: NO

## 2022-11-28 SDOH — ECONOMIC STABILITY: TRANSPORTATION INSECURITY
IN THE PAST 12 MONTHS, HAS THE LACK OF TRANSPORTATION KEPT YOU FROM MEDICAL APPOINTMENTS OR FROM GETTING MEDICATIONS?: NO

## 2022-11-28 ASSESSMENT — LIFESTYLE VARIABLES
HOW OFTEN DO YOU HAVE SIX OR MORE DRINKS ON ONE OCCASION: NEVER
AUDIT-C TOTAL SCORE: 0
HOW MANY STANDARD DRINKS CONTAINING ALCOHOL DO YOU HAVE ON A TYPICAL DAY: PATIENT DOES NOT DRINK
SKIP TO QUESTIONS 9-10: 1
HOW OFTEN DO YOU HAVE A DRINK CONTAINING ALCOHOL: NEVER

## 2022-11-28 ASSESSMENT — PATIENT HEALTH QUESTIONNAIRE - PHQ9: CLINICAL INTERPRETATION OF PHQ2 SCORE: 0

## 2022-11-28 ASSESSMENT — SOCIAL DETERMINANTS OF HEALTH (SDOH)
HOW OFTEN DO YOU ATTENT MEETINGS OF THE CLUB OR ORGANIZATION YOU BELONG TO?: NEVER
DO YOU BELONG TO ANY CLUBS OR ORGANIZATIONS SUCH AS CHURCH GROUPS UNIONS, FRATERNAL OR ATHLETIC GROUPS, OR SCHOOL GROUPS?: NO
HOW OFTEN DO YOU ATTEND CHURCH OR RELIGIOUS SERVICES?: 1 TO 4 TIMES PER YEAR
HOW OFTEN DO YOU GET TOGETHER WITH FRIENDS OR RELATIVES?: MORE THAN THREE TIMES A WEEK
WITHIN THE LAST YEAR, HAVE YOU BEEN HUMILIATED OR EMOTIONALLY ABUSED IN OTHER WAYS BY YOUR PARTNER OR EX-PARTNER?: NO
WITHIN THE LAST YEAR, HAVE TO BEEN RAPED OR FORCED TO HAVE ANY KIND OF SEXUAL ACTIVITY BY YOUR PARTNER OR EX-PARTNER?: NO
IN A TYPICAL WEEK, HOW MANY TIMES DO YOU TALK ON THE PHONE WITH FAMILY, FRIENDS, OR NEIGHBORS?: MORE THAN THREE TIMES A WEEK
WITHIN THE LAST YEAR, HAVE YOU BEEN KICKED, HIT, SLAPPED, OR OTHERWISE PHYSICALLY HURT BY YOUR PARTNER OR EX-PARTNER?: NO
HOW HARD IS IT FOR YOU TO PAY FOR THE VERY BASICS LIKE FOOD, HOUSING, MEDICAL CARE, AND HEATING?: NOT HARD AT ALL
WITHIN THE LAST YEAR, HAVE YOU BEEN AFRAID OF YOUR PARTNER OR EX-PARTNER?: NO

## 2022-11-28 NOTE — PROGRESS NOTES
"INITIAL CARE MANAGEMENT CARE PLAN/ASSESSMENT     Called and spoke to patient to enroll her in the Personal Care Management Program. Patient gave her verbal consent and expressed her desire to enroll in the program. Patient verified her identity by providing this RN with her full name and . Patient is eligible for CCM due to her risk score of 4 and her DM2, HTN, CHF, and DYS diagnoses. Patient goes by Isaiah. Patient is a 77 year old  [employer] female who lives alone at a senior apartment. Patient remains independent with her ADL's and IADL's and is an active . Patient reports that she feels like she is in \"pretty good health\". Patient states she has lost 90 pounds in the past 14 years, but would like to lose more weight for her 60th high school reunion next year. Patient would like this RN to send her resources/education on diet and healthy eating, especially snacks. Patient states she tries to limit sugar/sodium.  Patient states she would also like to exercise more and bought a \"fit bit\". This RN will have MIGUEL Alejandra send patient exercises she can do at home. Patient states she does not get out as much after COVID19. Patient uses computer/Internet but is not on MyChart.   Patient understands the monthly follow-up call requirements of the PCM program. First monthly call is scheduled for 2022 @ 1:30pm. Patient has this RN's name and PCM program phone number and knows she can call with any health questions or concerns. Patient has no other immediate needs/concerns to address at this time.      Medication Self-Management Goals:     Reviewed medications listed below with patient.      Current Outpatient Medications:     phentermine (ADIPEX-P) 37.5 MG tablet, TAKE 1/2 TABLETS BY MOUTH EVERY MORNING BEFORE BREAKFAST FOR 30 DAYS E66.01, Disp: 15 Tablet, Rfl: 5    atorvastatin (LIPITOR) 20 MG Tab, Take 1 Tablet by mouth every day. MUST BE SEEN FOR FURTHER REFILLS, Disp: 90 Tablet, Rfl: 3    " losartan (COZAAR) 25 MG Tab, Take 1 Tablet by mouth every day., Disp: 90 Tablet, Rfl: 3    levothyroxine (SYNTHROID) 150 MCG Tab, Take 1 Tablet by mouth every morning on an empty stomach., Disp: 90 Tablet, Rfl: 3    topiramate (TOPAMAX) 25 MG Tab, TAKE 1 TABLET BY MOUTH EVERY DAY, Disp: 90 Tablet, Rfl: 2    spironolactone (ALDACTONE) 25 MG Tab, TAKE 1 TABLET BY MOUTH EVERY DAY, Disp: 90 Tablet, Rfl: 3    KLOR-CON 10 10 MEQ tablet, TAKE 1 TABLET BY MOUTH EVERY DAY, Disp: 90 Tablet, Rfl: 3    furosemide (LASIX) 40 MG Tab, TAKE 1 TABLET BY MOUTH EVERY DAY IN THE MORNING, Disp: 90 Tablet, Rfl: 3    glucose blood (FREESTYLE LITE) strip, 1 Strip by Other route 1 time a day as needed., Disp: 100 Strip, Rfl: 6    FreeStyle Lancets Misc, USE TWICE A DAY, Disp: 100 Each, Rfl: 6    metFORMIN (GLUCOPHAGE) 500 MG Tab, TAKE 1 TABLET BY MOUTH TWICE A DAY WITH MEALS, Disp: 180 Tablet, Rfl: 3    TRADJENTA 5 MG Tab tablet, TAKE 1 TABLET BY MOUTH EVERY DAY, Disp: 90 Tablet, Rfl: 3    tacrolimus (PROTOPIC) 0.1 % Ointment, Apply  topically 2 times a day., Disp: , Rfl:     Ciclopirox 0.77 % Gel, Apply  topically as needed., Disp: , Rfl:     risedronate (ACTONEL) 35 MG Tab, Take 35 mg by mouth every 7 days., Disp: , Rfl:     betamethasone, augmented, (DIPROLENE) 0.05 % gel, Apply 1 Application to affected area(s) every bedtime., Disp: 50 g, Rfl: 2    letrozole (FEMARA) 2.5 MG Tab, Take 2.5 mg by mouth every day., Disp: , Rfl:     Fiber Powder, Take 1 Package by mouth every day., Disp: , Rfl:     Probiotic Product (PROBIOTIC DAILY PO), Take 1 Package by mouth every day., Disp: , Rfl:     docusate sodium (COLACE) 100 MG Cap, Take 300 mg by mouth every day., Disp: , Rfl:     acetaminophen (TYLENOL) 500 MG Tab, Take 500 mg by mouth 2 Times a Day., Disp: , Rfl:     CALCIUM-MAGNESIUM-VITAMIN D PO, Take 1 Tab by mouth every day., Disp: , Rfl:     BIOTIN PO, Take 1 Tab by mouth every day., Disp: , Rfl:     COLLAGEN PO, Take 1 Tab by mouth every  day., Disp: , Rfl:     Glucosamine-Chondroitin (GLUCOSAMINE CHONDR COMPLEX PO), Take 750 mg by mouth 2 Times a Day., Disp: , Rfl:     Lutein 10 MG Tab, Take 10 mg by mouth every evening., Disp: , Rfl:     aspirin 81 MG tablet, Take 81 mg by mouth every morning., Disp: 90 Tab, Rfl: 3    coenzyme Q-10 30 MG capsule, Take 1 Cap by mouth every day., Disp: 30 Cap, Rfl: 11    ascorbic acid (VITAMIN C) 500 MG tablet, Take 1 Tab by mouth every day., Disp: 30 Tab, Rfl: 11    Multiple Vitamins-Minerals (ULTRA WOMENS PACK) Misc, Take 1 Tab by mouth every day., Disp: 30 Each, Rfl: 11         Goal: Patient will continue to take her medications as scheduled/prescribed.        Physical/Functional/Environmental Status:     Activities of Daily Living:  Bathing: independent   Dressing: independent  Grooming: independent  Mouth Care: independent  Toileting: independent  Climbing a Flight of Stairs: independent    Independent Activities of Daily Living:  Shopping: independent  Cooking: independent  Managing Medications: independent  Using the phone and looking up numbers: independent  Driving or using public transportation: independent  Managing Finances: independent        11/28/2022     2:59 PM   STEADI Fall Risk   One or more falls in the last year No               Goal: Patient will remain free from falls and injuries from falls.      Financial Status:     Patient is financially stable      Goal: N/A     Transportation Status:    Patient is an active      Goal: Patient will make all her scheduled provider appointments     Mental/Behavioral/Psychosocial Status:        3/22/2022     1:30 PM 9/19/2022     1:00 PM 11/28/2022     2:39 PM   Depression Screen (PHQ-2/PHQ-9)   PHQ-2 Total Score 0 0 0       Interpretation of PHQ-9 Total Score   Score Severity   1-4 No Depression   5-9 Mild Depression   10-14 Moderate Depression   15-19 Moderately Severe Depression   20-27 Severe Depression       Goal:  N/A      Chronic Care Management  Care Plan         Goal:  Patient will exercise a designated amount of time daily   Barriers: Knowledge deficit, patient motivation/adherence, physical limitations   Interventions: Discuss barriers to starting an exercise program, provide education/motivation to support patient in starting an exercise program, provide exercises that patient can do at home.     Start Date: 11/28/2022  End Date:      Goal:  Patient wants to eat healthy and continue to lose weight for her 60th High School Reunion   Barriers: Knowledge Deficit, Patient motivation/adherence  Interventions: Discuss barriers to eating healthy with patient, provide patient education/motivation to support her to reach her weight loss goals, provide patient with education/resources on healthy diet, counting calories/carbohydrates and limiting sugars, and reading food labels.     Start Date: 11/28/2022  End Date:           Discussion: Discussed and created     Goals: Goals, Barriers, and interventions.      Next Scheduled patient outreach:  12/28/2022 @ 1:30pm

## 2022-11-29 NOTE — PROGRESS NOTES
CHW Justyn was asked by the [facility] RN Jhonatan to contact the pt and send her resources for exercises she could do at home. This CHW sent Chair exercises and Chair Yoga that are easy and doable in a sitting position.

## 2022-12-06 ENCOUNTER — APPOINTMENT (OUTPATIENT)
Dept: PHYSICAL THERAPY | Facility: REHABILITATION | Age: 77
End: 2022-12-06
Attending: FAMILY MEDICINE
Payer: MEDICARE

## 2022-12-23 DIAGNOSIS — E11.9 DM TYPE 2, GOAL HBA1C < 8% (HCC): ICD-10-CM

## 2022-12-23 RX ORDER — LINAGLIPTIN 5 MG/1
TABLET, FILM COATED ORAL
Qty: 90 TABLET | Refills: 3 | Status: SHIPPED | OUTPATIENT
Start: 2022-12-23 | End: 2023-12-22

## 2022-12-23 NOTE — TELEPHONE ENCOUNTER
Received request via: Patient    Was the patient seen in the last year in this department? Yes    Does the patient have an active prescription (recently filled or refills available) for medication(s) requested? No    Does the patient have intermediate Plus and need 100 day supply (blood pressure, diabetes and cholesterol meds only)? Patient does not have SCP

## 2022-12-28 ENCOUNTER — PATIENT OUTREACH (OUTPATIENT)
Dept: HEALTH INFORMATION MANAGEMENT | Facility: OTHER | Age: 77
End: 2022-12-28
Payer: MEDICARE

## 2022-12-28 DIAGNOSIS — E11.9 DM TYPE 2, GOAL HBA1C < 7.5% (HCC): ICD-10-CM

## 2022-12-28 PROCEDURE — 99490 CHRNC CARE MGMT STAFF 1ST 20: CPT | Performed by: FAMILY MEDICINE

## 2022-12-28 NOTE — PROGRESS NOTES
"Called and spoke to patient to complete PCM program monthly follow-up call. Patient states that she is \"doing good so far\". Patient states she received the diet resources and chair exercises and is eating healthier and has started exercising. Patient states she had bought a FitBit and that is help motivate her. Both those goals remain on-track and in-progress. Patient states she is otherwise doing well and has no new goals or anything else to work on. Patient will just continue to focus on eating healthy and exercising. Patient has no other immediate needs/concerns to address at this time. Patient has PCM program phone number and knows she can call with any questions/concerns.     Next PCM program monthly follow-up call scheduled for 1/27/2023 @ 2:30pm  "

## 2023-01-04 NOTE — PROCEDURE: BIOPSY BY SHAVE METHOD
Detail Level: Detailed
Depth Of Biopsy: dermis
Was A Bandage Applied: Yes
Size Of Lesion In Cm: 0
Biopsy Type: H and E
Biopsy Method: Dermablade
Anesthesia Type: 0.5% lidocaine without epinephrine
Anesthesia Volume In Cc: 0.5
Hemostasis: Electrocautery
Wound Care: Petrolatum
Dressing: bandage
Destruction After The Procedure: No
Type Of Destruction Used: Curettage
Curettage Text: The wound bed was treated with curettage after the biopsy was performed.
Cryotherapy Text: The wound bed was treated with cryotherapy after the biopsy was performed.
Electrodesiccation Text: The wound bed was treated with electrodesiccation after the biopsy was performed.
Electrodesiccation And Curettage Text: The wound bed was treated with electrodesiccation and curettage after the biopsy was performed.
Silver Nitrate Text: The wound bed was treated with silver nitrate after the biopsy was performed.
Lab: [id number]
Lab Facility: 
Consent: Written consent was obtained and risks were reviewed including but not limited to scarring, infection, bleeding, scabbing, incomplete removal, nerve damage and allergy to anesthesia.
Post-Care Instructions: I reviewed with the patient in detail post-care instructions. Patient is to keep the biopsy site dry overnight, and then apply bacitracin twice daily until healed. Patient may apply hydrogen peroxide soaks to remove any crusting.
Notification Instructions: Patient will be notified of biopsy results. However, patient instructed to call the office if not contacted within 2 weeks.
Billing Type: Third-Party Bill
Information: Selecting Yes will display possible errors in your note based on the variables you have selected. This validation is only offered as a suggestion for you. PLEASE NOTE THAT THE VALIDATION TEXT WILL BE REMOVED WHEN YOU FINALIZE YOUR NOTE. IF YOU WANT TO FAX A PRELIMINARY NOTE YOU WILL NEED TO TOGGLE THIS TO 'NO' IF YOU DO NOT WANT IT IN YOUR FAXED NOTE.
Ear Wedge Repair Text: A wedge excision was completed by carrying down an excision through the full thickness of the ear and cartilage with an inward facing Burow's triangle. The wound was then closed in a layered fashion.

## 2023-02-06 ENCOUNTER — APPOINTMENT (RX ONLY)
Dept: URBAN - METROPOLITAN AREA CLINIC 6 | Facility: CLINIC | Age: 78
Setting detail: DERMATOLOGY
End: 2023-02-06

## 2023-02-06 DIAGNOSIS — D18.0 HEMANGIOMA: ICD-10-CM

## 2023-02-06 DIAGNOSIS — Z00.8 ENCOUNTER FOR OTHER GENERAL EXAMINATION: ICD-10-CM

## 2023-02-06 DIAGNOSIS — L82.1 OTHER SEBORRHEIC KERATOSIS: ICD-10-CM

## 2023-02-06 DIAGNOSIS — I87.2 VENOUS INSUFFICIENCY (CHRONIC) (PERIPHERAL): ICD-10-CM

## 2023-02-06 DIAGNOSIS — Z71.89 OTHER SPECIFIED COUNSELING: ICD-10-CM

## 2023-02-06 DIAGNOSIS — L30.4 ERYTHEMA INTERTRIGO: ICD-10-CM | Status: STABLE

## 2023-02-06 DIAGNOSIS — L20.89 OTHER ATOPIC DERMATITIS: ICD-10-CM

## 2023-02-06 DIAGNOSIS — L81.4 OTHER MELANIN HYPERPIGMENTATION: ICD-10-CM

## 2023-02-06 DIAGNOSIS — L98.8 OTHER SPECIFIED DISORDERS OF THE SKIN AND SUBCUTANEOUS TISSUE: ICD-10-CM

## 2023-02-06 DIAGNOSIS — D22 MELANOCYTIC NEVI: ICD-10-CM

## 2023-02-06 DIAGNOSIS — Z85.820 PERSONAL HISTORY OF MALIGNANT MELANOMA OF SKIN: ICD-10-CM

## 2023-02-06 PROBLEM — D22.4 MELANOCYTIC NEVI OF SCALP AND NECK: Status: ACTIVE | Noted: 2023-02-06

## 2023-02-06 PROBLEM — D23.71 OTHER BENIGN NEOPLASM OF SKIN OF RIGHT LOWER LIMB, INCLUDING HIP: Status: ACTIVE | Noted: 2023-02-06

## 2023-02-06 PROBLEM — D22.5 MELANOCYTIC NEVI OF TRUNK: Status: ACTIVE | Noted: 2023-02-06

## 2023-02-06 PROBLEM — L20.84 INTRINSIC (ALLERGIC) ECZEMA: Status: ACTIVE | Noted: 2023-02-06

## 2023-02-06 PROBLEM — D18.01 HEMANGIOMA OF SKIN AND SUBCUTANEOUS TISSUE: Status: ACTIVE | Noted: 2023-02-06

## 2023-02-06 PROCEDURE — ? REFERRAL CORRESPONDENCE

## 2023-02-06 PROCEDURE — ? SUNSCREEN TREATMENT REGIMEN

## 2023-02-06 PROCEDURE — ? PRESCRIPTION

## 2023-02-06 PROCEDURE — ? SUNSCREEN RECOMMENDATIONS

## 2023-02-06 PROCEDURE — ? PRESCRIPTION MEDICATION MANAGEMENT

## 2023-02-06 PROCEDURE — ? COUNSELING

## 2023-02-06 PROCEDURE — 99214 OFFICE O/P EST MOD 30 MIN: CPT

## 2023-02-06 RX ORDER — TRIAMCINOLONE ACETONIDE 1 MG/G
CREAM TOPICAL BID
Qty: 45 | Refills: 1 | Status: ERX | COMMUNITY
Start: 2023-02-06

## 2023-02-06 RX ORDER — CICLOPIROX OLAMINE 7.7 MG/G
CREAM TOPICAL BID
Qty: 30 | Refills: 6 | Status: ERX | COMMUNITY
Start: 2023-02-06

## 2023-02-06 RX ADMIN — TRIAMCINOLONE ACETONIDE: 1 CREAM TOPICAL at 00:00

## 2023-02-06 RX ADMIN — CICLOPIROX OLAMINE: 7.7 CREAM TOPICAL at 00:00

## 2023-02-06 ASSESSMENT — LOCATION ZONE DERM
LOCATION ZONE: SCALP
LOCATION ZONE: TRUNK
LOCATION ZONE: LIP
LOCATION ZONE: HAND
LOCATION ZONE: ARM
LOCATION ZONE: LEG
LOCATION ZONE: FACE

## 2023-02-06 ASSESSMENT — LOCATION SIMPLE DESCRIPTION DERM
LOCATION SIMPLE: LEFT PRETIBIAL REGION
LOCATION SIMPLE: RIGHT SHOULDER
LOCATION SIMPLE: CHEST
LOCATION SIMPLE: SCALP
LOCATION SIMPLE: RIGHT HAND
LOCATION SIMPLE: LEFT HAND
LOCATION SIMPLE: LEFT SHOULDER
LOCATION SIMPLE: RIGHT LIP
LOCATION SIMPLE: ABDOMEN
LOCATION SIMPLE: LEFT CHEEK
LOCATION SIMPLE: LEFT LOWER BACK
LOCATION SIMPLE: LEFT BREAST

## 2023-02-06 ASSESSMENT — LOCATION DETAILED DESCRIPTION DERM
LOCATION DETAILED: RIGHT INFERIOR VERMILION LIP
LOCATION DETAILED: LEFT POSTERIOR SHOULDER
LOCATION DETAILED: MIDDLE STERNUM
LOCATION DETAILED: LEFT SUPERIOR MEDIAL LOWER BACK
LOCATION DETAILED: RIGHT RADIAL DORSAL HAND
LOCATION DETAILED: LEFT INFERIOR CENTRAL MALAR CHEEK
LOCATION DETAILED: LOWER STERNUM
LOCATION DETAILED: UPPER STERNUM
LOCATION DETAILED: LEFT CENTRAL FRONTAL SCALP
LOCATION DETAILED: LEFT NIPPLE
LOCATION DETAILED: LEFT LATERAL ABDOMEN
LOCATION DETAILED: RIGHT POSTERIOR SHOULDER
LOCATION DETAILED: LEFT DISTAL PRETIBIAL REGION
LOCATION DETAILED: LEFT RADIAL DORSAL HAND

## 2023-02-06 NOTE — PROCEDURE: PRESCRIPTION MEDICATION MANAGEMENT
Render In Strict Bullet Format?: No
Plan: Recommended applying triamcinolone acetonide 0.1 % topical cream bid to nipple x 2 weeks\\ngetting mammogram tomorrow\\ninstructed to RTC if no improvement
Detail Level: Zone

## 2023-02-08 ENCOUNTER — HOSPITAL ENCOUNTER (OUTPATIENT)
Dept: RADIOLOGY | Facility: MEDICAL CENTER | Age: 78
End: 2023-02-08
Attending: INTERNAL MEDICINE
Payer: MEDICARE

## 2023-02-08 DIAGNOSIS — Z12.31 VISIT FOR SCREENING MAMMOGRAM: ICD-10-CM

## 2023-02-08 PROCEDURE — 77063 BREAST TOMOSYNTHESIS BI: CPT

## 2023-02-10 ENCOUNTER — RX ONLY (OUTPATIENT)
Age: 78
Setting detail: RX ONLY
End: 2023-02-10

## 2023-02-10 RX ORDER — KETOCONAZOLE 20 MG/G
CREAM TOPICAL
Qty: 60 | Refills: 2 | Status: ERX | COMMUNITY
Start: 2023-02-10

## 2023-03-08 ENCOUNTER — PATIENT OUTREACH (OUTPATIENT)
Dept: HEALTH INFORMATION MANAGEMENT | Facility: OTHER | Age: 78
End: 2023-03-08
Payer: MEDICARE

## 2023-03-08 DIAGNOSIS — G47.33 OBSTRUCTIVE SLEEP APNEA: ICD-10-CM

## 2023-03-08 DIAGNOSIS — E78.5 DYSLIPIDEMIA, GOAL LDL BELOW 100: ICD-10-CM

## 2023-03-08 DIAGNOSIS — I10 ESSENTIAL HYPERTENSION: ICD-10-CM

## 2023-03-08 DIAGNOSIS — E11.9 DM TYPE 2, GOAL HBA1C < 7.5% (HCC): ICD-10-CM

## 2023-03-08 PROCEDURE — 99999 PR NO CHARGE: CPT | Performed by: FAMILY MEDICINE

## 2023-03-10 NOTE — PROGRESS NOTES
Assessment  Spoke with Ragini for monthly outreach follow up. Ragini states that she is doing well. She checks her blood sugar twice a day, it has been running around 150 in the morning. Discussed her evening snacks. She states usually an apple with peanut butter. Encouraged Ragini to keep a log of her food/snacks and blood sugars to bring to her PCP appointment on 3/20. She asked for more information on snack ideas. Will meet at her PCP appointment. Ragini report she walks daily I the hallways of her apartment building. She has a Fitbit to track her steps. She is up to 2500 steps daily. She started with a goal of 500 and is now at 2500. She feels that soon she may go to 3000. Congratulated Ragini on her progress. She reports she has lost a total of 90 pounds. Ragini stated that there are no activities at her senior living apartment complex. Asked her if she was interested in information regarding senior activities in the area. Ragini declined, she reported visiting with her daughter and granddaughters at least every other week. Ragini denied any other questions or concerns. Encouraged to call with any needs.     Education  Discussed monitoring blood sugars  Discussed snacks and DM diet  Discussed daily exercise     Care Plan  Continue daily exercise   Continue Diabetes/nutrition education  Continue CHF education    Progress:  Progressing     Next outreach:  1 month     Chart reviewed for Quarterly Assessment, patient continues to qualify and would benefit from PCM Program.

## 2023-03-13 DIAGNOSIS — E66.01 MORBID OBESITY WITH BMI OF 40.0-44.9, ADULT (HCC): ICD-10-CM

## 2023-03-13 RX ORDER — TOPIRAMATE 25 MG/1
25 TABLET ORAL DAILY
Qty: 90 TABLET | Refills: 2 | Status: SHIPPED | OUTPATIENT
Start: 2023-03-13 | End: 2023-03-20

## 2023-03-16 LAB
25(OH)D3+25(OH)D2 SERPL-MCNC: 42.1 NG/ML (ref 30–100)
ALBUMIN SERPL-MCNC: 4.6 G/DL (ref 3.7–4.7)
ALBUMIN/GLOB SERPL: 2.4 {RATIO} (ref 1.2–2.2)
ALP SERPL-CCNC: 104 IU/L (ref 44–121)
ALT SERPL-CCNC: 20 IU/L (ref 0–32)
AST SERPL-CCNC: 21 IU/L (ref 0–40)
BASOPHILS # BLD AUTO: 0.1 X10E3/UL (ref 0–0.2)
BASOPHILS NFR BLD AUTO: 1 %
BILIRUB SERPL-MCNC: 0.4 MG/DL (ref 0–1.2)
BUN SERPL-MCNC: 26 MG/DL (ref 8–27)
BUN/CREAT SERPL: 35 (ref 12–28)
CALCIUM SERPL-MCNC: 9.4 MG/DL (ref 8.7–10.3)
CHLORIDE SERPL-SCNC: 102 MMOL/L (ref 96–106)
CHOLEST SERPL-MCNC: 104 MG/DL (ref 100–199)
CO2 SERPL-SCNC: 23 MMOL/L (ref 20–29)
CREAT SERPL-MCNC: 0.74 MG/DL (ref 0.57–1)
EGFRCR SERPLBLD CKD-EPI 2021: 83 ML/MIN/1.73
EOSINOPHIL # BLD AUTO: 0.2 X10E3/UL (ref 0–0.4)
EOSINOPHIL NFR BLD AUTO: 2 %
ERYTHROCYTE [DISTWIDTH] IN BLOOD BY AUTOMATED COUNT: 12.7 % (ref 11.7–15.4)
GLOBULIN SER CALC-MCNC: 1.9 G/DL (ref 1.5–4.5)
GLUCOSE SERPL-MCNC: 170 MG/DL (ref 70–99)
HBA1C MFR BLD: 8.5 % (ref 4.8–5.6)
HCT VFR BLD AUTO: 43.8 % (ref 34–46.6)
HDLC SERPL-MCNC: 39 MG/DL
HGB BLD-MCNC: 14.6 G/DL (ref 11.1–15.9)
IMM GRANULOCYTES # BLD AUTO: 0 X10E3/UL (ref 0–0.1)
IMM GRANULOCYTES NFR BLD AUTO: 0 %
IMMATURE CELLS  115398: NORMAL
LABORATORY COMMENT REPORT: ABNORMAL
LDLC SERPL CALC-MCNC: 48 MG/DL (ref 0–99)
LYMPHOCYTES # BLD AUTO: 1.9 X10E3/UL (ref 0.7–3.1)
LYMPHOCYTES NFR BLD AUTO: 23 %
MCH RBC QN AUTO: 28.9 PG (ref 26.6–33)
MCHC RBC AUTO-ENTMCNC: 33.3 G/DL (ref 31.5–35.7)
MCV RBC AUTO: 87 FL (ref 79–97)
MONOCYTES # BLD AUTO: 0.8 X10E3/UL (ref 0.1–0.9)
MONOCYTES NFR BLD AUTO: 9 %
MORPHOLOGY BLD-IMP: NORMAL
NEUTROPHILS # BLD AUTO: 5.3 X10E3/UL (ref 1.4–7)
NEUTROPHILS NFR BLD AUTO: 65 %
NRBC BLD AUTO-RTO: NORMAL %
PLATELET # BLD AUTO: 263 X10E3/UL (ref 150–450)
POTASSIUM SERPL-SCNC: 4.1 MMOL/L (ref 3.5–5.2)
PROT SERPL-MCNC: 6.5 G/DL (ref 6–8.5)
RBC # BLD AUTO: 5.06 X10E6/UL (ref 3.77–5.28)
SODIUM SERPL-SCNC: 140 MMOL/L (ref 134–144)
TRIGL SERPL-MCNC: 86 MG/DL (ref 0–149)
TSH SERPL DL<=0.005 MIU/L-ACNC: 1.57 UIU/ML (ref 0.45–4.5)
VLDLC SERPL CALC-MCNC: 17 MG/DL (ref 5–40)
WBC # BLD AUTO: 8.2 X10E3/UL (ref 3.4–10.8)

## 2023-03-20 ENCOUNTER — OFFICE VISIT (OUTPATIENT)
Dept: MEDICAL GROUP | Facility: MEDICAL CENTER | Age: 78
End: 2023-03-20
Payer: MEDICARE

## 2023-03-20 ENCOUNTER — PATIENT OUTREACH (OUTPATIENT)
Dept: HEALTH INFORMATION MANAGEMENT | Facility: OTHER | Age: 78
End: 2023-03-20

## 2023-03-20 VITALS
OXYGEN SATURATION: 98 % | HEART RATE: 92 BPM | BODY MASS INDEX: 37.89 KG/M2 | TEMPERATURE: 97.9 F | DIASTOLIC BLOOD PRESSURE: 62 MMHG | SYSTOLIC BLOOD PRESSURE: 110 MMHG | WEIGHT: 205.91 LBS | HEIGHT: 62 IN

## 2023-03-20 DIAGNOSIS — E11.65 UNCONTROLLED TYPE 2 DIABETES MELLITUS WITH HYPERGLYCEMIA (HCC): ICD-10-CM

## 2023-03-20 DIAGNOSIS — I10 ESSENTIAL HYPERTENSION: ICD-10-CM

## 2023-03-20 DIAGNOSIS — E11.9 DM TYPE 2, GOAL HBA1C < 7.5% (HCC): ICD-10-CM

## 2023-03-20 DIAGNOSIS — I50.32 CHRONIC DIASTOLIC CONGESTIVE HEART FAILURE (HCC): ICD-10-CM

## 2023-03-20 DIAGNOSIS — G47.33 OBSTRUCTIVE SLEEP APNEA: ICD-10-CM

## 2023-03-20 DIAGNOSIS — E66.9 OBESITY, CLASS II, BMI 35-39.9: ICD-10-CM

## 2023-03-20 DIAGNOSIS — E78.5 DYSLIPIDEMIA, GOAL LDL BELOW 100: ICD-10-CM

## 2023-03-20 PROCEDURE — 99214 OFFICE O/P EST MOD 30 MIN: CPT | Performed by: FAMILY MEDICINE

## 2023-03-20 RX ORDER — PHENTERMINE HYDROCHLORIDE 37.5 MG/1
18.75 TABLET ORAL
Qty: 15 TABLET | Refills: 5 | Status: SHIPPED | OUTPATIENT
Start: 2023-03-20 | End: 2023-04-13

## 2023-03-20 RX ORDER — TOPIRAMATE 50 MG/1
50 TABLET, FILM COATED ORAL DAILY
Qty: 90 TABLET | Refills: 3 | Status: SHIPPED | OUTPATIENT
Start: 2023-03-20 | End: 2024-03-06

## 2023-03-20 RX ORDER — TRIAMCINOLONE ACETONIDE 1 MG/G
CREAM TOPICAL
COMMUNITY
Start: 2023-03-13

## 2023-03-20 RX ORDER — KETOCONAZOLE 20 MG/G
CREAM TOPICAL
COMMUNITY
Start: 2023-03-13

## 2023-03-20 ASSESSMENT — PATIENT HEALTH QUESTIONNAIRE - PHQ9: CLINICAL INTERPRETATION OF PHQ2 SCORE: 0

## 2023-03-20 ASSESSMENT — FIBROSIS 4 INDEX: FIB4 SCORE: 1.39

## 2023-03-20 NOTE — PROGRESS NOTES
Met with Ragini in the Clinic. Discussed her A1c of 8.5, reviewed her glucose log, and provided written material on diabetic diet and how to read food labels. Dicussed diabetic diet with Ragini. Discussed next outreach follow up and encouraged her to call with any questions or concerns.

## 2023-03-20 NOTE — PROGRESS NOTES
Diabetes Focused Exam:    Chief Complaint   Patient presents with    Diabetes Follow-up     6m fv    Hypertension    Dyslipidemia      Subjective:   HAI Reaves is a 78 y.o. female who presents for follow up of chronic conditions of diabetes mellitus, hypertension and hyperlipidemia. She indicates that she is feeling well and denies any symptoms referable to the above diagnoses. Specifically denies chest pain, palpitations, dyspnea, orthopnea, PND or peripheral edema. Also denies polyuria, polydipsia, urinary complaints, abdominal complaints, myalgias, numbness, weakness or other related symptoms.     She continues to be stable in her heart failure.  She takes the furosemide and spironolactone.    Her weight and activity are doing well on the phentermine and topiramate.  Overall weight.loss.      The patient is taking ASA every day 81 mg and taking all other medications as prescribed. Patient denies any side effects of medication.  DM: A1c goal <7.5%  Glucose monitoring frequency: daily, varies times   Fasting sugars: 105-185, usually 150-160s. Post-prandial sugars: not checking  Hypoglycemic episodes none  Diabetic complications: cardiovascular disease  ACR Albumin/Creatinine Ratio goal <30   HTN: Blood pressure goal <140/<80. Currently Rx ACE/ARB: Not Indicated  Hyperlipidemia:Cholesterol goal LDL <100, total/HDL <5. Currently Rx Statin: Yes  Last eye exam 12/2022 at [facility]  Denies visual blurring, double vision, eye pain and floaters  Last monofilament foot exam: today   Denies foot pain, numbness, calluses, ulcers    See medications and orders placed in encounter report.  Past medical history, family history, social history reviewed and updated as documented in medical record.  Current medications including changes today:  Current Outpatient Medications   Medication Sig Dispense Refill    Misc. Devices Misc This is an order for full face CPAP mask and new ResMed auto CPAP machine at 5-15 cm  pressure.  ICD-10 code G47.33       SPENCER 99 months   NPI [id number] 1 Each 0    topiramate (TOPAMAX) 50 MG tablet Take 1 Tablet by mouth every day. 90 Tablet 3    phentermine (ADIPEX-P) 37.5 MG tablet Take 0.5 Tablets by mouth every morning before breakfast for 180 days. 15 Tablet 5    metFORMIN (GLUCOPHAGE) 500 MG Tab TAKE 1 TABLET BY MOUTH TWICE A DAY WITH MEALS 180 Tablet 3    ciclopirox (LOPROX) 0.77 % cream PLEASE SEE ATTACHED FOR DETAILED DIRECTIONS      ketoconazole (NIZORAL) 2 % Cream APPLY EVERY DAY FOR 2 WEEKS FOR FLARES      triamcinolone acetonide (KENALOG) 0.1 % Cream APPLY TWICE DAILY TO AFFECTED AREAS ON LEGS TWICE A DAY FOR MAXIMUM 2 WEEKS      TRADJENTA 5 MG Tab tablet TAKE 1 TABLET BY MOUTH EVERY DAY 90 Tablet 3    atorvastatin (LIPITOR) 20 MG Tab Take 1 Tablet by mouth every day. MUST BE SEEN FOR FURTHER REFILLS 90 Tablet 3    losartan (COZAAR) 25 MG Tab Take 1 Tablet by mouth every day. 90 Tablet 3    levothyroxine (SYNTHROID) 150 MCG Tab Take 1 Tablet by mouth every morning on an empty stomach. 90 Tablet 3    spironolactone (ALDACTONE) 25 MG Tab TAKE 1 TABLET BY MOUTH EVERY DAY 90 Tablet 3    KLOR-CON 10 10 MEQ tablet TAKE 1 TABLET BY MOUTH EVERY DAY 90 Tablet 3    furosemide (LASIX) 40 MG Tab TAKE 1 TABLET BY MOUTH EVERY DAY IN THE MORNING 90 Tablet 3    glucose blood (FREESTYLE LITE) strip 1 Strip by Other route 1 time a day as needed. 100 Strip 6    FreeStyle Lancets Misc USE TWICE A  Each 6    tacrolimus (PROTOPIC) 0.1 % Ointment Apply  topically 2 times a day.      Ciclopirox 0.77 % Gel Apply  topically as needed.      risedronate (ACTONEL) 35 MG Tab Take 35 mg by mouth every 7 days.      betamethasone, augmented, (DIPROLENE) 0.05 % gel Apply 1 Application to affected area(s) every bedtime. 50 g 2    letrozole (FEMARA) 2.5 MG Tab Take 2.5 mg by mouth every day.      Fiber Powder Take 1 Package by mouth every day.      Probiotic Product (PROBIOTIC DAILY PO) Take 1 Package by mouth  "every day.      docusate sodium (COLACE) 100 MG Cap Take 300 mg by mouth every day.      acetaminophen (TYLENOL) 500 MG Tab Take 500 mg by mouth 2 Times a Day.      CALCIUM-MAGNESIUM-VITAMIN D PO Take 1 Tab by mouth every day.      BIOTIN PO Take 1 Tab by mouth every day.      COLLAGEN PO Take 1 Tab by mouth every day.      Glucosamine-Chondroitin (GLUCOSAMINE CHONDR COMPLEX PO) Take 750 mg by mouth 2 Times a Day.      Lutein 10 MG Tab Take 10 mg by mouth every evening.      aspirin 81 MG tablet Take 81 mg by mouth every morning. 90 Tab 3    coenzyme Q-10 30 MG capsule Take 1 Cap by mouth every day. 30 Cap 11    ascorbic acid (VITAMIN C) 500 MG tablet Take 1 Tab by mouth every day. 30 Tab 11    Multiple Vitamins-Minerals (ULTRA WOMENS PACK) Misc Take 1 Tab by mouth every day. 30 Each 11     No current facility-administered medications for this visit.     Allergies:   Allergies   Allergen Reactions    Asa [Aspirin]      Stomach bleeding.   Tolerates low dose ASA    Cough Syrup M [Cough Cold-Flu Relief] Swelling     Severe facial swelling    Dextromethorphan Swelling     Facial swellling      Social History     Tobacco Use    Smoking status: Never    Smokeless tobacco: Never   Vaping Use    Vaping Use: Never used   Substance Use Topics    Alcohol use: No     Alcohol/week: 0.0 oz    Drug use: No     Exercise: she is pushing over 3000 steps per day.  Trying to work up farther.  Health Maintenance/Immunizations: TDaP vaccine recommended, will get at [facility]    ROS  Pertinent  ROS findings as above. Denies chest pain or shortness of breath.      Objective:     OBJECTIVE:  /62 (BP Location: Right arm, Patient Position: Sitting, BP Cuff Size: Small adult)   Pulse 92   Temp 36.6 °C (97.9 °F) (Temporal)   Ht 1.575 m (5' 2\")   Wt 93.4 kg (205 lb 14.6 oz)   LMP  (LMP Unknown)   SpO2 98%   BMI 37.66 kg/m²  Body mass index is 37.66 kg/m². BMI: moderately obese  General: No apparent distress, conversant, cooperative and " pleasant with the examination.  Psych: Alert and oriented x4, judgment and insight normal  Neck: No JVD or bruits, no adenopathy, supple  Thyroid: normal to inspection and palpation  Lungs: negative findings: normal respiratory rate and rhythm, lungs clear to auscultation  Heart: negative findings: regular rate and rhythm, S1 normal, S2 normal, no murmurs, clicks, or gallops  Abdomen: Soft, nontender, no hepatosplenomegaly or masses, normal bowel sounds  Skin: No rashes, no cyanosis, no lesions or ulcers  Extremities: No cyanosis clubbing or edema.   Feet are examined Monofilament testing with a 10 gram force: sensation intact: intact bilaterally  Visual Inspection: Feet without maceration, ulcers, fissures.  Pedal pulses: intact bilaterally  Feet turn purple with dangling.    POC labs:   Lab Results   Component Value Date/Time    POCGLUCOSE 150 (H) 03/28/2019 07:07 AM          Last labs    Lab Results   Component Value Date/Time    CHOLSTRLTOT 104 03/14/2023 04:46 AM    LDL 75 05/15/2020 05:25 AM    HDL 39 (L) 03/14/2023 04:46 AM    TRIGLYCERIDE 86 03/14/2023 04:46 AM       Lab Results   Component Value Date/Time    SODIUM 140 03/14/2023 04:46 AM    SODIUM 139 03/11/2019 02:05 PM    POTASSIUM 4.1 03/14/2023 04:46 AM    POTASSIUM 3.7 03/11/2019 02:05 PM    GLUCOSE 170 (H) 03/14/2023 04:46 AM    GLUCOSE 133 (H) 03/11/2019 02:05 PM    BUNCREATRAT 35 (H) 03/14/2023 04:46 AM     Lab Results   Component Value Date/Time    HBA1C 8.5 (H) 03/14/2023 04:46 AM    HBA1C 7.8 (H) 09/13/2022 05:43 AM    HBA1C 7.3 (H) 03/15/2022 06:34 AM    HBA1C 7.0 (H) 03/11/2019 02:05 PM    HBA1C 8.7 (H) 09/08/2015 07:49 AM    HBA1C 8.4 (H) 05/05/2015 08:32 AM    HBA1C 9.4 (H) 01/06/2015 08:37 AM     Lab Results   Component Value Date/Time    MICRALB 36.2 09/13/2022 05:43 AM          Assessment/Plan:   Medications, refills, and referrals per orders.   1. Uncontrolled type 2 diabetes mellitus with hyperglycemia (HCC)  Diabetic Monofilament LE  Exam      2. DM type 2, goal HbA1c < 7.5% ([facility])        3. Chronic diastolic congestive heart failure (HCC)        4. Obstructive sleep apnea  Misc. Devices Misc      5. Essential hypertension        6. Dyslipidemia, goal LDL below 100        7. Obesity, Class II, BMI 35-39.9  topiramate (TOPAMAX) 50 MG tablet    phentermine (ADIPEX-P) 37.5 MG tablet        DM2 A1c is not at goal   Patient to monitor sugars: daily frequency  Discussed diet, exercise, disease management and weight loss goals.   Education and advise provided today:All medications, side effects and compliance (discussed carefully)  Annual eye examinations at [facility]  Diabetic diet discussed in detail, written exchange diet given  Foot care discussed and Podiatry visits  Glycohemoglobin and other lab monitoring  Home glucose monitoring emphasized  Labs immediately prior to next visit  Long term diabetic complications  Low cholesterol diet, weight control and daily exercise    Followup:   Do labs and RTC 6 months, sooner should new symptoms or problems arise.

## 2023-04-02 DIAGNOSIS — E11.8 CONTROLLED TYPE 2 DIABETES MELLITUS WITH COMPLICATION, WITHOUT LONG-TERM CURRENT USE OF INSULIN (HCC): ICD-10-CM

## 2023-04-04 RX ORDER — BLOOD-GLUCOSE METER
KIT MISCELLANEOUS
Qty: 100 STRIP | Refills: 6 | Status: SHIPPED | OUTPATIENT
Start: 2023-04-04

## 2023-04-11 DIAGNOSIS — E66.9 OBESITY, CLASS II, BMI 35-39.9: ICD-10-CM

## 2023-04-13 RX ORDER — PHENTERMINE HYDROCHLORIDE 37.5 MG/1
TABLET ORAL
Qty: 15 TABLET | Refills: 0 | Status: SHIPPED | OUTPATIENT
Start: 2023-04-13 | End: 2023-05-15

## 2023-04-14 ENCOUNTER — PATIENT OUTREACH (OUTPATIENT)
Dept: HEALTH INFORMATION MANAGEMENT | Facility: OTHER | Age: 78
End: 2023-04-14
Payer: MEDICARE

## 2023-04-14 DIAGNOSIS — E78.5 DYSLIPIDEMIA, GOAL LDL BELOW 100: ICD-10-CM

## 2023-04-14 DIAGNOSIS — E11.9 DM TYPE 2, GOAL HBA1C < 7.5% (HCC): ICD-10-CM

## 2023-04-14 DIAGNOSIS — I10 ESSENTIAL HYPERTENSION: ICD-10-CM

## 2023-04-14 DIAGNOSIS — G47.33 OBSTRUCTIVE SLEEP APNEA: ICD-10-CM

## 2023-04-14 PROCEDURE — 99490 CHRNC CARE MGMT STAFF 1ST 20: CPT | Performed by: FAMILY MEDICINE

## 2023-04-14 NOTE — TELEPHONE ENCOUNTER
PDMP review shows no inconsistencies.  Phentermine is renewed.  She was seen just a few weeks ago.

## 2023-04-14 NOTE — PROGRESS NOTES
"Assessment  Spoke with Ragini for monthly outreach follow up. She states she is doing \"super great!\" She states she is down to 201 pounds. She is very excited. She has been stuck at 210 pounds of a few months. She uses her Fitbit to track her steps she is up to 4500 steps a day. She is thinking about increasing her daily goal to 5000 steps. Congratulated Ragini on her progress. Discussed her exercise routine and blood sugars. They have been ranging from . Ragini denied any questions or concerns. Encouraged to call with any needs.     Education  Discussed diet and exercise routine    Care Plan  Continue daily exercise   Continue to monitor blood sugars    Progress:  Progressing     Next outreach:  1 month   "

## 2023-04-18 DIAGNOSIS — Z79.899 ON POTASSIUM WASTING DIURETIC THERAPY: ICD-10-CM

## 2023-04-18 RX ORDER — POTASSIUM CHLORIDE 750 MG/1
TABLET, FILM COATED, EXTENDED RELEASE ORAL
Qty: 90 TABLET | Refills: 3 | Status: SHIPPED | OUTPATIENT
Start: 2023-04-18

## 2023-04-21 NOTE — TELEPHONE ENCOUNTER
Received request via: Pharmacy    Was the patient seen in the last year in this department? Yes    Does the patient have an active prescription (recently filled or refills available) for medication(s) requested? No  
day(s)

## 2023-05-12 ENCOUNTER — PATIENT OUTREACH (OUTPATIENT)
Dept: HEALTH INFORMATION MANAGEMENT | Facility: OTHER | Age: 78
End: 2023-05-12
Payer: MEDICARE

## 2023-05-12 DIAGNOSIS — E78.5 DYSLIPIDEMIA, GOAL LDL BELOW 100: ICD-10-CM

## 2023-05-12 DIAGNOSIS — E11.8 CONTROLLED TYPE 2 DIABETES MELLITUS WITH COMPLICATION, WITHOUT LONG-TERM CURRENT USE OF INSULIN (HCC): ICD-10-CM

## 2023-05-12 DIAGNOSIS — E11.9 DM TYPE 2, GOAL HBA1C < 7.5% (HCC): ICD-10-CM

## 2023-05-12 DIAGNOSIS — I10 ESSENTIAL HYPERTENSION: ICD-10-CM

## 2023-05-12 DIAGNOSIS — D05.11 DUCTAL CARCINOMA IN SITU (DCIS) OF RIGHT BREAST: ICD-10-CM

## 2023-05-12 DIAGNOSIS — G47.33 OBSTRUCTIVE SLEEP APNEA: ICD-10-CM

## 2023-05-12 DIAGNOSIS — E66.01 MORBID OBESITY WITH BMI OF 40.0-44.9, ADULT (HCC): ICD-10-CM

## 2023-05-12 PROCEDURE — 99999 PR NO CHARGE: CPT | Performed by: FAMILY MEDICINE

## 2023-05-12 NOTE — PROGRESS NOTES
"Assessment  Spoke with Ragini for monthly outreach follow up. Ragini states that she is \"doing just great, really good.\" Her weight is now 198. She is very excited to have dropped below 200. She gave away some clothes and is excited to go shopping. Her blood sugars are ranging from 110-140. She continues to use her Fitbit to track daily exercise. Yesterday she logged 6900 steps. This is her highest yet. Congratulated Ragini on her progress!. Discussed her daily exercise and blood sugars. Reviewed next PCP appointment. Ragini states she is having trouble getting her CPAP supplies. Orders re-faxed. Will follow up with DME company. She denied any other questions or concerns.  Encouraged Ragini to call with needs.    Education  Discussed daily exercise  Discussed blood sugars    Plan of Care and Goals  Continue daily exercise  Continue diabetes education  Continue CHF education    Barriers:  Age  Knowledge deficit    Progress:  Progressing     Next outreach:  1 month   "

## 2023-05-15 DIAGNOSIS — E66.9 OBESITY, CLASS II, BMI 35-39.9: ICD-10-CM

## 2023-05-15 PROCEDURE — 1170F FXNL STATUS ASSESSED: CPT | Performed by: FAMILY MEDICINE

## 2023-05-15 PROCEDURE — 1126F AMNT PAIN NOTED NONE PRSNT: CPT | Performed by: FAMILY MEDICINE

## 2023-05-15 RX ORDER — PHENTERMINE HYDROCHLORIDE 37.5 MG/1
TABLET ORAL
Qty: 15 TABLET | Refills: 3 | Status: SHIPPED | OUTPATIENT
Start: 2023-05-15 | End: 2023-08-29

## 2023-05-16 NOTE — TELEPHONE ENCOUNTER
PDMP review shows no inconsistencies.  Patient last seen in which is well within the timeframe.  4-month prescription is sent to pharmacy for phentermine 1/2 tablet daily.  Patient has been having successful weight loss on this regimen.

## 2023-05-17 ENCOUNTER — TELEPHONE (OUTPATIENT)
Dept: HEALTH INFORMATION MANAGEMENT | Facility: OTHER | Age: 78
End: 2023-05-17
Payer: MEDICARE

## 2023-05-17 NOTE — TELEPHONE ENCOUNTER
Spoke with Ragini and let her know that the orders for her CPAP supplies is confirmed at [facility]. Ragini verbalized understanding. She will follow up with [facility] for her supplies.

## 2023-06-15 ENCOUNTER — PATIENT OUTREACH (OUTPATIENT)
Dept: HEALTH INFORMATION MANAGEMENT | Facility: OTHER | Age: 78
End: 2023-06-15
Payer: MEDICARE

## 2023-06-15 DIAGNOSIS — E11.9 DM TYPE 2, GOAL HBA1C < 7.5% (HCC): ICD-10-CM

## 2023-06-15 DIAGNOSIS — G47.33 OBSTRUCTIVE SLEEP APNEA: ICD-10-CM

## 2023-06-15 DIAGNOSIS — E66.01 MORBID OBESITY WITH BMI OF 40.0-44.9, ADULT (HCC): ICD-10-CM

## 2023-06-15 DIAGNOSIS — E78.5 DYSLIPIDEMIA, GOAL LDL BELOW 100: ICD-10-CM

## 2023-06-15 DIAGNOSIS — E11.8 CONTROLLED TYPE 2 DIABETES MELLITUS WITH COMPLICATION, WITHOUT LONG-TERM CURRENT USE OF INSULIN (HCC): ICD-10-CM

## 2023-06-15 DIAGNOSIS — I10 ESSENTIAL HYPERTENSION: ICD-10-CM

## 2023-06-15 DIAGNOSIS — D05.11 DUCTAL CARCINOMA IN SITU (DCIS) OF RIGHT BREAST: ICD-10-CM

## 2023-06-15 NOTE — PROGRESS NOTES
"Assessment  Spoke with Ragini for monthly outreach follow up. Ragini states she is \"doing just fine.\" She went to a quilt show today with friends. She reports she gets an average of 6000 steps a day. Her blood sugar is usually 120-130. She did have a reading of 58 once in the afternoon. Discussed the importance of regular meals/snacks. Ragini continues to keep a log of her blood sugars. Encouraged her to report if she continues to have low blood sugars. She states she has not received her CPAP supplies yet. Will follow up with [facility]. Ragini denies any other questions or concerns. Encouraged to call with any needs.     Education  Discussed blood sugars/hypoglycemic event  Continue blood sugar log  Discussed regular exercise     Plan of Care and Goals  Continue regular exercise  Continue CHF and Diabetes education     Barriers:  Age  Progression of disease process  Knowledge of healthcare    Progress:  Progressing     Next outreach:  1 month     Chart reviewed for Quarterly Assessment, patient continues to qualify and benefit from PCM program.                        "

## 2023-06-19 ENCOUNTER — TELEPHONE (OUTPATIENT)
Dept: HEALTH INFORMATION MANAGEMENT | Facility: OTHER | Age: 78
End: 2023-06-19
Payer: MEDICARE

## 2023-06-19 NOTE — TELEPHONE ENCOUNTER
Called [facility] to confirm supplies for Ragini. Called Ragini to follow up. Let her know she can call/order or  her supplies any time she is ready. Ragini verbalized understanding.

## 2023-06-21 ENCOUNTER — TELEPHONE (OUTPATIENT)
Dept: MEDICAL GROUP | Facility: MEDICAL CENTER | Age: 78
End: 2023-06-21
Payer: MEDICARE

## 2023-06-25 DIAGNOSIS — R06.02 EXERTIONAL SHORTNESS OF BREATH: ICD-10-CM

## 2023-06-25 DIAGNOSIS — R91.8 OPACITIES OF BOTH LUNGS PRESENT ON CHEST X-RAY: ICD-10-CM

## 2023-06-28 RX ORDER — FUROSEMIDE 40 MG/1
TABLET ORAL
Qty: 90 TABLET | Refills: 3 | Status: SHIPPED | OUTPATIENT
Start: 2023-06-28

## 2023-07-18 ENCOUNTER — PATIENT OUTREACH (OUTPATIENT)
Dept: HEALTH INFORMATION MANAGEMENT | Facility: OTHER | Age: 78
End: 2023-07-18
Payer: MEDICARE

## 2023-07-18 DIAGNOSIS — E66.01 MORBID OBESITY WITH BMI OF 40.0-44.9, ADULT (HCC): ICD-10-CM

## 2023-07-18 DIAGNOSIS — I10 ESSENTIAL HYPERTENSION: ICD-10-CM

## 2023-07-18 DIAGNOSIS — G47.33 OBSTRUCTIVE SLEEP APNEA: ICD-10-CM

## 2023-07-18 DIAGNOSIS — E11.8 CONTROLLED TYPE 2 DIABETES MELLITUS WITH COMPLICATION, WITHOUT LONG-TERM CURRENT USE OF INSULIN (HCC): ICD-10-CM

## 2023-07-18 DIAGNOSIS — E78.5 DYSLIPIDEMIA, GOAL LDL BELOW 100: ICD-10-CM

## 2023-07-18 DIAGNOSIS — E11.9 DM TYPE 2, GOAL HBA1C < 7.5% (HCC): ICD-10-CM

## 2023-07-18 DIAGNOSIS — D05.11 DUCTAL CARCINOMA IN SITU (DCIS) OF RIGHT BREAST: ICD-10-CM

## 2023-07-18 PROCEDURE — 99999 PR NO CHARGE: CPT | Performed by: FAMILY MEDICINE

## 2023-07-18 NOTE — PROGRESS NOTES
Assessment  Spoke with Ragini for monthly outreach. Ragini states that she is doing great. She reports her blood sugars have been 112-140. She states she is no longer having low blood sugars. She continues to use her Fitbit and get 6000 steps a day. She has been staying inside with the summer heat. She reports a strange sensation on her skin just below her knee-like she is wear socks. She denies any numbness or tingling in her leg or feet. She has not lost any sensation in her fee. She questions if this is neuropathy. She will continue to monitor her symptoms and report to Dr. Marques at her next PCP appointment. She has received a new CPAP machine and supplies. Ragini denied any questions or concerns. Encouraged to call with any needs.     Education  Discussed blood sugars   Discussed regular exercise     Plan of Care and Goals  Continue regular exercise   Continue diabetes education    Barriers:  Age  Progression of disease process  Knowledge of healthcare     Progress:  Progressing     Next outreach:  1 month

## 2023-07-22 DIAGNOSIS — R91.8 OPACITIES OF BOTH LUNGS PRESENT ON CHEST X-RAY: ICD-10-CM

## 2023-07-22 DIAGNOSIS — R06.02 EXERTIONAL SHORTNESS OF BREATH: ICD-10-CM

## 2023-07-22 RX ORDER — SPIRONOLACTONE 25 MG/1
25 TABLET ORAL
Qty: 90 TABLET | Refills: 3 | Status: SHIPPED | OUTPATIENT
Start: 2023-07-22

## 2023-08-10 ENCOUNTER — TELEPHONE (OUTPATIENT)
Dept: MEDICAL GROUP | Facility: MEDICAL CENTER | Age: 78
End: 2023-08-10

## 2023-08-10 DIAGNOSIS — R05.1 ACUTE COUGH: ICD-10-CM

## 2023-08-10 NOTE — TELEPHONE ENCOUNTER
Patient left a voicemail, she has a very bad cold and would like your advise. She cannot take just any cough medicine because she is allergic. Please advise

## 2023-08-11 RX ORDER — BENZONATATE 100 MG/1
100 CAPSULE ORAL 3 TIMES DAILY PRN
Qty: 60 CAPSULE | Refills: 0 | Status: SHIPPED | OUTPATIENT
Start: 2023-08-11 | End: 2023-09-25

## 2023-08-23 ENCOUNTER — PATIENT OUTREACH (OUTPATIENT)
Dept: HEALTH INFORMATION MANAGEMENT | Facility: OTHER | Age: 78
End: 2023-08-23
Payer: MEDICARE

## 2023-08-23 DIAGNOSIS — E11.9 DM TYPE 2, GOAL HBA1C < 7.5% (HCC): ICD-10-CM

## 2023-08-23 DIAGNOSIS — E11.8 CONTROLLED TYPE 2 DIABETES MELLITUS WITH COMPLICATION, WITHOUT LONG-TERM CURRENT USE OF INSULIN (HCC): ICD-10-CM

## 2023-08-23 DIAGNOSIS — E66.01 MORBID OBESITY WITH BMI OF 40.0-44.9, ADULT (HCC): ICD-10-CM

## 2023-08-23 DIAGNOSIS — E78.5 DYSLIPIDEMIA, GOAL LDL BELOW 100: ICD-10-CM

## 2023-08-23 DIAGNOSIS — I10 ESSENTIAL HYPERTENSION: ICD-10-CM

## 2023-08-23 DIAGNOSIS — G47.33 OBSTRUCTIVE SLEEP APNEA: ICD-10-CM

## 2023-08-23 DIAGNOSIS — D05.11 DUCTAL CARCINOMA IN SITU (DCIS) OF RIGHT BREAST: ICD-10-CM

## 2023-08-23 PROCEDURE — 99490 CHRNC CARE MGMT STAFF 1ST 20: CPT | Performed by: FAMILY MEDICINE

## 2023-08-23 NOTE — PROGRESS NOTES
Assessment  Spoke with Ragini for monthly outreach follow up. Ragini states his doing much better. She has had a bad cold. She reports the benzonatate prescriptions from Dr. Marques helped. She is feeling much better. She reports her blood sugars have stayed at 140 or below. She has continued to walk in the hallway at least 6500 steps a day on her fitness tracker. Reviewed her next PCP appointment on 9/25. Ragini any questions or concerns. Encouraged to call with any needs. RN Care Coordinators contact information given.     Education  Discussed blood sugars  Discussed regular exercise     Plan of Care and Goals  Continue to regular exercise   Continue DM and CHF education    Barriers:  Age  Knowledge of healthcare  Habits     Progress:  Progressing     Next outreach:  1 month

## 2023-08-24 ENCOUNTER — APPOINTMENT (RX ONLY)
Dept: URBAN - METROPOLITAN AREA CLINIC 6 | Facility: CLINIC | Age: 78
Setting detail: DERMATOLOGY
End: 2023-08-24

## 2023-08-24 DIAGNOSIS — L81.4 OTHER MELANIN HYPERPIGMENTATION: ICD-10-CM

## 2023-08-24 DIAGNOSIS — L82.1 OTHER SEBORRHEIC KERATOSIS: ICD-10-CM

## 2023-08-24 DIAGNOSIS — Z85.820 PERSONAL HISTORY OF MALIGNANT MELANOMA OF SKIN: ICD-10-CM

## 2023-08-24 DIAGNOSIS — L98.8 OTHER SPECIFIED DISORDERS OF THE SKIN AND SUBCUTANEOUS TISSUE: ICD-10-CM

## 2023-08-24 DIAGNOSIS — Z00.8 ENCOUNTER FOR OTHER GENERAL EXAMINATION: ICD-10-CM

## 2023-08-24 DIAGNOSIS — D18.0 HEMANGIOMA: ICD-10-CM

## 2023-08-24 DIAGNOSIS — D22 MELANOCYTIC NEVI: ICD-10-CM

## 2023-08-24 DIAGNOSIS — Z71.89 OTHER SPECIFIED COUNSELING: ICD-10-CM

## 2023-08-24 DIAGNOSIS — L90.5 SCAR CONDITIONS AND FIBROSIS OF SKIN: ICD-10-CM

## 2023-08-24 PROBLEM — D22.5 MELANOCYTIC NEVI OF TRUNK: Status: ACTIVE | Noted: 2023-08-24

## 2023-08-24 PROBLEM — D23.71 OTHER BENIGN NEOPLASM OF SKIN OF RIGHT LOWER LIMB, INCLUDING HIP: Status: ACTIVE | Noted: 2023-08-24

## 2023-08-24 PROBLEM — D18.01 HEMANGIOMA OF SKIN AND SUBCUTANEOUS TISSUE: Status: ACTIVE | Noted: 2023-08-24

## 2023-08-24 PROBLEM — D48.5 NEOPLASM OF UNCERTAIN BEHAVIOR OF SKIN: Status: ACTIVE | Noted: 2023-08-24

## 2023-08-24 PROBLEM — D22.4 MELANOCYTIC NEVI OF SCALP AND NECK: Status: ACTIVE | Noted: 2023-08-24

## 2023-08-24 PROCEDURE — ? BIOPSY BY SHAVE METHOD

## 2023-08-24 PROCEDURE — ? OBSERVATION

## 2023-08-24 PROCEDURE — ? COUNSELING

## 2023-08-24 PROCEDURE — 99213 OFFICE O/P EST LOW 20 MIN: CPT | Mod: 25

## 2023-08-24 PROCEDURE — 11102 TANGNTL BX SKIN SINGLE LES: CPT

## 2023-08-24 PROCEDURE — ? SUNSCREEN RECOMMENDATIONS

## 2023-08-24 PROCEDURE — 11103 TANGNTL BX SKIN EA SEP/ADDL: CPT

## 2023-08-24 PROCEDURE — ? SUNSCREEN TREATMENT REGIMEN

## 2023-08-24 PROCEDURE — ? REFERRAL CORRESPONDENCE

## 2023-08-24 PROCEDURE — ? PHOTO-DOCUMENTATION

## 2023-08-24 ASSESSMENT — LOCATION ZONE DERM
LOCATION ZONE: SCALP
LOCATION ZONE: TRUNK
LOCATION ZONE: LIP
LOCATION ZONE: FACE
LOCATION ZONE: ARM
LOCATION ZONE: HAND

## 2023-08-24 ASSESSMENT — LOCATION DETAILED DESCRIPTION DERM
LOCATION DETAILED: RIGHT INFERIOR VERMILION LIP
LOCATION DETAILED: LEFT CENTRAL FRONTAL SCALP
LOCATION DETAILED: LEFT INFERIOR CENTRAL MALAR CHEEK
LOCATION DETAILED: UPPER STERNUM
LOCATION DETAILED: LOWER STERNUM
LOCATION DETAILED: MIDDLE STERNUM
LOCATION DETAILED: LEFT SUPERIOR MEDIAL LOWER BACK
LOCATION DETAILED: RIGHT RADIAL DORSAL HAND
LOCATION DETAILED: LEFT POSTERIOR SHOULDER
LOCATION DETAILED: RIGHT INFERIOR UPPER BACK
LOCATION DETAILED: RIGHT POSTERIOR SHOULDER
LOCATION DETAILED: LEFT INFERIOR PREAURICULAR CHEEK
LOCATION DETAILED: RIGHT INFERIOR LATERAL UPPER BACK
LOCATION DETAILED: LEFT RADIAL DORSAL HAND

## 2023-08-24 ASSESSMENT — LOCATION SIMPLE DESCRIPTION DERM
LOCATION SIMPLE: LEFT SHOULDER
LOCATION SIMPLE: LEFT LOWER BACK
LOCATION SIMPLE: LEFT CHEEK
LOCATION SIMPLE: RIGHT HAND
LOCATION SIMPLE: RIGHT LIP
LOCATION SIMPLE: LEFT HAND
LOCATION SIMPLE: RIGHT UPPER BACK
LOCATION SIMPLE: CHEST
LOCATION SIMPLE: SCALP
LOCATION SIMPLE: RIGHT SHOULDER

## 2023-09-17 DIAGNOSIS — I10 ESSENTIAL HYPERTENSION: ICD-10-CM

## 2023-09-17 RX ORDER — LOSARTAN POTASSIUM 25 MG/1
25 TABLET ORAL
Qty: 90 TABLET | Refills: 3 | Status: SHIPPED | OUTPATIENT
Start: 2023-09-17

## 2023-09-19 LAB
ALBUMIN SERPL-MCNC: 4.1 G/DL (ref 3.8–4.8)
ALBUMIN/CREAT UR: 21 MG/G CREAT (ref 0–29)
ALBUMIN/GLOB SERPL: 1.9 {RATIO} (ref 1.2–2.2)
ALP SERPL-CCNC: 98 IU/L (ref 44–121)
ALT SERPL-CCNC: 23 IU/L (ref 0–32)
AST SERPL-CCNC: 25 IU/L (ref 0–40)
BILIRUB SERPL-MCNC: 0.4 MG/DL (ref 0–1.2)
BUN SERPL-MCNC: 24 MG/DL (ref 8–27)
BUN/CREAT SERPL: 38 (ref 12–28)
CALCIUM SERPL-MCNC: 9 MG/DL (ref 8.7–10.3)
CHLORIDE SERPL-SCNC: 105 MMOL/L (ref 96–106)
CHOLEST SERPL-MCNC: 101 MG/DL (ref 100–199)
CO2 SERPL-SCNC: 22 MMOL/L (ref 20–29)
CREAT SERPL-MCNC: 0.63 MG/DL (ref 0.57–1)
CREAT UR-MCNC: 161.9 MG/DL
EGFRCR SERPLBLD CKD-EPI 2021: 91 ML/MIN/1.73
ERYTHROCYTE [DISTWIDTH] IN BLOOD BY AUTOMATED COUNT: 12.8 % (ref 11.7–15.4)
GLOBULIN SER CALC-MCNC: 2.2 G/DL (ref 1.5–4.5)
GLUCOSE SERPL-MCNC: 140 MG/DL (ref 70–99)
HBA1C MFR BLD: 8 % (ref 4.8–5.6)
HCT VFR BLD AUTO: 42.1 % (ref 34–46.6)
HDLC SERPL-MCNC: 38 MG/DL
HGB BLD-MCNC: 14.2 G/DL (ref 11.1–15.9)
LABORATORY COMMENT REPORT: ABNORMAL
LDLC SERPL CALC-MCNC: 47 MG/DL (ref 0–99)
MCH RBC QN AUTO: 29.2 PG (ref 26.6–33)
MCHC RBC AUTO-ENTMCNC: 33.7 G/DL (ref 31.5–35.7)
MCV RBC AUTO: 87 FL (ref 79–97)
MICROALBUMIN UR-MCNC: 34.8 UG/ML
NRBC BLD AUTO-RTO: NORMAL %
PLATELET # BLD AUTO: 252 X10E3/UL (ref 150–450)
POTASSIUM SERPL-SCNC: 4.1 MMOL/L (ref 3.5–5.2)
PROT SERPL-MCNC: 6.3 G/DL (ref 6–8.5)
RBC # BLD AUTO: 4.86 X10E6/UL (ref 3.77–5.28)
SODIUM SERPL-SCNC: 141 MMOL/L (ref 134–144)
TRIGL SERPL-MCNC: 75 MG/DL (ref 0–149)
TSH SERPL DL<=0.005 MIU/L-ACNC: 2.69 UIU/ML (ref 0.45–4.5)
VLDLC SERPL CALC-MCNC: 16 MG/DL (ref 5–40)
WBC # BLD AUTO: 8 X10E3/UL (ref 3.4–10.8)

## 2023-09-25 ENCOUNTER — OFFICE VISIT (OUTPATIENT)
Dept: MEDICAL GROUP | Facility: MEDICAL CENTER | Age: 78
End: 2023-09-25
Payer: MEDICARE

## 2023-09-25 ENCOUNTER — PATIENT OUTREACH (OUTPATIENT)
Dept: HEALTH INFORMATION MANAGEMENT | Facility: OTHER | Age: 78
End: 2023-09-25

## 2023-09-25 VITALS
HEIGHT: 62 IN | DIASTOLIC BLOOD PRESSURE: 62 MMHG | SYSTOLIC BLOOD PRESSURE: 102 MMHG | OXYGEN SATURATION: 96 % | WEIGHT: 194.2 LBS | HEART RATE: 90 BPM | BODY MASS INDEX: 35.74 KG/M2 | TEMPERATURE: 98 F

## 2023-09-25 DIAGNOSIS — E89.0 POSTSURGICAL HYPOTHYROIDISM: ICD-10-CM

## 2023-09-25 DIAGNOSIS — E11.9 DM TYPE 2, GOAL HBA1C < 7.5% (HCC): ICD-10-CM

## 2023-09-25 DIAGNOSIS — E11.8 CONTROLLED TYPE 2 DIABETES MELLITUS WITH COMPLICATION, WITHOUT LONG-TERM CURRENT USE OF INSULIN (HCC): ICD-10-CM

## 2023-09-25 DIAGNOSIS — D05.11 DUCTAL CARCINOMA IN SITU (DCIS) OF RIGHT BREAST: ICD-10-CM

## 2023-09-25 DIAGNOSIS — I10 ESSENTIAL HYPERTENSION: ICD-10-CM

## 2023-09-25 DIAGNOSIS — E78.5 DYSLIPIDEMIA, GOAL LDL BELOW 100: ICD-10-CM

## 2023-09-25 DIAGNOSIS — M79.674 CHRONIC TOE PAIN, RIGHT FOOT: ICD-10-CM

## 2023-09-25 DIAGNOSIS — G89.29 CHRONIC TOE PAIN, RIGHT FOOT: ICD-10-CM

## 2023-09-25 DIAGNOSIS — E66.9 OBESITY, CLASS II, BMI 35-39.9: ICD-10-CM

## 2023-09-25 DIAGNOSIS — Z23 NEED FOR IMMUNIZATION AGAINST INFLUENZA: ICD-10-CM

## 2023-09-25 DIAGNOSIS — E11.65 UNCONTROLLED TYPE 2 DIABETES MELLITUS WITH HYPERGLYCEMIA (HCC): ICD-10-CM

## 2023-09-25 DIAGNOSIS — E66.01 MORBID OBESITY WITH BMI OF 40.0-44.9, ADULT (HCC): ICD-10-CM

## 2023-09-25 DIAGNOSIS — G47.33 OBSTRUCTIVE SLEEP APNEA: ICD-10-CM

## 2023-09-25 PROCEDURE — G0008 ADMIN INFLUENZA VIRUS VAC: HCPCS | Performed by: FAMILY MEDICINE

## 2023-09-25 PROCEDURE — 99214 OFFICE O/P EST MOD 30 MIN: CPT | Mod: 25 | Performed by: FAMILY MEDICINE

## 2023-09-25 PROCEDURE — 3078F DIAST BP <80 MM HG: CPT | Performed by: FAMILY MEDICINE

## 2023-09-25 PROCEDURE — 3074F SYST BP LT 130 MM HG: CPT | Performed by: FAMILY MEDICINE

## 2023-09-25 PROCEDURE — 1170F FXNL STATUS ASSESSED: CPT | Performed by: FAMILY MEDICINE

## 2023-09-25 PROCEDURE — 90662 IIV NO PRSV INCREASED AG IM: CPT | Performed by: FAMILY MEDICINE

## 2023-09-25 RX ORDER — ATORVASTATIN CALCIUM 20 MG/1
20 TABLET, FILM COATED ORAL DAILY
Qty: 90 TABLET | Refills: 3 | Status: SHIPPED | OUTPATIENT
Start: 2023-09-25

## 2023-09-25 RX ORDER — PHENTERMINE HYDROCHLORIDE 37.5 MG/1
18.75 TABLET ORAL EVERY MORNING
Qty: 15 TABLET | Refills: 2 | Status: SHIPPED | OUTPATIENT
Start: 2023-09-25 | End: 2023-10-19

## 2023-09-25 ASSESSMENT — FIBROSIS 4 INDEX: FIB4 SCORE: 1.61

## 2023-09-25 NOTE — PROGRESS NOTES
Diabetes Focused Exam:    Chief Complaint   Patient presents with    Diabetes Mellitus    Obesity      Subjective:   HPI  Ragini Reaves is a 78 y.o. female who presents for follow up of chronic conditions of diabetes mellitus, hypertension and hyperlipidemia. She indicates that she is feeling well and denies any symptoms referable to the above diagnoses. Specifically denies chest pain, palpitations, dyspnea, orthopnea, PND or peripheral edema. Also denies polyuria, polydipsia, urinary complaints, abdominal complaints, myalgias, numbness, weakness or other related symptoms.     Continues sustained weight loss on phentermine and topiramate, walking over 7000 steps per day.  Doing well with this.  Has lost 2 sizes.  She has lost 90 pounds total.    The patient is taking ASA every day 81 mg and taking all other medications as prescribed. Patient denies any side effects of medication.  DM: A1c goal <7.5 % Glucose monitoring frequency: daily   Fasting sugars: 109-215, usually 130-160 . Post-prandial sugars: not checking  Hypoglycemic episodes none  Diabetic complications: none  ACR Albumin/Creatinine Ratio goal <30   HTN: Blood pressure goal <140/<80. Currently Rx ACE/ARB: Yes  Hyperlipidemia:Cholesterol goal LDL <100, total/HDL <5. Currently Rx Statin: Yes  Last eye exam 2 months ago at [facility], says everything was fine.  Denies visual blurring, double vision, eye pain and floaters  Last monofilament foot exam: 3/2023   Denies foot pain, numbness, calluses, ulcers    See medications and orders placed in encounter report.  Past medical history, family history, social history reviewed and updated as documented in medical record.  Current medications including changes today:  Current Outpatient Medications   Medication Sig Dispense Refill    phentermine (ADIPEX-P) 37.5 MG tablet Take 0.5 Tablets by mouth every morning for 90 days. 15 Tablet 2    atorvastatin (LIPITOR) 20 MG Tab Take 1 Tablet by mouth every day. MUST  BE SEEN FOR FURTHER REFILLS 90 Tablet 3    losartan (COZAAR) 25 MG Tab TAKE 1 TABLET BY MOUTH EVERY DAY 90 Tablet 3    spironolactone (ALDACTONE) 25 MG Tab TAKE 1 TABLET BY MOUTH EVERY DAY 90 Tablet 3    furosemide (LASIX) 40 MG Tab TAKE 1 TABLET BY MOUTH EVERY DAY IN THE MORNING 90 Tablet 3    KLOR-CON 10 10 MEQ tablet TAKE 1 TABLET BY MOUTH EVERY DAY 90 Tablet 3    FREESTYLE LITE strip USE 1 STRIP ONCE A DAY AS NEEDED 100 Strip 6    ciclopirox (LOPROX) 0.77 % cream PLEASE SEE ATTACHED FOR DETAILED DIRECTIONS      ketoconazole (NIZORAL) 2 % Cream APPLY EVERY DAY FOR 2 WEEKS FOR FLARES      triamcinolone acetonide (KENALOG) 0.1 % Cream APPLY TWICE DAILY TO AFFECTED AREAS ON LEGS TWICE A DAY FOR MAXIMUM 2 WEEKS      Misc. Devices Misc This is an order for full face CPAP mask and new ResMed auto CPAP machine at 5-15 cm pressure.  ICD-10 code G47.33       SPENCER 99 months   NPI [id number] 1 Each 0    topiramate (TOPAMAX) 50 MG tablet Take 1 Tablet by mouth every day. 90 Tablet 3    metFORMIN (GLUCOPHAGE) 500 MG Tab TAKE 1 TABLET BY MOUTH TWICE A DAY WITH MEALS 180 Tablet 3    TRADJENTA 5 MG Tab tablet TAKE 1 TABLET BY MOUTH EVERY DAY 90 Tablet 3    levothyroxine (SYNTHROID) 150 MCG Tab Take 1 Tablet by mouth every morning on an empty stomach. 90 Tablet 3    FreeStyle Lancets Misc USE TWICE A  Each 6    tacrolimus (PROTOPIC) 0.1 % Ointment Apply  topically 2 times a day.      Ciclopirox 0.77 % Gel Apply  topically as needed.      risedronate (ACTONEL) 35 MG Tab Take 35 mg by mouth every 7 days.      betamethasone, augmented, (DIPROLENE) 0.05 % gel Apply 1 Application to affected area(s) every bedtime. 50 g 2    letrozole (FEMARA) 2.5 MG Tab Take 2.5 mg by mouth every day.      Fiber Powder Take 1 Package by mouth every day.      Probiotic Product (PROBIOTIC DAILY PO) Take 1 Package by mouth every day.      docusate sodium (COLACE) 100 MG Cap Take 300 mg by mouth every day.      acetaminophen (TYLENOL) 500 MG Tab  "Take 500 mg by mouth 2 Times a Day.      CALCIUM-MAGNESIUM-VITAMIN D PO Take 1 Tab by mouth every day.      BIOTIN PO Take 1 Tab by mouth every day.      COLLAGEN PO Take 1 Tab by mouth every day.      Glucosamine-Chondroitin (GLUCOSAMINE CHONDR COMPLEX PO) Take 750 mg by mouth 2 Times a Day.      Lutein 10 MG Tab Take 10 mg by mouth every evening.      aspirin 81 MG tablet Take 81 mg by mouth every morning. 90 Tab 3    coenzyme Q-10 30 MG capsule Take 1 Cap by mouth every day. 30 Cap 11    ascorbic acid (VITAMIN C) 500 MG tablet Take 1 Tab by mouth every day. 30 Tab 11    Multiple Vitamins-Minerals (ULTRA WOMENS PACK) Misc Take 1 Tab by mouth every day. 30 Each 11     No current facility-administered medications for this visit.     Allergies:   Allergies   Allergen Reactions    Asa [Aspirin]      Stomach bleeding.   Tolerates low dose ASA    Cough Syrup M [Cough Cold-Flu Relief] Swelling     Severe facial swelling    Dextromethorphan Swelling     Facial swellling      Social History     Tobacco Use    Smoking status: Never    Smokeless tobacco: Never   Vaping Use    Vaping Use: Never used   Substance Use Topics    Alcohol use: No     Alcohol/week: 0.0 oz    Drug use: No     Exercise: she is walking daily in her complex.    Health Maintenance/Immunizations: discussed, will get high dose flu vaccine today     ROS  Pertinent  ROS findings as above. Denies chest pain or shortness of breath     Objective:     OBJECTIVE:  /62   Pulse 90   Temp 36.7 °C (98 °F)   Ht 1.575 m (5' 2\")   Wt 88.1 kg (194 lb 3.2 oz)   LMP  (LMP Unknown)   SpO2 96%   BMI 35.52 kg/m²  Body mass index is 35.52 kg/m². BMI: moderately obese  General: No apparent distress, conversant, cooperative and pleasant with the examination.  Psych: Alert and oriented x4, judgment and insight normal  Neck: No JVD or bruits, no adenopathy, supple  Thyroid: normal to inspection and palpation  Lungs: negative findings: normal respiratory rate and " rhythm, lungs clear to auscultation  Heart: negative findings: regular rate and rhythm, S1 normal, S2 normal, no murmurs, clicks, or gallops  Abdomen: Soft, nontender, no hepatosplenomegaly or masses, normal bowel sounds  Skin: No rashes, no cyanosis, no lesions or ulcers  Extremities: No cyanosis clubbing or edema.     POC labs:   Lab Results   Component Value Date/Time    POCGLUCOSE 150 (H) 03/28/2019 07:07 AM          Last labs    Lab Results   Component Value Date/Time    CHOLSTRLTOT 101 09/18/2023 05:22 AM    LDL 75 05/15/2020 05:25 AM    HDL 38 (L) 09/18/2023 05:22 AM    TRIGLYCERIDE 75 09/18/2023 05:22 AM       Lab Results   Component Value Date/Time    SODIUM 141 09/18/2023 05:22 AM    SODIUM 139 03/11/2019 02:05 PM    POTASSIUM 4.1 09/18/2023 05:22 AM    POTASSIUM 3.7 03/11/2019 02:05 PM    GLUCOSE 140 (H) 09/18/2023 05:22 AM    GLUCOSE 133 (H) 03/11/2019 02:05 PM    BUNCREATRAT 38 (H) 09/18/2023 05:22 AM     Lab Results   Component Value Date/Time    HBA1C 8.0 (H) 09/18/2023 05:22 AM    HBA1C 8.5 (H) 03/14/2023 04:46 AM    HBA1C 7.8 (H) 09/13/2022 05:43 AM    HBA1C 7.0 (H) 03/11/2019 02:05 PM    HBA1C 8.7 (H) 09/08/2015 07:49 AM    HBA1C 8.4 (H) 05/05/2015 08:32 AM    HBA1C 9.4 (H) 01/06/2015 08:37 AM     Lab Results   Component Value Date/Time    MICRALB 34.8 09/18/2023 05:22 AM          Assessment/Plan:   Medications, refills, and referrals per orders.   1. Uncontrolled type 2 diabetes mellitus with hyperglycemia (HCC)  Comp Metabolic Panel    Lipid Profile    MICROALBUMIN CREAT RATIO URINE    HEMOGLOBIN A1C      2. DM type 2, goal HbA1c < 7.5% (HCC)        3. Chronic toe pain, right foot  Referral to Podiatry      4. Obesity, Class II, BMI 35-39.9  phentermine (ADIPEX-P) 37.5 MG tablet      5. Need for immunization against influenza  Influenza Vaccine, High Dose (65+ Only)      6. Dyslipidemia, goal LDL below 100  atorvastatin (LIPITOR) 20 MG Tab    Comp Metabolic Panel    TSH    Lipid Profile      7.  Postsurgical hypothyroidism  TSH      8. Essential hypertension  Comp Metabolic Panel    CBC WITHOUT DIFFERENTIAL    TSH        DM2 A1c is not at goal   Patient to monitor sugars: daily frequency  Discussed diet, exercise, disease management and weight loss goals.  Doing well, congratulated.   Education and advise provided today:All medications, side effects and compliance (discussed carefully)  Annual eye examinations at Ophthalmology  Diabetic diet discussed in detail, written exchange diet given  Foot care discussed and Podiatry visits  Glycohemoglobin and other lab monitoring  Home glucose monitoring emphasized  Labs immediately prior to next visit  Long term diabetic complications  Low cholesterol diet, weight control and daily exercise    Followup:   Do labs and RTC 6 months, sooner should new symptoms or problems arise.

## 2023-09-26 NOTE — PROGRESS NOTES
Diabetes Focused Exam:    Chief Complaint   Patient presents with   • Diabetes Mellitus   • Hypertension   • Hyperlipidemia      Subjective:   HPI  Ragini Reaves is a 74 y.o. female who presents for follow up of chronic conditions of diabetes mellitus, hypertension and hyperlipidemia. She indicates that she is feeling well and denies any symptoms referable to the above diagnoses. Specifically denies chest pain, palpitations, dyspnea, orthopnea, PND or peripheral edema. Also denies polyuria, polydipsia, urinary complaints, abdominal complaints, myalgias, numbness, weakness or other related symptoms.     Needs new test strips.    Daughter noted a dark lesion left lateral trunk.  This is smooth, irregular, multicolored left lateral trunk.  Referral to dermatology discussed and placed.    Mammogram found small amount of breast cancer, this is now removed.  She will be meeting with radiation oncology.    The patient is taking ASA every day 81 mg and taking all other medications as prescribed. Patient denies any side effects of medication.  DM: A1c goal <8  Glucose monitoring frequency: bid   Fasting sugars: 118-158. Usually 130s.  Post-prandial sugars: not usually checking  Hypoglycemic episodes none  Diabetic complications: none  ACR Albumin/Creatinine Ratio goal <30   HTN: Blood pressure goal <140/<80. Currently Rx ACE/ARB: Yes  Hyperlipidemia:Cholesterol goal LDL <100, total/HDL <5 . Currently Rx Statin: Yes  Last eye exam 7/2018   Denies visual blurring, double vision, eye pain and floaters  Last monofilament foot exam: 1/2019 Denies foot pain, numbness, calluses, ulcers      See medications and orders placed in encounter report.  Past medical history, family history, social history reviewed and updated as documented in medical record.    Current medications including changes today:  Current Outpatient Prescriptions   Medication Sig Dispense Refill   • glucose blood (FREESTYLE LITE) strip 1 Strip by Other  route 2 Times a Day. 100 Strip 5   • furosemide (LASIX) 40 MG Tab TAKE 1 TABLET BY MOUTH EVERY DAY 90 Tab 3   • KLOR-CON 10 10 MEQ tablet TAKE 1 TABLET BY MOUTH EVERY DAY 90 Tab 3   • metFORMIN (GLUCOPHAGE) 500 MG Tab TAKE 1 TAB BY MOUTH 2 TIMES A DAY, WITH MEALS. 180 Tab 3   • levothyroxine (SYNTHROID) 150 MCG Tab Take 150 mcg by mouth Every morning on an empty stomach. Pt takes a 75MCG on Wed, all over days 150MCG     • Fiber Powder Take 1 Package by mouth every day.     • Probiotic Product (PROBIOTIC DAILY PO) Take 1 Package by mouth every day.     • docusate sodium (COLACE) 100 MG Cap Take 200 mg by mouth every day.     • acetaminophen (TYLENOL) 500 MG Tab Take 500 mg by mouth 2 Times a Day.     • NON SPECIFIED Take 300 mg by mouth every day. CISTANCHE     • CALCIUM-MAGNESIUM-VITAMIN D PO Take 1 Tab by mouth every day.     • BIOTIN PO Take 1 Tab by mouth every day.     • COLLAGEN PO Take 1 Tab by mouth every day.     • Glucosamine-Chondroitin (GLUCOSAMINE CHONDR COMPLEX PO) Take 1 Tab by mouth 2 Times a Day.     • spironolactone (ALDACTONE) 25 MG Tab TAKE 1 TABLET BY MOUTH EVERY DAY 90 Tab 3   • losartan (COZAAR) 25 MG Tab Take 1 Tab by mouth every day. 90 Tab 3   • linagliptin (TRADJENTA) 5 MG Tab tablet Take 1 Tab by mouth every day. 90 Tab 3   • simvastatin (ZOCOR) 5 MG Tab TAKE 1 TAB BY MOUTH EVERY EVENING. 90 Tab 3   • furosemide (LASIX) 40 MG Tab Take 40 mg by mouth every morning.     • Lutein 10 MG Tab Take 10 mg by mouth every evening.     • aspirin 81 MG tablet Take 81 mg by mouth every morning. 90 Tab 3   • coenzyme Q-10 30 MG capsule Take 1 Cap by mouth every day. 30 Cap 11   • ascorbic acid (VITAMIN C) 500 MG tablet Take 1 Tab by mouth every day. 30 Tab 11   • Multiple Vitamins-Minerals (ULTRA WOMENS PACK) Misc Take 1 Tab by mouth every day. 30 Each 11     No current facility-administered medications for this visit.      Allergies:   Allergies   Allergen Reactions   • Asa [Aspirin]      Stomach  "bleeding.   Tolerates low dose ASA   • Cough Syrup M [Cough Cold-Flu Relief] Swelling     Severe facial swelling   • Dextromethorphan Swelling     Facial swellling      Social History   Substance Use Topics   • Smoking status: Never Smoker   • Smokeless tobacco: Never Used   • Alcohol use No     Exercise: she has been trying to walk more.  Health Maintenance/Immunizations: discussed    ROS  Pertinent  ROS findings as above.      Objective:     OBJECTIVE:  /68   Pulse 96   Temp 36.8 °C (98.2 °F)   Resp 14   Ht 1.549 m (5' 1\")   Wt 106.1 kg (234 lb)   LMP  (LMP Unknown)   SpO2 96%   BMI 44.21 kg/m²  Body mass index is 44.21 kg/m². BMI: severely obese  General: No apparent distress, conversant, cooperative and pleasant with the examination.  Psych: Alert and oriented x4, judgment and insight normal  Neck: No JVD or bruits, no adenopathy, supple  Thyroid: normal to inspection and palpation  Lungs: negative findings: normal respiratory rate and rhythm, lungs clear to auscultation  Heart: negative findings: regular rate and rhythm, S1 normal  Abdomen: Soft, nontender, no hepatosplenomegaly or masses, normal bowel sounds  Skin: No rashes, no cyanosis, no ulcers.  Irregular 7 by 3 mm pigmented nevus, 3 color left lateral posterior trunk.  Well healed scar under breast.  Extremities: No cyanosis clubbing or edema. Tender nodule right anterior chin.  Feet are examined     Pathology reports are reviewed and discussed.  POC labs:   Lab Results   Component Value Date/Time    POCGLUCOSE 150 (H) 03/28/2019 07:07 AM          Last labs    Lab Results   Component Value Date/Time    CHOLSTRLTOT 145 04/29/2019 07:28 AM    LDL 78 04/29/2019 07:28 AM    HDL 45 04/29/2019 07:28 AM    TRIGLYCERIDE 110 04/29/2019 07:28 AM       Lab Results   Component Value Date/Time    SODIUM 140 04/29/2019 07:28 AM    SODIUM 139 03/11/2019 02:05 PM    POTASSIUM 4.3 04/29/2019 07:28 AM    POTASSIUM 3.7 03/11/2019 02:05 PM    GLUCOSE 121 (H) " 04/29/2019 07:28 AM    GLUCOSE 133 (H) 03/11/2019 02:05 PM    BUNCREATRAT 36 (H) 04/29/2019 07:28 AM     Lab Results   Component Value Date/Time    HBA1C 7.4 (H) 04/29/2019 07:28 AM    HBA1C 7.0 (H) 03/11/2019 02:05 PM    HBA1C 7.1 (H) 12/27/2018 10:02 AM    HBA1C 7.2 (H) 06/29/2018 08:48 AM    HBA1C 8.7 (H) 09/08/2015 07:49 AM    HBA1C 8.4 (H) 05/05/2015 08:32 AM    HBA1C 9.4 (H) 01/06/2015 08:37 AM     Lab Results   Component Value Date/Time    MICRALB 95.6 12/27/2018 10:02 AM          Assessment/Plan:   Medications, refills, and referrals per orders.   1. Uncontrolled type 2 diabetes mellitus without complication, without long-term current use of insulin ([facility])  glucose blood (FREESTYLE LITE) strip    HEMOGLOBIN A1C    MICROALBUMIN CREAT RATIO URINE    Comp Metabolic Panel    CBC WITHOUT DIFFERENTIAL   2. DM type 2, goal HbA1c < 8% ([facility])  Comp Metabolic Panel   3. Essential hypertension  Comp Metabolic Panel    CBC WITHOUT DIFFERENTIAL   4. Dyslipidemia, goal LDL below 100  Comp Metabolic Panel    Lipid Profile   5. Ductal carcinoma in situ (DCIS) of right breast     6. Atypical pigmented skin lesion  REFERRAL TO DERMATOLOGY    CBC WITHOUT DIFFERENTIAL     DM2 A1c is at goal   Patient to monitor sugars: bid frequency  Discussed diet, exercise, disease management and weight loss goals.   Education and advise provided today:All medications, side effects and compliance (discussed carefully)  Annual eye examinations at Ophthalmology  Diabetic diet discussed in detail, written exchange diet given  Foot care discussed and Podiatry visits  Glycohemoglobin and other lab monitoring  Labs immediately prior to next visit  Long term diabetic complications  Low cholesterol diet, weight control and daily exercise    Followup:   Do labs and RTC 4 months, sooner should new symptoms or problems arise.           detailed exam

## 2023-10-02 NOTE — PROGRESS NOTES
Three attempts at monthly outreach follow up. No answer, left messages. Will follow up at next monthly outreach.     Chart reviewed for Quarterly Assessment, patient continues to qualify and benefit from PCM program.

## 2023-10-18 DIAGNOSIS — E66.9 OBESITY, CLASS II, BMI 35-39.9: ICD-10-CM

## 2023-10-19 RX ORDER — PHENTERMINE HYDROCHLORIDE 37.5 MG/1
TABLET ORAL
Qty: 15 TABLET | Refills: 5 | Status: SHIPPED | OUTPATIENT
Start: 2023-10-19 | End: 2024-04-16

## 2023-10-24 ENCOUNTER — HOSPITAL ENCOUNTER (OUTPATIENT)
Dept: RADIOLOGY | Facility: MEDICAL CENTER | Age: 78
End: 2023-10-24
Attending: INTERNAL MEDICINE
Payer: MEDICARE

## 2023-10-24 DIAGNOSIS — D05.11 INTRADUCTAL CARCINOMA IN SITU OF RIGHT BREAST: ICD-10-CM

## 2023-10-24 DIAGNOSIS — Z79.811 USE OF AROMATASE INHIBITORS: ICD-10-CM

## 2023-10-24 DIAGNOSIS — M65.9 SAPHO SYNDROME (HCC): ICD-10-CM

## 2023-10-24 DIAGNOSIS — L70.9 SAPHO SYNDROME (HCC): ICD-10-CM

## 2023-10-24 DIAGNOSIS — M85.80 SAPHO SYNDROME (HCC): ICD-10-CM

## 2023-10-24 DIAGNOSIS — M86.9 SAPHO SYNDROME (HCC): ICD-10-CM

## 2023-10-24 DIAGNOSIS — Z12.31 ENCOUNTER FOR MAMMOGRAM TO ESTABLISH BASELINE MAMMOGRAM: ICD-10-CM

## 2023-10-24 DIAGNOSIS — L40.3 SAPHO SYNDROME (HCC): ICD-10-CM

## 2023-10-24 PROCEDURE — 77080 DXA BONE DENSITY AXIAL: CPT

## 2023-11-03 ENCOUNTER — PATIENT OUTREACH (OUTPATIENT)
Dept: HEALTH INFORMATION MANAGEMENT | Facility: OTHER | Age: 78
End: 2023-11-03
Payer: MEDICARE

## 2023-11-03 DIAGNOSIS — E11.8 CONTROLLED TYPE 2 DIABETES MELLITUS WITH COMPLICATION, WITHOUT LONG-TERM CURRENT USE OF INSULIN (HCC): ICD-10-CM

## 2023-11-03 DIAGNOSIS — E66.01 MORBID OBESITY WITH BMI OF 40.0-44.9, ADULT (HCC): ICD-10-CM

## 2023-11-03 DIAGNOSIS — E78.5 DYSLIPIDEMIA, GOAL LDL BELOW 100: ICD-10-CM

## 2023-11-03 DIAGNOSIS — D05.11 DUCTAL CARCINOMA IN SITU (DCIS) OF RIGHT BREAST: ICD-10-CM

## 2023-11-03 DIAGNOSIS — E11.9 DM TYPE 2, GOAL HBA1C < 7.5% (HCC): ICD-10-CM

## 2023-11-03 DIAGNOSIS — G47.33 OBSTRUCTIVE SLEEP APNEA: ICD-10-CM

## 2023-11-03 DIAGNOSIS — I10 ESSENTIAL HYPERTENSION: ICD-10-CM

## 2023-11-03 PROCEDURE — 99999 PR NO CHARGE: CPT | Performed by: FAMILY MEDICINE

## 2023-11-06 NOTE — PROGRESS NOTES
"Assessment  Spoke with Ragini for monthly outreach follow up. Ragini states she is going, \"really pretty good.\"  She reports her blood sugars have been under 130. She is now up to 8000 steps a day on her fit bit. Congratulated Ragini on her progress. Discussed the Medicare store for any questions regarding her medicare coverage. Phone number and address given. She has a follow up with Podiatry today. She reports she may need to have surgery on her foot. She will be discussing this today. Encouraged Ragini to call if she has any questions or concerns. She denies any needs today.     Education  Discussed Podiatry follow up, poss surgery to foot  Discussed regular exercise  Discussed blood sugars     Plan of Care and Goals  Continue Diabetes education  Continue regular exercise     Barriers:  Age  Progression of disease-Diabetes  Knowledge of healthcare    Progress:  Progressing     Next outreach:  1 month                            "

## 2023-11-24 DIAGNOSIS — E89.0 POSTSURGICAL HYPOTHYROIDISM: ICD-10-CM

## 2023-11-27 RX ORDER — LEVOTHYROXINE SODIUM 0.15 MG/1
150 TABLET ORAL
Qty: 90 TABLET | Refills: 3 | Status: SHIPPED | OUTPATIENT
Start: 2023-11-27

## 2023-11-27 NOTE — TELEPHONE ENCOUNTER
Received request via: Pharmacy    Was the patient seen in the last year in this department? Yes    Does the patient have an active prescription (recently filled or refills available) for medication(s) requested? yes    Does the patient have skilled nursing Plus and need 100 day supply (blood pressure, diabetes and cholesterol meds only)? Patient does not have SCP   Requested Prescriptions     Pending Prescriptions Disp Refills    levothyroxine (SYNTHROID) 150 MCG Tab [Pharmacy Med Name: LEVOTHYROXINE 150 MCG TABLET] 90 Tablet 3     Sig: TAKE 1 TABLET BY MOUTH EVERY DAY IN THE MORNING ON AN EMPTY STOMACH

## 2023-12-07 ENCOUNTER — PATIENT OUTREACH (OUTPATIENT)
Dept: HEALTH INFORMATION MANAGEMENT | Facility: OTHER | Age: 78
End: 2023-12-07
Payer: MEDICARE

## 2023-12-07 DIAGNOSIS — I10 ESSENTIAL HYPERTENSION: ICD-10-CM

## 2023-12-07 DIAGNOSIS — E78.5 DYSLIPIDEMIA, GOAL LDL BELOW 100: ICD-10-CM

## 2023-12-07 DIAGNOSIS — E66.01 MORBID OBESITY WITH BMI OF 40.0-44.9, ADULT (HCC): ICD-10-CM

## 2023-12-07 DIAGNOSIS — E11.9 DM TYPE 2, GOAL HBA1C < 7.5% (HCC): ICD-10-CM

## 2023-12-07 DIAGNOSIS — D05.11 DUCTAL CARCINOMA IN SITU (DCIS) OF RIGHT BREAST: ICD-10-CM

## 2023-12-07 DIAGNOSIS — G47.33 OBSTRUCTIVE SLEEP APNEA: ICD-10-CM

## 2023-12-07 DIAGNOSIS — E11.8 CONTROLLED TYPE 2 DIABETES MELLITUS WITH COMPLICATION, WITHOUT LONG-TERM CURRENT USE OF INSULIN (HCC): ICD-10-CM

## 2023-12-07 PROCEDURE — 99490 CHRNC CARE MGMT STAFF 1ST 20: CPT | Performed by: FAMILY MEDICINE

## 2023-12-07 NOTE — PROGRESS NOTES
"Assessment  Spoke with Ragini for monthly outreach follow up. Ragini states, \"I'm doing just fine.\" She has been feeling \"pretty good.\" She reports her blood sugars have been around 135. She is up to 8500 steps a day. She states her goal is 10,000. Congratulated Ragini on her progress. She is hoping to get a small treadmill for her house so she doesn't have to walk up and down the hallway of her apartment complex. She discussed plans for the holiday season and gift projects she is working on. Discussed upcoming foot surgery in January. Ragini denied any needs at this time. Encouraged to call with any questions or concerns.     Education  Discussed blood sugars    Plan of Care and Goals  Continue regular exercise routine  Continue Diabetes education     Barriers:  Age  Progression of disease process-DM2  Knowledge of healthcare  Habits     Progress:  Progressing     Next outreach:  1 month                          "

## 2023-12-22 DIAGNOSIS — E11.9 DM TYPE 2, GOAL HBA1C < 8% (HCC): ICD-10-CM

## 2023-12-22 RX ORDER — LINAGLIPTIN 5 MG/1
TABLET, FILM COATED ORAL
Qty: 90 TABLET | Refills: 3 | Status: SHIPPED | OUTPATIENT
Start: 2023-12-22

## 2024-01-04 ENCOUNTER — PATIENT OUTREACH (OUTPATIENT)
Dept: HEALTH INFORMATION MANAGEMENT | Facility: OTHER | Age: 79
End: 2024-01-04
Payer: MEDICARE

## 2024-01-04 DIAGNOSIS — E11.9 DM TYPE 2, GOAL HBA1C < 7.5% (HCC): ICD-10-CM

## 2024-01-04 DIAGNOSIS — D05.11 DUCTAL CARCINOMA IN SITU (DCIS) OF RIGHT BREAST: ICD-10-CM

## 2024-01-04 DIAGNOSIS — E11.8 CONTROLLED TYPE 2 DIABETES MELLITUS WITH COMPLICATION, WITHOUT LONG-TERM CURRENT USE OF INSULIN (HCC): ICD-10-CM

## 2024-01-04 DIAGNOSIS — I10 ESSENTIAL HYPERTENSION: ICD-10-CM

## 2024-01-04 DIAGNOSIS — E66.01 MORBID OBESITY WITH BMI OF 40.0-44.9, ADULT (HCC): ICD-10-CM

## 2024-01-04 DIAGNOSIS — E78.5 DYSLIPIDEMIA, GOAL LDL BELOW 100: ICD-10-CM

## 2024-01-04 DIAGNOSIS — G47.33 OBSTRUCTIVE SLEEP APNEA: ICD-10-CM

## 2024-01-04 PROCEDURE — 99999 PR NO CHARGE: CPT | Performed by: FAMILY MEDICINE

## 2024-01-04 NOTE — PROGRESS NOTES
Assessment  Spoke with Ragini for monthly outreach follow up. Ragini states she just vacuumed and was sitting down to take a rest. She is doing well with exercise program. She is up to 10,000 steps a day. Congratulated Ragini on her progress. She reports her blood sugars have been no higher than 135. Discussed her hobbies. She likes to sew. She has finished 8 quilts. Ragini denies any needs at this time. Encouraged to call with any questions or concerns.     Education  Discussed exercise routine  Discussed blood sugars     Plan of Care and Goals  Continue regular exericise    Continue to monitor blood sugars    Barriers:  Age  Progression of disease process  Knowledge of healthcare  Habits     Progress:  Progressing     Next outreach:  1 month      Chart reviewed for Quarterly Assessment, patient continues to qualify and benefit from PCM program.

## 2024-02-06 ENCOUNTER — PATIENT OUTREACH (OUTPATIENT)
Dept: HEALTH INFORMATION MANAGEMENT | Facility: OTHER | Age: 79
End: 2024-02-06
Payer: MEDICARE

## 2024-02-06 DIAGNOSIS — I10 ESSENTIAL HYPERTENSION: ICD-10-CM

## 2024-02-06 DIAGNOSIS — E78.5 DYSLIPIDEMIA, GOAL LDL BELOW 100: ICD-10-CM

## 2024-02-06 DIAGNOSIS — E66.01 MORBID OBESITY WITH BMI OF 40.0-44.9, ADULT (HCC): ICD-10-CM

## 2024-02-06 DIAGNOSIS — D05.11 DUCTAL CARCINOMA IN SITU (DCIS) OF RIGHT BREAST: ICD-10-CM

## 2024-02-06 DIAGNOSIS — E11.8 CONTROLLED TYPE 2 DIABETES MELLITUS WITH COMPLICATION, WITHOUT LONG-TERM CURRENT USE OF INSULIN (HCC): ICD-10-CM

## 2024-02-06 DIAGNOSIS — E11.9 DM TYPE 2, GOAL HBA1C < 7.5% (HCC): ICD-10-CM

## 2024-02-06 DIAGNOSIS — G47.33 OBSTRUCTIVE SLEEP APNEA: ICD-10-CM

## 2024-02-12 ENCOUNTER — HOSPITAL ENCOUNTER (OUTPATIENT)
Dept: RADIOLOGY | Facility: MEDICAL CENTER | Age: 79
End: 2024-02-12
Attending: INTERNAL MEDICINE
Payer: MEDICARE

## 2024-02-12 DIAGNOSIS — M85.80 SAPHO SYNDROME (HCC): ICD-10-CM

## 2024-02-12 DIAGNOSIS — L70.9 SAPHO SYNDROME (HCC): ICD-10-CM

## 2024-02-12 DIAGNOSIS — D05.11 INTRADUCTAL CARCINOMA IN SITU OF RIGHT BREAST: ICD-10-CM

## 2024-02-12 DIAGNOSIS — L40.3 SAPHO SYNDROME (HCC): ICD-10-CM

## 2024-02-12 DIAGNOSIS — M86.9 SAPHO SYNDROME (HCC): ICD-10-CM

## 2024-02-12 DIAGNOSIS — Z79.811 USE OF AROMATASE INHIBITORS: ICD-10-CM

## 2024-02-12 DIAGNOSIS — M65.9 SAPHO SYNDROME (HCC): ICD-10-CM

## 2024-02-12 DIAGNOSIS — Z12.31 ENCOUNTER FOR MAMMOGRAM TO ESTABLISH BASELINE MAMMOGRAM: ICD-10-CM

## 2024-02-12 PROCEDURE — 77067 SCR MAMMO BI INCL CAD: CPT

## 2024-02-12 NOTE — PROGRESS NOTES
Three attempts at monthly outreach follow up. No answer, left messages, will follow up at next monthly outreach.

## 2024-02-26 ENCOUNTER — APPOINTMENT (RX ONLY)
Dept: URBAN - METROPOLITAN AREA CLINIC 6 | Facility: CLINIC | Age: 79
Setting detail: DERMATOLOGY
End: 2024-02-26

## 2024-02-26 DIAGNOSIS — L82.1 OTHER SEBORRHEIC KERATOSIS: ICD-10-CM

## 2024-02-26 DIAGNOSIS — L90.5 SCAR CONDITIONS AND FIBROSIS OF SKIN: ICD-10-CM

## 2024-02-26 DIAGNOSIS — Z85.820 PERSONAL HISTORY OF MALIGNANT MELANOMA OF SKIN: ICD-10-CM | Status: STABLE

## 2024-02-26 DIAGNOSIS — D22 MELANOCYTIC NEVI: ICD-10-CM

## 2024-02-26 DIAGNOSIS — L98.8 OTHER SPECIFIED DISORDERS OF THE SKIN AND SUBCUTANEOUS TISSUE: ICD-10-CM

## 2024-02-26 DIAGNOSIS — L81.4 OTHER MELANIN HYPERPIGMENTATION: ICD-10-CM

## 2024-02-26 DIAGNOSIS — D18.0 HEMANGIOMA: ICD-10-CM

## 2024-02-26 DIAGNOSIS — L57.0 ACTINIC KERATOSIS: ICD-10-CM

## 2024-02-26 DIAGNOSIS — Z71.89 OTHER SPECIFIED COUNSELING: ICD-10-CM

## 2024-02-26 PROBLEM — D22.4 MELANOCYTIC NEVI OF SCALP AND NECK: Status: ACTIVE | Noted: 2024-02-26

## 2024-02-26 PROBLEM — D22.5 MELANOCYTIC NEVI OF TRUNK: Status: ACTIVE | Noted: 2024-02-26

## 2024-02-26 PROBLEM — D18.01 HEMANGIOMA OF SKIN AND SUBCUTANEOUS TISSUE: Status: ACTIVE | Noted: 2024-02-26

## 2024-02-26 PROBLEM — D23.71 OTHER BENIGN NEOPLASM OF SKIN OF RIGHT LOWER LIMB, INCLUDING HIP: Status: ACTIVE | Noted: 2024-02-26

## 2024-02-26 PROCEDURE — ? OBSERVATION

## 2024-02-26 PROCEDURE — ? SUNSCREEN TREATMENT REGIMEN

## 2024-02-26 PROCEDURE — ? COUNSELING

## 2024-02-26 PROCEDURE — ? LIQUID NITROGEN

## 2024-02-26 PROCEDURE — 17000 DESTRUCT PREMALG LESION: CPT

## 2024-02-26 PROCEDURE — ? PHOTO-DOCUMENTATION

## 2024-02-26 PROCEDURE — 99213 OFFICE O/P EST LOW 20 MIN: CPT | Mod: 25

## 2024-02-26 ASSESSMENT — LOCATION SIMPLE DESCRIPTION DERM
LOCATION SIMPLE: UPPER BACK
LOCATION SIMPLE: SCALP
LOCATION SIMPLE: LEFT FOREHEAD
LOCATION SIMPLE: LEFT CHEEK
LOCATION SIMPLE: RIGHT HAND
LOCATION SIMPLE: ABDOMEN
LOCATION SIMPLE: CHEST
LOCATION SIMPLE: LEFT HAND
LOCATION SIMPLE: RIGHT LIP
LOCATION SIMPLE: INFERIOR FOREHEAD
LOCATION SIMPLE: LEFT LOWER BACK

## 2024-02-26 ASSESSMENT — LOCATION DETAILED DESCRIPTION DERM
LOCATION DETAILED: INFERIOR MID FOREHEAD
LOCATION DETAILED: LEFT CENTRAL FRONTAL SCALP
LOCATION DETAILED: LEFT RADIAL DORSAL HAND
LOCATION DETAILED: MIDDLE STERNUM
LOCATION DETAILED: EPIGASTRIC SKIN
LOCATION DETAILED: LEFT INFERIOR FOREHEAD
LOCATION DETAILED: LEFT SUPERIOR MEDIAL LOWER BACK
LOCATION DETAILED: INFERIOR THORACIC SPINE
LOCATION DETAILED: PERIUMBILICAL SKIN
LOCATION DETAILED: RIGHT RADIAL DORSAL HAND
LOCATION DETAILED: LEFT INFERIOR PREAURICULAR CHEEK
LOCATION DETAILED: RIGHT INFERIOR VERMILION LIP

## 2024-02-26 ASSESSMENT — LOCATION ZONE DERM
LOCATION ZONE: FACE
LOCATION ZONE: TRUNK
LOCATION ZONE: LIP
LOCATION ZONE: HAND
LOCATION ZONE: SCALP

## 2024-02-26 NOTE — PROCEDURE: MIPS QUALITY
Detail Level: Detailed
Quality 137: Melanoma: Continuity Of Care - Recall System: Patient information entered into a recall system that includes: target date for the next exam specified AND a process to follow up with patients regarding missed or unscheduled appointments
Quality 226: Preventive Care And Screening: Tobacco Use: Screening And Cessation Intervention: Patient screened for tobacco use and is an ex/non-smoker
Quality 397: Melanoma: Reporting: Pathology report includes the pT Category, thickness, ulceration and mitotic rate, peripheral and deep margin status and presence or absence of microsatellitosis for invasive tumors.
Quality 130: Documentation Of Current Medications In The Medical Record: Current Medications Documented

## 2024-02-26 NOTE — PROCEDURE: LIQUID NITROGEN
Render Note In Bullet Format When Appropriate: No
Number Of Freeze-Thaw Cycles: 2 freeze-thaw cycles
Show Applicator Variable?: Yes
Detail Level: Detailed
Consent: The patient's verbal consent was obtained including but not limited to risks of crusting, scabbing, blistering, scarring, darker or lighter pigmentary change, recurrence, incomplete removal and infection.
Post-Care Instructions: I reviewed with the patient in detail post-care instructions. Patient is to wear sunprotection, and avoid picking at any of the treated lesions. Pt may apply Vaseline to crusted or scabbing areas.
Duration Of Freeze Thaw-Cycle (Seconds): 10

## 2024-03-06 DIAGNOSIS — E66.9 OBESITY, CLASS II, BMI 35-39.9: ICD-10-CM

## 2024-03-06 RX ORDER — TOPIRAMATE 50 MG/1
50 TABLET, FILM COATED ORAL DAILY
Qty: 90 TABLET | Refills: 3 | Status: SHIPPED | OUTPATIENT
Start: 2024-03-06

## 2024-03-12 ENCOUNTER — PATIENT OUTREACH (OUTPATIENT)
Dept: HEALTH INFORMATION MANAGEMENT | Facility: OTHER | Age: 79
End: 2024-03-12
Payer: MEDICARE

## 2024-03-12 DIAGNOSIS — G47.33 OBSTRUCTIVE SLEEP APNEA: ICD-10-CM

## 2024-03-12 DIAGNOSIS — D05.11 DUCTAL CARCINOMA IN SITU (DCIS) OF RIGHT BREAST: ICD-10-CM

## 2024-03-12 DIAGNOSIS — I10 ESSENTIAL HYPERTENSION: ICD-10-CM

## 2024-03-12 DIAGNOSIS — E78.5 DYSLIPIDEMIA, GOAL LDL BELOW 100: ICD-10-CM

## 2024-03-12 DIAGNOSIS — E11.8 CONTROLLED TYPE 2 DIABETES MELLITUS WITH COMPLICATION, WITHOUT LONG-TERM CURRENT USE OF INSULIN (HCC): ICD-10-CM

## 2024-03-12 DIAGNOSIS — E11.9 DM TYPE 2, GOAL HBA1C < 7.5% (HCC): ICD-10-CM

## 2024-03-12 DIAGNOSIS — E66.01 MORBID OBESITY WITH BMI OF 40.0-44.9, ADULT (HCC): ICD-10-CM

## 2024-03-15 NOTE — PROGRESS NOTES
"Assessment  Spoke with Ragini for monthly outreach follow up. Ragini states that she is \"just doing great.\" She has maintained her 100 pound weight loss. Her weight ranges for 190-194. She continues to walk 3-4 times a day. She uses her fitbit to track her progress. She was getting 10,000 steps a day but had foot surgery in January. This reduced her steps to 1500 day. She has slowly increase back to 7000. Her blood sugars have been ranging from 120-130. Congratulated Ragini on her progress. She has an upcoming PCP appointment on 3/25 with pending lab orders prior to her appointment. Reviewed lab orders with Ragini she plans to be there labs next week. Her last A1c was 8.0 new orders are pending. Discussed possible graduation form the PCM program soon. Will follow up on lab results and her next PCP appointment.     Education  Discussed monitoring blood sugars-due for next A1c  Discussed exercise routine-the use of her Fitbit to track progress     Plan of Care and Goals  Continue Diabetes education  Continue regular exercise     Barriers:  Age  Knowledge of healthcare  Progression of disease process   Habits     Progress:  Progressing     Next outreach:  1 month                          "

## 2024-03-21 LAB
ALBUMIN SERPL-MCNC: 4.5 G/DL (ref 3.8–4.8)
ALBUMIN/CREAT UR: 26 MG/G CREAT (ref 0–29)
ALBUMIN/GLOB SERPL: 2 {RATIO} (ref 1.2–2.2)
ALP SERPL-CCNC: 95 IU/L (ref 44–121)
ALT SERPL-CCNC: 18 IU/L (ref 0–32)
AST SERPL-CCNC: 25 IU/L (ref 0–40)
BILIRUB SERPL-MCNC: 0.6 MG/DL (ref 0–1.2)
BUN SERPL-MCNC: 25 MG/DL (ref 8–27)
BUN/CREAT SERPL: 34 (ref 12–28)
CALCIUM SERPL-MCNC: 9.2 MG/DL (ref 8.7–10.3)
CHLORIDE SERPL-SCNC: 102 MMOL/L (ref 96–106)
CHOLEST SERPL-MCNC: 103 MG/DL (ref 100–199)
CO2 SERPL-SCNC: 23 MMOL/L (ref 20–29)
CREAT SERPL-MCNC: 0.73 MG/DL (ref 0.57–1)
CREAT UR-MCNC: 121.1 MG/DL
EGFRCR SERPLBLD CKD-EPI 2021: 84 ML/MIN/1.73
ERYTHROCYTE [DISTWIDTH] IN BLOOD BY AUTOMATED COUNT: 12.4 % (ref 11.7–15.4)
GLOBULIN SER CALC-MCNC: 2.3 G/DL (ref 1.5–4.5)
GLUCOSE SERPL-MCNC: 152 MG/DL (ref 70–99)
HBA1C MFR BLD: 8 % (ref 4.8–5.6)
HCT VFR BLD AUTO: 46.9 % (ref 34–46.6)
HDLC SERPL-MCNC: 43 MG/DL
HGB BLD-MCNC: 14.8 G/DL (ref 11.1–15.9)
LABORATORY COMMENT REPORT: NORMAL
LDLC SERPL CALC-MCNC: 44 MG/DL (ref 0–99)
MCH RBC QN AUTO: 28.5 PG (ref 26.6–33)
MCHC RBC AUTO-ENTMCNC: 31.6 G/DL (ref 31.5–35.7)
MCV RBC AUTO: 90 FL (ref 79–97)
MICROALBUMIN UR-MCNC: 31.6 UG/ML
NRBC BLD AUTO-RTO: ABNORMAL %
PLATELET # BLD AUTO: 272 X10E3/UL (ref 150–450)
POTASSIUM SERPL-SCNC: 4.3 MMOL/L (ref 3.5–5.2)
PROT SERPL-MCNC: 6.8 G/DL (ref 6–8.5)
RBC # BLD AUTO: 5.2 X10E6/UL (ref 3.77–5.28)
SODIUM SERPL-SCNC: 142 MMOL/L (ref 134–144)
TRIGL SERPL-MCNC: 81 MG/DL (ref 0–149)
TSH SERPL DL<=0.005 MIU/L-ACNC: 6.81 UIU/ML (ref 0.45–4.5)
VLDLC SERPL CALC-MCNC: 16 MG/DL (ref 5–40)
WBC # BLD AUTO: 8.5 X10E3/UL (ref 3.4–10.8)

## 2024-03-25 ENCOUNTER — OFFICE VISIT (OUTPATIENT)
Dept: MEDICAL GROUP | Facility: MEDICAL CENTER | Age: 79
End: 2024-03-25
Payer: MEDICARE

## 2024-03-25 VITALS
HEIGHT: 62 IN | WEIGHT: 190 LBS | OXYGEN SATURATION: 98 % | BODY MASS INDEX: 34.96 KG/M2 | SYSTOLIC BLOOD PRESSURE: 112 MMHG | TEMPERATURE: 98 F | DIASTOLIC BLOOD PRESSURE: 82 MMHG | RESPIRATION RATE: 18 BRPM | HEART RATE: 88 BPM

## 2024-03-25 DIAGNOSIS — E11.9 DM TYPE 2, GOAL HBA1C < 7.5% (HCC): ICD-10-CM

## 2024-03-25 DIAGNOSIS — E66.9 OBESITY, CLASS I, BMI 30-34.9: ICD-10-CM

## 2024-03-25 DIAGNOSIS — H26.9 BILATERAL INCIPIENT CATARACTS: ICD-10-CM

## 2024-03-25 DIAGNOSIS — K76.0 FATTY LIVER: ICD-10-CM

## 2024-03-25 DIAGNOSIS — J45.30 MILD PERSISTENT ASTHMA, UNCOMPLICATED: ICD-10-CM

## 2024-03-25 DIAGNOSIS — E26.1 SECONDARY HYPERALDOSTERONISM (HCC): ICD-10-CM

## 2024-03-25 DIAGNOSIS — G47.33 OBSTRUCTIVE SLEEP APNEA: ICD-10-CM

## 2024-03-25 DIAGNOSIS — Z00.00 MEDICARE ANNUAL WELLNESS VISIT, SUBSEQUENT: ICD-10-CM

## 2024-03-25 DIAGNOSIS — I10 ESSENTIAL HYPERTENSION: ICD-10-CM

## 2024-03-25 DIAGNOSIS — Z85.820 HISTORY OF MALIGNANT MELANOMA OF BACK: ICD-10-CM

## 2024-03-25 DIAGNOSIS — M85.89 OSTEOPENIA OF MULTIPLE SITES: ICD-10-CM

## 2024-03-25 DIAGNOSIS — I50.32 CHRONIC DIASTOLIC CONGESTIVE HEART FAILURE (HCC): ICD-10-CM

## 2024-03-25 DIAGNOSIS — M19.011 PRIMARY OSTEOARTHRITIS OF BOTH SHOULDERS: ICD-10-CM

## 2024-03-25 DIAGNOSIS — E78.5 DYSLIPIDEMIA, GOAL LDL BELOW 100: ICD-10-CM

## 2024-03-25 DIAGNOSIS — I35.1 NONRHEUMATIC AORTIC VALVE INSUFFICIENCY: ICD-10-CM

## 2024-03-25 DIAGNOSIS — E89.0 POSTSURGICAL HYPOTHYROIDISM: ICD-10-CM

## 2024-03-25 DIAGNOSIS — D05.11 DUCTAL CARCINOMA IN SITU (DCIS) OF RIGHT BREAST: ICD-10-CM

## 2024-03-25 DIAGNOSIS — Z85.850 HISTORY OF THYROID CANCER: ICD-10-CM

## 2024-03-25 DIAGNOSIS — E11.65 UNCONTROLLED TYPE 2 DIABETES MELLITUS WITH HYPERGLYCEMIA (HCC): ICD-10-CM

## 2024-03-25 DIAGNOSIS — M19.012 PRIMARY OSTEOARTHRITIS OF BOTH SHOULDERS: ICD-10-CM

## 2024-03-25 PROBLEM — Z86.006 HISTORY OF MELANOMA IN SITU: Status: RESOLVED | Noted: 2019-06-24 | Resolved: 2024-03-25

## 2024-03-25 PROBLEM — E66.811 OBESITY, CLASS I, BMI 30-34.9: Status: ACTIVE | Noted: 2021-11-18

## 2024-03-25 PROCEDURE — G0439 PPPS, SUBSEQ VISIT: HCPCS | Performed by: FAMILY MEDICINE

## 2024-03-25 PROCEDURE — 3074F SYST BP LT 130 MM HG: CPT | Performed by: FAMILY MEDICINE

## 2024-03-25 PROCEDURE — 3079F DIAST BP 80-89 MM HG: CPT | Performed by: FAMILY MEDICINE

## 2024-03-25 PROCEDURE — 1170F FXNL STATUS ASSESSED: CPT | Performed by: FAMILY MEDICINE

## 2024-03-25 ASSESSMENT — FIBROSIS 4 INDEX: FIB4 SCORE: 1.71

## 2024-03-25 ASSESSMENT — ENCOUNTER SYMPTOMS: GENERAL WELL-BEING: GOOD

## 2024-03-25 ASSESSMENT — PATIENT HEALTH QUESTIONNAIRE - PHQ9: CLINICAL INTERPRETATION OF PHQ2 SCORE: 0

## 2024-03-25 ASSESSMENT — ACTIVITIES OF DAILY LIVING (ADL): BATHING_REQUIRES_ASSISTANCE: 0

## 2024-03-25 NOTE — PROGRESS NOTES
Chief Complaint   Patient presents with    Annual Wellness Visit       HPI:  Ragini Reaves is a 79 y.o. here for Medicare Annual Wellness Visit     Patient Active Problem List    Diagnosis Date Noted    Secondary hyperaldosteronism (HCC) 03/25/2024    Uncontrolled type 2 diabetes mellitus with hyperglycemia (HCC) 03/20/2023    Nonrheumatic aortic valve insufficiency 09/19/2022    Obesity, Class I, BMI 30-34.9 11/18/2021    History of malignant melanoma of back 11/18/2021    DM type 2, goal HbA1c < 7.5% (HCC) 11/18/2021    Ductal carcinoma in situ (DCIS) of right breast 03/25/2019    Chronic diastolic congestive heart failure (HCC) 08/22/2018    Obstructive sleep apnea 02/01/2018    Fatty liver 05/02/2016    Bilateral incipient cataracts 01/12/2016    Mild persistent asthma, uncomplicated 03/03/2014    Primary osteoarthritis of both shoulders     Osteopenia of multiple sites     Essential hypertension     Dyslipidemia, goal LDL below 100     History of thyroid cancer 06/21/2012    Postsurgical hypothyroidism        Current Outpatient Medications   Medication Sig Dispense Refill    metFORMIN (GLUCOPHAGE) 500 MG Tab TAKE 1 TABLET BY MOUTH TWICE A DAY WITH MEALS 180 Tablet 3    topiramate (TOPAMAX) 50 MG tablet TAKE 1 TABLET BY MOUTH EVERY DAY 90 Tablet 3    TRADJENTA 5 MG Tab tablet TAKE 1 TABLET BY MOUTH EVERY DAY 90 Tablet 3    levothyroxine (SYNTHROID) 150 MCG Tab TAKE 1 TABLET BY MOUTH EVERY DAY IN THE MORNING ON AN EMPTY STOMACH 90 Tablet 3    phentermine (ADIPEX-P) 37.5 MG tablet TAKE 1/2 TABLETS BY MOUTH EVERY MORNING BEFORE BREAKFAST FOR 30 DAYS E66.01 15 Tablet 5    atorvastatin (LIPITOR) 20 MG Tab Take 1 Tablet by mouth every day. MUST BE SEEN FOR FURTHER REFILLS 90 Tablet 3    losartan (COZAAR) 25 MG Tab TAKE 1 TABLET BY MOUTH EVERY DAY 90 Tablet 3    spironolactone (ALDACTONE) 25 MG Tab TAKE 1 TABLET BY MOUTH EVERY DAY 90 Tablet 3    furosemide (LASIX) 40 MG Tab TAKE 1 TABLET BY MOUTH EVERY DAY IN  THE MORNING 90 Tablet 3    KLOR-CON 10 10 MEQ tablet TAKE 1 TABLET BY MOUTH EVERY DAY 90 Tablet 3    FREESTYLE LITE strip USE 1 STRIP ONCE A DAY AS NEEDED 100 Strip 6    ciclopirox (LOPROX) 0.77 % cream PLEASE SEE ATTACHED FOR DETAILED DIRECTIONS      ketoconazole (NIZORAL) 2 % Cream APPLY EVERY DAY FOR 2 WEEKS FOR FLARES      triamcinolone acetonide (KENALOG) 0.1 % Cream APPLY TWICE DAILY TO AFFECTED AREAS ON LEGS TWICE A DAY FOR MAXIMUM 2 WEEKS      Misc. Devices Misc This is an order for full face CPAP mask and new ResMed auto CPAP machine at 5-15 cm pressure.  ICD-10 code G47.33       SPENCER 99 months   NPI [id number] 1 Each 0    FreeStyle Lancets Misc USE TWICE A  Each 6    tacrolimus (PROTOPIC) 0.1 % Ointment Apply  topically 2 times a day.      Ciclopirox 0.77 % Gel Apply  topically as needed.      risedronate (ACTONEL) 35 MG Tab Take 35 mg by mouth every 7 days.      betamethasone, augmented, (DIPROLENE) 0.05 % gel Apply 1 Application to affected area(s) every bedtime. 50 g 2    letrozole (FEMARA) 2.5 MG Tab Take 2.5 mg by mouth every day.      Fiber Powder Take 1 Package by mouth every day.      Probiotic Product (PROBIOTIC DAILY PO) Take 1 Package by mouth every day.      docusate sodium (COLACE) 100 MG Cap Take 300 mg by mouth every day.      acetaminophen (TYLENOL) 500 MG Tab Take 500 mg by mouth 2 Times a Day.      CALCIUM-MAGNESIUM-VITAMIN D PO Take 1 Tab by mouth every day.      BIOTIN PO Take 1 Tab by mouth every day.      COLLAGEN PO Take 1 Tab by mouth every day.      Glucosamine-Chondroitin (GLUCOSAMINE CHONDR COMPLEX PO) Take 750 mg by mouth 2 Times a Day.      Lutein 10 MG Tab Take 10 mg by mouth every evening.      aspirin 81 MG tablet Take 81 mg by mouth every morning. 90 Tab 3    coenzyme Q-10 30 MG capsule Take 1 Cap by mouth every day. 30 Cap 11    ascorbic acid (VITAMIN C) 500 MG tablet Take 1 Tab by mouth every day. 30 Tab 11    Multiple Vitamins-Minerals (ULTRA WOMENS PACK) Misc Take  1 Tab by mouth every day. 30 Each 11     No current facility-administered medications for this visit.          Current supplements as per medication list.     Allergies: Asa [aspirin], Cough syrup m [cough cold-flu relief], and Dextromethorphan    Current social contact/activities: she is active with friends and family     She  reports that she has never smoked. She has never used smokeless tobacco. She reports that she does not drink alcohol and does not use drugs.  Counseling given: Yes      ROS:    Gait: Uses no assistive device  Ostomy: No  Other tubes: No  Amputations: No  Chronic oxygen use: No  Last eye exam: 9/2023  Wears hearing aids: No   : Denies any urinary leakage during the last 6 months    Screening/anticipatory guidance:  Seatbelt worn.  Feels safe at home.  Yearly preventive examinations recommended.  Colonoscopy no longer recommended.   Just completed mammogram.  Immunizations discussed.  DEXA bone density test will be due in 2025.    Depression Screening  Little interest or pleasure in doing things?  0 - not at all  Feeling down, depressed , or hopeless? 0 - not at all  Patient Health Questionnaire Score: 0     If depressive symptoms identified deferred to follow up visit unless specifically addressed in assessment and plan.    Interpretation of PHQ-9 Total Score   Score Severity   1-4 No Depression   5-9 Mild Depression   10-14 Moderate Depression   15-19 Moderately Severe Depression   20-27 Severe Depression    Screening for Cognitive Impairment  Do you or any of your friends or family members have any concern about your memory? No  Three Minute Recall (Leader, Season, Table) 3/3    Jose clock face with all 12 numbers and set the hands to show 10 minutes after 11.       Cognitive concerns identified deferred for follow up unless specifically addressed in assessment and plan.    Fall Risk Assessment  Has the patient had two or more falls in the last year or any fall with injury in the last  year?  No    Safety Assessment  Do you always wear your seatbelt?  Yes  Any changes to home needed to function safely? No  Difficulty hearing.  No  Patient counseled about all safety risks that were identified.    Functional Assessment ADLs  Are there any barriers preventing you from cooking for yourself or meeting nutritional needs?  No.    Are there any barriers preventing you from driving safely or obtaining transportation?  No.    Are there any barriers preventing you from using a telephone or calling for help?  No    Are there any barriers preventing you from shopping?  No.    Are there any barriers preventing you from taking care of your own finances?  No    Are there any barriers preventing you from managing your medications?  No    Are there any barriers preventing you from showering, bathing or dressing yourself? No    Are there any barriers preventing you from doing housework or laundry? No  Are there any barriers preventing you from using the toilet?No  Are you currently engaging in any exercise or physical activity?  Yes. walking    Self-Assessment of Health  What is your perception of your health? Good  Do you sleep more than six hours a night? No  In the past 7 days, how much did pain keep you from doing your normal work? None  Do you spend quality time with family or friends (virtually or in person)? Yes  Do you usually eat a heart healthy diet that constists of a variety of fruits, vegetables, whole grains and fiber? Yes  Do you eat foods high in fat and/or Fast Food more than three times per week? No    Advance Care Planning  Do you have an Advance Directive, Living Will, Durable Power of , or POLST? Yes  Advance Directive       is not on file - instructed patient to bring in a copy to scan into their chart      Health Maintenance Summary            Postponed - COVID-19 Vaccine (5 - 2023-24 season) Postponed until 9/1/2024      10/11/2022  Imm Admin: PFIZER BIVALENT SARS-COV-2 VACCINE (12+)     11/03/2021  Imm Admin: PFIZER PURPLE CAP SARS-COV-2 VACCINATION (12+)    02/01/2021  Imm Admin: PFIZER PURPLE CAP SARS-COV-2 VACCINATION (12+)    01/11/2021  Imm Admin: PFIZER PURPLE CAP SARS-COV-2 VACCINATION (12+)              Diabetes: Retinopathy Screening (Yearly) Next due on 7/6/2024 07/06/2023  Done - [facility]    02/24/2021  REFERRAL FOR RETINAL SCREENING EXAM    12/17/2019  REFERRAL FOR RETINAL SCREENING EXAM    07/03/2018  REFERRAL FOR RETINAL SCREENING EXAM    07/03/2018  Done - Dr. Carey    Only the first 5 history entries have been loaded, but more history exists.              Ordered - A1c Screening (Every 6 Months) Ordered on 3/25/2024      03/20/2024  HEMOGLOBIN A1C    09/18/2023  HEMOGLOBIN A1C    03/14/2023  HEMOGLOBIN A1C    09/13/2022  HEMOGLOBIN A1C    03/15/2022  HEMOGLOBIN A1C    Only the first 5 history entries have been loaded, but more history exists.              Ordered - Fasting Lipid Profile (Yearly) Ordered on 3/25/2024      03/20/2024  LIPID PANEL    09/18/2023  LIPID PANEL    03/14/2023  LIPID PANEL    09/13/2022  LIPID PANEL    03/15/2022  LIPID PANEL    Only the first 5 history entries have been loaded, but more history exists.              Diabetes: Urine Protein Screening (Yearly) Next due on 3/20/2025      03/20/2024  MICROALB/CREAT RATIO RAND. UR    09/18/2023  MICROALB/CREAT RATIO RAND. UR    09/13/2022  MICROALB/CREAT RATIO RAND. UR    03/15/2022  MICROALB/CREAT RATIO RAND. UR    07/20/2021  MICROALB/CREAT RATIO RAND. UR    Only the first 5 history entries have been loaded, but more history exists.              Ordered - SERUM CREATININE (Yearly) Ordered on 3/25/2024      03/20/2024  COMP METABOLIC PANEL    09/18/2023  COMP METABOLIC PANEL    03/14/2023  COMP METABOLIC PANEL    09/13/2022  COMP METABOLIC PANEL    03/15/2022  COMP METABOLIC PANEL    Only the first 5 history entries have been loaded, but more history exists.              Annual Wellness Visit  (Yearly) Next due on 3/25/2025      03/25/2024  Visit Dx: Medicare annual wellness visit, subsequent    09/19/2022  Visit Dx: Medicare annual wellness visit, subsequent    08/09/2021  Level of Service: NE ANNUAL WELLNESS VISIT-INCLUDES PPPS SUBSEQUE*    08/05/2019  Done - Completed by Dr. Otis Escoto    12/21/2017  Visit Dx: Medicare annual wellness visit, subsequent    Only the first 5 history entries have been loaded, but more history exists.              Diabetes: Monofilament / LE Exam (Yearly) Next due on 3/25/2025      03/25/2024  Diabetic Monofilament Lower Extremity Exam    03/25/2024  SmartData: WORKFLOW - DIABETES - DIABETIC FOOT EXAM PERFORMED    03/20/2023  Diabetic Monofilament LE Exam    03/20/2023  SmartData: WORKFLOW - DIABETES - DIABETIC FOOT EXAM PERFORMED    03/22/2022  Diabetic Monofilament Lower Extremity Exam    Only the first 5 history entries have been loaded, but more history exists.              Bone Density Scan (Every 5 Years) Tentatively due on 10/24/2028      10/24/2023  DS-BONE DENSITY STUDY (DEXA)    02/04/2021  DS-BONE DENSITY STUDY (DEXA)    01/15/2019  DS-BONE DENSITY STUDY (DEXA)    08/13/2013  DS-BONE DENSITY STUDY (DEXA)    04/11/2008  DS-BONE DENSITY STUDY (DEXA)    Only the first 5 history entries have been loaded, but more history exists.              IMM DTaP/Tdap/Td Vaccine (4 - Td or Tdap) Next due on 3/26/2033      03/26/2023  Imm Admin: Tdap Vaccine    03/25/2023  Imm Admin: Tdap Vaccine    11/06/2012  Imm Admin: Tdap Vaccine              Pneumococcal Vaccine: 65+ Years (Series Information) Completed      01/12/2016  Imm Admin: Pneumococcal Conjugate Vaccine (Prevnar/PCV-13)    12/06/2013  Imm Admin: Pneumococcal polysaccharide vaccine (PPSV-23)    11/08/2008  Imm Admin: Pneumococcal Vaccine (UF) - HISTORICAL DATA              Influenza Vaccine (Series Information) Completed      09/25/2023  Imm Admin: Influenza Vaccine Adult HD    09/19/2022  Imm Admin: Influenza  Vaccine Adult HD    10/25/2021  Imm Admin: Influenza Vaccine Adult HD    09/29/2020  Imm Admin: Influenza Vaccine Quad Inj (Pf)    09/12/2019  Imm Admin: Influenza Vaccine Adult HD    Only the first 5 history entries have been loaded, but more history exists.              Hepatitis A Vaccine (Hep A) (Series Information) Aged Out      No completion history exists for this topic.              HPV Vaccines (Series Information) Aged Out      No completion history exists for this topic.              Polio Vaccine (Inactivated Polio) (Series Information) Aged Out      No completion history exists for this topic.              Meningococcal Immunization (Series Information) Aged Out      No completion history exists for this topic.              Discontinued - Mammogram  Discontinued        Frequency changed to Never automatically (Topic No Longer Applies)    02/12/2024  MA-SCREENING MAMMO BILAT W/TOMOSYNTHESIS W/CAD    02/08/2023  MA-SCREENING MAMMO BILAT W/TOMOSYNTHESIS W/CAD    02/07/2022  MA-SCREENING MAMMO BILAT W/TOMOSYNTHESIS W/CAD    02/04/2021  MA-SCREENING MAMMO BILAT W/TOMOSYNTHESIS W/CAD    Only the first 5 history entries have been loaded, but more history exists.              Discontinued - Cervical Cancer Screening  Discontinued        Frequency changed to Never automatically (Topic No Longer Applies)    08/02/2012  PAP IG (IMAGE GUIDED)              Discontinued - Colorectal Cancer Screening  Discontinued        Frequency changed to Never automatically (Topic No Longer Applies)    05/21/2013  Colonoscopy (Previously completed)    05/21/2013  Surgical Procedure: COLONOSCOPY WITH BIOPSY    12/03/2012  Colonoscopy (Patient Declined)              Discontinued - Hepatitis B Vaccine (Hep B)  Discontinued      No completion history exists for this topic.              Discontinued - Zoster (Shingles) Vaccines  Discontinued      No completion history exists for this topic.              Discontinued - Hepatitis C  Screening  Discontinued      No completion history exists for this topic.                    Patient Care Team:  Chapito Marques M.D. as PCP - General (Family Medicine)  Frandy Pereira M.D. as Consulting Physician (Cardiovascular Disease (Cardiology))  Jhonatan Pierson M.D. as Consulting Physician (Radiation Oncology)  Bunny Estrada D.P.M. as Consulting Physician (Podiatry)  Shelli Lambert M.D. as Consulting Physician (Dermatology)  [employer] (DME Supplier)  Jhonatan Simon R.N. (Inactive) as Chronic Care Manager (Registered Nurse)  Vanesa Porras R.N. as RN Care Coordinator  (Registered Nurse)  Millie Mason M.D. (Medical Oncology)  Antonio Turner (Optometry)        Social History     Tobacco Use    Smoking status: Never    Smokeless tobacco: Never   Vaping Use    Vaping Use: Never used   Substance Use Topics    Alcohol use: No     Alcohol/week: 0.0 oz    Drug use: No     Family History   Problem Relation Age of Onset    Lung Disease Brother     Cancer Mother         pelvic    Stroke Maternal Grandmother      She  has a past medical history of Asthma, Cataract (03/11/2019), Chickenpox, Chronic diastolic congestive heart failure (HCC) (08/22/2018), Dental disorder, Diabetes (HCC), Dyslipidemia, goal LDL below 100, Dyslipidemia, goal LDL below 130, Essential hypertension, Frequent urination, History of malignant melanoma of back (11/18/2021), History of melanoma in situ, Hypertension, Hypothyroidism, Lump of right breast, Malignant melanoma (HCC) (08/09/2021), Microalbuminuria (05/02/2016), Nonrheumatic aortic valve insufficiency (09/19/2022), Obesity, Obstructive sleep apnea (02/01/2018), Osteoarthritis, Osteopenia, 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   Past Surgical  "History:   Procedure Laterality Date    MASTECTOMY Right 3/28/2019    Procedure: MASTECTOMY - RE-EXCISION RIGHT BREAST BIOPSY SITE FOR MARGINS;  Surgeon: Karuna Hussein M.D.;  Location: SURGERY SAME DAY [facility];  Service: General    BREAST BIOPSY Right 3/13/2019    Procedure: BREAST BIOPSY - WIRE LOCALIZED;  Surgeon: Karuna Hussein M.D.;  Location: SURGERY [facility];  Service: Gen Robotic    COLONOSCOPY WITH BIOPSY  5/21/2013    Performed by Mauro Garcia M.D. at ENDOSCOPY [facility]    THYROIDECTOMY  2000    TONSILLECTOMY         Exam:   /82   Pulse 88   Temp 36.7 °C (98 °F)   Resp 18   Ht 1.575 m (5' 2\")   Wt 86.2 kg (190 lb)   SpO2 98%  Body mass index is 34.75 kg/m².    Hearing good.    Dentition upper denture.  Lower partial.   Alert, oriented in no acute distress.  Eye contact is good, speech goal directed, affect calm  HEENT:   EOMI, PERRLA.     Neck:       Full range of motion but some limitation to extension due to significant cervical kyphosis. No JVD or carotid bruits appreciated. No cervical adenopathy appreciated. No thyromegaly or neck masses appreciated.  No retractions appreciated.  Lungs:     Clear to auscultation A&P with good air movement.  Moderate thoracic kyphosis.  Heart:      Regular rate and rhythm normal S1 and S2 without murmur appreciated.  Strong and symmetric radial and DP pulses.  Abd:        Soft, bowel sounds positive, nontender. No bloating noted. No hepatosplenomegaly or mass appreciated. No pulsatile mass appreciated.  Ext:         Extremities show symmetric and full range of motion with normal strength. No cyanosis or clubbing appreciated. No peripheral edema appreciated. Monofilament testing with a 10 gram force: sensation intact: intact bilaterally  Visual Inspection: Feet without maceration, ulcers, fissures.  Pedal pulses: intact bilaterally  Neuro:    Gait is normal. Patient is lucid, fluent and appropriate. No significant tremor appreciated.  Skin: "       Exhibit no rashes, pigmented lesions or ulcerations.    Lab results from 3/19/2024 reviewed and discussed in detail.  A1c 8.0%    Assessment and Plan. The following treatment and monitoring plan is recommended:      1. Medicare annual wellness visit, subsequent  Her medical problems are generally stable    2. Uncontrolled type 2 diabetes mellitus with hyperglycemia (HCC)/DM type 2, goal HbA1c < 7.5% (HCC)  Patient continues to have uncontrolled diabetes.  A1c is 8.0%.  She continues her medication regimen.  She is working to improve her diet further.  She continues gradual weight loss.  Monofilament exam completed today was normal.  Follow-up lab orders discussed and placed.  Stable problem.  - Diabetic Monofilament Lower Extremity Exam  - Comp Metabolic Panel; Future  - CBC WITHOUT DIFFERENTIAL; Future  - TSH; Future  - Lipid Profile; Future  - HEMOGLOBIN A1C; Future    3. Essential hypertension  HTN - Chronic condition stable. Currently taking all meds as directed.   She is taking baby aspirin daily.   She is monitoring BP at home.  She is checking only rarely as her blood pressure has been very good.  Generally 110s to 120s over 70s.  Here today 112/82, very good overall.  Denies symptoms low BP: light-headed, tunnel-vision, unusual fatigue.   Denies symptoms high BP:pounding headache, visual changes, palpitations, flushed face.   Denies medicine side effects: unusual fatigue, slow heartbeat, foot/leg swelling, cough.  - Comp Metabolic Panel; Future    4. History of thyroid cancer/Postsurgical hypothyroidism  Patient reports good energy level on the medication. Patient denies insomnia, tremor or change in appetite.  Patient is taking the medication on an empty stomach in the morning and waiting at least 30 minutes before eating.  Last TSH  a few days ago was above target.  She feels quite well on the current dose and does not particularly want to change.  Plan recheck TSH in 4 months.  Stable problem  clinically.  - TSH; Future    5. Dyslipidemia, goal LDL below 100  Patient denies chest pain, chest pressure, palpitations or exertional shortness of breath. Patient denies muscle aches or muscle weakness from the atorvastatin medication.  Most recent LDL a few days ago was 44, at target.  Patient is a never smoker. Patient takes 81 mg aspirin daily. Patient has no history of myocardial infarction, stroke or PVD.  Follow-up lab orders discussed and placed.  The problem is clinically stable.  - Comp Metabolic Panel; Future  - Lipid Profile; Future    6. Mild persistent asthma, uncomplicated  Actually it is a little unclear to me whether patient truly has asthma.  Last pulmonary function testing in January 2018 showed reduced FVC and FEV1 if he is seeing ratio but no response to bronchodilators.  No evidence for emphysema.  I feel some of her previous asthma-like symptoms may have been due to fluid overload.  Overall stable problem, patient states she is breathing well, denies shortness of breath.    7. Fatty liver  Patient does have history of fatty liver, documented on imaging.  Her most recent transaminase levels are excellent.  Patient avoids all alcohol.  Follow-up lab orders discussed and placed.  Stable problem.  - Comp Metabolic Panel; Future  - CBC WITHOUT DIFFERENTIAL; Future    8. Obstructive sleep apnea  She continues treatment with her AutoPap device.  She follows with pulmonology as needed.  Has been using her device most nights.  Stable problem.    9. Nonrheumatic aortic valve insufficiency/Chronic diastolic congestive heart failure (HCC)/Secondary hyperaldosteronism (HCC)  Patient currently has mild nonrheumatic aortic valve insufficiency on recent imaging.  She has some diastolic congestive heart failure and is on diuretics with presumed secondary hyperaldosteronism.  Denies any increased shortness of breath.  Stable problem.    10. Bilateral incipient cataracts  Patient had recent eye evaluation.   She has early cataracts bilaterally which are stable.  Denies any change in vision.  Stable problem.    11. Ductal carcinoma in situ (DCIS) of right breast  Patient very happy that she is currently at 4-1/2 years out from diagnosis.  She will follow-up with her oncologist at the 5-year rayo.  It is anticipated that she will be able to stop the  letrozole at that time.  Patient notes no new masses.  Recent mammogram last month showed no radiographic evidence of malignancy.  We will continue yearly mammograms.  Patient continues regular self-exam.  Stable problem.    12. History of malignant melanoma of back  Patient has history of malignant melanoma.  She is still following with dermatology every 6 months but has done well with no evidence of recurrence or metastasis.  Stable problem.    13. Osteopenia of multiple sites  Patient does have history of osteopenia of multiple sites.  On her most recent bone density in October 2023 her lumbar density had unfortunately progressed to osteoporosis.  She continues treatment with risendronate  once weekly and calcium/magnesium and vitamin D.  No recent or current fracture.  Stable or slowly progressive problem.    14. Primary osteoarthritis of both shoulders  She has osteoarthritis of both shoulders with some stiffness.  She continues to work on good range of motion.  Patient cannot take nonsteroidal anti-inflammatory drugs due to cardiac history and taking a bisphosphonate.  Stable problem.    15. Obesity, Class I, BMI 30-34.9  Patient continues to have significant improvement in her obesity.  She continues phentermine 37.5 mg one half tab daily.  Denies palpitations or insomnia from this issue.  She will continue to work on improved diet.  She feels she has slipped a little daily.  Continues good voluntary weight loss.  Stable problem.      Services suggested: No services needed at this time  Health Care Screening: Age-appropriate preventive services recommended by USPTF and  ACIP covered by Medicare were discussed today. Services ordered if indicated and agreed upon by the patient.  Referrals offered: Community-based lifestyle interventions to reduce health risks and promote self-management and wellness, fall prevention, nutrition, physical activity, tobacco-use cessation, weight loss, and mental health services as per orders if indicated.    Discussion today about general wellness and lifestyle habits:  discussed  Prevent falls and reduce trip hazards; Cautioned about securing or removing rugs.  Have a working fire alarm and carbon monoxide detector; has  Engage in regular physical activity and social activities     Follow-up: Return in about 4 months (around 7/25/2024), or if symptoms worsen or fail to improve.

## 2024-03-25 NOTE — LETTER
March 25, 2024        Patient: Ragini Reaves   YOB: 1945   Date of Visit: 3/25/2024       To Whom It May Concern:    It is my medical opinion that Ragini Reavse takes the following over the counter medications and supplements which are medically necessary;    1) acetaminophen 500 mg 2 per day  2) aspirin 81mg once per day  3) stool softener (docusate 100 mg) once daily  4) vitamin pack once daily (womens pack)  5) fiber powder pack, psyllium, once a day  6) probiotic powder pack, once a day.  7) joint health pain, once a day  8) vitamin C 500 mg once a day  9) co q 10 once a day  10) lutein once a day    If you have any questions or concerns, please don't hesitate to call.    Sincerely,        Chapito Marques M.D.    [facility] [address]   [address] [facility] [address]  [place] [id number] [phone] (Phone)  [phone] (Fax)

## 2024-03-26 PROBLEM — M62.838 SPASM OF CERVICAL PARASPINOUS MUSCLE: Status: RESOLVED | Noted: 2022-03-22 | Resolved: 2024-03-26

## 2024-04-07 DIAGNOSIS — E11.8 CONTROLLED TYPE 2 DIABETES MELLITUS WITH COMPLICATION, WITHOUT LONG-TERM CURRENT USE OF INSULIN (HCC): ICD-10-CM

## 2024-04-08 RX ORDER — BLOOD-GLUCOSE METER
KIT MISCELLANEOUS
Qty: 100 STRIP | Refills: 6 | Status: SHIPPED | OUTPATIENT
Start: 2024-04-08

## 2024-04-08 NOTE — TELEPHONE ENCOUNTER
Received request via: Pharmacy    Was the patient seen in the last year in this department? Yes    Does the patient have an active prescription (recently filled or refills available) for medication(s) requested? No    Pharmacy Name: [facility]/pharmacy #[id number] - [place], [place] - [address] [facility]     Does the patient have detention Plus and need 100 day supply (blood pressure, diabetes and cholesterol meds only)? Patient does not have SCP

## 2024-04-29 ENCOUNTER — PATIENT OUTREACH (OUTPATIENT)
Dept: HEALTH INFORMATION MANAGEMENT | Facility: OTHER | Age: 79
End: 2024-04-29
Payer: MEDICARE

## 2024-04-29 DIAGNOSIS — G47.33 OBSTRUCTIVE SLEEP APNEA: ICD-10-CM

## 2024-04-29 DIAGNOSIS — D05.11 DUCTAL CARCINOMA IN SITU (DCIS) OF RIGHT BREAST: ICD-10-CM

## 2024-04-29 DIAGNOSIS — I10 ESSENTIAL HYPERTENSION: ICD-10-CM

## 2024-04-29 DIAGNOSIS — E66.01 MORBID OBESITY WITH BMI OF 40.0-44.9, ADULT (HCC): ICD-10-CM

## 2024-04-29 DIAGNOSIS — E78.5 DYSLIPIDEMIA, GOAL LDL BELOW 100: ICD-10-CM

## 2024-04-29 DIAGNOSIS — E11.9 DM TYPE 2, GOAL HBA1C < 7.5% (HCC): ICD-10-CM

## 2024-04-29 DIAGNOSIS — E11.8 CONTROLLED TYPE 2 DIABETES MELLITUS WITH COMPLICATION, WITHOUT LONG-TERM CURRENT USE OF INSULIN (HCC): ICD-10-CM

## 2024-05-01 NOTE — PROGRESS NOTES
Assessment  Spoke with Ragini for monthly outreach follow up. Ragini states she is doing well. Discussed her last PCP appointment on 3/25. Her A1c remained at 8.0. Ragini state she is working on her A1c. She has been trying to eat earlier in the evening. She reports her nightly blood sugars have improved to 114-120. She continues her daily exercise. She is currently at 9,000 steps with and goal of 10,000. Congratulated Ragini on her progress. Dicussed graduation from the PCM program. Dicussed CHW referral. Encouraged Ragini to call with any questions or concerns.     Education  Discussed graduation for PCM Program and CHW referral.     Plan of Care and Goals  Continue regular exercise-[place] of [id number]  Continue manage A1c     Barriers:  Age  Knowledge of healthcare  Habits     Progress:  Graduated-CHW referral        1-2 drinks

## 2024-05-02 ENCOUNTER — PATIENT OUTREACH (OUTPATIENT)
Dept: HEALTH INFORMATION MANAGEMENT | Facility: OTHER | Age: 79
End: 2024-05-02
Payer: MEDICARE

## 2024-05-02 SDOH — ECONOMIC STABILITY: INCOME INSECURITY: HOW HARD IS IT FOR YOU TO PAY FOR THE VERY BASICS LIKE FOOD, HOUSING, MEDICAL CARE, AND HEATING?: NOT HARD AT ALL

## 2024-05-02 SDOH — ECONOMIC STABILITY: INCOME INSECURITY: IN THE LAST 12 MONTHS, WAS THERE A TIME WHEN YOU WERE NOT ABLE TO PAY THE MORTGAGE OR RENT ON TIME?: NO

## 2024-05-02 SDOH — HEALTH STABILITY: PHYSICAL HEALTH: ON AVERAGE, HOW MANY DAYS PER WEEK DO YOU ENGAGE IN MODERATE TO STRENUOUS EXERCISE (LIKE A BRISK WALK)?: 7 DAYS

## 2024-05-02 SDOH — SOCIAL STABILITY: SOCIAL NETWORK: ARE YOU MARRIED, WIDOWED, DIVORCED, SEPARATED, NEVER MARRIED, OR LIVING WITH A PARTNER?: DIVORCED

## 2024-05-02 SDOH — HEALTH STABILITY: MENTAL HEALTH: HOW OFTEN DO YOU HAVE A DRINK CONTAINING ALCOHOL?: NEVER

## 2024-05-02 SDOH — SOCIAL STABILITY: SOCIAL INSECURITY
WITHIN THE LAST YEAR, HAVE YOU BEEN KICKED, HIT, SLAPPED, OR OTHERWISE PHYSICALLY HURT BY YOUR PARTNER OR EX-PARTNER?: NO

## 2024-05-02 SDOH — SOCIAL STABILITY: SOCIAL INSECURITY: WITHIN THE LAST YEAR, HAVE YOU BEEN HUMILIATED OR EMOTIONALLY ABUSED IN OTHER WAYS BY YOUR PARTNER OR EX-PARTNER?: NO

## 2024-05-02 SDOH — ECONOMIC STABILITY: FOOD INSECURITY: WITHIN THE PAST 12 MONTHS, THE FOOD YOU BOUGHT JUST DIDN'T LAST AND YOU DIDN'T HAVE MONEY TO GET MORE.: NEVER TRUE

## 2024-05-02 SDOH — SOCIAL STABILITY: SOCIAL INSECURITY
WITHIN THE LAST YEAR, HAVE TO BEEN RAPED OR FORCED TO HAVE ANY KIND OF SEXUAL ACTIVITY BY YOUR PARTNER OR EX-PARTNER?: NO

## 2024-05-02 SDOH — HEALTH STABILITY: MENTAL HEALTH: HOW OFTEN DO YOU HAVE 6 OR MORE DRINKS ON ONE OCCASION?: NEVER

## 2024-05-02 SDOH — ECONOMIC STABILITY: INCOME INSECURITY: IN THE PAST 12 MONTHS, HAS THE ELECTRIC, GAS, OIL, OR WATER COMPANY THREATENED TO SHUT OFF SERVICE IN YOUR HOME?: NO

## 2024-05-02 SDOH — SOCIAL STABILITY: SOCIAL NETWORK: HOW OFTEN DO YOU ATTENT MEETINGS OF THE CLUB OR ORGANIZATION YOU BELONG TO?: NEVER

## 2024-05-02 SDOH — SOCIAL STABILITY: SOCIAL NETWORK
DO YOU BELONG TO ANY CLUBS OR ORGANIZATIONS SUCH AS CHURCH GROUPS UNIONS, FRATERNAL OR ATHLETIC GROUPS, OR SCHOOL GROUPS?: NO

## 2024-05-02 SDOH — ECONOMIC STABILITY: FOOD INSECURITY: WITHIN THE PAST 12 MONTHS, YOU WORRIED THAT YOUR FOOD WOULD RUN OUT BEFORE YOU GOT MONEY TO BUY MORE.: NEVER TRUE

## 2024-05-02 SDOH — SOCIAL STABILITY: SOCIAL INSECURITY: WITHIN THE LAST YEAR, HAVE YOU BEEN AFRAID OF YOUR PARTNER OR EX-PARTNER?: NO

## 2024-05-02 SDOH — SOCIAL STABILITY: SOCIAL NETWORK: HOW OFTEN DO YOU GET TOGETHER WITH FRIENDS OR RELATIVES?: MORE THAN THREE TIMES A WEEK

## 2024-05-02 SDOH — ECONOMIC STABILITY: HOUSING INSECURITY: IN THE LAST 12 MONTHS, HOW MANY PLACES HAVE YOU LIVED?: 1

## 2024-05-02 SDOH — SOCIAL STABILITY: SOCIAL NETWORK: HOW OFTEN DO YOU ATTEND CHURCH OR RELIGIOUS SERVICES?: 1 TO 4 TIMES PER YEAR

## 2024-05-02 SDOH — SOCIAL STABILITY: SOCIAL NETWORK
IN A TYPICAL WEEK, HOW MANY TIMES DO YOU TALK ON THE PHONE WITH FAMILY, FRIENDS, OR NEIGHBORS?: MORE THAN THREE TIMES A WEEK

## 2024-05-02 SDOH — HEALTH STABILITY: MENTAL HEALTH: HOW MANY STANDARD DRINKS CONTAINING ALCOHOL DO YOU HAVE ON A TYPICAL DAY?: PATIENT DOES NOT DRINK

## 2024-05-02 SDOH — HEALTH STABILITY: PHYSICAL HEALTH: ON AVERAGE, HOW MANY MINUTES DO YOU ENGAGE IN EXERCISE AT THIS LEVEL?: 120 MIN

## 2024-05-02 ASSESSMENT — LIFESTYLE VARIABLES
AUDIT-C TOTAL SCORE: 0
SKIP TO QUESTIONS 9-10: 1

## 2024-05-02 NOTE — PROGRESS NOTES
CHW tried calling the pt to follow up for GCM. Pt did not answer and this CHW left a VM requesting a return call. 5/2  Community Health Worker Follow-Up    Reason for outreach: CHW called the pt as requested by the PCM nurse for GCM.    CHW Interventions: CHW called the pt to follow up and add her to GCM. CHW and the pt discussed the purpose of GCM. CHW and the pt also completed the SDo[facility]. Pt has no needs at this time     Specific Resources Provided:  Housing/Shelter: n/a  Transportation: n/a  Food: n/a  Financial: n/a  Social Supports: n/a  Other: n/a    Plan: CHW and the pt completed the SDoH and the pt has no needs at this time. CHW will follow up with the pt in 1 month. CHW made sure the pt has the PCM number and encouraged the pt to reach out with any needs.

## 2024-05-13 DIAGNOSIS — E66.9 OBESITY, CLASS II, BMI 35-39.9: ICD-10-CM

## 2024-05-13 RX ORDER — PHENTERMINE HYDROCHLORIDE 37.5 MG/1
TABLET ORAL
Qty: 15 TABLET | Refills: 3 | Status: SHIPPED | OUTPATIENT
Start: 2024-05-13 | End: 2024-11-09

## 2024-05-13 NOTE — TELEPHONE ENCOUNTER
Received request via: Pharmacy    Was the patient seen in the last year in this department? Yes    Does the patient have an active prescription (recently filled or refills available) for medication(s) requested? No    Pharmacy Name: [facility]/pharmacy #[id number] - [place], [place] - [address] [facility]   Does the patient have long term Plus and need 100 day supply (blood pressure, diabetes and cholesterol meds only)? Patient does not have SCP

## 2024-05-14 NOTE — TELEPHONE ENCOUNTER
Patient last seen in office March 25.  PDMP review shows last fill on April 11.  Patient has had quite successful gradual weight loss using phentermine for appetite suppression.  On exam in March she did not have any high blood pressure.  There is no history of tachycardia or adverse effects from the phentermine.  This is renewed.  Patient has July appointment.

## 2024-06-10 ENCOUNTER — PATIENT OUTREACH (OUTPATIENT)
Dept: HEALTH INFORMATION MANAGEMENT | Facility: OTHER | Age: 79
End: 2024-06-10
Payer: MEDICARE

## 2024-06-10 NOTE — PROGRESS NOTES
CHW tried calling the pt to follow up for GCM. Pt did not answer and this CHW left a VM requesting a return call. 6/10  Community Health Worker Follow-Up    Reason for outreach: CHW called the pt to follow up on GCM.    CHW Interventions: CHW and the pt discussed that the pt was doing well and keeping up on all the Goals that her and the PCM completed. CHW discussed calling the pt in 1 month and releasing from the program if all is good. Pt stated she has no needs at this time.    Specific Resources Provided:  Housing/Shelter: n/a  Transportation: n/a  Food: n/a  Financial: n/a  Social Supports: n/a  Other: n/a    Plan: CHW will call the pt in 1 month.

## 2024-06-13 DIAGNOSIS — Z79.899 ON POTASSIUM WASTING DIURETIC THERAPY: ICD-10-CM

## 2024-06-13 RX ORDER — POTASSIUM CHLORIDE 750 MG/1
TABLET, FILM COATED, EXTENDED RELEASE ORAL
Qty: 90 TABLET | Refills: 3 | Status: SHIPPED | OUTPATIENT
Start: 2024-06-13

## 2024-06-19 DIAGNOSIS — R06.02 EXERTIONAL SHORTNESS OF BREATH: ICD-10-CM

## 2024-06-19 DIAGNOSIS — R91.8 OPACITIES OF BOTH LUNGS PRESENT ON CHEST X-RAY: ICD-10-CM

## 2024-06-19 RX ORDER — FUROSEMIDE 40 MG/1
TABLET ORAL
Qty: 90 TABLET | Refills: 3 | Status: SHIPPED | OUTPATIENT
Start: 2024-06-19

## 2024-07-12 ENCOUNTER — PATIENT OUTREACH (OUTPATIENT)
Dept: HEALTH INFORMATION MANAGEMENT | Facility: OTHER | Age: 79
End: 2024-07-12
Payer: MEDICARE

## 2024-07-14 DIAGNOSIS — R91.8 OPACITIES OF BOTH LUNGS PRESENT ON CHEST X-RAY: ICD-10-CM

## 2024-07-14 DIAGNOSIS — R06.02 EXERTIONAL SHORTNESS OF BREATH: ICD-10-CM

## 2024-07-15 RX ORDER — SPIRONOLACTONE 25 MG/1
25 TABLET ORAL
Qty: 90 TABLET | Refills: 3 | Status: SHIPPED | OUTPATIENT
Start: 2024-07-15

## 2024-07-16 LAB
ALBUMIN SERPL-MCNC: 4.3 G/DL (ref 3.8–4.8)
ALP SERPL-CCNC: 105 IU/L (ref 44–121)
ALT SERPL-CCNC: 22 IU/L (ref 0–32)
AST SERPL-CCNC: 28 IU/L (ref 0–40)
BASOPHILS # BLD AUTO: 0.1 X10E3/UL (ref 0–0.2)
BASOPHILS NFR BLD AUTO: 1 %
BILIRUB SERPL-MCNC: 0.5 MG/DL (ref 0–1.2)
BUN SERPL-MCNC: 29 MG/DL (ref 8–27)
BUN/CREAT SERPL: 43 (ref 12–28)
CALCIUM SERPL-MCNC: 9.1 MG/DL (ref 8.7–10.3)
CHLORIDE SERPL-SCNC: 103 MMOL/L (ref 96–106)
CHOLEST SERPL-MCNC: 101 MG/DL (ref 100–199)
CO2 SERPL-SCNC: 25 MMOL/L (ref 20–29)
CREAT SERPL-MCNC: 0.67 MG/DL (ref 0.57–1)
EGFRCR SERPLBLD CKD-EPI 2021: 89 ML/MIN/1.73
EOSINOPHIL # BLD AUTO: 0.1 X10E3/UL (ref 0–0.4)
EOSINOPHIL NFR BLD AUTO: 2 %
ERYTHROCYTE [DISTWIDTH] IN BLOOD BY AUTOMATED COUNT: 12.4 % (ref 11.7–15.4)
GLOBULIN SER CALC-MCNC: 2.4 G/DL (ref 1.5–4.5)
GLUCOSE SERPL-MCNC: 137 MG/DL (ref 70–99)
HBA1C MFR BLD: 7.3 % (ref 4.8–5.6)
HCT VFR BLD AUTO: 44.4 % (ref 34–46.6)
HDLC SERPL-MCNC: 42 MG/DL
HGB BLD-MCNC: 14.4 G/DL (ref 11.1–15.9)
IMM GRANULOCYTES # BLD AUTO: 0 X10E3/UL (ref 0–0.1)
IMM GRANULOCYTES NFR BLD AUTO: 0 %
IMMATURE CELLS  115398: NORMAL
LDL CALC COMMENT:: NORMAL
LDLC SERPL CALC-MCNC: 45 MG/DL (ref 0–99)
LYMPHOCYTES # BLD AUTO: 1.9 X10E3/UL (ref 0.7–3.1)
LYMPHOCYTES NFR BLD AUTO: 25 %
MCH RBC QN AUTO: 28.5 PG (ref 26.6–33)
MCHC RBC AUTO-ENTMCNC: 32.4 G/DL (ref 31.5–35.7)
MCV RBC AUTO: 88 FL (ref 79–97)
MONOCYTES # BLD AUTO: 0.7 X10E3/UL (ref 0.1–0.9)
MONOCYTES NFR BLD AUTO: 9 %
MORPHOLOGY BLD-IMP: NORMAL
NEUTROPHILS # BLD AUTO: 4.9 X10E3/UL (ref 1.4–7)
NEUTROPHILS NFR BLD AUTO: 63 %
NRBC BLD AUTO-RTO: NORMAL %
PLATELET # BLD AUTO: 229 X10E3/UL (ref 150–450)
POTASSIUM SERPL-SCNC: 4.3 MMOL/L (ref 3.5–5.2)
PROT SERPL-MCNC: 6.7 G/DL (ref 6–8.5)
RBC # BLD AUTO: 5.06 X10E6/UL (ref 3.77–5.28)
SODIUM SERPL-SCNC: 140 MMOL/L (ref 134–144)
TRIGL SERPL-MCNC: 61 MG/DL (ref 0–149)
TSH SERPL DL<=0.005 MIU/L-ACNC: 3.9 UIU/ML (ref 0.45–4.5)
VLDLC SERPL CALC-MCNC: 14 MG/DL (ref 5–40)
WBC # BLD AUTO: 7.7 X10E3/UL (ref 3.4–10.8)

## 2024-07-22 ENCOUNTER — OFFICE VISIT (OUTPATIENT)
Dept: MEDICAL GROUP | Facility: MEDICAL CENTER | Age: 79
End: 2024-07-22
Payer: MEDICARE

## 2024-07-22 VITALS
DIASTOLIC BLOOD PRESSURE: 64 MMHG | BODY MASS INDEX: 36.67 KG/M2 | OXYGEN SATURATION: 96 % | HEART RATE: 103 BPM | SYSTOLIC BLOOD PRESSURE: 118 MMHG | HEIGHT: 62 IN | TEMPERATURE: 98 F | RESPIRATION RATE: 16 BRPM | WEIGHT: 199.3 LBS

## 2024-07-22 DIAGNOSIS — I10 ESSENTIAL HYPERTENSION: ICD-10-CM

## 2024-07-22 DIAGNOSIS — E11.65 UNCONTROLLED TYPE 2 DIABETES MELLITUS WITH HYPERGLYCEMIA (HCC): ICD-10-CM

## 2024-07-22 DIAGNOSIS — Z85.850 HISTORY OF THYROID CANCER: ICD-10-CM

## 2024-07-22 DIAGNOSIS — E66.9 OBESITY, CLASS II, BMI 35-39.9: ICD-10-CM

## 2024-07-22 DIAGNOSIS — M81.0 OSTEOPOROSIS OF LUMBAR SPINE: ICD-10-CM

## 2024-07-22 DIAGNOSIS — D05.11 DUCTAL CARCINOMA IN SITU (DCIS) OF RIGHT BREAST: ICD-10-CM

## 2024-07-22 DIAGNOSIS — E78.5 DYSLIPIDEMIA, GOAL LDL BELOW 100: ICD-10-CM

## 2024-07-22 DIAGNOSIS — Z86.000 HISTORY OF DUCTAL CARCINOMA IN SITU (DCIS) OF BREAST: ICD-10-CM

## 2024-07-22 PROBLEM — G47.33 OBSTRUCTIVE SLEEP APNEA: Status: RESOLVED | Noted: 2018-02-01 | Resolved: 2024-07-22

## 2024-07-22 PROCEDURE — 99214 OFFICE O/P EST MOD 30 MIN: CPT | Performed by: FAMILY MEDICINE

## 2024-07-22 PROCEDURE — 1170F FXNL STATUS ASSESSED: CPT | Performed by: FAMILY MEDICINE

## 2024-07-22 PROCEDURE — 3078F DIAST BP <80 MM HG: CPT | Performed by: FAMILY MEDICINE

## 2024-07-22 PROCEDURE — 3074F SYST BP LT 130 MM HG: CPT | Performed by: FAMILY MEDICINE

## 2024-07-22 RX ORDER — MULTIVIT-MIN/IRON/FOLIC ACID/K 18-600-40
1 CAPSULE ORAL DAILY
COMMUNITY
Start: 2024-07-22

## 2024-07-22 RX ORDER — PHENTERMINE HYDROCHLORIDE 37.5 MG/1
37.5 TABLET ORAL EVERY MORNING
Qty: 30 TABLET | Refills: 2 | Status: SHIPPED | OUTPATIENT
Start: 2024-08-15 | End: 2024-11-13

## 2024-07-22 ASSESSMENT — FIBROSIS 4 INDEX: FIB4 SCORE: 2.06

## 2024-08-08 ENCOUNTER — PATIENT OUTREACH (OUTPATIENT)
Dept: HEALTH INFORMATION MANAGEMENT | Facility: OTHER | Age: 79
End: 2024-08-08
Payer: MEDICARE

## 2024-08-08 NOTE — PROGRESS NOTES
Community Health Worker Follow-Up    Reason for outreach: CHW called the pt to follow up on GCM.    CHW Interventions: CHW and the pt discussed how the pt has been feeling. Pt stated she is feeling good. Pt stated her A1C went from 8.3 to 7.2 and this CHW praised the pt for a job well done. Pt stated she will continue working on it. Pt stated she has brought he steps back up and is walking about 4500 a day. This CHW and the pt discussed the importance of staying active.     Specific Resources Provided:  Housing/Shelter: n/a  Transportation: n/a  Food: n/a  Financial: n/a  Social Supports: n/a  Other: n/a    Plan: CHW will follow up with the pt in 1 month and discuss releasing from the program.

## 2024-08-24 DIAGNOSIS — E89.0 POSTSURGICAL HYPOTHYROIDISM: ICD-10-CM

## 2024-08-26 ENCOUNTER — APPOINTMENT (RX ONLY)
Dept: URBAN - METROPOLITAN AREA CLINIC 6 | Facility: CLINIC | Age: 79
Setting detail: DERMATOLOGY
End: 2024-08-26

## 2024-08-26 DIAGNOSIS — Z71.89 OTHER SPECIFIED COUNSELING: ICD-10-CM

## 2024-08-26 DIAGNOSIS — D22 MELANOCYTIC NEVI: ICD-10-CM

## 2024-08-26 DIAGNOSIS — Z85.820 PERSONAL HISTORY OF MALIGNANT MELANOMA OF SKIN: ICD-10-CM

## 2024-08-26 DIAGNOSIS — L90.5 SCAR CONDITIONS AND FIBROSIS OF SKIN: ICD-10-CM

## 2024-08-26 DIAGNOSIS — D18.0 HEMANGIOMA: ICD-10-CM

## 2024-08-26 DIAGNOSIS — L98.8 OTHER SPECIFIED DISORDERS OF THE SKIN AND SUBCUTANEOUS TISSUE: ICD-10-CM

## 2024-08-26 DIAGNOSIS — I78.8 OTHER DISEASES OF CAPILLARIES: ICD-10-CM

## 2024-08-26 DIAGNOSIS — L81.4 OTHER MELANIN HYPERPIGMENTATION: ICD-10-CM

## 2024-08-26 DIAGNOSIS — L82.1 OTHER SEBORRHEIC KERATOSIS: ICD-10-CM

## 2024-08-26 PROBLEM — D22.71 MELANOCYTIC NEVI OF RIGHT LOWER LIMB, INCLUDING HIP: Status: ACTIVE | Noted: 2024-08-26

## 2024-08-26 PROBLEM — D22.72 MELANOCYTIC NEVI OF LEFT LOWER LIMB, INCLUDING HIP: Status: ACTIVE | Noted: 2024-08-26

## 2024-08-26 PROBLEM — D22.4 MELANOCYTIC NEVI OF SCALP AND NECK: Status: ACTIVE | Noted: 2024-08-26

## 2024-08-26 PROBLEM — D22.5 MELANOCYTIC NEVI OF TRUNK: Status: ACTIVE | Noted: 2024-08-26

## 2024-08-26 PROBLEM — D22.62 MELANOCYTIC NEVI OF LEFT UPPER LIMB, INCLUDING SHOULDER: Status: ACTIVE | Noted: 2024-08-26

## 2024-08-26 PROBLEM — D18.01 HEMANGIOMA OF SKIN AND SUBCUTANEOUS TISSUE: Status: ACTIVE | Noted: 2024-08-26

## 2024-08-26 PROBLEM — D23.71 OTHER BENIGN NEOPLASM OF SKIN OF RIGHT LOWER LIMB, INCLUDING HIP: Status: ACTIVE | Noted: 2024-08-26

## 2024-08-26 PROBLEM — D22.61 MELANOCYTIC NEVI OF RIGHT UPPER LIMB, INCLUDING SHOULDER: Status: ACTIVE | Noted: 2024-08-26

## 2024-08-26 PROCEDURE — ? COUNSELING

## 2024-08-26 PROCEDURE — ? OBSERVATION

## 2024-08-26 PROCEDURE — ? SUNSCREEN RECOMMENDATIONS

## 2024-08-26 PROCEDURE — ? SUNSCREEN TREATMENT REGIMEN

## 2024-08-26 PROCEDURE — 99213 OFFICE O/P EST LOW 20 MIN: CPT

## 2024-08-26 PROCEDURE — ? PHOTO-DOCUMENTATION

## 2024-08-26 RX ORDER — LEVOTHYROXINE SODIUM 150 UG/1
150 TABLET ORAL
Qty: 90 TABLET | Refills: 3 | Status: SHIPPED | OUTPATIENT
Start: 2024-08-26

## 2024-08-26 ASSESSMENT — LOCATION DETAILED DESCRIPTION DERM
LOCATION DETAILED: LEFT SUPERIOR MEDIAL LOWER BACK
LOCATION DETAILED: LEFT ANTERIOR DISTAL UPPER ARM
LOCATION DETAILED: RIGHT ANTERIOR DISTAL THIGH
LOCATION DETAILED: PERIUMBILICAL SKIN
LOCATION DETAILED: LEFT ANTERIOR DISTAL THIGH
LOCATION DETAILED: RIGHT ANTECUBITAL SKIN
LOCATION DETAILED: RIGHT INFERIOR VERMILION LIP
LOCATION DETAILED: RIGHT KNEE
LOCATION DETAILED: RIGHT VENTRAL PROXIMAL FOREARM
LOCATION DETAILED: LEFT INFERIOR PREAURICULAR CHEEK
LOCATION DETAILED: LOWER STERNUM
LOCATION DETAILED: RIGHT DISTAL PRETIBIAL REGION
LOCATION DETAILED: LEFT VENTRAL PROXIMAL FOREARM
LOCATION DETAILED: LEFT CENTRAL FRONTAL SCALP
LOCATION DETAILED: LEFT ANTERIOR PROXIMAL UPPER ARM
LOCATION DETAILED: RIGHT PROXIMAL PRETIBIAL REGION
LOCATION DETAILED: LEFT DISTAL PRETIBIAL REGION
LOCATION DETAILED: LEFT PROXIMAL PRETIBIAL REGION
LOCATION DETAILED: LEFT KNEE
LOCATION DETAILED: RIGHT ANTERIOR PROXIMAL UPPER ARM
LOCATION DETAILED: RIGHT ANTERIOR DISTAL UPPER ARM
LOCATION DETAILED: EPIGASTRIC SKIN

## 2024-08-26 ASSESSMENT — LOCATION SIMPLE DESCRIPTION DERM
LOCATION SIMPLE: RIGHT PRETIBIAL REGION
LOCATION SIMPLE: LEFT PRETIBIAL REGION
LOCATION SIMPLE: LEFT LOWER BACK
LOCATION SIMPLE: CHEST
LOCATION SIMPLE: RIGHT KNEE
LOCATION SIMPLE: SCALP
LOCATION SIMPLE: LEFT UPPER ARM
LOCATION SIMPLE: LEFT THIGH
LOCATION SIMPLE: RIGHT THIGH
LOCATION SIMPLE: ABDOMEN
LOCATION SIMPLE: LEFT FOREARM
LOCATION SIMPLE: RIGHT LIP
LOCATION SIMPLE: LEFT CHEEK
LOCATION SIMPLE: LEFT KNEE
LOCATION SIMPLE: RIGHT UPPER ARM
LOCATION SIMPLE: RIGHT FOREARM

## 2024-08-26 ASSESSMENT — LOCATION ZONE DERM
LOCATION ZONE: LIP
LOCATION ZONE: TRUNK
LOCATION ZONE: FACE
LOCATION ZONE: ARM
LOCATION ZONE: SCALP
LOCATION ZONE: LEG

## 2024-08-26 NOTE — TELEPHONE ENCOUNTER
Received request via: Pharmacy    Was the patient seen in the last year in this department? Yes    Does the patient have an active prescription (recently filled or refills available) for medication(s) requested? No    Pharmacy Name: [facility]/pharmacy #[id number] - [place], [place] - [address] [address] [facility]     Does the patient have long-term Plus and need 100-day supply? (This applies to ALL medications) Patient does not have SCP   Instructions (Optional): Quoted $100 for cosmetic cryotherapy Detail Level: Simple Introduction Text (Please End With A Colon): The following procedure was deferred: Size Of Lesion In Cm (Optional): 0 Instructions (Optional): Quoted $100 for cosmetic cryotherapy (SKs and skin tags total)

## 2024-08-26 NOTE — PROCEDURE: MIPS QUALITY
Detail Level: Detailed
Quality 137: Melanoma: Continuity Of Care - Recall System: Patient information entered into a recall system that includes: target date for the next exam specified AND a process to follow up with patients regarding missed or unscheduled appointments
Quality 226: Preventive Care And Screening: Tobacco Use: Screening And Cessation Intervention: Patient screened for tobacco use and is an ex/non-smoker
Quality 47: Advance Care Plan: Advance care planning not documented, reason not otherwise specified.
Quality 397: Melanoma: Reporting: Pathology report includes the pT Category, thickness, ulceration and mitotic rate, peripheral and deep margin status and presence or absence of microsatellitosis for invasive tumors.
Quality 130: Documentation Of Current Medications In The Medical Record: Current Medications Documented

## 2024-09-05 ENCOUNTER — PATIENT OUTREACH (OUTPATIENT)
Dept: HEALTH INFORMATION MANAGEMENT | Facility: OTHER | Age: 79
End: 2024-09-05
Payer: MEDICARE

## 2024-09-05 NOTE — PROGRESS NOTES
Community Health Worker Follow-Up    Reason for outreach: CHW called the pt to follow up on GCM.    CHW Interventions: This CHW called the pt to follow up with GCM for the month. Pt stated she is doing good. Pt stated she is still working on her exercising and bringing her A1C down like her and the PCM nurse worked on. This CHW praised the pt for doing so well and encouraged her to keep up the good work.    Specific Resources Provided:  Housing/Shelter: n/a  Transportation: n/a  Food: n/a  Financial: n/a  Social Supports: n/as  Other: n/a    Plan: This CHW will follow up in 1 month. This CHW made sure the Pt had the GCM number and encouraged her to reach out with any needs or concerns.

## 2024-09-06 DIAGNOSIS — I10 ESSENTIAL HYPERTENSION: ICD-10-CM

## 2024-09-06 RX ORDER — LOSARTAN POTASSIUM 25 MG/1
25 TABLET ORAL
Qty: 90 TABLET | Refills: 0 | Status: SHIPPED | OUTPATIENT
Start: 2024-09-06

## 2024-09-10 DIAGNOSIS — E78.5 DYSLIPIDEMIA, GOAL LDL BELOW 100: ICD-10-CM

## 2024-09-10 RX ORDER — ATORVASTATIN CALCIUM 20 MG/1
20 TABLET, FILM COATED ORAL DAILY
Qty: 90 TABLET | Refills: 3 | Status: SHIPPED | OUTPATIENT
Start: 2024-09-10

## 2024-09-27 ENCOUNTER — PATIENT OUTREACH (OUTPATIENT)
Dept: HEALTH INFORMATION MANAGEMENT | Facility: OTHER | Age: 79
End: 2024-09-27
Payer: MEDICARE

## 2024-09-27 NOTE — PROGRESS NOTES
Community Health Worker Follow-Up    Reason for outreach: CHW called the pt to follow up with GCM.    CHW Interventions: This CHW called the pt to follow up with GCM and discuss how the pt had been feeling. Pt stated she is doing well with her A1C and keeping her daily sugars down. Pt stated she has an appointment with Dr Marques at the end of November. This CHW and the pt discussed releasing from GCM. Pt stated she was ready. This CHW discussed that we are here if she needs us.    Specific Resources Provided:  Housing/Shelter: n/a  Transportation: n/a  Food: n/a  Financial: n/a  Social Supports: n/a  Other: n/a    Plan: CHW released the pt from the GCM program. This CHW made sure the pt had the PCM number and encouraged her to reach out if needed. CHW will no longer follow at this time.

## 2024-11-18 DIAGNOSIS — E66.812 OBESITY, CLASS II, BMI 35-39.9: ICD-10-CM

## 2024-11-18 RX ORDER — PHENTERMINE HYDROCHLORIDE 37.5 MG/1
37.5 TABLET ORAL EVERY MORNING
Qty: 30 TABLET | Refills: 2 | Status: SHIPPED | OUTPATIENT
Start: 2024-11-18 | End: 2024-11-25 | Stop reason: SDUPTHER

## 2024-11-18 NOTE — TELEPHONE ENCOUNTER
Received request via: Pharmacy    Was the patient seen in the last year in this department? Yes    Does the patient have an active prescription (recently filled or refills available) for medication(s) requested? No    Pharmacy Name: [facility]/pharmacy #[id number] - [place], [place] - [address]     Does the patient have [facility] Plus and need 100-day supply? (This applies to ALL medications) Patient does not have [facility]-medicare

## 2024-11-18 NOTE — TELEPHONE ENCOUNTER
Patient seen in office July 22 this year which is within the 180-day timeframe required for phentermine renewal.  PDMP review shows no inconsistencies with last fill October 18.  Patient has tolerated the medication well and it has been helpful for her.  It is renewed.

## 2024-11-20 LAB
25(OH)D3+25(OH)D2 SERPL-MCNC: 42 NG/ML (ref 30–100)
ALBUMIN SERPL-MCNC: 4.4 G/DL (ref 3.8–4.8)
ALBUMIN/CREAT UR: 33 MG/G CREAT (ref 0–29)
ALP SERPL-CCNC: 101 IU/L (ref 44–121)
ALT SERPL-CCNC: 25 IU/L (ref 0–32)
AST SERPL-CCNC: 33 IU/L (ref 0–40)
BILIRUB SERPL-MCNC: 0.5 MG/DL (ref 0–1.2)
BUN SERPL-MCNC: 29 MG/DL (ref 8–27)
BUN/CREAT SERPL: 47 (ref 12–28)
CALCIUM SERPL-MCNC: 9.2 MG/DL (ref 8.7–10.3)
CHLORIDE SERPL-SCNC: 102 MMOL/L (ref 96–106)
CHOLEST SERPL-MCNC: 102 MG/DL (ref 100–199)
CO2 SERPL-SCNC: 22 MMOL/L (ref 20–29)
CREAT SERPL-MCNC: 0.62 MG/DL (ref 0.57–1)
CREAT UR-MCNC: 109.2 MG/DL
EGFRCR SERPLBLD CKD-EPI 2021: 91 ML/MIN/1.73
ERYTHROCYTE [DISTWIDTH] IN BLOOD BY AUTOMATED COUNT: 12.3 % (ref 11.7–15.4)
GLOBULIN SER CALC-MCNC: 2 G/DL (ref 1.5–4.5)
GLUCOSE SERPL-MCNC: 147 MG/DL (ref 70–99)
HBA1C MFR BLD: 7.7 % (ref 4.8–5.6)
HCT VFR BLD AUTO: 45.6 % (ref 34–46.6)
HDLC SERPL-MCNC: 45 MG/DL
HGB BLD-MCNC: 14.7 G/DL (ref 11.1–15.9)
LDL CALC COMMENT:: NORMAL
LDLC SERPL CALC-MCNC: 43 MG/DL (ref 0–99)
MCH RBC QN AUTO: 28.7 PG (ref 26.6–33)
MCHC RBC AUTO-ENTMCNC: 32.2 G/DL (ref 31.5–35.7)
MCV RBC AUTO: 89 FL (ref 79–97)
MICROALBUMIN UR-MCNC: 36.1 UG/ML
NRBC BLD AUTO-RTO: NORMAL %
PLATELET # BLD AUTO: 239 X10E3/UL (ref 150–450)
POTASSIUM SERPL-SCNC: 4.4 MMOL/L (ref 3.5–5.2)
PROT SERPL-MCNC: 6.4 G/DL (ref 6–8.5)
RBC # BLD AUTO: 5.12 X10E6/UL (ref 3.77–5.28)
SODIUM SERPL-SCNC: 140 MMOL/L (ref 134–144)
TRIGL SERPL-MCNC: 65 MG/DL (ref 0–149)
TSH SERPL DL<=0.005 MIU/L-ACNC: 4.81 UIU/ML (ref 0.45–4.5)
VLDLC SERPL CALC-MCNC: 14 MG/DL (ref 5–40)
WBC # BLD AUTO: 8.1 X10E3/UL (ref 3.4–10.8)

## 2024-11-25 ENCOUNTER — OFFICE VISIT (OUTPATIENT)
Dept: MEDICAL GROUP | Facility: MEDICAL CENTER | Age: 79
End: 2024-11-25
Payer: MEDICARE

## 2024-11-25 VITALS
HEIGHT: 62 IN | OXYGEN SATURATION: 97 % | HEART RATE: 102 BPM | WEIGHT: 195.4 LBS | BODY MASS INDEX: 35.96 KG/M2 | TEMPERATURE: 97.6 F | SYSTOLIC BLOOD PRESSURE: 112 MMHG | DIASTOLIC BLOOD PRESSURE: 54 MMHG

## 2024-11-25 DIAGNOSIS — E66.812 OBESITY, CLASS II, BMI 35-39.9: ICD-10-CM

## 2024-11-25 DIAGNOSIS — I50.32 CHRONIC DIASTOLIC CONGESTIVE HEART FAILURE (HCC): ICD-10-CM

## 2024-11-25 DIAGNOSIS — E78.5 DYSLIPIDEMIA, GOAL LDL BELOW 100: ICD-10-CM

## 2024-11-25 DIAGNOSIS — E11.9 DM TYPE 2, GOAL HBA1C < 8% (HCC): ICD-10-CM

## 2024-11-25 DIAGNOSIS — E11.65 UNCONTROLLED TYPE 2 DIABETES MELLITUS WITH HYPERGLYCEMIA (HCC): ICD-10-CM

## 2024-11-25 DIAGNOSIS — E89.0 POSTSURGICAL HYPOTHYROIDISM: ICD-10-CM

## 2024-11-25 DIAGNOSIS — I10 ESSENTIAL HYPERTENSION: ICD-10-CM

## 2024-11-25 DIAGNOSIS — K76.0 FATTY LIVER: ICD-10-CM

## 2024-11-25 PROCEDURE — 3078F DIAST BP <80 MM HG: CPT | Performed by: FAMILY MEDICINE

## 2024-11-25 PROCEDURE — 99214 OFFICE O/P EST MOD 30 MIN: CPT | Performed by: FAMILY MEDICINE

## 2024-11-25 PROCEDURE — 1170F FXNL STATUS ASSESSED: CPT | Performed by: FAMILY MEDICINE

## 2024-11-25 PROCEDURE — 3074F SYST BP LT 130 MM HG: CPT | Performed by: FAMILY MEDICINE

## 2024-11-25 RX ORDER — PHENTERMINE HYDROCHLORIDE 37.5 MG/1
37.5 TABLET ORAL EVERY MORNING
Qty: 30 TABLET | Refills: 2 | Status: SHIPPED | OUTPATIENT
Start: 2024-11-25 | End: 2025-02-23

## 2024-11-25 RX ORDER — LINAGLIPTIN 5 MG/1
5 TABLET, FILM COATED ORAL
Qty: 90 TABLET | Refills: 3 | Status: SHIPPED | OUTPATIENT
Start: 2024-11-25

## 2024-11-25 ASSESSMENT — FIBROSIS 4 INDEX: FIB4 SCORE: 2.18

## 2024-11-25 NOTE — PROGRESS NOTES
Diabetes Focused Exam:    Chief Complaint   Patient presents with    Diabetes Mellitus    Insomnia    Dyslipidemia      Subjective:   HPI  Ragini Reaves is a 79 y.o. female who presents for follow up of chronic conditions of diabetes mellitus, hypertension and hyperlipidemia. She indicates that she is feeling well and denies any symptoms referable to the above diagnoses. Specifically denies chest pain, palpitations, dyspnea, orthopnea, PND or peripheral edema. Also denies polyuria, polydipsia, urinary complaints, abdominal complaints, myalgias, numbness, weakness or other related symptoms.     Sleep is interrupted.  Falling asleep at times during the day.  Goes to sleep easily but wakes up after 1 1/2 to 2 hours.  Trouble getting back to sleep.  Totals around 5 hours per night.  Will start exercise in the afternoon.    Her weight remains a little high but is keeping her weight off.  Wants to lose a little more.    The patient is taking ASA 81 mg, she is taking all other medications as prescribed. Patient denies any side effects of medication.  DM: A1c goal <7  Glucose monitoring frequency: daily   Fasting sugars: 105-140, usually 120s. Post-prandial sugars: occasionally high, up to 200.  Hypoglycemic episodes just one, trying to eat regularly  Diabetic complications: cardiovascular disease  ACR Albumin/Creatinine Ratio goal <30   HTN: Blood pressure goal <140/<80. Currently Rx ACE/ARB: Yes  Hyperlipidemia:Cholesterol goal LDL <100, total/HDL <5. Currently Rx Statin: Yes  Last eye exam a week ago.  Considering cataract surgery.  Denies visual blurring, double vision, eye pain and floaters  Last monofilament foot exam: 3/2024  Denies foot pain, numbness, calluses, ulcers      See medications and orders placed in encounter report.  Past medical history, family history, social history reviewed and updated as documented in medical record.  Current medications including changes today:  Current Outpatient  Medications   Medication Sig Dispense Refill    linagliptin (TRADJENTA) 5 MG Tab tablet Take 1 Tablet by mouth every day. 90 Tablet 3    phentermine (ADIPEX-P) 37.5 MG tablet Take 1 Tablet by mouth every morning for 90 days. 30 tablets is a 30 day quantity 30 Tablet 2    atorvastatin (LIPITOR) 20 MG Tab TAKE 1 TABLET BY MOUTH EVERY DAY. MUST BE SEEN FOR FURTHER REFILLS 90 Tablet 3    losartan (COZAAR) 25 MG Tab TAKE 1 TABLET BY MOUTH EVERY DAY 90 Tablet 0    levothyroxine (SYNTHROID) 150 MCG Tab TAKE 1 TABLET BY MOUTH EVERY DAY IN THE MORNING ON AN EMPTY STOMACH 90 Tablet 3    Cholecalciferol (VITAMIN D) 50 MCG (2000 UT) Cap Take 1 Capsule by mouth every day.      spironolactone (ALDACTONE) 25 MG Tab TAKE 1 TABLET BY MOUTH EVERY DAY 90 Tablet 3    furosemide (LASIX) 40 MG Tab TAKE 1 TABLET BY MOUTH EVERY DAY IN THE MORNING 90 Tablet 3    KLOR-CON 10 10 MEQ tablet TAKE 1 TABLET BY MOUTH EVERY DAY 90 Tablet 3    FREESTYLE LITE strip USE 1 STRIP ONCE A DAY AS NEEDED 100 Strip 6    metFORMIN (GLUCOPHAGE) 500 MG Tab TAKE 1 TABLET BY MOUTH TWICE A DAY WITH MEALS 180 Tablet 3    topiramate (TOPAMAX) 50 MG tablet TAKE 1 TABLET BY MOUTH EVERY DAY 90 Tablet 3    ciclopirox (LOPROX) 0.77 % cream PLEASE SEE ATTACHED FOR DETAILED DIRECTIONS      ketoconazole (NIZORAL) 2 % Cream APPLY EVERY DAY FOR 2 WEEKS FOR FLARES      triamcinolone acetonide (KENALOG) 0.1 % Cream APPLY TWICE DAILY TO AFFECTED AREAS ON LEGS TWICE A DAY FOR MAXIMUM 2 WEEKS      Misc. Devices Misc This is an order for full face CPAP mask and new ResMed auto CPAP machine at 5-15 cm pressure.  ICD-10 code G47.33       SPENCER 99 months   NPI [id number] 1 Each 0    FreeStyle Lancets Misc USE TWICE A  Each 6    tacrolimus (PROTOPIC) 0.1 % Ointment Apply  topically 2 times a day.      Ciclopirox 0.77 % Gel Apply  topically as needed.      betamethasone, augmented, (DIPROLENE) 0.05 % gel Apply 1 Application to affected area(s) every bedtime. 50 g 2    Fiber Powder  "Take 1 Package by mouth every day.      Probiotic Product (PROBIOTIC DAILY PO) Take 1 Package by mouth every day.      docusate sodium (COLACE) 100 MG Cap Take 300 mg by mouth every day.      acetaminophen (TYLENOL) 500 MG Tab Take 500 mg by mouth 2 Times a Day.      CALCIUM-MAGNESIUM-VITAMIN D PO Take 1 Tab by mouth every day.      BIOTIN PO Take 1 Tab by mouth every day.      COLLAGEN PO Take 1 Tab by mouth every day.      Glucosamine-Chondroitin (GLUCOSAMINE CHONDR COMPLEX PO) Take 750 mg by mouth 2 Times a Day.      Lutein 10 MG Tab Take 10 mg by mouth every evening.      aspirin 81 MG tablet Take 81 mg by mouth every morning. 90 Tab 3    coenzyme Q-10 30 MG capsule Take 1 Cap by mouth every day. 30 Cap 11    ascorbic acid (VITAMIN C) 500 MG tablet Take 1 Tab by mouth every day. 30 Tab 11    Multiple Vitamins-Minerals (ULTRA WOMENS PACK) Misc Take 1 Tab by mouth every day. 30 Each 11     No current facility-administered medications for this visit.     Allergies:   Allergies   Allergen Reactions    Asa [Aspirin]      Stomach bleeding.   Tolerates low dose ASA    Cough Syrup M [Cough Cold-Flu Relief] Swelling     Severe facial swelling    Dextromethorphan Swelling     Facial swellling      Social History     Tobacco Use    Smoking status: Never    Smokeless tobacco: Never   Vaping Use    Vaping status: Never Used   Substance Use Topics    Alcohol use: No     Alcohol/week: 0.0 oz    Drug use: No     Exercise: limited, but does push herself to walk 4500 steps a day minimum.  Health Maintenance/Immunizations: discussed, will get flu vaccine at [facility]    ROS  Pertinent  ROS findings as above. Denies chest pain or shortness of breath.     Objective:     OBJECTIVE:  /54   Pulse (!) 102   Temp 36.4 °C (97.6 °F) (Temporal)   Ht 1.575 m (5' 2\")   Wt 88.6 kg (195 lb 6.4 oz)   LMP  (LMP Unknown)   SpO2 97%   BMI 35.74 kg/m²  Body mass index is 35.74 kg/m². BMI: moderately obese  General: No apparent distress, " conversant, cooperative and pleasant with the examination.  Psych: Alert and oriented x4, judgment and insight normal  Neck: No JVD or bruits, no adenopathy, supple  Thyroid: normal to inspection and palpation  Lungs: negative findings: normal respiratory rate and rhythm, lungs clear to auscultation.  Marked thoracic kyphosis.  Heart: negative findings: regular rate and rhythm, S1 normal, S2 normal, no murmurs, clicks, or gallops  Abdomen: Soft, nontender, no hepatosplenomegaly or masses, normal bowel sounds  Skin: No rashes, no cyanosis, no lesions or ulcers  Extremities: No cyanosis clubbing or edema.     POC labs:   Lab Results   Component Value Date/Time    POCGLUCOSE 150 (H) 03/28/2019 07:07 AM          Last labs    Lab Results   Component Value Date/Time    CHOLSTRLTOT 102 11/19/2024 06:20 AM    LDL 75 05/15/2020 05:25 AM    HDL 45 11/19/2024 06:20 AM    TRIGLYCERIDE 65 11/19/2024 06:20 AM       Lab Results   Component Value Date/Time    SODIUM 140 11/19/2024 06:20 AM    SODIUM 139 03/11/2019 02:05 PM    POTASSIUM 4.4 11/19/2024 06:20 AM    POTASSIUM 3.7 03/11/2019 02:05 PM    GLUCOSE 147 (H) 11/19/2024 06:20 AM    GLUCOSE 133 (H) 03/11/2019 02:05 PM    BUNCREATRAT 47 (H) 11/19/2024 06:20 AM     Lab Results   Component Value Date/Time    HBA1C 7.7 (H) 11/19/2024 06:20 AM    HBA1C 7.3 (H) 07/15/2024 05:24 AM    HBA1C 8.0 (H) 03/20/2024 05:15 AM    HBA1C 7.0 (H) 03/11/2019 02:05 PM    HBA1C 8.7 (H) 09/08/2015 07:49 AM    HBA1C 8.4 (H) 05/05/2015 08:32 AM    HBA1C 9.4 (H) 01/06/2015 08:37 AM     Lab Results   Component Value Date/Time    MICRALB 36.1 11/19/2024 06:20 AM          Assessment/Plan:   Medications, refills, and referrals per orders.   1. Uncontrolled type 2 diabetes mellitus with hyperglycemia (HCC)  Comp Metabolic Panel    CBC WITHOUT DIFFERENTIAL    TSH    Lipid Profile    HEMOGLOBIN A1C      2. DM type 2, goal HbA1c < 8% ([facility])  linagliptin (TRADJENTA) 5 MG Tab tablet      3. Dyslipidemia, goal LDL  below 100  Comp Metabolic Panel    CBC WITHOUT DIFFERENTIAL    TSH    Lipid Profile      4. Essential hypertension  Comp Metabolic Panel    CBC WITHOUT DIFFERENTIAL    Lipid Profile      5. Chronic diastolic congestive heart failure (HCC)        6. Fatty liver        7. Postsurgical hypothyroidism  TSH      8. Obesity, Class II, BMI 35-39.9  phentermine (ADIPEX-P) 37.5 MG tablet        DM2 A1c is not at goal  discussed adding more exercise.  Patient to monitor sugars: daily frequency  Discussed diet, exercise, disease management and weight loss goals.   Education and advise provided today:All medications, side effects and compliance (discussed carefully)  Annual eye examinations at Ophthalmology  Diabetic diet discussed in detail, written exchange diet given  Foot care discussed and Podiatry visits  Glycohemoglobin and other lab monitoring  Home glucose monitoring emphasized  Labs immediately prior to next visit  Long term diabetic complications  Low cholesterol diet, weight control and daily exercise    Followup:   Do labs and RTC 4 months, sooner should new symptoms or problems arise.

## 2024-12-05 DIAGNOSIS — I10 ESSENTIAL HYPERTENSION: ICD-10-CM

## 2024-12-05 RX ORDER — LOSARTAN POTASSIUM 25 MG/1
25 TABLET ORAL
Qty: 90 TABLET | Refills: 2 | Status: SHIPPED | OUTPATIENT
Start: 2024-12-05

## 2025-02-22 DIAGNOSIS — E66.812 OBESITY, CLASS II, BMI 35-39.9: ICD-10-CM

## 2025-02-24 ENCOUNTER — APPOINTMENT (OUTPATIENT)
Dept: URBAN - METROPOLITAN AREA CLINIC 6 | Facility: CLINIC | Age: 80
Setting detail: DERMATOLOGY
End: 2025-02-24

## 2025-02-24 DIAGNOSIS — L98.8 OTHER SPECIFIED DISORDERS OF THE SKIN AND SUBCUTANEOUS TISSUE: ICD-10-CM

## 2025-02-24 DIAGNOSIS — I78.8 OTHER DISEASES OF CAPILLARIES: ICD-10-CM

## 2025-02-24 DIAGNOSIS — D18.0 HEMANGIOMA: ICD-10-CM

## 2025-02-24 DIAGNOSIS — L82.1 OTHER SEBORRHEIC KERATOSIS: ICD-10-CM

## 2025-02-24 DIAGNOSIS — Z85.820 PERSONAL HISTORY OF MALIGNANT MELANOMA OF SKIN: ICD-10-CM

## 2025-02-24 DIAGNOSIS — D22 MELANOCYTIC NEVI: ICD-10-CM

## 2025-02-24 DIAGNOSIS — E66.812 OBESITY, CLASS II, BMI 35-39.9: ICD-10-CM

## 2025-02-24 DIAGNOSIS — Z71.89 OTHER SPECIFIED COUNSELING: ICD-10-CM

## 2025-02-24 DIAGNOSIS — L81.4 OTHER MELANIN HYPERPIGMENTATION: ICD-10-CM

## 2025-02-24 DIAGNOSIS — L90.5 SCAR CONDITIONS AND FIBROSIS OF SKIN: ICD-10-CM

## 2025-02-24 PROBLEM — D22.5 MELANOCYTIC NEVI OF TRUNK: Status: ACTIVE | Noted: 2025-02-24

## 2025-02-24 PROBLEM — D23.72 OTHER BENIGN NEOPLASM OF SKIN OF LEFT LOWER LIMB, INCLUDING HIP: Status: ACTIVE | Noted: 2025-02-24

## 2025-02-24 PROBLEM — D22.62 MELANOCYTIC NEVI OF LEFT UPPER LIMB, INCLUDING SHOULDER: Status: ACTIVE | Noted: 2025-02-24

## 2025-02-24 PROBLEM — D22.61 MELANOCYTIC NEVI OF RIGHT UPPER LIMB, INCLUDING SHOULDER: Status: ACTIVE | Noted: 2025-02-24

## 2025-02-24 PROBLEM — D22.72 MELANOCYTIC NEVI OF LEFT LOWER LIMB, INCLUDING HIP: Status: ACTIVE | Noted: 2025-02-24

## 2025-02-24 PROBLEM — D22.4 MELANOCYTIC NEVI OF SCALP AND NECK: Status: ACTIVE | Noted: 2025-02-24

## 2025-02-24 PROBLEM — D23.71 OTHER BENIGN NEOPLASM OF SKIN OF RIGHT LOWER LIMB, INCLUDING HIP: Status: ACTIVE | Noted: 2025-02-24

## 2025-02-24 PROBLEM — D18.01 HEMANGIOMA OF SKIN AND SUBCUTANEOUS TISSUE: Status: ACTIVE | Noted: 2025-02-24

## 2025-02-24 PROBLEM — D22.71 MELANOCYTIC NEVI OF RIGHT LOWER LIMB, INCLUDING HIP: Status: ACTIVE | Noted: 2025-02-24

## 2025-02-24 PROCEDURE — ? COUNSELING

## 2025-02-24 PROCEDURE — ? SUNSCREEN TREATMENT REGIMEN

## 2025-02-24 PROCEDURE — 99213 OFFICE O/P EST LOW 20 MIN: CPT

## 2025-02-24 PROCEDURE — ? SUNSCREEN RECOMMENDATIONS

## 2025-02-24 PROCEDURE — ? OBSERVATION

## 2025-02-24 PROCEDURE — ? PHOTO-DOCUMENTATION

## 2025-02-24 RX ORDER — TOPIRAMATE 50 MG/1
50 TABLET, FILM COATED ORAL DAILY
Qty: 90 TABLET | Refills: 3 | Status: SHIPPED | OUTPATIENT
Start: 2025-02-24

## 2025-02-24 ASSESSMENT — LOCATION DETAILED DESCRIPTION DERM
LOCATION DETAILED: RIGHT PROXIMAL PRETIBIAL REGION
LOCATION DETAILED: LEFT DISTAL PRETIBIAL REGION
LOCATION DETAILED: LEFT KNEE
LOCATION DETAILED: LEFT INFERIOR PREAURICULAR CHEEK
LOCATION DETAILED: LOWER STERNUM
LOCATION DETAILED: RIGHT DISTAL PRETIBIAL REGION
LOCATION DETAILED: LEFT SUPERIOR MEDIAL LOWER BACK
LOCATION DETAILED: LEFT ANTERIOR DISTAL THIGH
LOCATION DETAILED: RIGHT VENTRAL PROXIMAL FOREARM
LOCATION DETAILED: LEFT PROXIMAL PRETIBIAL REGION
LOCATION DETAILED: LEFT CENTRAL FRONTAL SCALP
LOCATION DETAILED: PERIUMBILICAL SKIN
LOCATION DETAILED: EPIGASTRIC SKIN
LOCATION DETAILED: LEFT ANTERIOR PROXIMAL UPPER ARM
LOCATION DETAILED: RIGHT ANTERIOR PROXIMAL UPPER ARM
LOCATION DETAILED: LEFT VENTRAL PROXIMAL FOREARM
LOCATION DETAILED: RIGHT ANTECUBITAL SKIN
LOCATION DETAILED: RIGHT INFERIOR VERMILION LIP
LOCATION DETAILED: RIGHT ANTERIOR DISTAL UPPER ARM
LOCATION DETAILED: LEFT ANTERIOR DISTAL UPPER ARM
LOCATION DETAILED: RIGHT KNEE
LOCATION DETAILED: RIGHT ANTERIOR DISTAL THIGH

## 2025-02-24 ASSESSMENT — LOCATION ZONE DERM
LOCATION ZONE: FACE
LOCATION ZONE: ARM
LOCATION ZONE: LEG
LOCATION ZONE: TRUNK
LOCATION ZONE: SCALP
LOCATION ZONE: LIP

## 2025-02-24 ASSESSMENT — LOCATION SIMPLE DESCRIPTION DERM
LOCATION SIMPLE: RIGHT UPPER ARM
LOCATION SIMPLE: RIGHT LIP
LOCATION SIMPLE: RIGHT THIGH
LOCATION SIMPLE: LEFT CHEEK
LOCATION SIMPLE: LEFT THIGH
LOCATION SIMPLE: LEFT UPPER ARM
LOCATION SIMPLE: LEFT LOWER BACK
LOCATION SIMPLE: CHEST
LOCATION SIMPLE: LEFT KNEE
LOCATION SIMPLE: RIGHT FOREARM
LOCATION SIMPLE: RIGHT PRETIBIAL REGION
LOCATION SIMPLE: RIGHT KNEE
LOCATION SIMPLE: LEFT FOREARM
LOCATION SIMPLE: LEFT PRETIBIAL REGION
LOCATION SIMPLE: ABDOMEN
LOCATION SIMPLE: SCALP

## 2025-02-24 NOTE — PROCEDURE: SUNSCREEN TREATMENT REGIMEN
----- Message from Karin Nicholson NP sent at 1/3/2019  1:37 PM CST -----  Please let patient know of the normal Pap smear which was negative for HPV. Thank you   Detail Level: Zone

## 2025-02-25 RX ORDER — PHENTERMINE HYDROCHLORIDE 37.5 MG/1
37.5 TABLET ORAL EVERY MORNING
Qty: 30 TABLET | Refills: 2 | Status: SHIPPED | OUTPATIENT
Start: 2025-02-25 | End: 2025-05-26

## 2025-02-26 NOTE — TELEPHONE ENCOUNTER
Seen in November.  PDMP review shows no inconsistencies.  Within 180 day time frame for schedule IV medications.  Tolerated well, has been helpful.  Renewal placed.

## 2025-04-02 ENCOUNTER — RESULTS FOLLOW-UP (OUTPATIENT)
Dept: MEDICAL GROUP | Facility: MEDICAL CENTER | Age: 80
End: 2025-04-02
Payer: MEDICARE

## 2025-04-02 LAB
ALBUMIN SERPL-MCNC: 4.1 G/DL (ref 3.8–4.8)
ALP SERPL-CCNC: 105 IU/L (ref 44–121)
ALT SERPL-CCNC: 24 IU/L (ref 0–32)
AST SERPL-CCNC: 27 IU/L (ref 0–40)
BILIRUB SERPL-MCNC: 0.4 MG/DL (ref 0–1.2)
BUN SERPL-MCNC: 31 MG/DL (ref 8–27)
BUN/CREAT SERPL: 44 (ref 12–28)
CALCIUM SERPL-MCNC: 8.8 MG/DL (ref 8.7–10.3)
CHLORIDE SERPL-SCNC: 103 MMOL/L (ref 96–106)
CHOLEST SERPL-MCNC: 103 MG/DL (ref 100–199)
CO2 SERPL-SCNC: 24 MMOL/L (ref 20–29)
CREAT SERPL-MCNC: 0.7 MG/DL (ref 0.57–1)
EGFRCR SERPLBLD CKD-EPI 2021: 87 ML/MIN/1.73
ERYTHROCYTE [DISTWIDTH] IN BLOOD BY AUTOMATED COUNT: 13 % (ref 11.7–15.4)
GLOBULIN SER CALC-MCNC: 2.3 G/DL (ref 1.5–4.5)
GLUCOSE SERPL-MCNC: 153 MG/DL (ref 70–99)
HBA1C MFR BLD: 7.6 % (ref 4.8–5.6)
HCT VFR BLD AUTO: 44.8 % (ref 34–46.6)
HDLC SERPL-MCNC: 41 MG/DL
HGB BLD-MCNC: 14.7 G/DL (ref 11.1–15.9)
LDL CALC COMMENT:: NORMAL
LDLC SERPL CALC-MCNC: 48 MG/DL (ref 0–99)
MCH RBC QN AUTO: 28.8 PG (ref 26.6–33)
MCHC RBC AUTO-ENTMCNC: 32.8 G/DL (ref 31.5–35.7)
MCV RBC AUTO: 88 FL (ref 79–97)
NRBC BLD AUTO-RTO: NORMAL %
PLATELET # BLD AUTO: 232 X10E3/UL (ref 150–450)
POTASSIUM SERPL-SCNC: 4.4 MMOL/L (ref 3.5–5.2)
PROT SERPL-MCNC: 6.4 G/DL (ref 6–8.5)
RBC # BLD AUTO: 5.1 X10E6/UL (ref 3.77–5.28)
SODIUM SERPL-SCNC: 140 MMOL/L (ref 134–144)
TRIGL SERPL-MCNC: 62 MG/DL (ref 0–149)
TSH SERPL DL<=0.005 MIU/L-ACNC: 5.44 UIU/ML (ref 0.45–4.5)
VLDLC SERPL CALC-MCNC: 14 MG/DL (ref 5–40)
WBC # BLD AUTO: 8.4 X10E3/UL (ref 3.4–10.8)

## 2025-04-07 ENCOUNTER — OFFICE VISIT (OUTPATIENT)
Dept: MEDICAL GROUP | Facility: MEDICAL CENTER | Age: 80
End: 2025-04-07
Payer: MEDICARE

## 2025-04-07 VITALS
BODY MASS INDEX: 35.46 KG/M2 | WEIGHT: 192.68 LBS | TEMPERATURE: 97.3 F | OXYGEN SATURATION: 97 % | RESPIRATION RATE: 20 BRPM | HEIGHT: 62 IN | DIASTOLIC BLOOD PRESSURE: 52 MMHG | HEART RATE: 95 BPM | SYSTOLIC BLOOD PRESSURE: 100 MMHG

## 2025-04-07 DIAGNOSIS — E11.9 DM TYPE 2, GOAL HBA1C < 7.5% (HCC): ICD-10-CM

## 2025-04-07 DIAGNOSIS — E11.65 UNCONTROLLED TYPE 2 DIABETES MELLITUS WITH HYPERGLYCEMIA (HCC): ICD-10-CM

## 2025-04-07 DIAGNOSIS — E78.5 DYSLIPIDEMIA, GOAL LDL BELOW 100: ICD-10-CM

## 2025-04-07 DIAGNOSIS — E66.812 OBESITY, CLASS II, BMI 35-39.9: ICD-10-CM

## 2025-04-07 DIAGNOSIS — E89.0 POSTSURGICAL HYPOTHYROIDISM: ICD-10-CM

## 2025-04-07 DIAGNOSIS — I10 ESSENTIAL HYPERTENSION: ICD-10-CM

## 2025-04-07 DIAGNOSIS — Z12.31 ENCOUNTER FOR SCREENING MAMMOGRAM FOR BREAST CANCER: ICD-10-CM

## 2025-04-07 PROBLEM — Z98.41 STATUS POST BILATERAL CATARACT EXTRACTION: Status: ACTIVE | Noted: 2025-04-07

## 2025-04-07 PROBLEM — Z98.42 STATUS POST BILATERAL CATARACT EXTRACTION: Status: ACTIVE | Noted: 2025-04-07

## 2025-04-07 PROCEDURE — 99214 OFFICE O/P EST MOD 30 MIN: CPT | Performed by: FAMILY MEDICINE

## 2025-04-07 PROCEDURE — 1170F FXNL STATUS ASSESSED: CPT | Performed by: FAMILY MEDICINE

## 2025-04-07 PROCEDURE — 3078F DIAST BP <80 MM HG: CPT | Performed by: FAMILY MEDICINE

## 2025-04-07 PROCEDURE — 3074F SYST BP LT 130 MM HG: CPT | Performed by: FAMILY MEDICINE

## 2025-04-07 RX ORDER — LEVOTHYROXINE SODIUM 175 UG/1
175 TABLET ORAL
Qty: 90 TABLET | Refills: 3 | Status: SHIPPED | OUTPATIENT
Start: 2025-04-07

## 2025-04-07 ASSESSMENT — FIBROSIS 4 INDEX: FIB4 SCORE: 1.9

## 2025-04-07 ASSESSMENT — PATIENT HEALTH QUESTIONNAIRE - PHQ9: CLINICAL INTERPRETATION OF PHQ2 SCORE: 0

## 2025-04-07 NOTE — PROGRESS NOTES
Diabetes Focused Exam:    Chief Complaint   Patient presents with    Diabetes Mellitus    Hyperlipidemia    Hypertension    Hypothyroidism      Subjective:   HPI  Ragini Reaves is a 80 y.o. female who presents for follow up of chronic conditions of diabetes mellitus, hypertension and hyperlipidemia. She indicates that she is feeling well and denies any symptoms referable to the above diagnoses. Specifically denies chest pain, palpitations, dyspnea, orthopnea, PND or peripheral edema. Also denies polyuria, polydipsia, urinary complaints, abdominal complaints, myalgias, numbness, weakness or other related symptoms.     Patient reports fatigue on the medication. Patient has some insomnia, no tremor or change in appetite.  Patient is taking the medication on an empty stomach in the morning and waiting at least 30 minutes before eating.  Last TSH a week ago was above target.  Discussed, medication adjusted.    The patient is taking ASA every day and taking all other medications as prescribed. Patient denies any side effects of medication.  DM: A1c goal <7.5% Glucose monitoring frequency: daily   Fasting sugars: 120s-140s. Post-prandial sugars: not checking  Hypoglycemic episodes none clinically  Diabetic complications: mild neuropathy  ACR Albumin/Creatinine Ratio goal <30  HTN: Blood pressure goal <140/<80. Currently Rx ACE/ARB: Yes  Hyperlipidemia:Cholesterol goal LDL <100, total/HDL <5. Currently Rx Statin: Yes  Last eye exam 3/2025. With [facility] optometrist, Oswaldo Robertson  Denies visual blurring, double vision, eye pain and floaters  Last monofilament foot exam: today   Denies foot pain, numbness, calluses, ulcers    See medications and orders placed in encounter report.  Past medical history, family history, social history reviewed and updated as documented in medical record.  Current medications including changes today:  Current Outpatient Medications   Medication Sig Dispense Refill    levothyroxine  (SYNTHROID) 175 MCG Tab Take 1 Tablet by mouth every morning on an empty stomach. 90 Tablet 3    phentermine (ADIPEX-P) 37.5 MG tablet Take 1 Tablet by mouth every morning for 90 days. 30 tablets is a 30 day quantity 30 Tablet 2    topiramate (TOPAMAX) 50 MG tablet TAKE 1 TABLET BY MOUTH EVERY DAY 90 Tablet 3    metFORMIN (GLUCOPHAGE) 500 MG Tab TAKE 1 TABLET BY MOUTH TWICE A DAY WITH MEALS 180 Tablet 3    losartan (COZAAR) 25 MG Tab TAKE 1 TABLET BY MOUTH EVERY DAY 90 Tablet 2    linagliptin (TRADJENTA) 5 MG Tab tablet Take 1 Tablet by mouth every day. 90 Tablet 3    atorvastatin (LIPITOR) 20 MG Tab TAKE 1 TABLET BY MOUTH EVERY DAY. MUST BE SEEN FOR FURTHER REFILLS 90 Tablet 3    Cholecalciferol (VITAMIN D) 50 MCG (2000 UT) Cap Take 1 Capsule by mouth every day.      spironolactone (ALDACTONE) 25 MG Tab TAKE 1 TABLET BY MOUTH EVERY DAY 90 Tablet 3    furosemide (LASIX) 40 MG Tab TAKE 1 TABLET BY MOUTH EVERY DAY IN THE MORNING 90 Tablet 3    KLOR-CON 10 10 MEQ tablet TAKE 1 TABLET BY MOUTH EVERY DAY 90 Tablet 3    FREESTYLE LITE strip USE 1 STRIP ONCE A DAY AS NEEDED 100 Strip 6    ciclopirox (LOPROX) 0.77 % cream PLEASE SEE ATTACHED FOR DETAILED DIRECTIONS      ketoconazole (NIZORAL) 2 % Cream APPLY EVERY DAY FOR 2 WEEKS FOR FLARES      triamcinolone acetonide (KENALOG) 0.1 % Cream APPLY TWICE DAILY TO AFFECTED AREAS ON LEGS TWICE A DAY FOR MAXIMUM 2 WEEKS      Misc. Devices Misc This is an order for full face CPAP mask and new ResMed auto CPAP machine at 5-15 cm pressure.  ICD-10 code G47.33       SPENCER 99 months   NPI [id number] 1 Each 0    FreeStyle Lancets Misc USE TWICE A  Each 6    tacrolimus (PROTOPIC) 0.1 % Ointment Apply  topically 2 times a day.      Ciclopirox 0.77 % Gel Apply  topically as needed.      betamethasone, augmented, (DIPROLENE) 0.05 % gel Apply 1 Application to affected area(s) every bedtime. 50 g 2    Fiber Powder Take 1 Package by mouth every day.      Probiotic Product (PROBIOTIC  "DAILY PO) Take 1 Package by mouth every day.      docusate sodium (COLACE) 100 MG Cap Take 300 mg by mouth every day.      acetaminophen (TYLENOL) 500 MG Tab Take 500 mg by mouth 2 Times a Day.      CALCIUM-MAGNESIUM-VITAMIN D PO Take 1 Tab by mouth every day.      BIOTIN PO Take 1 Tab by mouth every day.      COLLAGEN PO Take 1 Tab by mouth every day.      Glucosamine-Chondroitin (GLUCOSAMINE CHONDR COMPLEX PO) Take 750 mg by mouth 2 Times a Day.      Lutein 10 MG Tab Take 10 mg by mouth every evening.      aspirin 81 MG tablet Take 81 mg by mouth every morning. 90 Tab 3    coenzyme Q-10 30 MG capsule Take 1 Cap by mouth every day. 30 Cap 11    ascorbic acid (VITAMIN C) 500 MG tablet Take 1 Tab by mouth every day. 30 Tab 11    Multiple Vitamins-Minerals (ULTRA WOMENS PACK) Misc Take 1 Tab by mouth every day. 30 Each 11     No current facility-administered medications for this visit.     Allergies:   Allergies   Allergen Reactions    Asa [Aspirin]      Stomach bleeding.   Tolerates low dose ASA    Cough Syrup M [Cough Cold-Flu Relief] Swelling     Severe facial swelling    Dextromethorphan Swelling     Facial swellling      Social History     Tobacco Use    Smoking status: Never    Smokeless tobacco: Never   Vaping Use    Vaping status: Never Used   Substance Use Topics    Alcohol use: No     Alcohol/week: 0.0 oz    Drug use: No     Exercise: is walking 4000 to 5000 steps per day  Health Maintenance/Immunizations: discussed.  Wears seatbelt.  Immunizations reviewed.  Recommended RSV vaccine.  Would like to avoid shingles vaccine.    ROS  Pertinent  ROS findings as above. Denies chest pain or shortness of breath.  Denies abdominal pain.     Objective:     OBJECTIVE:  /52 (BP Location: Right arm, Patient Position: Sitting, BP Cuff Size: Adult)   Pulse 95   Temp 36.3 °C (97.3 °F) (Temporal)   Resp 20   Ht 1.575 m (5' 2\")   Wt 87.4 kg (192 lb 10.9 oz)   LMP  (LMP Unknown)   SpO2 97%   BMI 35.24 kg/m²  Body " mass index is 35.24 kg/m². BMI: moderately obese  General: No apparent distress, conversant, cooperative and pleasant with the examination.  Psych: Alert and oriented x4, judgment and insight normal  Neck: No JVD or bruits, no adenopathy, supple  Thyroid: normal to inspection and palpation  Lungs: negative findings: normal respiratory rate and rhythm, lungs clear to auscultation  Heart: negative findings: regular rate and rhythm, S1 normal, S2 normal, no murmurs, clicks, or gallops  Abdomen: Soft, nontender, no hepatosplenomegaly or masses, normal bowel sounds.    Skin: No rashes, no cyanosis, no lesions or ulcers  Extremities: No cyanosis clubbing or edema.   Feet are examined Monofilament testing with a 10 gram force: sensation intact: decreased bilaterally mild decrease.  Sees podiatry.  Visual Inspection: Feet without maceration, ulcers, fissures.  Pedal pulses: intact bilaterally    POC labs:   Lab Results   Component Value Date/Time    POCGLUCOSE 150 (H) 03/28/2019 07:07 AM          Last labs    Lab Results   Component Value Date/Time    CHOLSTRLTOT 103 03/31/2025 05:04 AM    LDL 75 05/15/2020 05:25 AM    HDL 41 03/31/2025 05:04 AM    TRIGLYCERIDE 62 03/31/2025 05:04 AM       Lab Results   Component Value Date/Time    SODIUM 140 03/31/2025 05:04 AM    SODIUM 139 03/11/2019 02:05 PM    POTASSIUM 4.4 03/31/2025 05:04 AM    POTASSIUM 3.7 03/11/2019 02:05 PM    GLUCOSE 153 (H) 03/31/2025 05:04 AM    GLUCOSE 133 (H) 03/11/2019 02:05 PM    BUNCREATRAT 44 (H) 03/31/2025 05:04 AM     Lab Results   Component Value Date/Time    HBA1C 7.6 (H) 03/31/2025 05:04 AM    HBA1C 7.7 (H) 11/19/2024 06:20 AM    HBA1C 7.3 (H) 07/15/2024 05:24 AM    HBA1C 7.0 (H) 03/11/2019 02:05 PM    HBA1C 8.7 (H) 09/08/2015 07:49 AM    HBA1C 8.4 (H) 05/05/2015 08:32 AM    HBA1C 9.4 (H) 01/06/2015 08:37 AM     Lab Results   Component Value Date/Time    MICRALB 36.1 11/19/2024 06:20 AM      Patient is a never smoker.    Assessment/Plan:    Medications, refills, and referrals per orders.   1. Uncontrolled type 2 diabetes mellitus with hyperglycemia (HCC)  Diabetic Monofilament LE Exam    Comp Metabolic Panel    CBC WITHOUT DIFFERENTIAL    TSH    Lipid Profile    HEMOGLOBIN A1C    MICROALBUMIN CREAT RATIO URINE      2. DM type 2, goal HbA1c < 7.5% (HCC)  Comp Metabolic Panel    HEMOGLOBIN A1C      3. Essential hypertension  Comp Metabolic Panel    CBC WITHOUT DIFFERENTIAL    Lipid Profile      4. Dyslipidemia, goal LDL below 100  Lipid Profile      5. Postsurgical hypothyroidism  levothyroxine (SYNTHROID) 175 MCG Tab    TSH      6. Obesity, Class II, BMI 35-39.9        7. Encounter for screening mammogram for breast cancer  MA-SCREENING MAMMO BILAT W/TOMOSYNTHESIS W/CAD        DM2 A1c is not at goal   Patient to monitor sugars: daily frequency  Discussed diet, exercise, disease management and weight loss goals.  At plateau but the phentermine continues to help her appetite.  Denies palpitations.  No tremor appreciated.   Education and advise provided today:All medications, side effects and compliance (discussed carefully)  Annual eye examinations at Ophthalmology  Diabetic diet discussed in detail, written exchange diet given  Foot care discussed and Podiatry visits  Glycohemoglobin and other lab monitoring  Home glucose monitoring emphasized  Labs immediately prior to next visit  Long term diabetic complications  Low cholesterol diet, weight control and daily exercise    Followup:   Do labs and RTC 6 months, sooner should new symptoms or problems arise.

## 2025-04-28 ENCOUNTER — HOSPITAL ENCOUNTER (OUTPATIENT)
Dept: RADIOLOGY | Facility: MEDICAL CENTER | Age: 80
End: 2025-04-28
Attending: FAMILY MEDICINE
Payer: MEDICARE

## 2025-04-28 DIAGNOSIS — Z12.31 ENCOUNTER FOR SCREENING MAMMOGRAM FOR BREAST CANCER: ICD-10-CM

## 2025-04-28 PROCEDURE — 77067 SCR MAMMO BI INCL CAD: CPT

## 2025-05-16 DIAGNOSIS — E11.8 CONTROLLED TYPE 2 DIABETES MELLITUS WITH COMPLICATION, WITHOUT LONG-TERM CURRENT USE OF INSULIN (HCC): ICD-10-CM

## 2025-05-16 RX ORDER — BLOOD-GLUCOSE METER
KIT MISCELLANEOUS
Qty: 100 STRIP | Refills: 6 | Status: ON HOLD | OUTPATIENT
Start: 2025-05-16

## 2025-05-24 DIAGNOSIS — Z79.899 ON POTASSIUM WASTING DIURETIC THERAPY: ICD-10-CM

## 2025-05-24 DIAGNOSIS — R06.02 EXERTIONAL SHORTNESS OF BREATH: ICD-10-CM

## 2025-05-24 DIAGNOSIS — R91.8 OPACITIES OF BOTH LUNGS PRESENT ON CHEST X-RAY: ICD-10-CM

## 2025-05-26 RX ORDER — SPIRONOLACTONE 25 MG/1
25 TABLET ORAL
Qty: 90 TABLET | Refills: 3 | Status: ON HOLD | OUTPATIENT
Start: 2025-05-26

## 2025-05-26 RX ORDER — POTASSIUM CHLORIDE 750 MG/1
10 TABLET, EXTENDED RELEASE ORAL
Qty: 90 TABLET | Refills: 3 | Status: ON HOLD | OUTPATIENT
Start: 2025-05-26

## 2025-05-26 RX ORDER — FUROSEMIDE 40 MG/1
40 TABLET ORAL EVERY MORNING
Qty: 90 TABLET | Refills: 3 | Status: ON HOLD | OUTPATIENT
Start: 2025-05-26

## 2025-05-28 ENCOUNTER — HOSPITAL ENCOUNTER (INPATIENT)
Facility: MEDICAL CENTER | Age: 80
End: 2025-05-28
Attending: EMERGENCY MEDICINE | Admitting: STUDENT IN AN ORGANIZED HEALTH CARE EDUCATION/TRAINING PROGRAM
Payer: MEDICARE

## 2025-05-28 ENCOUNTER — APPOINTMENT (OUTPATIENT)
Dept: RADIOLOGY | Facility: MEDICAL CENTER | Age: 80
DRG: 603 | End: 2025-05-28
Attending: EMERGENCY MEDICINE
Payer: MEDICARE

## 2025-05-28 ENCOUNTER — APPOINTMENT (OUTPATIENT)
Dept: URBAN - METROPOLITAN AREA CLINIC 6 | Facility: CLINIC | Age: 80
Setting detail: DERMATOLOGY
End: 2025-05-28

## 2025-05-28 DIAGNOSIS — L03.116 CELLULITIS OF LEFT LOWER EXTREMITY: ICD-10-CM

## 2025-05-28 DIAGNOSIS — D72.829 LEUKOCYTOSIS, UNSPECIFIED TYPE: ICD-10-CM

## 2025-05-28 DIAGNOSIS — R65.20 SEPSIS WITH ACUTE ORGAN DYSFUNCTION WITHOUT SEPTIC SHOCK, DUE TO UNSPECIFIED ORGANISM, UNSPECIFIED ORGAN DYSFUNCTION TYPE (HCC): Primary | ICD-10-CM

## 2025-05-28 DIAGNOSIS — I48.91 ATRIAL FIBRILLATION WITH RVR (HCC): ICD-10-CM

## 2025-05-28 DIAGNOSIS — Z71.89 OTHER SPECIFIED COUNSELING: ICD-10-CM

## 2025-05-28 DIAGNOSIS — L0390 CELLULITIS AND ABSCESS OF UNSPECIFIED SITES: ICD-10-CM

## 2025-05-28 DIAGNOSIS — A41.9 SEPSIS WITH ACUTE ORGAN DYSFUNCTION WITHOUT SEPTIC SHOCK, DUE TO UNSPECIFIED ORGANISM, UNSPECIFIED ORGAN DYSFUNCTION TYPE (HCC): Primary | ICD-10-CM

## 2025-05-28 DIAGNOSIS — L0391 CELLULITIS AND ABSCESS OF UNSPECIFIED SITES: ICD-10-CM

## 2025-05-28 DIAGNOSIS — R00.0 TACHYCARDIA: ICD-10-CM

## 2025-05-28 PROBLEM — L03.90 CELLULITIS: Status: ACTIVE | Noted: 2025-05-28

## 2025-05-28 LAB
ALBUMIN SERPL BCP-MCNC: 3.9 G/DL (ref 3.2–4.9)
ALBUMIN/GLOB SERPL: 0.9 G/DL
ALP SERPL-CCNC: 153 U/L (ref 30–99)
ALT SERPL-CCNC: 57 U/L (ref 2–50)
ANION GAP SERPL CALC-SCNC: 13 MMOL/L (ref 7–16)
APPEARANCE UR: CLEAR
AST SERPL-CCNC: 45 U/L (ref 12–45)
BACTERIA BLD CULT: NORMAL
BACTERIA BLD CULT: NORMAL
BASOPHILS # BLD AUTO: 0.4 % (ref 0–1.8)
BASOPHILS # BLD: 0.06 K/UL (ref 0–0.12)
BILIRUB SERPL-MCNC: 0.4 MG/DL (ref 0.1–1.5)
BILIRUB UR QL STRIP.AUTO: NEGATIVE
BUN SERPL-MCNC: 28 MG/DL (ref 8–22)
CALCIUM ALBUM COR SERPL-MCNC: 9.7 MG/DL (ref 8.5–10.5)
CALCIUM SERPL-MCNC: 9.6 MG/DL (ref 8.5–10.5)
CHLORIDE SERPL-SCNC: 100 MMOL/L (ref 96–112)
CO2 SERPL-SCNC: 23 MMOL/L (ref 20–33)
COLOR UR: YELLOW
CREAT SERPL-MCNC: 0.78 MG/DL (ref 0.5–1.4)
EKG IMPRESSION: NORMAL
EOSINOPHIL # BLD AUTO: 0.21 K/UL (ref 0–0.51)
EOSINOPHIL NFR BLD: 1.4 % (ref 0–6.9)
ERYTHROCYTE [DISTWIDTH] IN BLOOD BY AUTOMATED COUNT: 42.3 FL (ref 35.9–50)
GFR SERPLBLD CREATININE-BSD FMLA CKD-EPI: 77 ML/MIN/1.73 M 2
GLOBULIN SER CALC-MCNC: 4.2 G/DL (ref 1.9–3.5)
GLUCOSE BLD STRIP.AUTO-MCNC: 137 MG/DL (ref 65–99)
GLUCOSE SERPL-MCNC: 134 MG/DL (ref 65–99)
GLUCOSE UR STRIP.AUTO-MCNC: NEGATIVE MG/DL
HCT VFR BLD AUTO: 44.9 % (ref 37–47)
HGB BLD-MCNC: 14.4 G/DL (ref 12–16)
IMM GRANULOCYTES # BLD AUTO: 0.17 K/UL (ref 0–0.11)
IMM GRANULOCYTES NFR BLD AUTO: 1.1 % (ref 0–0.9)
KETONES UR STRIP.AUTO-MCNC: NEGATIVE MG/DL
LACTATE SERPL-SCNC: 0.9 MMOL/L (ref 0.5–2)
LACTATE SERPL-SCNC: 1.4 MMOL/L (ref 0.5–2)
LEUKOCYTE ESTERASE UR QL STRIP.AUTO: NEGATIVE
LYMPHOCYTES # BLD AUTO: 1.78 K/UL (ref 1–4.8)
LYMPHOCYTES NFR BLD: 11.9 % (ref 22–41)
MCH RBC QN AUTO: 28.2 PG (ref 27–33)
MCHC RBC AUTO-ENTMCNC: 32.1 G/DL (ref 32.2–35.5)
MCV RBC AUTO: 88 FL (ref 81.4–97.8)
MICRO URNS: NORMAL
MONOCYTES # BLD AUTO: 1.31 K/UL (ref 0–0.85)
MONOCYTES NFR BLD AUTO: 8.7 % (ref 0–13.4)
NEUTROPHILS # BLD AUTO: 11.49 K/UL (ref 1.82–7.42)
NEUTROPHILS NFR BLD: 76.5 % (ref 44–72)
NITRITE UR QL STRIP.AUTO: NEGATIVE
NRBC # BLD AUTO: 0 K/UL
NRBC BLD-RTO: 0 /100 WBC (ref 0–0.2)
NT-PROBNP SERPL IA-MCNC: 233 PG/ML (ref 0–125)
PH UR STRIP.AUTO: 7.5 [PH] (ref 5–8)
PLATELET # BLD AUTO: 396 K/UL (ref 164–446)
PMV BLD AUTO: 10.1 FL (ref 9–12.9)
POTASSIUM SERPL-SCNC: 3.7 MMOL/L (ref 3.6–5.5)
PROT SERPL-MCNC: 8.1 G/DL (ref 6–8.2)
PROT UR QL STRIP: NEGATIVE MG/DL
RBC # BLD AUTO: 5.1 M/UL (ref 4.2–5.4)
RBC UR QL AUTO: NEGATIVE
SCCMEC + MECA PNL NOSE NAA+PROBE: NEGATIVE
SIGNIFICANT IND 70042: NORMAL
SIGNIFICANT IND 70042: NORMAL
SITE SITE: NORMAL
SITE SITE: NORMAL
SODIUM SERPL-SCNC: 136 MMOL/L (ref 135–145)
SOURCE SOURCE: NORMAL
SOURCE SOURCE: NORMAL
SP GR UR STRIP.AUTO: 1.01
TROPONIN T SERPL-MCNC: 26 NG/L (ref 6–19)
UROBILINOGEN UR STRIP.AUTO-MCNC: 1 EU/DL
WBC # BLD AUTO: 15 K/UL (ref 4.8–10.8)

## 2025-05-28 PROCEDURE — 93971 EXTREMITY STUDY: CPT | Mod: LT

## 2025-05-28 PROCEDURE — 87040 BLOOD CULTURE FOR BACTERIA: CPT

## 2025-05-28 PROCEDURE — 87086 URINE CULTURE/COLONY COUNT: CPT

## 2025-05-28 PROCEDURE — 87641 MR-STAPH DNA AMP PROBE: CPT

## 2025-05-28 PROCEDURE — 71045 X-RAY EXAM CHEST 1 VIEW: CPT

## 2025-05-28 PROCEDURE — 99213 OFFICE O/P EST LOW 20 MIN: CPT

## 2025-05-28 PROCEDURE — 93005 ELECTROCARDIOGRAM TRACING: CPT | Mod: TC | Performed by: EMERGENCY MEDICINE

## 2025-05-28 PROCEDURE — 99285 EMERGENCY DEPT VISIT HI MDM: CPT

## 2025-05-28 PROCEDURE — 99291 CRITICAL CARE FIRST HOUR: CPT | Performed by: STUDENT IN AN ORGANIZED HEALTH CARE EDUCATION/TRAINING PROGRAM

## 2025-05-28 PROCEDURE — 83605 ASSAY OF LACTIC ACID: CPT

## 2025-05-28 PROCEDURE — ? DIAGNOSIS COMMENT

## 2025-05-28 PROCEDURE — 96365 THER/PROPH/DIAG IV INF INIT: CPT

## 2025-05-28 PROCEDURE — 700111 HCHG RX REV CODE 636 W/ 250 OVERRIDE (IP): Mod: UD | Performed by: EMERGENCY MEDICINE

## 2025-05-28 PROCEDURE — 94760 N-INVAS EAR/PLS OXIMETRY 1: CPT

## 2025-05-28 PROCEDURE — 36415 COLL VENOUS BLD VENIPUNCTURE: CPT

## 2025-05-28 PROCEDURE — 82962 GLUCOSE BLOOD TEST: CPT

## 2025-05-28 PROCEDURE — 93005 ELECTROCARDIOGRAM TRACING: CPT | Mod: TC

## 2025-05-28 PROCEDURE — 770020 HCHG ROOM/CARE - TELE (206)

## 2025-05-28 PROCEDURE — 84484 ASSAY OF TROPONIN QUANT: CPT

## 2025-05-28 PROCEDURE — 83880 ASSAY OF NATRIURETIC PEPTIDE: CPT

## 2025-05-28 PROCEDURE — 81003 URINALYSIS AUTO W/O SCOPE: CPT

## 2025-05-28 PROCEDURE — 85025 COMPLETE CBC W/AUTO DIFF WBC: CPT

## 2025-05-28 PROCEDURE — 80053 COMPREHEN METABOLIC PANEL: CPT

## 2025-05-28 PROCEDURE — ? PHOTO-DOCUMENTATION

## 2025-05-28 PROCEDURE — 700105 HCHG RX REV CODE 258: Mod: UD | Performed by: EMERGENCY MEDICINE

## 2025-05-28 PROCEDURE — ? COUNSELING

## 2025-05-28 RX ORDER — CEFEPIME HYDROCHLORIDE 2 G/1
2 INJECTION, POWDER, FOR SOLUTION INTRAVENOUS ONCE
Status: DISCONTINUED | OUTPATIENT
Start: 2025-05-28 | End: 2025-05-28

## 2025-05-28 RX ORDER — ACETAMINOPHEN 325 MG/1
650 TABLET ORAL EVERY 6 HOURS PRN
Status: DISCONTINUED | OUTPATIENT
Start: 2025-05-28 | End: 2025-06-03 | Stop reason: HOSPADM

## 2025-05-28 RX ORDER — SPIRONOLACTONE 25 MG/1
25 TABLET ORAL DAILY
Status: DISCONTINUED | OUTPATIENT
Start: 2025-05-29 | End: 2025-06-03 | Stop reason: HOSPADM

## 2025-05-28 RX ORDER — SODIUM CHLORIDE 9 MG/ML
1000 INJECTION, SOLUTION INTRAVENOUS ONCE
Status: COMPLETED | OUTPATIENT
Start: 2025-05-28 | End: 2025-05-28

## 2025-05-28 RX ORDER — ATORVASTATIN CALCIUM 20 MG/1
20 TABLET, FILM COATED ORAL DAILY
Status: DISCONTINUED | OUTPATIENT
Start: 2025-05-29 | End: 2025-06-03 | Stop reason: HOSPADM

## 2025-05-28 RX ORDER — ASPIRIN 81 MG/1
81 TABLET ORAL DAILY
Status: DISCONTINUED | OUTPATIENT
Start: 2025-05-29 | End: 2025-06-01

## 2025-05-28 RX ORDER — DEXTROSE MONOHYDRATE 25 G/50ML
25 INJECTION, SOLUTION INTRAVENOUS
Status: DISCONTINUED | OUTPATIENT
Start: 2025-05-28 | End: 2025-05-31

## 2025-05-28 RX ORDER — LINEZOLID 2 MG/ML
600 INJECTION, SOLUTION INTRAVENOUS EVERY 12 HOURS
Status: DISCONTINUED | OUTPATIENT
Start: 2025-05-29 | End: 2025-05-29

## 2025-05-28 RX ORDER — ONDANSETRON 4 MG/1
4 TABLET, ORALLY DISINTEGRATING ORAL EVERY 4 HOURS PRN
Status: DISCONTINUED | OUTPATIENT
Start: 2025-05-28 | End: 2025-06-03 | Stop reason: HOSPADM

## 2025-05-28 RX ORDER — INSULIN LISPRO 100 [IU]/ML
1-6 INJECTION, SOLUTION INTRAVENOUS; SUBCUTANEOUS EVERY 6 HOURS
Status: DISCONTINUED | OUTPATIENT
Start: 2025-05-28 | End: 2025-05-31

## 2025-05-28 RX ORDER — LINEZOLID 2 MG/ML
600 INJECTION, SOLUTION INTRAVENOUS ONCE
Status: COMPLETED | OUTPATIENT
Start: 2025-05-28 | End: 2025-05-28

## 2025-05-28 RX ORDER — LOSARTAN POTASSIUM 25 MG/1
25 TABLET ORAL DAILY
Status: DISCONTINUED | OUTPATIENT
Start: 2025-05-29 | End: 2025-06-03 | Stop reason: HOSPADM

## 2025-05-28 RX ORDER — ONDANSETRON 2 MG/ML
4 INJECTION INTRAMUSCULAR; INTRAVENOUS EVERY 4 HOURS PRN
Status: DISCONTINUED | OUTPATIENT
Start: 2025-05-28 | End: 2025-06-03 | Stop reason: HOSPADM

## 2025-05-28 RX ORDER — ENOXAPARIN SODIUM 100 MG/ML
80 INJECTION SUBCUTANEOUS EVERY 12 HOURS
Status: DISCONTINUED | OUTPATIENT
Start: 2025-05-28 | End: 2025-05-31

## 2025-05-28 RX ORDER — METOPROLOL SUCCINATE 25 MG/1
25 TABLET, EXTENDED RELEASE ORAL
Status: DISCONTINUED | OUTPATIENT
Start: 2025-05-28 | End: 2025-05-30

## 2025-05-28 RX ORDER — LABETALOL HYDROCHLORIDE 5 MG/ML
10 INJECTION, SOLUTION INTRAVENOUS EVERY 4 HOURS PRN
Status: DISCONTINUED | OUTPATIENT
Start: 2025-05-28 | End: 2025-06-03 | Stop reason: HOSPADM

## 2025-05-28 RX ADMIN — SODIUM CHLORIDE 1000 ML: 9 INJECTION, SOLUTION INTRAVENOUS at 18:11

## 2025-05-28 RX ADMIN — SODIUM CHLORIDE 1000 ML: 9 INJECTION, SOLUTION INTRAVENOUS at 16:52

## 2025-05-28 RX ADMIN — LINEZOLID 600 MG: 600 INJECTION, SOLUTION INTRAVENOUS at 17:11

## 2025-05-28 ASSESSMENT — FIBROSIS 4 INDEX
FIB4 SCORE: 1.2
FIB4 SCORE: 1.9

## 2025-05-28 ASSESSMENT — LOCATION ZONE DERM: LOCATION ZONE: LEG

## 2025-05-28 ASSESSMENT — LOCATION SIMPLE DESCRIPTION DERM: LOCATION SIMPLE: LEFT PRETIBIAL REGION

## 2025-05-28 ASSESSMENT — LOCATION DETAILED DESCRIPTION DERM: LOCATION DETAILED: LEFT DISTAL PRETIBIAL REGION

## 2025-05-28 ASSESSMENT — PAIN DESCRIPTION - PAIN TYPE: TYPE: ACUTE PAIN

## 2025-05-28 NOTE — ED TRIAGE NOTES
"Chief Complaint   Patient presents with    Leg Pain     Swollen Left lower leg    Sent by MD     Sent by Dermatologist        81 yo  female to triage for above complaint. A&Ox4, GCS 15. Heart Rate in the 180s . EKG ordered and completed. PIV placed and blood drawn    Pt is alert and orientated, speaking full sentences, follows commands and responds appropriately  to questions.       /81   Pulse (!) 183   Temp 36.4 °C (97.5 °F) (Temporal)   Resp 18   Ht 1.575 m (5' 2\")   Wt 86.2 kg (190 lb)   LMP  (LMP Unknown)   SpO2 95%   BMI 34.75 kg/m²       Past Medical History:   Diagnosis Date    Status post bilateral cataract extraction 04/07/2025    Osteoporosis of lumbar spine 07/22/2024    Uncontrolled type 2 diabetes mellitus with hyperglycemia (HCC) 03/20/2023    Nonrheumatic aortic valve insufficiency 09/19/2022    History of malignant melanoma of back 11/18/2021    Malignant melanoma (HCC) 08/09/2021    History of melanoma in situ 06/24/2019    Ductal carcinoma in situ (DCIS) of right breast 03/25/2019    Added automatically from request for surgery [id number].  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

## 2025-05-28 NOTE — ED PROVIDER NOTES
"ED Provider Note    Scribed for Prashant Olguin by Priyanka Cazares. 5/28/2025  4:14 PM    Primary care provider: Chapito Marques M.D.  Means of arrival: Walk-in  History obtained from: Patient  History limited by: None    CHIEF COMPLAINT  Chief Complaint   Patient presents with    Leg Pain     Swollen Left lower leg    Sent by MD     Sent by Dermatologist       EXTERNAL RECORDS REVIEWED  Outpatient Notes The patient was seen by PCP on 4/7/25. She has a history of diabetes, hyperlipidemia, hypertension, and hypothyroidism.      HPI/ROS  LIMITATION TO HISTORY   Select: : None  OUTSIDE HISTORIAN(S):  None    HPI  Ragini Reaves is a 80 y.o. female who presents to the Emergency Department for leg pain onset 10 days ago. The patient reports that she started to experience left leg pain and swelling 10 days ago. She explains that she had skin cancer 7 years ago and decided to go to her dermatologist today who prompted her to present to the ED for concerns of infection. She has associated pus draining, shortness of breath, and a feeling of chest \"fullness.\" The patient currently has a heart rate of 119 BPM. She is unsure when her heart rate increased. She denies having a history of atrial fibrillation. She denies having any recent fevers. There are no known alleviating or exacerbating factors.     REVIEW OF SYSTEMS  As above, all other systems reviewed and are negative.   See HPI for further details.     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), Dyslipidemia, goal LDL below 100, Dyslipidemia, goal LDL below 130, Essential hypertension, Frequent urination, History of malignant melanoma of back (11/18/2021), History of melanoma in situ (06/24/2019), Hypertension, Hypothyroidism, Lump of right breast, " Malignant melanoma (HCC) (08/09/2021), Microalbuminuria (05/02/2016), Nonrheumatic aortic valve insufficiency (09/19/2022), Obesity, Obstructive sleep apnea (02/01/2018), Osteoarthritis, Osteopenia, Osteoporosis of lumbar spine (07/22/2024), 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  SURGICAL HISTORY   has a past surgical history that includes colonoscopy with biopsy (5/21/2013); tonsillectomy; thyroidectomy (2000); breast biopsy (Right, 3/13/2019); and mastectomy (Right, 3/28/2019).  SOCIAL HISTORY  Social History[1]   Social History     Substance and Sexual Activity   Drug Use No     FAMILY HISTORY  Family History   Problem Relation Age of Onset    Lung Disease Brother     Cancer Mother         pelvic    Stroke Maternal Grandmother      CURRENT MEDICATIONS  Current Outpatient Medications   Medication Instructions    acetaminophen (TYLENOL) 500 mg, 2 TIMES DAILY    ascorbic acid (VITAMIN C) 500 mg, DAILY    aspirin 81 mg, EVERY MORNING    atorvastatin (LIPITOR) 20 mg, Oral, DAILY, MUST BE SEEN FOR FURTHER REFILLS    betamethasone, augmented, (DIPROLENE) 0.05 % gel 1 Application , Topical, EVERY BEDTIME    BIOTIN PO 1 Tablet, DAILY    CALCIUM-MAGNESIUM-VITAMIN D PO 1 Tablet, DAILY    Cholecalciferol (VITAMIN D) 50 MCG (2000 UT) Cap 1 Capsule, Oral, DAILY    ciclopirox (LOPROX) 0.77 % cream PLEASE SEE ATTACHED FOR DETAILED DIRECTIONS    Ciclopirox 0.77 % Gel PRN    coenzyme Q-10 30 mg, DAILY    COLLAGEN PO 1 Tablet, DAILY    docusate sodium (COLACE) 300 mg, DAILY    Fiber Powder 1 Package, DAILY    FreeStyle Lancets Misc USE TWICE A DAY    FREESTYLE LITE strip USE 1 STRIP ONCE A DAY AS NEEDED    furosemide (LASIX) 40 mg, Oral, EVERY MORNING     Glucosamine-Chondroitin (GLUCOSAMINE CHONDR COMPLEX PO) 750 mg, 2 TIMES DAILY    ketoconazole (NIZORAL) 2 % Cream APPLY EVERY DAY FOR 2 WEEKS FOR FLARES    levothyroxine (SYNTHROID) 175 mcg, Oral, EACH MORNING ON EMPTY STOMACH    losartan (COZAAR) 25 mg, Oral, EVERY DAY    Lutein 10 mg, EVERY EVENING    metFORMIN (GLUCOPHAGE) 500 mg, Oral, 2 TIMES DAILY WITH MEALS    Misc. Devices Misc This is an order for full face CPAP mask and new ResMed auto CPAP machine at 5-15 cm pressure.  ICD-10 code G47.33       SEPNCER 99 months   NPI [id number]    Multiple Vitamins-Minerals (ULTRA WOMENS PACK) Misc 1 Tablet, DAILY    potassium chloride ER (KLOR-CON) 10 MEQ tablet 10 mEq, Oral, EVERY DAY    Probiotic Product (PROBIOTIC DAILY PO) 1 Package, DAILY    spironolactone (ALDACTONE) 25 mg, Oral, EVERY DAY    tacrolimus (PROTOPIC) 0.1 % Ointment 2 TIMES DAILY    topiramate (TOPAMAX) 50 mg, Oral, DAILY    Tradjenta 5 mg, Oral, EVERY DAY    triamcinolone acetonide (KENALOG) 0.1 % Cream APPLY TWICE DAILY TO AFFECTED AREAS ON LEGS TWICE A DAY FOR MAXIMUM 2 WEEKS        ALLERGIES  Allergies[2]    PHYSICAL EXAM    VITAL SIGNS:   Vitals:    05/28/25 1800 05/28/25 1830 05/28/25 1900 05/28/25 1930   BP: 110/53 114/55 120/57 116/55   Pulse: (!) 117 95 (!) 109 (!) 107   Resp: 19 15 19 20   Temp:       TempSrc:       SpO2: 96% 97% 94% 96%   Weight:       Height:         Vitals: My interpretation: normotensive, tachycardic, afebrile, not hypoxic    Reinterpretation of vitals: Improving    Cardiac Monitor Interpretation: The cardiac monitor revealed atrial fibrillation with rapid ventricular response as interpreted by me. The cardiac monitor was ordered secondary to the patient's history of tachycardia and to monitor for dysrhythmia and/or tachycardia.    PE:   Gen: sitting comfortably, speaking clearly, appears in no acute distress   ENT: Mucous membranes moist, posterior pharynx clear, uvula midline, nares patent bilaterally   Neck: Supple,  FROM  Pulmonary: Lungs are clear to auscultation bilaterally. No tachypnea  CV: Tachycardic, no murmur appreciated, pulses 2+ in both upper and lower extremities  Abdomen: soft, NT/ND; no rebound/guarding  : no CVA or suprapubic tenderness   Neuro: A&Ox4 (person, place, time, situation), speech fluent, gait steady, no focal deficits appreciated  Skin: No rash or lesions.  No pallor or jaundice.  No cyanosis.  Warm and dry.   Left lower extremity: Patient has marked erythema and warmth to the left lower extremity compared to the right, good pulses, no induration or fluctuance, generalized cellulitic changes.            DIAGNOSTIC STUDIES / PROCEDURES    LABS  Results for orders placed or performed during the hospital encounter of 05/28/25   CBC WITH DIFFERENTIAL    Collection Time: 05/28/25  3:36 PM   Result Value Ref Range    WBC 15.0 (H) 4.8 - 10.8 K/uL    RBC 5.10 4.20 - 5.40 M/uL    Hemoglobin 14.4 12.0 - 16.0 g/dL    Hematocrit 44.9 37.0 - 47.0 %    MCV 88.0 81.4 - 97.8 fL    MCH 28.2 27.0 - 33.0 pg    MCHC 32.1 (L) 32.2 - 35.5 g/dL    RDW 42.3 35.9 - 50.0 fL    Platelet Count 396 164 - 446 K/uL    MPV 10.1 9.0 - 12.9 fL    Neutrophils-Polys 76.50 (H) 44.00 - 72.00 %    Lymphocytes 11.90 (L) 22.00 - 41.00 %    Monocytes 8.70 0.00 - 13.40 %    Eosinophils 1.40 0.00 - 6.90 %    Basophils 0.40 0.00 - 1.80 %    Immature Granulocytes 1.10 (H) 0.00 - 0.90 %    Nucleated RBC 0.00 0.00 - 0.20 /100 WBC    Neutrophils (Absolute) 11.49 (H) 1.82 - 7.42 K/uL    Lymphs (Absolute) 1.78 1.00 - 4.80 K/uL    Monos (Absolute) 1.31 (H) 0.00 - 0.85 K/uL    Eos (Absolute) 0.21 0.00 - 0.51 K/uL    Baso (Absolute) 0.06 0.00 - 0.12 K/uL    Immature Granulocytes (abs) 0.17 (H) 0.00 - 0.11 K/uL    NRBC (Absolute) 0.00 K/uL   COMP METABOLIC PANEL    Collection Time: 05/28/25  3:36 PM   Result Value Ref Range    Sodium 136 135 - 145 mmol/L    Potassium 3.7 3.6 - 5.5 mmol/L    Chloride 100 96 - 112 mmol/L    Co2 23 20 - 33 mmol/L    Anion  Gap 13.0 7.0 - 16.0    Glucose 134 (H) 65 - 99 mg/dL    Bun 28 (H) 8 - 22 mg/dL    Creatinine 0.78 0.50 - 1.40 mg/dL    Calcium 9.6 8.5 - 10.5 mg/dL    Correct Calcium 9.7 8.5 - 10.5 mg/dL    AST(SGOT) 45 12 - 45 U/L    ALT(SGPT) 57 (H) 2 - 50 U/L    Alkaline Phosphatase 153 (H) 30 - 99 U/L    Total Bilirubin 0.4 0.1 - 1.5 mg/dL    Albumin 3.9 3.2 - 4.9 g/dL    Total Protein 8.1 6.0 - 8.2 g/dL    Globulin 4.2 (H) 1.9 - 3.5 g/dL    A-G Ratio 0.9 g/dL   TROPONIN    Collection Time: 25  3:36 PM   Result Value Ref Range    Troponin T 26 (H) 6 - 19 ng/L   proBrain Natriuretic Peptide, NT    Collection Time: 25  3:36 PM   Result Value Ref Range    NT-proBNP 233 (H) 0 - 125 pg/mL   Lactic Acid    Collection Time: 25  3:36 PM   Result Value Ref Range    Lactic Acid 1.4 0.5 - 2.0 mmol/L   ESTIMATED GFR    Collection Time: 25  3:36 PM   Result Value Ref Range    GFR (CKD-EPI) 77 >60 mL/min/1.73 m 2   EKG    Collection Time: 25  4:13 PM   Result Value Ref Range    Report       [facility] Emergency Dept.    Test Date:  2025  Pt Name:    JA MAHER               Department: ER  MRN:        [id number]                      Room:       RD 01  Gender:     Female                       Technician: [id number]  :        [id number]                   Requested By:ER TRIAGE PROTOCOL  Order #:    [id number]                    Reading MD: Prashant Olguin    Measurements  Intervals                                Axis  Rate:       173                          P:          0  IL:         0                            QRS:        25  QRSD:       67                           T:          14  QT:         283  QTc:        480    Interpretive Statements  Atrial fibrillation with rapid V-rate  Low voltage, precordial leads  Repolarization abnormality, prob rate related  Compared to ECG 2019 13:49:34  Low QRS voltage now present  Early repolarization now present  Sinus rhythm no longer  present  First degree AV block no longer present  Electronically Sign ed On 05- 16:13:24 PDT by Prashant Olguin     MRSA By PCR (Amp)    Collection Time: 05/28/25  5:00 PM    Specimen: Nares; Respirate   Result Value Ref Range    MRSA by PCR Negative Negative   LACTIC ACID    Collection Time: 05/28/25  6:10 PM   Result Value Ref Range    Lactic Acid 0.9 0.5 - 2.0 mmol/L      All labs reviewed by me. Labs were compared to prior labs if they were available. Significant for leukocytosis of 15, no anemia, normal electrolytes, mild hyperglycemia, normal renal function, normal liver enzymes, normal bilirubin, troponin minimally elevated 26, BNP negative, lactic acid normal.    RADIOLOGY  I have independently interpreted the diagnostic imaging associated with this visit and am waiting the final reading from the radiologist.   My preliminary interpretation is a follows: On my independent interpretation patient has no new significant cardiomegaly or focal consolidative process on CXR.     Radiologist interpretation is as follows:  US-EXTREMITY VENOUS LOWER UNILAT LEFT   Final Result      DX-CHEST-PORTABLE (1 VIEW)   Final Result         No acute cardiac or pulmonary abnormality is identified.      EC-ECHOCARDIOGRAM COMPLETE W/O CONT    (Results Pending)     COURSE & MEDICAL DECISION MAKING  Nursing notes, VS, PMSFHx, labs, imaging, EKG reviewed in chart.    Heart Score: moderate      ED Observation Status? No; Patient does not meet criteria for ED Observation.     Ddx: PE, MI, pneumonia, DVT, cellulitis, necrotizing soft tissue injury    MDM: 4:14 PM Ragini Reaves is a 80 y.o. female who presented with evaluation about 10 days of worsening cellulitis to the left lower extremity.  Went to her dermatologist today who sent her here for IV antibiotics and admission.  No prior history of cellulitic changes.  Patient denies systemic symptoms such as fevers.  Upon arrival here she is also notably extremely  tachycardic and stat EKG done shows A-fib with RVR, rate around 180.  Concern that this could possibly be secondary to sepsis versus true arrhythmia the patient was given 2 L of IV fluid bolus and thankfully heart rate slowed and then she spontaneously cardioverted back to normal sinus rhythm.  Denies chest pain.  Can feel palpitations.  Left lower extremity shows obvious infection.  Labs and septic workup ordered.  All labs reviewed by me. Labs were compared to prior labs if they were available. Significant for leukocytosis of 15, no anemia, normal electrolytes, mild hyperglycemia, normal renal function, normal liver enzymes, normal bilirubin, troponin minimally elevated 26, BNP negative, lactic acid normal.  Chest x-ray unremarkable.  Did order a DVT study out of abundance of caution, however there is a significant delay and hospitalist will follow-up on results.  Patient was administered antibiotics after discussion with pharmacist and decided to Zyvox is the best option currently and she was darted on this IV.  Admitted to hospitalist for continued monitoring and treatment.  DVT study negative for deep venous thrombosis.    CRITICAL CARE TIME 34 minutes for atrial fibrillation with RVR and sepsis  There was a very real possibility of deterioration of the patient's condition. This patient required the highest level of care.  I provided critical care services which included: review of the medical record, treatment orders, ordering and reviewing test results, frequent reevaluation of the patient's condition and response to treatment, as well as discussing the case with appropriate personnel and various consultants. The critical care time associated with the care of this patient is exclusive of any procedures or specific interventions.     ADDITIONAL PROBLEM LIST AND DISPOSITION    I have discussed management of the patient with the following physicians and OLIVER's: Hospitalist    Discussion of management with other  QHP or appropriate source(s): Pharmacy regarding antibiotic selection     Barriers to care at this time, including but not limited to: None.     Decision tools and prescription drugs considered including, but not limited to: Antibiotics Zyvox.    FINAL IMPRESSION  1. Sepsis with acute organ dysfunction without septic shock, due to unspecified organism, unspecified organ dysfunction type (HCC) Acute   2. Atrial fibrillation with RVR (HCC) Acute   3. Tachycardia Acute   4. Leukocytosis, unspecified type Acute   5. Cellulitis of left lower extremity Acute      Priyanka CAICEDO ([place]), am scribing for, and in the presence of, Prashant Olguin.    Electronically signed by: Priyanka Cazares ([place]), 5/28/2025    IPrashant personally performed the services described in this documentation, as scribed by Priyanka Cazares in my presence, and it is both accurate and complete.    The note accurately reflects work and decisions made by me.  Prashant Olguin  5/28/2025  5:57 PM         [1]   Social History  Tobacco Use    Smoking status: Never    Smokeless tobacco: Never   Vaping Use    Vaping status: Never Used   Substance Use Topics    Alcohol use: No     Alcohol/week: 0.0 oz    Drug use: No   [2]   Allergies  Allergen Reactions    Asa [Aspirin]      Stomach bleeding.   Tolerates low dose ASA    Cough Syrup M [Cough Cold-Flu Relief] Swelling     Severe facial swelling    Dextromethorphan Swelling     Facial swellling

## 2025-05-29 ENCOUNTER — APPOINTMENT (OUTPATIENT)
Dept: CARDIOLOGY | Facility: MEDICAL CENTER | Age: 80
End: 2025-05-29
Attending: STUDENT IN AN ORGANIZED HEALTH CARE EDUCATION/TRAINING PROGRAM
Payer: MEDICARE

## 2025-05-29 LAB
ALBUMIN SERPL BCP-MCNC: 3.1 G/DL (ref 3.2–4.9)
ALBUMIN/GLOB SERPL: 0.9 G/DL
ALP SERPL-CCNC: 122 U/L (ref 30–99)
ALT SERPL-CCNC: 42 U/L (ref 2–50)
ANION GAP SERPL CALC-SCNC: 10 MMOL/L (ref 7–16)
AST SERPL-CCNC: 34 U/L (ref 12–45)
BASOPHILS # BLD AUTO: 0.5 % (ref 0–1.8)
BASOPHILS # BLD: 0.05 K/UL (ref 0–0.12)
BILIRUB SERPL-MCNC: 0.4 MG/DL (ref 0.1–1.5)
BUN SERPL-MCNC: 21 MG/DL (ref 8–22)
CALCIUM ALBUM COR SERPL-MCNC: 9.4 MG/DL (ref 8.5–10.5)
CALCIUM SERPL-MCNC: 8.7 MG/DL (ref 8.5–10.5)
CHLORIDE SERPL-SCNC: 104 MMOL/L (ref 96–112)
CO2 SERPL-SCNC: 22 MMOL/L (ref 20–33)
CREAT SERPL-MCNC: 0.69 MG/DL (ref 0.5–1.4)
EOSINOPHIL # BLD AUTO: 0.24 K/UL (ref 0–0.51)
EOSINOPHIL NFR BLD: 2.2 % (ref 0–6.9)
ERYTHROCYTE [DISTWIDTH] IN BLOOD BY AUTOMATED COUNT: 42.5 FL (ref 35.9–50)
GFR SERPLBLD CREATININE-BSD FMLA CKD-EPI: 87 ML/MIN/1.73 M 2
GLOBULIN SER CALC-MCNC: 3.3 G/DL (ref 1.9–3.5)
GLUCOSE BLD STRIP.AUTO-MCNC: 140 MG/DL (ref 65–99)
GLUCOSE BLD STRIP.AUTO-MCNC: 143 MG/DL (ref 65–99)
GLUCOSE BLD STRIP.AUTO-MCNC: 144 MG/DL (ref 65–99)
GLUCOSE BLD STRIP.AUTO-MCNC: 167 MG/DL (ref 65–99)
GLUCOSE SERPL-MCNC: 140 MG/DL (ref 65–99)
GRAM STN SPEC: NORMAL
HCT VFR BLD AUTO: 38.7 % (ref 37–47)
HGB BLD-MCNC: 12.6 G/DL (ref 12–16)
IMM GRANULOCYTES # BLD AUTO: 0.09 K/UL (ref 0–0.11)
IMM GRANULOCYTES NFR BLD AUTO: 0.8 % (ref 0–0.9)
LV EJECT FRACT MOD 2C 99903: 69.72
LV EJECT FRACT MOD 4C 99902: 70.71
LV EJECT FRACT MOD BP 99901: 70.75
LYMPHOCYTES # BLD AUTO: 1.31 K/UL (ref 1–4.8)
LYMPHOCYTES NFR BLD: 12.1 % (ref 22–41)
MCH RBC QN AUTO: 28.4 PG (ref 27–33)
MCHC RBC AUTO-ENTMCNC: 32.6 G/DL (ref 32.2–35.5)
MCV RBC AUTO: 87.2 FL (ref 81.4–97.8)
MONOCYTES # BLD AUTO: 1.01 K/UL (ref 0–0.85)
MONOCYTES NFR BLD AUTO: 9.3 % (ref 0–13.4)
NEUTROPHILS # BLD AUTO: 8.13 K/UL (ref 1.82–7.42)
NEUTROPHILS NFR BLD: 75.1 % (ref 44–72)
NRBC # BLD AUTO: 0 K/UL
NRBC BLD-RTO: 0 /100 WBC (ref 0–0.2)
PLATELET # BLD AUTO: 333 K/UL (ref 164–446)
PMV BLD AUTO: 10.3 FL (ref 9–12.9)
POTASSIUM SERPL-SCNC: 4.2 MMOL/L (ref 3.6–5.5)
PROT SERPL-MCNC: 6.4 G/DL (ref 6–8.2)
RBC # BLD AUTO: 4.44 M/UL (ref 4.2–5.4)
SIGNIFICANT IND 70042: NORMAL
SITE SITE: NORMAL
SODIUM SERPL-SCNC: 136 MMOL/L (ref 135–145)
SOURCE SOURCE: NORMAL
WBC # BLD AUTO: 10.8 K/UL (ref 4.8–10.8)

## 2025-05-29 PROCEDURE — 80053 COMPREHEN METABOLIC PANEL: CPT

## 2025-05-29 PROCEDURE — 93306 TTE W/DOPPLER COMPLETE: CPT

## 2025-05-29 PROCEDURE — 770020 HCHG ROOM/CARE - TELE (206)

## 2025-05-29 PROCEDURE — 700102 HCHG RX REV CODE 250 W/ 637 OVERRIDE(OP): Performed by: STUDENT IN AN ORGANIZED HEALTH CARE EDUCATION/TRAINING PROGRAM

## 2025-05-29 PROCEDURE — A9270 NON-COVERED ITEM OR SERVICE: HCPCS | Performed by: PHYSICIAN ASSISTANT

## 2025-05-29 PROCEDURE — 82962 GLUCOSE BLOOD TEST: CPT | Mod: 91

## 2025-05-29 PROCEDURE — 700102 HCHG RX REV CODE 250 W/ 637 OVERRIDE(OP): Performed by: INTERNAL MEDICINE

## 2025-05-29 PROCEDURE — 700111 HCHG RX REV CODE 636 W/ 250 OVERRIDE (IP): Performed by: STUDENT IN AN ORGANIZED HEALTH CARE EDUCATION/TRAINING PROGRAM

## 2025-05-29 PROCEDURE — 97602 WOUND(S) CARE NON-SELECTIVE: CPT

## 2025-05-29 PROCEDURE — 87070 CULTURE OTHR SPECIMN AEROBIC: CPT

## 2025-05-29 PROCEDURE — A9270 NON-COVERED ITEM OR SERVICE: HCPCS | Performed by: INTERNAL MEDICINE

## 2025-05-29 PROCEDURE — 700102 HCHG RX REV CODE 250 W/ 637 OVERRIDE(OP): Performed by: PHYSICIAN ASSISTANT

## 2025-05-29 PROCEDURE — 87186 SC STD MICRODIL/AGAR DIL: CPT

## 2025-05-29 PROCEDURE — 700117 HCHG RX CONTRAST REV CODE 255: Performed by: STUDENT IN AN ORGANIZED HEALTH CARE EDUCATION/TRAINING PROGRAM

## 2025-05-29 PROCEDURE — 99233 SBSQ HOSP IP/OBS HIGH 50: CPT | Performed by: INTERNAL MEDICINE

## 2025-05-29 PROCEDURE — 36415 COLL VENOUS BLD VENIPUNCTURE: CPT

## 2025-05-29 PROCEDURE — 93306 TTE W/DOPPLER COMPLETE: CPT | Mod: 26 | Performed by: STUDENT IN AN ORGANIZED HEALTH CARE EDUCATION/TRAINING PROGRAM

## 2025-05-29 PROCEDURE — A9270 NON-COVERED ITEM OR SERVICE: HCPCS | Performed by: STUDENT IN AN ORGANIZED HEALTH CARE EDUCATION/TRAINING PROGRAM

## 2025-05-29 PROCEDURE — 87077 CULTURE AEROBIC IDENTIFY: CPT

## 2025-05-29 PROCEDURE — 87205 SMEAR GRAM STAIN: CPT

## 2025-05-29 PROCEDURE — 87147 CULTURE TYPE IMMUNOLOGIC: CPT

## 2025-05-29 PROCEDURE — 85025 COMPLETE CBC W/AUTO DIFF WBC: CPT

## 2025-05-29 RX ORDER — LINEZOLID 600 MG/1
600 TABLET, FILM COATED ORAL EVERY 12 HOURS
Status: DISCONTINUED | OUTPATIENT
Start: 2025-05-29 | End: 2025-05-30

## 2025-05-29 RX ORDER — NYSTATIN 100000 [USP'U]/G
POWDER TOPICAL 2 TIMES DAILY
Status: COMPLETED | OUTPATIENT
Start: 2025-05-29 | End: 2025-05-31

## 2025-05-29 RX ADMIN — LINEZOLID 600 MG: 600 INJECTION, SOLUTION INTRAVENOUS at 06:37

## 2025-05-29 RX ADMIN — SPIRONOLACTONE 25 MG: 25 TABLET ORAL at 05:49

## 2025-05-29 RX ADMIN — ASPIRIN 81 MG: 81 TABLET, COATED ORAL at 05:49

## 2025-05-29 RX ADMIN — ATORVASTATIN CALCIUM 20 MG: 20 TABLET, FILM COATED ORAL at 05:49

## 2025-05-29 RX ADMIN — ACETAMINOPHEN 650 MG: 325 TABLET ORAL at 22:12

## 2025-05-29 RX ADMIN — NYSTATIN: 100000 POWDER TOPICAL at 17:38

## 2025-05-29 RX ADMIN — HUMAN ALBUMIN MICROSPHERES AND PERFLUTREN 3 ML: 10; .22 INJECTION, SOLUTION INTRAVENOUS at 10:44

## 2025-05-29 RX ADMIN — INSULIN LISPRO 1 UNITS: 100 INJECTION, SOLUTION INTRAVENOUS; SUBCUTANEOUS at 23:00

## 2025-05-29 RX ADMIN — LOSARTAN POTASSIUM 25 MG: 25 TABLET, FILM COATED ORAL at 05:49

## 2025-05-29 RX ADMIN — INSULIN LISPRO 1 UNITS: 100 INJECTION, SOLUTION INTRAVENOUS; SUBCUTANEOUS at 12:31

## 2025-05-29 RX ADMIN — ACETAMINOPHEN 650 MG: 325 TABLET ORAL at 14:52

## 2025-05-29 RX ADMIN — LINEZOLID 600 MG: 600 TABLET, FILM COATED ORAL at 17:38

## 2025-05-29 RX ADMIN — ENOXAPARIN SODIUM 80 MG: 100 INJECTION SUBCUTANEOUS at 17:38

## 2025-05-29 RX ADMIN — NYSTATIN: 100000 POWDER TOPICAL at 12:30

## 2025-05-29 RX ADMIN — ENOXAPARIN SODIUM 80 MG: 100 INJECTION SUBCUTANEOUS at 05:49

## 2025-05-29 RX ADMIN — LEVOTHYROXINE SODIUM 175 MCG: 0.12 TABLET ORAL at 05:49

## 2025-05-29 SDOH — ECONOMIC STABILITY: TRANSPORTATION INSECURITY
IN THE PAST 12 MONTHS, HAS LACK OF RELIABLE TRANSPORTATION KEPT YOU FROM MEDICAL APPOINTMENTS, MEETINGS, WORK OR FROM GETTING THINGS NEEDED FOR DAILY LIVING?: NO

## 2025-05-29 ASSESSMENT — PATIENT HEALTH QUESTIONNAIRE - PHQ9
SUM OF ALL RESPONSES TO PHQ9 QUESTIONS 1 AND 2: 0
1. LITTLE INTEREST OR PLEASURE IN DOING THINGS: NOT AT ALL
2. FEELING DOWN, DEPRESSED, IRRITABLE, OR HOPELESS: NOT AT ALL

## 2025-05-29 ASSESSMENT — COGNITIVE AND FUNCTIONAL STATUS - GENERAL
STANDING UP FROM CHAIR USING ARMS: A LITTLE
WALKING IN HOSPITAL ROOM: A LITTLE
DRESSING REGULAR UPPER BODY CLOTHING: A LITTLE
HELP NEEDED FOR BATHING: A LITTLE
CLIMB 3 TO 5 STEPS WITH RAILING: A LITTLE
SUGGESTED CMS G CODE MODIFIER DAILY ACTIVITY: CJ
DAILY ACTIVITIY SCORE: 20
TURNING FROM BACK TO SIDE WHILE IN FLAT BAD: A LITTLE
DRESSING REGULAR LOWER BODY CLOTHING: A LITTLE
MOVING FROM LYING ON BACK TO SITTING ON SIDE OF FLAT BED: A LITTLE
TOILETING: A LITTLE
MOBILITY SCORE: 18
SUGGESTED CMS G CODE MODIFIER MOBILITY: CK
MOVING TO AND FROM BED TO CHAIR: A LITTLE

## 2025-05-29 ASSESSMENT — ENCOUNTER SYMPTOMS
VOMITING: 0
ABDOMINAL PAIN: 0
MYALGIAS: 1
PALPITATIONS: 0
CHILLS: 0
NAUSEA: 0
FEVER: 0
BACK PAIN: 0

## 2025-05-29 ASSESSMENT — LIFESTYLE VARIABLES
CONSUMPTION TOTAL: NEGATIVE
HOW MANY TIMES IN THE PAST YEAR HAVE YOU HAD 5 OR MORE DRINKS IN A DAY: 0
EVER FELT BAD OR GUILTY ABOUT YOUR DRINKING: NO
EVER HAD A DRINK FIRST THING IN THE MORNING TO STEADY YOUR NERVES TO GET RID OF A HANGOVER: NO
DOES PATIENT WANT TO STOP DRINKING: NO
HAVE PEOPLE ANNOYED YOU BY CRITICIZING YOUR DRINKING: NO
AVERAGE NUMBER OF DAYS PER WEEK YOU HAVE A DRINK CONTAINING ALCOHOL: 0
ON A TYPICAL DAY WHEN YOU DRINK ALCOHOL HOW MANY DRINKS DO YOU HAVE: 0
TOTAL SCORE: 0
HAVE YOU EVER FELT YOU SHOULD CUT DOWN ON YOUR DRINKING: NO
TOTAL SCORE: 0
ALCOHOL_USE: NO
TOTAL SCORE: 0

## 2025-05-29 ASSESSMENT — SOCIAL DETERMINANTS OF HEALTH (SDOH)
WITHIN THE PAST 12 MONTHS, THE FOOD YOU BOUGHT JUST DIDN'T LAST AND YOU DIDN'T HAVE MONEY TO GET MORE: NEVER TRUE
WITHIN THE LAST YEAR, HAVE TO BEEN RAPED OR FORCED TO HAVE ANY KIND OF SEXUAL ACTIVITY BY YOUR PARTNER OR EX-PARTNER?: NO
WITHIN THE LAST YEAR, HAVE YOU BEEN AFRAID OF YOUR PARTNER OR EX-PARTNER?: NO
IN THE PAST 12 MONTHS, HAS THE ELECTRIC, GAS, OIL, OR WATER COMPANY THREATENED TO SHUT OFF SERVICE IN YOUR HOME?: NO
WITHIN THE LAST YEAR, HAVE YOU BEEN HUMILIATED OR EMOTIONALLY ABUSED IN OTHER WAYS BY YOUR PARTNER OR EX-PARTNER?: NO
WITHIN THE LAST YEAR, HAVE YOU BEEN KICKED, HIT, SLAPPED, OR OTHERWISE PHYSICALLY HURT BY YOUR PARTNER OR EX-PARTNER?: NO
WITHIN THE PAST 12 MONTHS, YOU WORRIED THAT YOUR FOOD WOULD RUN OUT BEFORE YOU GOT THE MONEY TO BUY MORE: NEVER TRUE

## 2025-05-29 ASSESSMENT — PAIN DESCRIPTION - PAIN TYPE: TYPE: ACUTE PAIN

## 2025-05-29 NOTE — H&P
Hospital Medicine History & Physical Note    Date of Service  5/28/2025    Primary Care Physician  Chapito Marques M.D.    Consultants  None    Code Status  DNAR/DNI    Chief Complaint  Chief Complaint   Patient presents with    Leg Pain     Swollen Left lower leg    Sent by MD     Sent by Dermatologist         History of Presenting Illness  Ragini Reaves is a 80 y.o. female who presented 5/28/2025 with left lower extremity pain and swelling.  Patient lives alone at home and is able to care for self.  She has not been told that she had atrial fibrillation in the past.  For the last 2 weeks, she began to notice a progressively worsening swelling in her lower extremities bilaterally.  She noticed to have worsening pain and erythema on her left lower extremity.  Eventually, she reports that the swelling increased in her right lower extremity, however her left lower extremity continues to be painful, swollen, erythematous.  She went to see her dermatologist today, who recommended her to come into the ER for IV antibiotics.    In ER, patient found to have atrial fibrillation with rapid ventricular response.  Initial white blood cell count 15,000 with left shift.  Chest x-ray negative for acute cardiopulmonary abnormality.  Patient given fluids and Zyvox to which she converted to normal sinus rhythm, however still tachycardic at 110 bpm.  Patient will be admitted for IV antibiotics and for monitoring for recurrence of her atrial fibrillation.    I discussed the plan of care with patient.    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), Dyslipidemia, goal LDL below 100, Dyslipidemia, goal LDL below 130, Essential hypertension, Frequent urination, History of malignant melanoma of back  (11/18/2021), History of melanoma in situ (06/24/2019), Hypertension, Hypothyroidism, Lump of right breast, Malignant melanoma (HCC) (08/09/2021), Microalbuminuria (05/02/2016), Nonrheumatic aortic valve insufficiency (09/19/2022), Obesity, Obstructive sleep apnea (02/01/2018), Osteoarthritis, Osteopenia, Osteoporosis of lumbar spine (07/22/2024), 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    Surgical History   has a past surgical history that includes colonoscopy with biopsy (5/21/2013); tonsillectomy; thyroidectomy (2000); breast biopsy (Right, 3/13/2019); and mastectomy (Right, 3/28/2019).     Family History  family history includes Cancer in her mother; Lung Disease in her brother; Stroke in her maternal grandmother.   Family history reviewed with patient. There is no family history that is pertinent to the chief complaint.     Social History   reports that she has never smoked. She has never used smokeless tobacco. She reports that she does not drink alcohol and does not use drugs.    Allergies  Allergies[1]    Medications  Prior to Admission Medications   Prescriptions Last Dose Informant Patient Reported? Taking?   BIOTIN PO  Patient Yes No   Sig: Take 1 Tab by mouth every day.   CALCIUM-MAGNESIUM-VITAMIN D PO  Patient Yes No   Sig: Take 1 Tab by mouth every day.   COLLAGEN PO  Patient Yes No   Sig: Take 1 Tab by mouth every day.   Cholecalciferol (VITAMIN D) 50 MCG (2000 UT) Cap   No No   Sig: Take 1 Capsule by mouth every day.   Ciclopirox 0.77 % Gel   Yes No   Sig: Apply  topically as needed.   FREESTYLE LITE strip   No No   Sig: USE 1 STRIP ONCE A DAY AS NEEDED   Fiber Powder  Patient Yes No   Sig: Take 1 Package by mouth every day.    FreeStyle Lancets Misc   No No   Sig: USE TWICE A DAY   Glucosamine-Chondroitin (GLUCOSAMINE CHONDR COMPLEX PO)  Patient Yes No   Sig: Take 750 mg by mouth 2 Times a Day.   Lutein 10 MG Tab  Patient Yes No   Sig: Take 10 mg by mouth every evening.   Misc. Devices Misc   No No   Sig: This is an order for full face CPAP mask and new ResMed auto CPAP machine at 5-15 cm pressure.  ICD-10 code G47.33       SPENCER 99 months   NPI [id number]   Multiple Vitamins-Minerals (ULTRA WOMENS PACK) Misc  Patient Yes No   Sig: Take 1 Tab by mouth every day.   Probiotic Product (PROBIOTIC DAILY PO)  Patient Yes No   Sig: Take 1 Package by mouth every day.   acetaminophen (TYLENOL) 500 MG Tab  Patient Yes No   Sig: Take 500 mg by mouth 2 Times a Day.   ascorbic acid (VITAMIN C) 500 MG tablet  Patient Yes No   Sig: Take 1 Tab by mouth every day.   aspirin 81 MG tablet  Patient Yes No   Sig: Take 81 mg by mouth every morning.   atorvastatin (LIPITOR) 20 MG Tab   No No   Sig: TAKE 1 TABLET BY MOUTH EVERY DAY. MUST BE SEEN FOR FURTHER REFILLS   betamethasone, augmented, (DIPROLENE) 0.05 % gel   No No   Sig: Apply 1 Application to affected area(s) every bedtime.   ciclopirox (LOPROX) 0.77 % cream   Yes No   Sig: PLEASE SEE ATTACHED FOR DETAILED DIRECTIONS   coenzyme Q-10 30 MG capsule  Patient Yes No   Sig: Take 1 Cap by mouth every day.   docusate sodium (COLACE) 100 MG Cap  Patient Yes No   Sig: Take 300 mg by mouth every day.   furosemide (LASIX) 40 MG Tab   No No   Sig: TAKE 1 TABLET BY MOUTH EVERY DAY IN THE MORNING   ketoconazole (NIZORAL) 2 % Cream   Yes No   Sig: APPLY EVERY DAY FOR 2 WEEKS FOR FLARES   levothyroxine (SYNTHROID) 175 MCG Tab   No No   Sig: Take 1 Tablet by mouth every morning on an empty stomach.   linagliptin (TRADJENTA) 5 MG Tab tablet   No No   Sig: Take 1 Tablet by mouth every day.   losartan (COZAAR) 25 MG Tab   No No   Sig: TAKE 1 TABLET BY MOUTH EVERY DAY   metFORMIN (GLUCOPHAGE) 500 MG Tab   No No   Sig: TAKE  1 TABLET BY MOUTH TWICE A DAY WITH MEALS   potassium chloride ER (KLOR-CON) 10 MEQ tablet   No No   Sig: TAKE 1 TABLET BY MOUTH EVERY DAY   spironolactone (ALDACTONE) 25 MG Tab   No No   Sig: TAKE 1 TABLET BY MOUTH EVERY DAY   tacrolimus (PROTOPIC) 0.1 % Ointment   Yes No   Sig: Apply  topically 2 times a day.   topiramate (TOPAMAX) 50 MG tablet   No No   Sig: TAKE 1 TABLET BY MOUTH EVERY DAY   triamcinolone acetonide (KENALOG) 0.1 % Cream   Yes No   Sig: APPLY TWICE DAILY TO AFFECTED AREAS ON LEGS TWICE A DAY FOR MAXIMUM 2 WEEKS      Facility-Administered Medications: None       Physical Exam  Temp:  [36.4 °C (97.5 °F)] 36.4 °C (97.5 °F)  Pulse:  [119-183] 119  Resp:  [16-18] 18  BP: ()/(56-81) 107/59  SpO2:  [93 %-95 %] 93 %  Blood Pressure : 107/59   Temperature: 36.4 °C (97.5 °F)   Pulse: (!) 119   Respiration: 18   Pulse Oximetry: 93 %       Physical Exam  Constitutional:       Appearance: Normal appearance. She is obese.   HENT:      Head: Normocephalic.      Nose: Nose normal.      Mouth/Throat:      Mouth: Mucous membranes are moist.   Eyes:      Pupils: Pupils are equal, round, and reactive to light.   Cardiovascular:      Rate and Rhythm: Normal rate and regular rhythm.      Pulses: Normal pulses.   Pulmonary:      Effort: Pulmonary effort is normal.      Breath sounds: Normal breath sounds.   Abdominal:      General: Abdomen is flat. Bowel sounds are normal.      Palpations: Abdomen is soft.   Musculoskeletal:      Cervical back: Neck supple.   Skin:     General: Skin is warm.      Comments: Left lower extremity: Swollen, erythematous, 2 x 2 cm wound noted on the medial aspect of left shin.  Tender to touch.   Neurological:      General: No focal deficit present.      Mental Status: She is alert and oriented to person, place, and time. Mental status is at baseline.   Psychiatric:         Mood and Affect: Mood normal.         Behavior: Behavior normal.         Thought Content: Thought content normal.          Judgment: Judgment normal.         Laboratory:  Recent Labs     05/28/25  1536   WBC 15.0*   RBC 5.10   HEMOGLOBIN 14.4   HEMATOCRIT 44.9   MCV 88.0   MCH 28.2   MCHC 32.1*   RDW 42.3   PLATELETCT 396   MPV 10.1     Recent Labs     05/28/25  1536   SODIUM 136   POTASSIUM 3.7   CHLORIDE 100   CO2 23   GLUCOSE 134*   BUN 28*   CREATININE 0.78   CALCIUM 9.6     Recent Labs     05/28/25  1536   ALTSGPT 57*   ASTSGOT 45   ALKPHOSPHAT 153*   TBILIRUBIN 0.4   GLUCOSE 134*         Recent Labs     05/28/25  1536   NTPROBNP 233*         Recent Labs     05/28/25  1536   TROPONINT 26*       Imaging:  DX-CHEST-PORTABLE (1 VIEW)   Final Result         No acute cardiac or pulmonary abnormality is identified.      US-EXTREMITY VENOUS LOWER UNILAT LEFT    (Results Pending)       EKG:  I have personally reviewed the images and compared with prior images.    Assessment/Plan:  Justification for Admission Status  I anticipate this patient will require at least two midnights for appropriate medical management, necessitating inpatient admission because patient has new onset atrial fibrillation with RVR    Patient will need a Telemetry bed on EMERGENCY service .  The need is secondary to atrial fibrillation.    * Cellulitis- (present on admission)  Assessment & Plan  Noted to have worsening left lower extremity pain, erythema, swelling.  Noted to have a small wound on medial aspect of her left shin, suspect that this is the source for her infection  IV Zyvox  Wound care  DVT studies done, results pending, patient started on Lovenox for her atrial fibrillation      ACP (advance care planning)  Assessment & Plan  DNR/DNI    Atrial fibrillation with RVR ([facility])  Assessment & Plan  Patient presents with atrial fibrillation with rapid ventricular response, suspected from underlying infection  New diagnosis of atrial fibrillation, give therapeutic lovenox for now for AURORA VASC Score of 5, consider DOAC upon discharge  Start low dose  toprol  Echocardiogram      DM (diabetes mellitus) (HCC)  Assessment & Plan  Sliding scale     Morbid obesity (HCC)  Assessment & Plan  BMI 34    Dyslipidemia, goal LDL below 100- (present on admission)  Assessment & Plan  Lipitor    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   The patient continues to have: New onset A fib with high stroke risk, Cellulitis  The vital organ system that is affected is the: Heart, skin, cardiovascular system  If untreated there is a high chance of deterioration into: Recurrent A fib w/ RVR, Septic shock  And eventually death.   The critical care that I am providing today is: Therapeutic lovenox, IV zyvox  The critical that has been undertaken is medically complex.   There has been no overlap in critical care time.   Critical Care Time not including procedures: 46 minutes    VTE prophylaxis: therapeutic anticoagulation with lovenox       [1]   Allergies  Allergen Reactions    Asa [Aspirin]      Stomach bleeding.   Tolerates low dose ASA    Cough Syrup M [Cough Cold-Flu Relief] Swelling     Severe facial swelling    Dextromethorphan Swelling     Facial swellling

## 2025-05-29 NOTE — ASSESSMENT & PLAN NOTE
Noted to have worsening left lower extremity pain, erythema, swelling.  Noted to have a small wound on medial aspect of her left shin, suspect that this is the source for her infection  IV Zyvox  Wound care  DVT studies negative   I also ordered a ct scan of lower extremity

## 2025-05-29 NOTE — CARE PLAN
Problem: Knowledge Deficit - Standard  Goal: Patient and family/care givers will demonstrate understanding of plan of care, disease process/condition, diagnostic tests and medications  Outcome: Progressing     Problem: Hemodynamics  Goal: Patient's hemodynamics, fluid balance and neurologic status will be stable or improve  Outcome: Progressing     Problem: Fluid Volume  Goal: Fluid volume balance will be maintained  Outcome: Progressing     Problem: Urinary - Renal Perfusion  Goal: Ability to achieve and maintain adequate renal perfusion and functioning will improve  Outcome: Progressing     Problem: Respiratory  Goal: Patient will achieve/maintain optimum respiratory ventilation and gas exchange  Outcome: Progressing     Problem: Mechanical Ventilation  Goal: Safe management of artificial airway and ventilation  Outcome: Progressing  Goal: Successful weaning off mechanical ventilator, spontaneously maintains adequate gas exchange  Outcome: Progressing  Goal: Patient will be able to express needs and understand communication  Outcome: Progressing     Problem: Physical Regulation  Goal: Diagnostic test results will improve  Outcome: Progressing  Goal: Signs and symptoms of infection will decrease  Outcome: Progressing     Problem: Fall Risk  Goal: Patient will remain free from falls  Outcome: Progressing     Problem: Safety  Goal: Will remain free from injury  Outcome: Progressing  Goal: Will remain free from falls  Outcome: Progressing     Problem: Pain Management  Goal: Pain level will decrease to patient's comfort goal  Outcome: Progressing     Problem: Mobility  Goal: Risk for activity intolerance will decrease  Outcome: Progressing     Problem: Wound/ / Incision Healing  Goal: Patient's wound/surgical incision will decrease in size and heals properly  Outcome: Progressing   The patient is Watcher - Medium risk of patient condition declining or worsening    Shift Goals  Clinical Goals: antibiotics, stable  VS  Patient Goals: updates, go home  Family Goals: no family present    Progress made toward(s) clinical / shift goals:  All of the above    Patient is not progressing towards the following goals:

## 2025-05-29 NOTE — CARE PLAN
The patient is Stable - Low risk of patient condition declining or worsening         Progress made toward(s) clinical / shift goals:    Problem: Knowledge Deficit - Standard  Goal: Patient and family/care givers will demonstrate understanding of plan of care, disease process/condition, diagnostic tests and medications  Description: Target End Date:  1-3 days or as soon as patient condition allowsDocument in Patient Education1.  Patient and family/caregiver oriented to unit, equipment, visitation policy and means for communicating concern2.  Complete/review Learning Assessment3.  Assess knowledge level of disease process/condition, treatment plan, diagnostic tests and medications4.  Explain disease process/condition, treatment plan, diagnostic tests and medications  5/29/2025 0411 by Jasmin Contreras R.N.  Outcome: Progressing  5/29/2025 0408 by Jasmin Contreras R.N.  Outcome: Progressing     Problem: Fall Risk  Goal: Patient will remain free from falls  Description: Target End Date:  Prior to discharge or change in level of careDocument interventions on the [facility] Fall Risk Assessment1.  Assess for fall risk factors2.  Implement fall precautions  5/29/2025 0411 by Jasmin Contreras R.N.  Outcome: Progressing  5/29/2025 0408 by Jasmin Contreras R.N.  Outcome: Progressing     Problem: Safety  Goal: Will remain free from injury  Outcome: Progressing  Goal: Will remain free from falls  Outcome: Progressing     Problem: Pain Management  Goal: Pain level will decrease to patient's comfort goal  Outcome: Progressing     Problem: Mobility  Goal: Risk for activity intolerance will decrease  Outcome: Progressing     Problem: Wound/ / Incision Healing  Goal: Patient's wound/surgical incision will decrease in size and heals properly  Description: Target End Date:  Prior to discharge or change in level of careDocument on LDA1.  Assess and document surgical incision/wound2.  Provide incision/wound care per policy and/or provider  orders3.  Manage surgical drains per policy if applicable4.  Encourage adequate nutrition to promote wound healing5.  Collaborate with Clinical Dietician  Outcome: Progressing

## 2025-05-29 NOTE — PROGRESS NOTES
Monitor Summary  Rhythm: SR /ST  Rate:   Ectopy: (F) pvc, (F) pac  Measurements: .17/.07/.35  ---12 hr Chart Review---

## 2025-05-29 NOTE — WOUND TEAM
[facility]  Inpatient Services  Initial Wound and Skin Care Evaluation    Admission Date: 5/28/2025     Last order of IP CONSULT TO WOUND CARE was found on 5/29/2025 from Hospital Encounter on 5/28/2025     HPI, PMH, SH: Reviewed    Past Surgical History[1]  Social History     Tobacco Use    Smoking status: Never    Smokeless tobacco: Never   Substance Use Topics    Alcohol use: No     Alcohol/week: 0.0 oz     Chief Complaint   Patient presents with    Leg Pain     Swollen Left lower leg    Sent by MD     Sent by Dermatologist       Diagnosis: Cellulitis [L03.90]    Unit where seen by Wound Team: T733/02     WOUND CONSULT RELATED TO:  Left medial leg & pannus    WOUND TEAM PLAN OF CARE - Frequency of Follow-up:   Nursing to follow dressing orders written for wound care. Contact wound team if area fails to progress, deteriorates or with any questions/concerns if something comes up before next scheduled follow up (See below as to whether wound is following and frequency of wound follow up)   Not following, consult as needed  - Left medial leg & pannus    WOUND HISTORY:   This is a 80 y.o. female who presented 5/28/2025 with left lower extremity pain and swelling.  Patient lives alone at home and is able to care for self.  She has not been told that she had atrial fibrillation in the past.  For the last 2 weeks, she began to notice a progressively worsening swelling in her lower extremities bilaterally.  She noticed to have worsening pain and erythema on her left lower extremity.  Eventually, she reports that the swelling increased in her right lower extremity, however her left lower extremity continues to be painful, swollen, erythematous.  She went to see her dermatologist today, who recommended her to come into the ER for IV antibiotics.        WOUND ASSESSMENT/LDA  Wound 05/29/25 Atypical Partial Thickness Leg Medial Left (Active)   Date First Assessed/Time First Assessed: 05/29/25 1130   Primary  Wound Type: Atypical  Secondary Wound Type - Atypical: Partial Thickness  Location: Leg  Wound Orientation: Medial  Laterality: Left      Assessments 5/29/2025 11:30 AM   Wound Image      Site Assessment Yellow;Red;Pink   Periwound Assessment Edema;Red;Painful   Margins Attached edges   Closure Secondary intention   Drainage Amount Scant   Drainage Description Serosanguineous   Treatments Cleansed;Site care;Nonselective debridement;Wound culture   Wound Cleansing Approved Wound Cleanser   Periwound Protectant No-sting Skin Prep   Moisture Containment Device None   Dressing Status Clean;Dry;Intact   Dressing Changed New   Dressing Cleansing/Solutions Not Applicable   Dressing Options Petrolatum Gauze (yellow);Absorbent Abdominal Pad;Dry Roll Gauze;Hypafix Tape   Dressing Change/Treatment Frequency Daily, and As Needed   NEXT Dressing Change/Treatment Date 05/30/25   Wound Team Following Weekly   Wound Length (cm) 1.4 cm   Wound Width (cm) 1.6 cm   Wound Depth (cm) 0.2 cm   Wound Surface Area (cm^2) 1.76 cm^2   Wound Volume (cm^3) 0.235 cm^3   Shape circular   Wound Odor None   Pulses 2+;DP   WOUND NURSE ONLY - Time Spent with Patient (mins) 35       Left pannus  Right pannus     Left lateral leg Left posterior leg    Vascular:    ATIF:   No results found.    Lab Values:    Lab Results   Component Value Date/Time    WBC 10.8 05/29/2025 12:21 AM    RBC 4.44 05/29/2025 12:21 AM    HEMOGLOBIN 12.6 05/29/2025 12:21 AM    HEMATOCRIT 38.7 05/29/2025 12:21 AM    SEDRATEWES 8 11/30/2005 01:00 AM    HBA1C 7.6 (H) 03/31/2025 05:04 AM    HBA1C 7.0 (H) 03/11/2019 02:05 PM    PLATELETCT 333 05/29/2025 12:21 AM         Culture Results show:  No results found for this or any previous visit (from the past 720 hours).    Pain Level/Medicated:  None, Tolerated without pain medication       INTERVENTIONS BY WOUND TEAM:  Chart and images reviewed. Discussed with bedside RN. All areas of concern (based on picture review, LDA review and  discussion with bedside RN) have been thoroughly assessed. Documentation of areas based on significant findings. This RN in to assess patient. Performed standard wound care which includes appropriate positioning, dressing removal and non-selective debridement. Pictures and measurements obtained weekly if/when required.    Wound:  Left medial leg  Cleansed/Non-selectively Debrided with:  Wound cleanser and Gauze  Rakel wound: Cleansed with Wound cleanser and Gauze, Prepped with No Sting  Primary Dressing:  Yellow xeroform  Secondary (Outer) Dressing: ABD pad, Dry roll gauze, hypafix tape     Wound:  Pannus  Primary Dressing:  Interdry Cloth applied    Advanced Wound Care Discharge Planning  Number of Clinicians necessary to complete wound care: 1  Is patient requiring IV pain medications for dressing changes:  No   Length of time for dressing change 15 min. (This does not include chart review, pre-medication time, set up, clean up or time spent charting.)    Interdisciplinary consultation: Patient, Bedside RN, N/A.  Pressure injury and staging reviewed with N/A.    EVALUATION / RATIONALE FOR TREATMENT:     Date:  05/29/25  Wound Status:  Initial evaluation    Pannus: The patient states she develops fungal infections regularly to the pannus. The pannus appears slightly pink and moist to the left side. An interdry cloth placed to wick away moisture and exudate while also providing a bacteriostatic environment. Antifungal therapy already initiated by bedside nursing.    Left medial leg: A small yellow crusted area noted to the medial leg, and was easily removed using forceps. The tissue underneath is pink/yellow and painful to the touch. This was cultured, wound does not have tunnels, or undermining, and no purulent drainage present. Xeroform (yellow) applied to provide an occlusive dressing that incorporates bacteriostatic protection.         NURSING PLAN OF CARE ORDERS:  Dressing changes: See Dressing Care  orders    NUTRITION RECOMMENDATIONS   Wound Team Recommendations:  N/A    DIET ORDERS (From admission to next 24h)       Start     Ordered    05/28/25 1826  Diet Order Diet: Consistent CHO (Diabetic)  ALL MEALS        Question:  Diet:  Answer:  Consistent CHO (Diabetic)    05/28/25 1825                    PREVENTATIVE INTERVENTIONS:    Q shift Nakul - performed per nursing policy  Q shift pressure point assessments - performed per nursing policy    Surface/Positioning  Standard/trauma mattress - Currently in Place    Containment/Moisture Prevention    Dri-jase pad - Currently in Place  Antifungal treatment - Currently in Place and already ordered ,and initiated by bedside Nursing  Interdry - Currently in Place and Newly Applied this Visit    Mobilization      Up to chair     Anticipated discharge plans:  TBD        Vac Discharge Needs:  Vac Discharge plan is purely a recommendation from wound team and not a requirement for discharge unless otherwise stated by physician.  Not Applicable Pt not on a wound vac        [1]   Past Surgical History:  Procedure Laterality Date    MASTECTOMY Right 3/28/2019    Procedure: MASTECTOMY - RE-EXCISION RIGHT BREAST BIOPSY SITE FOR MARGINS;  Surgeon: Karuna Hussein M.D.;  Location: SURGERY SAME DAY [facility];  Service: General    BREAST BIOPSY Right 3/13/2019    Procedure: BREAST BIOPSY - WIRE LOCALIZED;  Surgeon: Karuna Hussein M.D.;  Location: SURGERY [facility];  Service: Gen Robotic    COLONOSCOPY WITH BIOPSY  5/21/2013    Performed by Mauro Garcia M.D. at ENDOSCOPY [facility]S    THYROIDECTOMY  2000    TONSILLECTOMY

## 2025-05-29 NOTE — PROGRESS NOTES
4 Eyes Skin Assessment Completed by LESLEY Castellanos and WESLEY Gallardo.    Skin assessment is primarily focused on high risk bony prominences. Pay special attention to skin beneath and around medical devices, high risk bony prominences, skin to skin areas and areas where the patient lacks sensation to feel pain and areas where the patient previously had breakdown.     Head (Occipital):  WDL   Ears (Under Medical Devices): WDL   Nose (Under Medical Devices): WDL   Mouth:  WDL   Neck: WDL   Breast/Chest:  Scar prior surgery   Shoulder Blades:  WDL   Spine:   WDL   (R) Arm/Elbow/Hand: WDL   (L) Arm/Elbow/Hand: WDL   Abdomen: WDL   Pannus/Groin:  Rash, Red, Excoriation/scratched, and Moisture   Sacrum/Coccyx:   Red and Blanching   (R) Ischial Tuberosity (Sit Bones):  WDL   (L) Ischial Tuberosity (Sit Bones):  WDL   (R) Leg:  Edema   (L) Leg:  Red, Yellow, Excoriation/scratched, Edema, and Weeping   (R) Heel:  WDL   (R) Foot/Toe: callous   (L) Heel: WDL   (L) Foot/Toe:  callous       DEVICES IN USE:         Respiratory Devices:  Pulse ox  Feeding Devices:  N/A   Lines & BP Monitoring Devices:  Peripheral IV, BP cuff, and Pulse ox    Orthopedic Devices:  N/A  Miscellaneous Devices:  Telemetry monitor    PROTOCOL INTERVENTIONS:   Standard/Trauma Bed:  Ordered via RN Wound Protocol    WOUND PHOTOS:   Completed and in EPIC     WOUND CONSULT:   Consult to be ordered for the following areas left lower leg and pannus/ groin

## 2025-05-29 NOTE — ED NOTES
Patient assisted to bedside commode. Urine sample collected and sent off to lab. Patient is back in bed and resting with call light within reach

## 2025-05-29 NOTE — PROGRESS NOTES
Hospital Medicine Daily Progress Note    Date of Service  5/29/2025    Chief Complaint  Ragini Reaves is a 80 y.o. female admitted 5/28/2025 with leg pain     Hospital Course  This is a 80 y.o. female who presented 5/28/2025 with left lower extremity pain and swelling.  Patient lives alone at home and is able to care for self.  She has not been told that she had atrial fibrillation in the past.  For the last 2 weeks, she began to notice a progressively worsening swelling in her lower extremities bilaterally.  She noticed to have worsening pain and erythema on her left lower extremity.  Eventually, she reports that the swelling increased in her right lower extremity, however her left lower extremity continues to be painful, swollen, erythematous.  She went to see her dermatologist today, who recommended her to come into the ER for IV antibiotics.     In ER, patient found to have atrial fibrillation with rapid ventricular response.  Initial white blood cell count 15,000 with left shift.  Chest x-ray negative for acute cardiopulmonary abnormality.  Patient given fluids and Zyvox to which she converted to normal sinus rhythm, however still tachycardic at 110 bpm.      Interval Problem Update  Patient seen and examined, afebrile, no nausea or vomiting still with lower extremity pain . She is now on sinus rhythm   Cont on IV unasyn and zyvox  I have also ordered a ct of the lower extremity   Close monitor for decompensation   I have discussed this patient's plan of care and discharge plan at IDT rounds today with Case Management, Nursing, Nursing leadership, and other members of the IDT team.    Consultants/Specialty  None     Code Status  DNAR/DNI    Disposition  The patient is not medically cleared for discharge to home or a post-acute facility.      I have placed the appropriate orders for post-discharge needs.    Review of Systems  Review of Systems   Constitutional:  Negative for chills and fever.    Cardiovascular:  Negative for chest pain and palpitations.   Gastrointestinal:  Negative for abdominal pain, nausea and vomiting.   Genitourinary:  Negative for dysuria and urgency.   Musculoskeletal:  Positive for myalgias. Negative for back pain.        Physical Exam  Temp:  [36.4 °C (97.5 °F)-37 °C (98.6 °F)] 36.6 °C (97.9 °F)  Pulse:  [] 103  Resp:  [15-20] 18  BP: ()/(53-83) 118/72  SpO2:  [92 %-97 %] 94 %    Physical Exam  Constitutional:       Appearance: Normal appearance. She is obese.   HENT:      Head: Normocephalic.      Nose: Nose normal.      Mouth/Throat:      Mouth: Mucous membranes are moist.   Eyes:      Pupils: Pupils are equal, round, and reactive to light.   Cardiovascular:      Rate and Rhythm: Normal rate and regular rhythm.      Pulses: Normal pulses.   Pulmonary:      Effort: Pulmonary effort is normal.      Breath sounds: Normal breath sounds.   Abdominal:      General: Abdomen is flat. Bowel sounds are normal.      Palpations: Abdomen is soft.   Musculoskeletal:      Cervical back: Neck supple.   Skin:     General: Skin is warm.      Comments: Left lower extremity: Swollen, erythematous, 2 x 2 cm wound noted on the medial aspect of left shin.  Tender to touch.   Neurological:      General: No focal deficit present.      Mental Status: She is alert and oriented to person, place, and time. Mental status is at baseline.   Psychiatric:         Mood and Affect: Mood normal.         Behavior: Behavior normal.         Thought Content: Thought content normal.         Judgment: Judgment normal.         Fluids    Intake/Output Summary (Last 24 hours) at 5/29/2025 1427  Last data filed at 5/29/2025 1000  Gross per 24 hour   Intake 540 ml   Output --   Net 540 ml        Laboratory  Recent Labs     05/28/25  1536 05/29/25  0021   WBC 15.0* 10.8   RBC 5.10 4.44   HEMOGLOBIN 14.4 12.6   HEMATOCRIT 44.9 38.7   MCV 88.0 87.2   MCH 28.2 28.4   MCHC 32.1* 32.6   RDW 42.3 42.5   PLATELETCT 396  333   MPV 10.1 10.3     Recent Labs     05/28/25  1536 05/29/25  0021   SODIUM 136 136   POTASSIUM 3.7 4.2   CHLORIDE 100 104   CO2 23 22   GLUCOSE 134* 140*   BUN 28* 21   CREATININE 0.78 0.69   CALCIUM 9.6 8.7                   Imaging  EC-ECHOCARDIOGRAM COMPLETE W/ CONT   Final Result      US-EXTREMITY VENOUS LOWER UNILAT LEFT   Final Result      DX-CHEST-PORTABLE (1 VIEW)   Final Result         No acute cardiac or pulmonary abnormality is identified.      CT-LOWER EXTREMITY UNILATERAL W/O CONTRAST LEFT    (Results Pending)        Assessment/Plan  * Cellulitis- (present on admission)  Assessment & Plan  Noted to have worsening left lower extremity pain, erythema, swelling.  Noted to have a small wound on medial aspect of her left shin, suspect that this is the source for her infection  IV Zyvox  Wound care  DVT studies negative   I also ordered a ct scan of lower extremity       ACP (advance care planning)  Assessment & Plan  DNR/DNI    Atrial fibrillation with RVR ([facility])  Assessment & Plan  Patient presents with atrial fibrillation with rapid ventricular response, suspected from underlying infection  New diagnosis of atrial fibrillation, AURORA VASC Score of 5, now on Lovenox therapeutic dose and also on metoprolol   Close monitor     DM (diabetes mellitus) ([facility])  Assessment & Plan  Sliding scale     Morbid obesity ([facility])  Assessment & Plan  BMI 34    Dyslipidemia, goal LDL below 100- (present on admission)  Assessment & Plan  Lipitor    Postsurgical hypothyroidism- (present on admission)  Assessment & Plan  Synthroid         VTE prophylaxis: Lovenox     Greater than 51 minutes spent prepping to see patient (e.g. review of tests) obtaining and/or reviewing separately obtained history. Performing a medically appropriate examination and/ evaluation.  Counseling and educating the patient/family/caregiver.  Ordering medications, tests, or procedures.  Referring and communicating with other health care professionals.   Documenting clinical information in EPIC.  Independently interpreting results and communicating results to patient/family/caregiver.  Care coordination.      I have performed a physical exam and reviewed and updated ROS and Plan today (5/29/2025). In review of yesterday's note (5/28/2025), there are no changes except as documented above.

## 2025-05-29 NOTE — ASSESSMENT & PLAN NOTE
Patient presents with atrial fibrillation with rapid ventricular response, suspected from underlying infection  New diagnosis of atrial fibrillation, AURORA VASC Score of 5, now on Lovenox therapeutic dose and also on metoprolol   Close monitor

## 2025-05-29 NOTE — ED NOTES
Med Rec complete per patient   Allergies reviewed  Antibiotics in the past 30 days:no  Anticoagulant in past 14 days:no  Pharmacy patient utilizes:[facility] on [facility]    Patient states for past week she's been taking Tylenol TID PRN instead of BID PRN

## 2025-05-29 NOTE — PROGRESS NOTES
Received report from ED RN. Pt arrived to unit at 0000 via gurney escorted by ACT. Assessment complete. VSS. No signs of distress noted at this time. Tele monitor in place. Monitor room notified.  Fall precautions and appropriate signs in place. Pt oriented to unit routine, call light/phone system within reach. Personal belongings within reach. Pt educated regarding fall precautions. Bed alarm is on. Pt denies any further needs at this time.

## 2025-05-30 ENCOUNTER — APPOINTMENT (OUTPATIENT)
Dept: RADIOLOGY | Facility: MEDICAL CENTER | Age: 80
End: 2025-05-30
Attending: INTERNAL MEDICINE
Payer: MEDICARE

## 2025-05-30 LAB
ANION GAP SERPL CALC-SCNC: 11 MMOL/L (ref 7–16)
BUN SERPL-MCNC: 22 MG/DL (ref 8–22)
CALCIUM SERPL-MCNC: 8.5 MG/DL (ref 8.5–10.5)
CHLORIDE SERPL-SCNC: 108 MMOL/L (ref 96–112)
CO2 SERPL-SCNC: 20 MMOL/L (ref 20–33)
CREAT SERPL-MCNC: 0.61 MG/DL (ref 0.5–1.4)
ERYTHROCYTE [DISTWIDTH] IN BLOOD BY AUTOMATED COUNT: 43.3 FL (ref 35.9–50)
GFR SERPLBLD CREATININE-BSD FMLA CKD-EPI: 90 ML/MIN/1.73 M 2
GLUCOSE BLD STRIP.AUTO-MCNC: 114 MG/DL (ref 65–99)
GLUCOSE BLD STRIP.AUTO-MCNC: 152 MG/DL (ref 65–99)
GLUCOSE BLD STRIP.AUTO-MCNC: 163 MG/DL (ref 65–99)
GLUCOSE SERPL-MCNC: 134 MG/DL (ref 65–99)
HCT VFR BLD AUTO: 39.5 % (ref 37–47)
HGB BLD-MCNC: 12.6 G/DL (ref 12–16)
MCH RBC QN AUTO: 28.7 PG (ref 27–33)
MCHC RBC AUTO-ENTMCNC: 31.9 G/DL (ref 32.2–35.5)
MCV RBC AUTO: 90 FL (ref 81.4–97.8)
PLATELET # BLD AUTO: 304 K/UL (ref 164–446)
PMV BLD AUTO: 9.8 FL (ref 9–12.9)
POTASSIUM SERPL-SCNC: 4.5 MMOL/L (ref 3.6–5.5)
RBC # BLD AUTO: 4.39 M/UL (ref 4.2–5.4)
SODIUM SERPL-SCNC: 139 MMOL/L (ref 135–145)
WBC # BLD AUTO: 11.3 K/UL (ref 4.8–10.8)

## 2025-05-30 PROCEDURE — A9270 NON-COVERED ITEM OR SERVICE: HCPCS | Performed by: STUDENT IN AN ORGANIZED HEALTH CARE EDUCATION/TRAINING PROGRAM

## 2025-05-30 PROCEDURE — 99233 SBSQ HOSP IP/OBS HIGH 50: CPT | Performed by: INTERNAL MEDICINE

## 2025-05-30 PROCEDURE — 73700 CT LOWER EXTREMITY W/O DYE: CPT | Mod: LT

## 2025-05-30 PROCEDURE — 700105 HCHG RX REV CODE 258: Performed by: INTERNAL MEDICINE

## 2025-05-30 PROCEDURE — 36415 COLL VENOUS BLD VENIPUNCTURE: CPT

## 2025-05-30 PROCEDURE — 80048 BASIC METABOLIC PNL TOTAL CA: CPT

## 2025-05-30 PROCEDURE — A9270 NON-COVERED ITEM OR SERVICE: HCPCS | Performed by: INTERNAL MEDICINE

## 2025-05-30 PROCEDURE — 700102 HCHG RX REV CODE 250 W/ 637 OVERRIDE(OP): Performed by: INTERNAL MEDICINE

## 2025-05-30 PROCEDURE — 700111 HCHG RX REV CODE 636 W/ 250 OVERRIDE (IP): Performed by: STUDENT IN AN ORGANIZED HEALTH CARE EDUCATION/TRAINING PROGRAM

## 2025-05-30 PROCEDURE — 85027 COMPLETE CBC AUTOMATED: CPT

## 2025-05-30 PROCEDURE — 770020 HCHG ROOM/CARE - TELE (206)

## 2025-05-30 PROCEDURE — 700111 HCHG RX REV CODE 636 W/ 250 OVERRIDE (IP): Mod: JZ | Performed by: INTERNAL MEDICINE

## 2025-05-30 PROCEDURE — 82962 GLUCOSE BLOOD TEST: CPT | Mod: 91

## 2025-05-30 PROCEDURE — 700102 HCHG RX REV CODE 250 W/ 637 OVERRIDE(OP): Performed by: STUDENT IN AN ORGANIZED HEALTH CARE EDUCATION/TRAINING PROGRAM

## 2025-05-30 RX ORDER — METOPROLOL SUCCINATE 25 MG/1
12.5 TABLET, EXTENDED RELEASE ORAL
Status: DISCONTINUED | OUTPATIENT
Start: 2025-05-30 | End: 2025-06-03 | Stop reason: HOSPADM

## 2025-05-30 RX ADMIN — LINEZOLID 600 MG: 600 TABLET, FILM COATED ORAL at 05:59

## 2025-05-30 RX ADMIN — NYSTATIN: 100000 POWDER TOPICAL at 05:59

## 2025-05-30 RX ADMIN — AMPICILLIN AND SULBACTAM 3 G: 1; 2 INJECTION, POWDER, FOR SOLUTION INTRAMUSCULAR; INTRAVENOUS at 17:26

## 2025-05-30 RX ADMIN — INSULIN LISPRO 1 UNITS: 100 INJECTION, SOLUTION INTRAVENOUS; SUBCUTANEOUS at 12:42

## 2025-05-30 RX ADMIN — ACETAMINOPHEN 650 MG: 325 TABLET ORAL at 17:27

## 2025-05-30 RX ADMIN — SPIRONOLACTONE 25 MG: 25 TABLET ORAL at 05:58

## 2025-05-30 RX ADMIN — AMPICILLIN AND SULBACTAM 3 G: 1; 2 INJECTION, POWDER, FOR SOLUTION INTRAMUSCULAR; INTRAVENOUS at 15:08

## 2025-05-30 RX ADMIN — LOSARTAN POTASSIUM 25 MG: 25 TABLET, FILM COATED ORAL at 05:58

## 2025-05-30 RX ADMIN — ACETAMINOPHEN 650 MG: 325 TABLET ORAL at 10:54

## 2025-05-30 RX ADMIN — NYSTATIN: 100000 POWDER TOPICAL at 17:29

## 2025-05-30 RX ADMIN — ASPIRIN 81 MG: 81 TABLET, COATED ORAL at 05:57

## 2025-05-30 RX ADMIN — ENOXAPARIN SODIUM 80 MG: 100 INJECTION SUBCUTANEOUS at 05:59

## 2025-05-30 RX ADMIN — METOPROLOL SUCCINATE 12.5 MG: 25 TABLET, EXTENDED RELEASE ORAL at 17:28

## 2025-05-30 RX ADMIN — LEVOTHYROXINE SODIUM 175 MCG: 0.12 TABLET ORAL at 05:56

## 2025-05-30 RX ADMIN — ENOXAPARIN SODIUM 80 MG: 100 INJECTION SUBCUTANEOUS at 17:29

## 2025-05-30 RX ADMIN — INSULIN LISPRO 1 UNITS: 100 INJECTION, SOLUTION INTRAVENOUS; SUBCUTANEOUS at 17:33

## 2025-05-30 RX ADMIN — ATORVASTATIN CALCIUM 20 MG: 20 TABLET, FILM COATED ORAL at 05:58

## 2025-05-30 ASSESSMENT — ENCOUNTER SYMPTOMS
FEVER: 0
VOMITING: 0
BACK PAIN: 0
PALPITATIONS: 0
ABDOMINAL PAIN: 0
CHILLS: 0
NAUSEA: 0
MYALGIAS: 1

## 2025-05-30 ASSESSMENT — FIBROSIS 4 INDEX: FIB4 SCORE: 1.38

## 2025-05-30 ASSESSMENT — PAIN DESCRIPTION - PAIN TYPE
TYPE: ACUTE PAIN

## 2025-05-30 NOTE — PROGRESS NOTES
Received bedside report from off-going RN and assumed patient care. Patient sitting up in bed with no signs of acute distress: A&Ox 4, RA, denies pain, telemetry box in place. Discussed POC: labs, medications, and treatments with patient who demonstrates understanding and all questions answered. Bed locked/lowest position w/alarm on; call bell and possessions within reach. Patient denies additional needs at this time.

## 2025-05-30 NOTE — CARE PLAN
The patient is Stable - Low risk of patient condition declining or worsening    Shift Goals  Clinical Goals: CT, Abx, VSS, Safety/Comfort  Patient Goals: sleep  Family Goals: HILDA - no family present    Progress made toward(s) clinical / shift goals:      Problem: Knowledge Deficit - Standard  Goal: Patient and family/care givers will demonstrate understanding of plan of care, disease process/condition, diagnostic tests and medications  Description: Target End Date:  1-3 days or as soon as patient condition allowsDocument in Patient Education1.  Patient and family/caregiver oriented to unit, equipment, visitation policy and means for communicating concern2.  Complete/review Learning Assessment3.  Assess knowledge level of disease process/condition, treatment plan, diagnostic tests and medications4.  Explain disease process/condition, treatment plan, diagnostic tests and medications  Outcome: Progressing     Problem: Hemodynamics  Goal: Patient's hemodynamics, fluid balance and neurologic status will be stable or improve  Description: Target End Date:  Prior to discharge or change in level of careDocument on Assessment and I/O flowsheet templates1.  Monitor vital signs, pulse oximetry and cardiac monitor per provider order and/or policy2.  Maintain blood pressure per provider order3.  Hemodynamic monitoring per provider order4.  Manage IV fluids and IV infusions5.  Monitor intake and output6.  Daily weights per unit policy or provider order7.  Assess peripheral pulses and capillary refill8.  Assess color and body temperature9.  Position patient for maximum circulation/cardiac [id number]. Monitor for signs/symptoms of excessive [id number]. Assess mental status, restlessness and changes in level of [id number]. Monitor temperature and report fever or hypothermia to provider immediately. Consideration of targeted temperature management.  Outcome: Progressing     Problem: Fluid Volume  Goal: Fluid volume balance will be  maintained  Description: Target End Date:  Prior to discharge or change in level of careDocument on I/O flowsheet1.  Monitor intake and output as ordered2.  Promote oral intake as appropriate3.  Report inadequate intake or output to physician4.  Administer IV therapy as ordered5.  Weights per provider order6.  Assess for signs and symptoms of bleeding7.  Monitor for signs of fluid overload (respiratory changes, edema, weight gain, increased abdominal girth)8.  Monitor of signs for inadequate fluid volume (poor skin turgor, dry mucous membranes)9.  Instruct patient on adherence to fluid restrictions  Outcome: Progressing     Problem: Respiratory  Goal: Patient will achieve/maintain optimum respiratory ventilation and gas exchange  Description: Target End Date:  Prior to discharge or change in level of careDocument on Assessment flowsheet1.  Assess and monitor rate, rhythm, depth and effort of respiration2.  Breath sounds assessed qshift and/or as needed3.  Assess O2 saturation, administer/titrate oxygen as ordered4.  Position patient for maximum ventilatory efficiency5.  Turn, cough, and deep breath with splinting to improve effectiveness6.  Collaborate with RT to administer medication/treatments per order7.  Encourage use of incentive spirometer and encourage patient to cough after use and utilize splinting techniques if applicable8.  Airway suctioning9.  Monitor sputum production for changes in color, consistency and mathldqdu46. Perform frequent oral [id number]. Alternate physical activity with rest periods  Outcome: Progressing     Problem: Fall Risk  Goal: Patient will remain free from falls  Description: Target End Date:  Prior to discharge or change in level of careDocument interventions on the [place] Fall Risk Assessment1.  Assess for fall risk factors2.  Implement fall precautions  Outcome: Progressing       Patient is not progressing towards the following goals:

## 2025-05-30 NOTE — PROGRESS NOTES
Hospital Medicine Daily Progress Note    Date of Service  5/30/2025    Chief Complaint  Ragini Reaves is a 80 y.o. female admitted 5/28/2025 with leg pain     Hospital Course  This is a 80 y.o. female who presented 5/28/2025 with left lower extremity pain and swelling.  Patient lives alone at home and is able to care for self.  She has not been told that she had atrial fibrillation in the past.  For the last 2 weeks, she began to notice a progressively worsening swelling in her lower extremities bilaterally.  She noticed to have worsening pain and erythema on her left lower extremity.  Eventually, she reports that the swelling increased in her right lower extremity, however her left lower extremity continues to be painful, swollen, erythematous.  She went to see her dermatologist today, who recommended her to come into the ER for IV antibiotics.     In ER, patient found to have atrial fibrillation with rapid ventricular response.  Initial white blood cell count 15,000 with left shift.  Chest x-ray negative for acute cardiopulmonary abnormality.  Patient given fluids and Zyvox to which she converted to normal sinus rhythm, however still tachycardic at 110 bpm.      Interval Problem Update  Patient resting in bed, Ct scan showing cellulitis, switching abx to unasym from zyvox  In regards to A.fibb cont metoprolol and Lovenox   Close monitor for decompensation   I have discussed this patient's plan of care and discharge plan at IDT rounds today with Case Management, Nursing, Nursing leadership, and other members of the IDT team.    Consultants/Specialty  None     Code Status  DNAR/DNI    Disposition  The patient is not medically cleared for discharge to home or a post-acute facility.      I have placed the appropriate orders for post-discharge needs.    Review of Systems  Review of Systems   Constitutional:  Negative for chills and fever.   Cardiovascular:  Negative for chest pain and palpitations.    Gastrointestinal:  Negative for abdominal pain, nausea and vomiting.   Genitourinary:  Negative for dysuria and urgency.   Musculoskeletal:  Positive for myalgias. Negative for back pain.        Physical Exam  Temp:  [36.7 °C (98.1 °F)-37 °C (98.6 °F)] 36.7 °C (98.1 °F)  Pulse:  [] 90  Resp:  [18-20] 20  BP: (104-122)/(51-69) 119/65  SpO2:  [95 %-99 %] 97 %    Physical Exam  Constitutional:       Appearance: Normal appearance. She is obese.   HENT:      Head: Normocephalic.      Nose: Nose normal.      Mouth/Throat:      Mouth: Mucous membranes are moist.   Eyes:      Pupils: Pupils are equal, round, and reactive to light.   Cardiovascular:      Rate and Rhythm: Normal rate and regular rhythm.      Pulses: Normal pulses.   Pulmonary:      Effort: Pulmonary effort is normal.      Breath sounds: Normal breath sounds.   Abdominal:      General: Abdomen is flat. Bowel sounds are normal.      Palpations: Abdomen is soft.   Musculoskeletal:      Cervical back: Neck supple.   Skin:     General: Skin is warm.      Comments: Left lower extremity: Swollen, erythematous, 2 x 2 cm wound noted on the medial aspect of left shin.  Tender to touch.   Neurological:      General: No focal deficit present.      Mental Status: She is alert and oriented to person, place, and time. Mental status is at baseline.   Psychiatric:         Mood and Affect: Mood normal.         Behavior: Behavior normal.         Thought Content: Thought content normal.         Judgment: Judgment normal.         Fluids    Intake/Output Summary (Last 24 hours) at 5/30/2025 1335  Last data filed at 5/30/2025 0400  Gross per 24 hour   Intake 400 ml   Output --   Net 400 ml        Laboratory  Recent Labs     05/28/25  1536 05/29/25  0021 05/30/25  0259   WBC 15.0* 10.8 11.3*   RBC 5.10 4.44 4.39   HEMOGLOBIN 14.4 12.6 12.6   HEMATOCRIT 44.9 38.7 39.5   MCV 88.0 87.2 90.0   MCH 28.2 28.4 28.7   MCHC 32.1* 32.6 31.9*   RDW 42.3 42.5 43.3   PLATELETCT 396 333  304   MPV 10.1 10.3 9.8     Recent Labs     05/28/25  1536 05/29/25  0021 05/30/25  0259   SODIUM 136 136 139   POTASSIUM 3.7 4.2 4.5   CHLORIDE 100 104 108   CO2 23 22 20   GLUCOSE 134* 140* 134*   BUN 28* 21 22   CREATININE 0.78 0.69 0.61   CALCIUM 9.6 8.7 8.5                   Imaging  CT-LOWER EXTREMITY UNILATERAL W/O CONTRAST LEFT   Final Result      1.  Chronic fracture deformities of the distal tibia and fibula.   2.  No acute fracture or dislocation.   3.  Degenerative changes are noted involving the knee, ankle, and foot.   4.  There is diffuse soft tissue edema. This may be seen with cellulitis.      EC-ECHOCARDIOGRAM COMPLETE W/ CONT   Final Result      US-EXTREMITY VENOUS LOWER UNILAT LEFT   Final Result      DX-CHEST-PORTABLE (1 VIEW)   Final Result         No acute cardiac or pulmonary abnormality is identified.           Assessment/Plan  * Cellulitis- (present on admission)  Assessment & Plan  Noted to have worsening left lower extremity pain, erythema, swelling.  Noted to have a small wound on medial aspect of her left shin, suspect that this is the source for her infection  IV Zyvox  Wound care  DVT studies negative   I also ordered a ct scan of lower extremity       ACP (advance care planning)  Assessment & Plan  DNR/DNI    Atrial fibrillation with RVR ([facility])  Assessment & Plan  Patient presents with atrial fibrillation with rapid ventricular response, suspected from underlying infection  New diagnosis of atrial fibrillation, AURORA VASC Score of 5, now on Lovenox therapeutic dose and also on metoprolol   Close monitor     DM (diabetes mellitus) ([facility])  Assessment & Plan  Sliding scale     Morbid obesity ([facility])  Assessment & Plan  BMI 34    Dyslipidemia, goal LDL below 100- (present on admission)  Assessment & Plan  Lipitor    Postsurgical hypothyroidism- (present on admission)  Assessment & Plan  Synthroid      Greater than 51 minutes spent prepping to see patient (e.g. review of tests) obtaining  and/or reviewing separately obtained history. Performing a medically appropriate examination and/ evaluation.  Counseling and educating the patient/family/caregiver.  Ordering medications, tests, or procedures.  Referring and communicating with other health care professionals.  Documenting clinical information in EPIC.  Independently interpreting results and communicating results to patient/family/caregiver.  Care coordination.       VTE prophylaxis: Lovenox       I have performed a physical exam and reviewed and updated ROS and Plan today (5/30/2025). In review of yesterday's note (5/29/2025), there are no changes except as documented above.

## 2025-05-30 NOTE — DISCHARGE PLANNING
Care Transition Team Assessment    Patient, Ragini Reaves, is an 80-year-old admitted for leg pain. Patient is alert and oriented x4; information was given by the patient. Please see pt's H&P for prior medical history. Patient reports living alone in a third floor apartment with elevator access; [address] [place] [address]. Pt confirmed contacts on facesheet, daughter Josefina [phone]. Patient does have a PCP, Chapito Marques. Patient reports good support from daughter and neighbors. Patient reports no concerns with food insecurity. Confirmed medical insurance is Medicare and Medicaid FFS. Preferred pharmacy is [facility] on [facility]. Patient is independent. Patient has a CPAP (unsure of provider, reports she does not use it). No concerns or history with substance abuse or mental health.    Identified DC needs at this time:    None    Information Source  Information Given By: Patient  Who is responsible for making decisions for patient? : Patient    Readmission Evaluation  Is this a readmission?: No    Elopement Risk  Legal Hold: No  Ambulatory or Self Mobile in Wheelchair: Yes  Disoriented: No  Psychiatric Symptoms: None  History of Wandering: No  Elopement this Admit: No  Vocalizing Wanting to Leave: No  Displays Behaviors, Body Language Wanting to Leave: No-Not at Risk for Elopement  Elopement Risk: Not at Risk for Elopement    Interdisciplinary Discharge Planning  Primary Care Physician: Chapito Marques  Lives with - Patient's Self Care Capacity: Alone and Able to Care For Self  Patient or legal guardian wants to designate a caregiver: No  Support Systems: Children, Friends / Neighbors  Housing / Facility: 3 Story Apartment / Condo, Independent Living Facility  Name of Care Facility: [facility]    Discharge Preparedness  What is your plan after discharge?: Other (comment) (Home)  What are your discharge supports?: Child, Other (comment) (Neighbors)  Prior Functional Level: Ambulatory, Independent with  Activities of Daily Living, Drives Self, Independent with Medication Management    Functional Assesment  Prior Functional Level: Ambulatory, Independent with Activities of Daily Living, Drives Self, Independent with Medication Management    Vision / Hearing Impairment  Vision Impairment : Yes  Right Eye Vision: Wears Glasses  Left Eye Vision: Wears Glasses, Impaired  Hearing Impairment : No    Domestic Abuse  Possible Abuse/Neglect Reported to:: Not Applicable    Psychological Assessment  History of Substance Abuse: None  History of Psychiatric Problems: No    Anticipated Discharge Information  Discharge Disposition: D/T to home under [facility] care in anticipation of covered skilled care (06)

## 2025-05-30 NOTE — PROGRESS NOTES
Monitor Summary  Rhythm: SR/ST  Rate:   Ectopy: (r) PVC, (f) PAC  Measurements: .16/.09/.34  ---12 hr Chart Review---

## 2025-05-31 VITALS
TEMPERATURE: 98.6 F | RESPIRATION RATE: 17 BRPM | DIASTOLIC BLOOD PRESSURE: 74 MMHG | HEIGHT: 62 IN | BODY MASS INDEX: 36.59 KG/M2 | OXYGEN SATURATION: 95 % | HEART RATE: 104 BPM | WEIGHT: 198.85 LBS | SYSTOLIC BLOOD PRESSURE: 135 MMHG

## 2025-05-31 LAB
ANION GAP SERPL CALC-SCNC: 10 MMOL/L (ref 7–16)
BACTERIA UR CULT: NORMAL
BACTERIA WND AEROBE CULT: ABNORMAL
BUN SERPL-MCNC: 19 MG/DL (ref 8–22)
CALCIUM SERPL-MCNC: 8.5 MG/DL (ref 8.5–10.5)
CHLORIDE SERPL-SCNC: 108 MMOL/L (ref 96–112)
CO2 SERPL-SCNC: 21 MMOL/L (ref 20–33)
CREAT SERPL-MCNC: 0.56 MG/DL (ref 0.5–1.4)
ERYTHROCYTE [DISTWIDTH] IN BLOOD BY AUTOMATED COUNT: 41 FL (ref 35.9–50)
GFR SERPLBLD CREATININE-BSD FMLA CKD-EPI: 92 ML/MIN/1.73 M 2
GLUCOSE BLD STRIP.AUTO-MCNC: 114 MG/DL (ref 65–99)
GLUCOSE BLD STRIP.AUTO-MCNC: 121 MG/DL (ref 65–99)
GLUCOSE BLD STRIP.AUTO-MCNC: 137 MG/DL (ref 65–99)
GLUCOSE BLD STRIP.AUTO-MCNC: 189 MG/DL (ref 65–99)
GLUCOSE SERPL-MCNC: 136 MG/DL (ref 65–99)
GRAM STN SPEC: ABNORMAL
HCT VFR BLD AUTO: 36.6 % (ref 37–47)
HGB BLD-MCNC: 12 G/DL (ref 12–16)
MCH RBC QN AUTO: 28.3 PG (ref 27–33)
MCHC RBC AUTO-ENTMCNC: 32.8 G/DL (ref 32.2–35.5)
MCV RBC AUTO: 86.3 FL (ref 81.4–97.8)
PLATELET # BLD AUTO: 320 K/UL (ref 164–446)
PMV BLD AUTO: 10.1 FL (ref 9–12.9)
POTASSIUM SERPL-SCNC: 4 MMOL/L (ref 3.6–5.5)
RBC # BLD AUTO: 4.24 M/UL (ref 4.2–5.4)
SIGNIFICANT IND 70042: ABNORMAL
SIGNIFICANT IND 70042: NORMAL
SITE SITE: ABNORMAL
SITE SITE: NORMAL
SODIUM SERPL-SCNC: 139 MMOL/L (ref 135–145)
SOURCE SOURCE: ABNORMAL
SOURCE SOURCE: NORMAL
WBC # BLD AUTO: 11.7 K/UL (ref 4.8–10.8)

## 2025-05-31 PROCEDURE — 700102 HCHG RX REV CODE 250 W/ 637 OVERRIDE(OP): Performed by: INTERNAL MEDICINE

## 2025-05-31 PROCEDURE — 85027 COMPLETE CBC AUTOMATED: CPT

## 2025-05-31 PROCEDURE — 36415 COLL VENOUS BLD VENIPUNCTURE: CPT

## 2025-05-31 PROCEDURE — A9270 NON-COVERED ITEM OR SERVICE: HCPCS | Performed by: STUDENT IN AN ORGANIZED HEALTH CARE EDUCATION/TRAINING PROGRAM

## 2025-05-31 PROCEDURE — 80048 BASIC METABOLIC PNL TOTAL CA: CPT

## 2025-05-31 PROCEDURE — 82962 GLUCOSE BLOOD TEST: CPT | Mod: 91

## 2025-05-31 PROCEDURE — 770020 HCHG ROOM/CARE - TELE (206)

## 2025-05-31 PROCEDURE — A9270 NON-COVERED ITEM OR SERVICE: HCPCS | Performed by: INTERNAL MEDICINE

## 2025-05-31 PROCEDURE — 700111 HCHG RX REV CODE 636 W/ 250 OVERRIDE (IP): Performed by: STUDENT IN AN ORGANIZED HEALTH CARE EDUCATION/TRAINING PROGRAM

## 2025-05-31 PROCEDURE — 99233 SBSQ HOSP IP/OBS HIGH 50: CPT | Performed by: INTERNAL MEDICINE

## 2025-05-31 PROCEDURE — 700111 HCHG RX REV CODE 636 W/ 250 OVERRIDE (IP): Mod: JZ | Performed by: INTERNAL MEDICINE

## 2025-05-31 PROCEDURE — 700102 HCHG RX REV CODE 250 W/ 637 OVERRIDE(OP): Performed by: STUDENT IN AN ORGANIZED HEALTH CARE EDUCATION/TRAINING PROGRAM

## 2025-05-31 PROCEDURE — 700105 HCHG RX REV CODE 258: Performed by: INTERNAL MEDICINE

## 2025-05-31 RX ORDER — INSULIN LISPRO 100 [IU]/ML
1-6 INJECTION, SOLUTION INTRAVENOUS; SUBCUTANEOUS
Status: DISCONTINUED | OUTPATIENT
Start: 2025-05-31 | End: 2025-06-03 | Stop reason: HOSPADM

## 2025-05-31 RX ORDER — DEXTROSE MONOHYDRATE 25 G/50ML
25 INJECTION, SOLUTION INTRAVENOUS
Status: DISCONTINUED | OUTPATIENT
Start: 2025-05-31 | End: 2025-06-03 | Stop reason: HOSPADM

## 2025-05-31 RX ADMIN — NYSTATIN: 100000 POWDER TOPICAL at 05:49

## 2025-05-31 RX ADMIN — ACETAMINOPHEN 650 MG: 325 TABLET ORAL at 20:23

## 2025-05-31 RX ADMIN — ACETAMINOPHEN 650 MG: 325 TABLET ORAL at 13:40

## 2025-05-31 RX ADMIN — ATORVASTATIN CALCIUM 20 MG: 20 TABLET, FILM COATED ORAL at 05:47

## 2025-05-31 RX ADMIN — APIXABAN 5 MG: 5 TABLET, FILM COATED ORAL at 17:13

## 2025-05-31 RX ADMIN — SPIRONOLACTONE 25 MG: 25 TABLET ORAL at 05:47

## 2025-05-31 RX ADMIN — ENOXAPARIN SODIUM 80 MG: 100 INJECTION SUBCUTANEOUS at 05:49

## 2025-05-31 RX ADMIN — AMPICILLIN AND SULBACTAM 3 G: 1; 2 INJECTION, POWDER, FOR SOLUTION INTRAMUSCULAR; INTRAVENOUS at 11:44

## 2025-05-31 RX ADMIN — AMPICILLIN AND SULBACTAM 3 G: 1; 2 INJECTION, POWDER, FOR SOLUTION INTRAMUSCULAR; INTRAVENOUS at 05:54

## 2025-05-31 RX ADMIN — INSULIN LISPRO 1 UNITS: 100 INJECTION, SOLUTION INTRAVENOUS; SUBCUTANEOUS at 20:29

## 2025-05-31 RX ADMIN — AMPICILLIN AND SULBACTAM 3 G: 1; 2 INJECTION, POWDER, FOR SOLUTION INTRAMUSCULAR; INTRAVENOUS at 17:18

## 2025-05-31 RX ADMIN — ASPIRIN 81 MG: 81 TABLET, COATED ORAL at 05:47

## 2025-05-31 RX ADMIN — NYSTATIN: 100000 POWDER TOPICAL at 17:14

## 2025-05-31 RX ADMIN — METOPROLOL SUCCINATE 12.5 MG: 25 TABLET, EXTENDED RELEASE ORAL at 17:14

## 2025-05-31 RX ADMIN — ACETAMINOPHEN 650 MG: 325 TABLET ORAL at 00:37

## 2025-05-31 RX ADMIN — LOSARTAN POTASSIUM 25 MG: 25 TABLET, FILM COATED ORAL at 05:48

## 2025-05-31 RX ADMIN — INSULIN LISPRO 1 UNITS: 100 INJECTION, SOLUTION INTRAVENOUS; SUBCUTANEOUS at 11:34

## 2025-05-31 RX ADMIN — AMPICILLIN AND SULBACTAM 3 G: 1; 2 INJECTION, POWDER, FOR SOLUTION INTRAMUSCULAR; INTRAVENOUS at 00:48

## 2025-05-31 RX ADMIN — LEVOTHYROXINE SODIUM 175 MCG: 0.12 TABLET ORAL at 05:48

## 2025-05-31 ASSESSMENT — CHA2DS2 SCORE
CHA2DS2 VASC SCORE: 5
HYPERTENSION: YES
VASCULAR DISEASE: NO
AGE 75 OR GREATER: YES
SEX: FEMALE
AGE 65 TO 74: NO
DIABETES: YES
CHF OR LEFT VENTRICULAR DYSFUNCTION: NO
PRIOR STROKE OR TIA OR THROMBOEMBOLISM: NO

## 2025-05-31 ASSESSMENT — PAIN DESCRIPTION - PAIN TYPE
TYPE: ACUTE PAIN

## 2025-05-31 ASSESSMENT — ENCOUNTER SYMPTOMS
FEVER: 0
MYALGIAS: 1
VOMITING: 0
ABDOMINAL PAIN: 0
PALPITATIONS: 0
BACK PAIN: 0
CHILLS: 0
NAUSEA: 0

## 2025-05-31 ASSESSMENT — FIBROSIS 4 INDEX: FIB4 SCORE: 1.31

## 2025-05-31 NOTE — PROGRESS NOTES
Hospital Medicine Daily Progress Note    Date of Service  5/31/2025    Chief Complaint  Ragini Reaves is a 80 y.o. female admitted 5/28/2025 with leg pain     Hospital Course  This is a 80 y.o. female who presented 5/28/2025 with left lower extremity pain and swelling.  Patient lives alone at home and is able to care for self.  She has not been told that she had atrial fibrillation in the past.  For the last 2 weeks, she began to notice a progressively worsening swelling in her lower extremities bilaterally.  She noticed to have worsening pain and erythema on her left lower extremity.  Eventually, she reports that the swelling increased in her right lower extremity, however her left lower extremity continues to be painful, swollen, erythematous.  She went to see her dermatologist today, who recommended her to come into the ER for IV antibiotics.     In ER, patient found to have atrial fibrillation with rapid ventricular response.  Initial white blood cell count 15,000 with left shift.  Chest x-ray negative for acute cardiopulmonary abnormality.  Patient given fluids and Zyvox to which she converted to normal sinus rhythm, however still tachycardic at 110 bpm.      Interval Problem Update  Patient seen and examined, afebrile no nausea or vomiting. Cont on IV Unasyn   Cont metoprolol for her A.fibb and switch to eliquis from Lovenox  Close monitor for decompensation   I have discussed this patient's plan of care and discharge plan at IDT rounds today with Case Management, Nursing, Nursing leadership, and other members of the IDT team.    Consultants/Specialty  None     Code Status  DNAR/DNI    Disposition  The patient is not medically cleared for discharge to home or a post-acute facility.      I have placed the appropriate orders for post-discharge needs.    Review of Systems  Review of Systems   Constitutional:  Negative for chills and fever.   Cardiovascular:  Negative for chest pain and palpitations.    Gastrointestinal:  Negative for abdominal pain, nausea and vomiting.   Genitourinary:  Negative for dysuria and urgency.   Musculoskeletal:  Positive for myalgias. Negative for back pain.        Physical Exam  Temp:  [36.7 °C (98.1 °F)-37.1 °C (98.8 °F)] 36.7 °C (98.1 °F)  Pulse:  [] 70  Resp:  [16-20] 18  BP: (110-125)/(63-70) 110/63  SpO2:  [93 %-98 %] 96 %    Physical Exam  Constitutional:       Appearance: Normal appearance. She is obese.   HENT:      Head: Normocephalic.      Nose: Nose normal.      Mouth/Throat:      Mouth: Mucous membranes are moist.   Eyes:      Pupils: Pupils are equal, round, and reactive to light.   Cardiovascular:      Rate and Rhythm: Normal rate and regular rhythm.      Pulses: Normal pulses.   Pulmonary:      Effort: Pulmonary effort is normal.      Breath sounds: Normal breath sounds.   Abdominal:      General: Abdomen is flat. Bowel sounds are normal.      Palpations: Abdomen is soft.   Musculoskeletal:      Cervical back: Neck supple.   Skin:     General: Skin is warm.      Comments: Left lower extremity: Swollen, erythematous, 2 x 2 cm wound noted on the medial aspect of left shin.  Tender to touch.   Neurological:      General: No focal deficit present.      Mental Status: She is alert and oriented to person, place, and time. Mental status is at baseline.   Psychiatric:         Mood and Affect: Mood normal.         Behavior: Behavior normal.         Thought Content: Thought content normal.         Judgment: Judgment normal.         Fluids    Intake/Output Summary (Last 24 hours) at 5/31/2025 1340  Last data filed at 5/31/2025 0000  Gross per 24 hour   Intake 180 ml   Output --   Net 180 ml        Laboratory  Recent Labs     05/29/25  0021 05/30/25  0259 05/31/25  0011   WBC 10.8 11.3* 11.7*   RBC 4.44 4.39 4.24   HEMOGLOBIN 12.6 12.6 12.0   HEMATOCRIT 38.7 39.5 36.6*   MCV 87.2 90.0 86.3   MCH 28.4 28.7 28.3   MCHC 32.6 31.9* 32.8   RDW 42.5 43.3 41.0   PLATELETCT 333 304  320   MPV 10.3 9.8 10.1     Recent Labs     05/29/25  0021 05/30/25  0259 05/31/25  0011   SODIUM 136 139 139   POTASSIUM 4.2 4.5 4.0   CHLORIDE 104 108 108   CO2 22 20 21   GLUCOSE 140* 134* 136*   BUN 21 22 19   CREATININE 0.69 0.61 0.56   CALCIUM 8.7 8.5 8.5                   Imaging  CT-LOWER EXTREMITY UNILATERAL W/O CONTRAST LEFT   Final Result      1.  Chronic fracture deformities of the distal tibia and fibula.   2.  No acute fracture or dislocation.   3.  Degenerative changes are noted involving the knee, ankle, and foot.   4.  There is diffuse soft tissue edema. This may be seen with cellulitis.      EC-ECHOCARDIOGRAM COMPLETE W/ CONT   Final Result      US-EXTREMITY VENOUS LOWER UNILAT LEFT   Final Result      DX-CHEST-PORTABLE (1 VIEW)   Final Result         No acute cardiac or pulmonary abnormality is identified.           Assessment/Plan  * Cellulitis- (present on admission)  Assessment & Plan  Noted to have worsening left lower extremity pain, erythema, swelling.  Noted to have a small wound on medial aspect of her left shin, suspect that this is the source for her infection  IV Zyvox  Wound care  DVT studies negative   I also ordered a ct scan of lower extremity       ACP (advance care planning)  Assessment & Plan  DNR/DNI    Atrial fibrillation with RVR ([facility])  Assessment & Plan  Patient presents with atrial fibrillation with rapid ventricular response, suspected from underlying infection  New diagnosis of atrial fibrillation, AURORA VASC Score of 5, now on Lovenox therapeutic dose and also on metoprolol   Close monitor     DM (diabetes mellitus) ([facility])  Assessment & Plan  Sliding scale     Morbid obesity ([facility])  Assessment & Plan  BMI 34    Dyslipidemia, goal LDL below 100- (present on admission)  Assessment & Plan  Lipitor    Postsurgical hypothyroidism- (present on admission)  Assessment & Plan  Synthroid        VTE prophylaxis: Lovenox     Greater than 51 minutes spent prepping to see patient (e.g.  review of tests) obtaining and/or reviewing separately obtained history. Performing a medically appropriate examination and/ evaluation.  Counseling and educating the patient/family/caregiver.  Ordering medications, tests, or procedures.  Referring and communicating with other health care professionals.  Documenting clinical information in EPIC.  Independently interpreting results and communicating results to patient/family/caregiver.  Care coordination.      I have performed a physical exam and reviewed and updated ROS and Plan today (5/31/2025). In review of yesterday's note (5/30/2025), there are no changes except as documented above.

## 2025-05-31 NOTE — CARE PLAN
The patient is Stable - Low risk of patient condition declining or worsening    Shift Goals  Clinical Goals: IV abx, monitor HR/rhythm, pain management  Patient Goals: rest, comfort  Family Goals: trina    Progress made toward(s) clinical / shift goals:        Problem: Knowledge Deficit - Standard  Goal: Patient and family/care givers will demonstrate understanding of plan of care, disease process/condition, diagnostic tests and medications  Outcome: Progressing     Problem: Hemodynamics  Goal: Patient's hemodynamics, fluid balance and neurologic status will be stable or improve  Outcome: Progressing     Problem: Fluid Volume  Goal: Fluid volume balance will be maintained  Outcome: Progressing     Problem: Urinary - Renal Perfusion  Goal: Ability to achieve and maintain adequate renal perfusion and functioning will improve  Outcome: Progressing     Problem: Respiratory  Goal: Patient will achieve/maintain optimum respiratory ventilation and gas exchange  Outcome: Progressing     Problem: Fall Risk  Goal: Patient will remain free from falls  Outcome: Progressing     Problem: Safety  Goal: Will remain free from injury  Outcome: Progressing     Problem: Pain Management  Goal: Pain level will decrease to patient's comfort goal  Outcome: Progressing     Problem: Mobility  Goal: Risk for activity intolerance will decrease  Outcome: Progressing     Problem: Wound/ / Incision Healing  Goal: Patient's wound/surgical incision will decrease in size and heals properly  Outcome: Progressing

## 2025-05-31 NOTE — CARE PLAN
The patient is Stable - Low risk of patient condition declining or worsening    Shift Goals  Clinical Goals: CT, Abx, VSS, Safety/Comfort  Patient Goals: sleep  Family Goals: HILDA - no family present    Progress made toward(s) clinical / shift goals:    Problem: Knowledge Deficit - Standard  Goal: Patient and family/care givers will demonstrate understanding of plan of care, disease process/condition, diagnostic tests and medications  Outcome: Progressing     Problem: Safety  Goal: Will remain free from injury  Outcome: Progressing     Problem: Mobility  Goal: Risk for activity intolerance will decrease  Outcome: Progressing

## 2025-05-31 NOTE — PROGRESS NOTES
Bedside report received from Lupe SUTTON. Pt assessment complete. Pt AO x 4. Reviewed plan of care with pt. Pt is tele monitored. Chart and labs reviewed. Bed in lowest position, and 2 side rails up. Pt educated on call lights, call light within reach. Hourly rounding in place

## 2025-06-01 LAB
ANION GAP SERPL CALC-SCNC: 11 MMOL/L (ref 7–16)
BACTERIA WND AEROBE CULT: ABNORMAL
BUN SERPL-MCNC: 17 MG/DL (ref 8–22)
CALCIUM SERPL-MCNC: 8.4 MG/DL (ref 8.5–10.5)
CHLORIDE SERPL-SCNC: 108 MMOL/L (ref 96–112)
CO2 SERPL-SCNC: 21 MMOL/L (ref 20–33)
CREAT SERPL-MCNC: 0.54 MG/DL (ref 0.5–1.4)
ERYTHROCYTE [DISTWIDTH] IN BLOOD BY AUTOMATED COUNT: 42 FL (ref 35.9–50)
GFR SERPLBLD CREATININE-BSD FMLA CKD-EPI: 93 ML/MIN/1.73 M 2
GLUCOSE BLD STRIP.AUTO-MCNC: 131 MG/DL (ref 65–99)
GLUCOSE BLD STRIP.AUTO-MCNC: 135 MG/DL (ref 65–99)
GLUCOSE BLD STRIP.AUTO-MCNC: 148 MG/DL (ref 65–99)
GLUCOSE BLD STRIP.AUTO-MCNC: 157 MG/DL (ref 65–99)
GLUCOSE BLD STRIP.AUTO-MCNC: 159 MG/DL (ref 65–99)
GLUCOSE BLD STRIP.AUTO-MCNC: 170 MG/DL (ref 65–99)
GLUCOSE SERPL-MCNC: 160 MG/DL (ref 65–99)
GRAM STN SPEC: ABNORMAL
HCT VFR BLD AUTO: 38.7 % (ref 37–47)
HGB BLD-MCNC: 12.5 G/DL (ref 12–16)
MCH RBC QN AUTO: 28.2 PG (ref 27–33)
MCHC RBC AUTO-ENTMCNC: 32.3 G/DL (ref 32.2–35.5)
MCV RBC AUTO: 87.2 FL (ref 81.4–97.8)
PLATELET # BLD AUTO: 319 K/UL (ref 164–446)
PMV BLD AUTO: 9.7 FL (ref 9–12.9)
POTASSIUM SERPL-SCNC: 4 MMOL/L (ref 3.6–5.5)
RBC # BLD AUTO: 4.44 M/UL (ref 4.2–5.4)
SIGNIFICANT IND 70042: ABNORMAL
SITE SITE: ABNORMAL
SODIUM SERPL-SCNC: 140 MMOL/L (ref 135–145)
SOURCE SOURCE: ABNORMAL
WBC # BLD AUTO: 9.9 K/UL (ref 4.8–10.8)

## 2025-06-01 PROCEDURE — 700111 HCHG RX REV CODE 636 W/ 250 OVERRIDE (IP): Mod: JZ | Performed by: INTERNAL MEDICINE

## 2025-06-01 PROCEDURE — 85027 COMPLETE CBC AUTOMATED: CPT

## 2025-06-01 PROCEDURE — 80048 BASIC METABOLIC PNL TOTAL CA: CPT

## 2025-06-01 PROCEDURE — A9270 NON-COVERED ITEM OR SERVICE: HCPCS | Performed by: STUDENT IN AN ORGANIZED HEALTH CARE EDUCATION/TRAINING PROGRAM

## 2025-06-01 PROCEDURE — 99232 SBSQ HOSP IP/OBS MODERATE 35: CPT | Performed by: INTERNAL MEDICINE

## 2025-06-01 PROCEDURE — 770020 HCHG ROOM/CARE - TELE (206)

## 2025-06-01 PROCEDURE — 700102 HCHG RX REV CODE 250 W/ 637 OVERRIDE(OP): Performed by: STUDENT IN AN ORGANIZED HEALTH CARE EDUCATION/TRAINING PROGRAM

## 2025-06-01 PROCEDURE — 82962 GLUCOSE BLOOD TEST: CPT | Mod: 91

## 2025-06-01 PROCEDURE — 700102 HCHG RX REV CODE 250 W/ 637 OVERRIDE(OP): Performed by: INTERNAL MEDICINE

## 2025-06-01 PROCEDURE — 36415 COLL VENOUS BLD VENIPUNCTURE: CPT

## 2025-06-01 PROCEDURE — A9270 NON-COVERED ITEM OR SERVICE: HCPCS | Performed by: INTERNAL MEDICINE

## 2025-06-01 PROCEDURE — 700105 HCHG RX REV CODE 258: Performed by: INTERNAL MEDICINE

## 2025-06-01 RX ADMIN — ACETAMINOPHEN 650 MG: 325 TABLET ORAL at 03:29

## 2025-06-01 RX ADMIN — LEVOTHYROXINE SODIUM 175 MCG: 0.12 TABLET ORAL at 06:30

## 2025-06-01 RX ADMIN — APIXABAN 5 MG: 5 TABLET, FILM COATED ORAL at 17:13

## 2025-06-01 RX ADMIN — ATORVASTATIN CALCIUM 20 MG: 20 TABLET, FILM COATED ORAL at 06:30

## 2025-06-01 RX ADMIN — AMPICILLIN AND SULBACTAM 3 G: 1; 2 INJECTION, POWDER, FOR SOLUTION INTRAMUSCULAR; INTRAVENOUS at 11:35

## 2025-06-01 RX ADMIN — AMPICILLIN AND SULBACTAM 3 G: 1; 2 INJECTION, POWDER, FOR SOLUTION INTRAMUSCULAR; INTRAVENOUS at 17:16

## 2025-06-01 RX ADMIN — APIXABAN 5 MG: 5 TABLET, FILM COATED ORAL at 06:31

## 2025-06-01 RX ADMIN — SPIRONOLACTONE 25 MG: 25 TABLET ORAL at 06:31

## 2025-06-01 RX ADMIN — AMPICILLIN AND SULBACTAM 3 G: 1; 2 INJECTION, POWDER, FOR SOLUTION INTRAMUSCULAR; INTRAVENOUS at 23:59

## 2025-06-01 RX ADMIN — LOSARTAN POTASSIUM 25 MG: 25 TABLET, FILM COATED ORAL at 06:32

## 2025-06-01 RX ADMIN — ACETAMINOPHEN 650 MG: 325 TABLET ORAL at 22:18

## 2025-06-01 RX ADMIN — ACETAMINOPHEN 650 MG: 325 TABLET ORAL at 16:12

## 2025-06-01 RX ADMIN — METOPROLOL SUCCINATE 12.5 MG: 25 TABLET, EXTENDED RELEASE ORAL at 17:12

## 2025-06-01 RX ADMIN — AMPICILLIN AND SULBACTAM 3 G: 1; 2 INJECTION, POWDER, FOR SOLUTION INTRAMUSCULAR; INTRAVENOUS at 06:30

## 2025-06-01 RX ADMIN — ACETAMINOPHEN 650 MG: 325 TABLET ORAL at 10:02

## 2025-06-01 RX ADMIN — ASPIRIN 81 MG: 81 TABLET, COATED ORAL at 06:30

## 2025-06-01 RX ADMIN — INSULIN LISPRO 1 UNITS: 100 INJECTION, SOLUTION INTRAVENOUS; SUBCUTANEOUS at 21:23

## 2025-06-01 RX ADMIN — AMPICILLIN AND SULBACTAM 3 G: 1; 2 INJECTION, POWDER, FOR SOLUTION INTRAMUSCULAR; INTRAVENOUS at 00:49

## 2025-06-01 ASSESSMENT — PAIN DESCRIPTION - PAIN TYPE
TYPE: ACUTE PAIN
TYPE: CHRONIC PAIN

## 2025-06-01 ASSESSMENT — ENCOUNTER SYMPTOMS
VOMITING: 0
NAUSEA: 0
CHILLS: 0
BACK PAIN: 0
FEVER: 0
PALPITATIONS: 0
MYALGIAS: 1
ABDOMINAL PAIN: 0

## 2025-06-01 ASSESSMENT — FIBROSIS 4 INDEX: FIB4 SCORE: 1.32

## 2025-06-01 NOTE — CARE PLAN
The patient is Stable - Low risk of patient condition declining or worsening    Shift Goals  Clinical Goals: IV abx, monitor HR, pain management  Patient Goals: rest, discharge  Family Goals: trina    Progress made toward(s) clinical / shift goals:  pt safety maintained. IV abx per MAR. Pt provided with pain medication per MAR, given comfort measures of repositioning. Fall precautions in place, bed and chair alarm on and plugged in, bed in lowest position and locked. Plan of care reviewed with patient at bedside, all questions addressed.       Problem: Knowledge Deficit - Standard  Goal: Patient and family/care givers will demonstrate understanding of plan of care, disease process/condition, diagnostic tests and medications  Outcome: Progressing     Problem: Fall Risk  Goal: Patient will remain free from falls  Outcome: Progressing       Patient is not progressing towards the following goals:

## 2025-06-01 NOTE — PROGRESS NOTES
Hospital Medicine Daily Progress Note    Date of Service  6/1/2025    Chief Complaint  Ragini Reaves is a 80 y.o. female admitted 5/28/2025 with leg pain     Hospital Course  This is a 80 y.o. female who presented 5/28/2025 with left lower extremity pain and swelling.  Patient lives alone at home and is able to care for self.  She has not been told that she had atrial fibrillation in the past.  For the last 2 weeks, she began to notice a progressively worsening swelling in her lower extremities bilaterally.  She noticed to have worsening pain and erythema on her left lower extremity.  Eventually, she reports that the swelling increased in her right lower extremity, however her left lower extremity continues to be painful, swollen, erythematous.  She went to see her dermatologist today, who recommended her to come into the ER for IV antibiotics.     In ER, patient found to have atrial fibrillation with rapid ventricular response.  Initial white blood cell count 15,000 with left shift.  Chest x-ray negative for acute cardiopulmonary abnormality.  Patient given fluids and Zyvox to which she converted to normal sinus rhythm, however still tachycardic at 110 bpm.      Interval Problem Update  Patient resting in bed still with some lower extremity pain cont on IV abx  In regards to her A.fibb cont on metoprolol and eliquis   Close monitor for decompensation   I have discussed this patient's plan of care and discharge plan at IDT rounds today with Case Management, Nursing, Nursing leadership, and other members of the IDT team.    Consultants/Specialty  None     Code Status  DNAR/DNI    Disposition  The patient is not medically cleared for discharge to home or a post-acute facility.      I have placed the appropriate orders for post-discharge needs.    Review of Systems  Review of Systems   Constitutional:  Negative for chills and fever.   Cardiovascular:  Negative for chest pain and palpitations.    Gastrointestinal:  Negative for abdominal pain, nausea and vomiting.   Genitourinary:  Negative for dysuria and urgency.   Musculoskeletal:  Positive for myalgias. Negative for back pain.        Physical Exam  Temp:  [36.5 °C (97.7 °F)-37 °C (98.6 °F)] 37 °C (98.6 °F)  Pulse:  [] 85  Resp:  [16-18] 17  BP: (111-135)/(63-89) 126/69  SpO2:  [93 %-98 %] 98 %    Physical Exam  Constitutional:       Appearance: Normal appearance. She is obese.   HENT:      Head: Normocephalic.      Nose: Nose normal.      Mouth/Throat:      Mouth: Mucous membranes are moist.   Eyes:      Pupils: Pupils are equal, round, and reactive to light.   Cardiovascular:      Rate and Rhythm: Normal rate and regular rhythm.      Pulses: Normal pulses.   Pulmonary:      Effort: Pulmonary effort is normal.      Breath sounds: Normal breath sounds.   Abdominal:      General: Abdomen is flat. Bowel sounds are normal.      Palpations: Abdomen is soft.   Musculoskeletal:      Cervical back: Neck supple.   Skin:     General: Skin is warm.      Comments: Left lower extremity: Swollen, erythematous, 2 x 2 cm wound noted on the medial aspect of left shin.  Tender to touch.   Neurological:      General: No focal deficit present.      Mental Status: She is alert and oriented to person, place, and time. Mental status is at baseline.   Psychiatric:         Mood and Affect: Mood normal.         Behavior: Behavior normal.         Thought Content: Thought content normal.         Judgment: Judgment normal.         Fluids    Intake/Output Summary (Last 24 hours) at 6/1/2025 1447  Last data filed at 6/1/2025 0000  Gross per 24 hour   Intake 320 ml   Output --   Net 320 ml        Laboratory  Recent Labs     05/30/25  0259 05/31/25  0011 06/01/25  0134   WBC 11.3* 11.7* 9.9   RBC 4.39 4.24 4.44   HEMOGLOBIN 12.6 12.0 12.5   HEMATOCRIT 39.5 36.6* 38.7   MCV 90.0 86.3 87.2   MCH 28.7 28.3 28.2   MCHC 31.9* 32.8 32.3   RDW 43.3 41.0 42.0   PLATELETCT 304 320 319    MPV 9.8 10.1 9.7     Recent Labs     05/30/25  0259 05/31/25  0011 06/01/25  0134   SODIUM 139 139 140   POTASSIUM 4.5 4.0 4.0   CHLORIDE 108 108 108   CO2 20 21 21   GLUCOSE 134* 136* 160*   BUN 22 19 17   CREATININE 0.61 0.56 0.54   CALCIUM 8.5 8.5 8.4*                   Imaging  CT-LOWER EXTREMITY UNILATERAL W/O CONTRAST LEFT   Final Result      1.  Chronic fracture deformities of the distal tibia and fibula.   2.  No acute fracture or dislocation.   3.  Degenerative changes are noted involving the knee, ankle, and foot.   4.  There is diffuse soft tissue edema. This may be seen with cellulitis.      EC-ECHOCARDIOGRAM COMPLETE W/ CONT   Final Result      US-EXTREMITY VENOUS LOWER UNILAT LEFT   Final Result      DX-CHEST-PORTABLE (1 VIEW)   Final Result         No acute cardiac or pulmonary abnormality is identified.           Assessment/Plan  * Cellulitis- (present on admission)  Assessment & Plan  Noted to have worsening left lower extremity pain, erythema, swelling.  Noted to have a small wound on medial aspect of her left shin, suspect that this is the source for her infection  IV Zyvox  Wound care  DVT studies negative   I also ordered a ct scan of lower extremity       ACP (advance care planning)  Assessment & Plan  DNR/DNI    Atrial fibrillation with RVR ([facility])  Assessment & Plan  Patient presents with atrial fibrillation with rapid ventricular response, suspected from underlying infection  New diagnosis of atrial fibrillation, AURORA VASC Score of 5, now on Lovenox therapeutic dose and also on metoprolol   Close monitor     DM (diabetes mellitus) ([facility])  Assessment & Plan  Sliding scale     Morbid obesity ([facility])  Assessment & Plan  BMI 34    Dyslipidemia, goal LDL below 100- (present on admission)  Assessment & Plan  Lipitor    Postsurgical hypothyroidism- (present on admission)  Assessment & Plan  Synthroid        VTE prophylaxis: Lovenox       I have performed a physical exam and reviewed and updated ROS  and Plan today (6/1/2025). In review of yesterday's note (5/31/2025), there are no changes except as documented above.

## 2025-06-01 NOTE — CARE PLAN
The patient is Stable - Low risk of patient condition declining or worsening    Shift Goals  Clinical Goals: IV abx, monitor HR, pain management, safety  Patient Goals: comfort, rest  Family Goals: trina    Progress made toward(s) clinical / shift goals:        Problem: Knowledge Deficit - Standard  Goal: Patient and family/care givers will demonstrate understanding of plan of care, disease process/condition, diagnostic tests and medications  Outcome: Progressing     Problem: Hemodynamics  Goal: Patient's hemodynamics, fluid balance and neurologic status will be stable or improve  Outcome: Progressing     Problem: Fluid Volume  Goal: Fluid volume balance will be maintained  Outcome: Progressing     Problem: Urinary - Renal Perfusion  Goal: Ability to achieve and maintain adequate renal perfusion and functioning will improve  Outcome: Progressing     Problem: Respiratory  Goal: Patient will achieve/maintain optimum respiratory ventilation and gas exchange  Outcome: Progressing     Problem: Fall Risk  Goal: Patient will remain free from falls  Outcome: Progressing     Problem: Safety  Goal: Will remain free from injury  Outcome: Progressing     Problem: Pain Management  Goal: Pain level will decrease to patient's comfort goal  Outcome: Progressing     Problem: Mobility  Goal: Risk for activity intolerance will decrease  Outcome: Progressing     Problem: Wound/ / Incision Healing  Goal: Patient's wound/surgical incision will decrease in size and heals properly  Outcome: Progressing     Problem: Pain - Standard  Goal: Alleviation of pain or a reduction in pain to the patient’s comfort goal  Outcome: Progressing

## 2025-06-02 LAB
ANION GAP SERPL CALC-SCNC: 9 MMOL/L (ref 7–16)
BACTERIA BLD CULT: NORMAL
BACTERIA BLD CULT: NORMAL
BUN SERPL-MCNC: 12 MG/DL (ref 8–22)
CALCIUM SERPL-MCNC: 8.7 MG/DL (ref 8.5–10.5)
CHLORIDE SERPL-SCNC: 108 MMOL/L (ref 96–112)
CO2 SERPL-SCNC: 22 MMOL/L (ref 20–33)
CREAT SERPL-MCNC: 0.5 MG/DL (ref 0.5–1.4)
ERYTHROCYTE [DISTWIDTH] IN BLOOD BY AUTOMATED COUNT: 41.3 FL (ref 35.9–50)
GFR SERPLBLD CREATININE-BSD FMLA CKD-EPI: 95 ML/MIN/1.73 M 2
GLUCOSE BLD STRIP.AUTO-MCNC: 131 MG/DL (ref 65–99)
GLUCOSE BLD STRIP.AUTO-MCNC: 143 MG/DL (ref 65–99)
GLUCOSE BLD STRIP.AUTO-MCNC: 166 MG/DL (ref 65–99)
GLUCOSE SERPL-MCNC: 154 MG/DL (ref 65–99)
HCT VFR BLD AUTO: 39.1 % (ref 37–47)
HGB BLD-MCNC: 13 G/DL (ref 12–16)
MCH RBC QN AUTO: 28.9 PG (ref 27–33)
MCHC RBC AUTO-ENTMCNC: 33.2 G/DL (ref 32.2–35.5)
MCV RBC AUTO: 86.9 FL (ref 81.4–97.8)
PLATELET # BLD AUTO: 362 K/UL (ref 164–446)
PMV BLD AUTO: 9.5 FL (ref 9–12.9)
POTASSIUM SERPL-SCNC: 4.2 MMOL/L (ref 3.6–5.5)
RBC # BLD AUTO: 4.5 M/UL (ref 4.2–5.4)
SIGNIFICANT IND 70042: NORMAL
SIGNIFICANT IND 70042: NORMAL
SITE SITE: NORMAL
SITE SITE: NORMAL
SODIUM SERPL-SCNC: 139 MMOL/L (ref 135–145)
SOURCE SOURCE: NORMAL
SOURCE SOURCE: NORMAL
WBC # BLD AUTO: 9.9 K/UL (ref 4.8–10.8)

## 2025-06-02 PROCEDURE — 700111 HCHG RX REV CODE 636 W/ 250 OVERRIDE (IP): Mod: JZ | Performed by: INTERNAL MEDICINE

## 2025-06-02 PROCEDURE — A9270 NON-COVERED ITEM OR SERVICE: HCPCS | Performed by: STUDENT IN AN ORGANIZED HEALTH CARE EDUCATION/TRAINING PROGRAM

## 2025-06-02 PROCEDURE — 700102 HCHG RX REV CODE 250 W/ 637 OVERRIDE(OP): Performed by: INTERNAL MEDICINE

## 2025-06-02 PROCEDURE — 85027 COMPLETE CBC AUTOMATED: CPT

## 2025-06-02 PROCEDURE — 770001 HCHG ROOM/CARE - MED/SURG/GYN PRIV*

## 2025-06-02 PROCEDURE — 80048 BASIC METABOLIC PNL TOTAL CA: CPT

## 2025-06-02 PROCEDURE — 82962 GLUCOSE BLOOD TEST: CPT | Mod: 91

## 2025-06-02 PROCEDURE — 700105 HCHG RX REV CODE 258: Performed by: INTERNAL MEDICINE

## 2025-06-02 PROCEDURE — 99232 SBSQ HOSP IP/OBS MODERATE 35: CPT | Performed by: INTERNAL MEDICINE

## 2025-06-02 PROCEDURE — 700102 HCHG RX REV CODE 250 W/ 637 OVERRIDE(OP): Performed by: STUDENT IN AN ORGANIZED HEALTH CARE EDUCATION/TRAINING PROGRAM

## 2025-06-02 PROCEDURE — A9270 NON-COVERED ITEM OR SERVICE: HCPCS | Performed by: INTERNAL MEDICINE

## 2025-06-02 RX ADMIN — APIXABAN 5 MG: 5 TABLET, FILM COATED ORAL at 17:47

## 2025-06-02 RX ADMIN — SPIRONOLACTONE 25 MG: 25 TABLET ORAL at 05:15

## 2025-06-02 RX ADMIN — ATORVASTATIN CALCIUM 20 MG: 20 TABLET, FILM COATED ORAL at 05:13

## 2025-06-02 RX ADMIN — ACETAMINOPHEN 650 MG: 325 TABLET ORAL at 05:12

## 2025-06-02 RX ADMIN — APIXABAN 5 MG: 5 TABLET, FILM COATED ORAL at 05:14

## 2025-06-02 RX ADMIN — AMPICILLIN AND SULBACTAM 3 G: 1; 2 INJECTION, POWDER, FOR SOLUTION INTRAMUSCULAR; INTRAVENOUS at 05:29

## 2025-06-02 RX ADMIN — LOSARTAN POTASSIUM 25 MG: 25 TABLET, FILM COATED ORAL at 05:16

## 2025-06-02 RX ADMIN — AMPICILLIN AND SULBACTAM 3 G: 1; 2 INJECTION, POWDER, FOR SOLUTION INTRAMUSCULAR; INTRAVENOUS at 12:21

## 2025-06-02 RX ADMIN — INSULIN LISPRO 1 UNITS: 100 INJECTION, SOLUTION INTRAVENOUS; SUBCUTANEOUS at 12:14

## 2025-06-02 RX ADMIN — ACETAMINOPHEN 650 MG: 325 TABLET ORAL at 12:22

## 2025-06-02 RX ADMIN — METOPROLOL SUCCINATE 12.5 MG: 25 TABLET, EXTENDED RELEASE ORAL at 17:47

## 2025-06-02 RX ADMIN — LEVOTHYROXINE SODIUM 175 MCG: 0.12 TABLET ORAL at 05:14

## 2025-06-02 RX ADMIN — ACETAMINOPHEN 650 MG: 325 TABLET ORAL at 17:47

## 2025-06-02 RX ADMIN — AMPICILLIN AND SULBACTAM 3 G: 1; 2 INJECTION, POWDER, FOR SOLUTION INTRAMUSCULAR; INTRAVENOUS at 17:48

## 2025-06-02 RX ADMIN — INSULIN LISPRO 1 UNITS: 100 INJECTION, SOLUTION INTRAVENOUS; SUBCUTANEOUS at 20:40

## 2025-06-02 ASSESSMENT — PAIN DESCRIPTION - PAIN TYPE
TYPE: ACUTE PAIN

## 2025-06-02 ASSESSMENT — ENCOUNTER SYMPTOMS
VOMITING: 0
PALPITATIONS: 0
MYALGIAS: 1
NAUSEA: 0
BACK PAIN: 0
ABDOMINAL PAIN: 0
CHILLS: 0
FEVER: 0

## 2025-06-02 ASSESSMENT — FIBROSIS 4 INDEX: FIB4 SCORE: 1.16

## 2025-06-02 NOTE — CARE PLAN
The patient is Stable - Low risk of patient condition declining or worsening    Shift Goals  Clinical Goals: abx therapy  Patient Goals: self care  Family Goals: trina    Progress made toward(s) clinical / shift goals:    Problem: Knowledge Deficit - Standard  Goal: Patient and family/care givers will demonstrate understanding of plan of care, disease process/condition, diagnostic tests and medications  Outcome: Progressing     Problem: Fluid Volume  Goal: Fluid volume balance will be maintained  Outcome: Progressing     Problem: Respiratory  Goal: Patient will achieve/maintain optimum respiratory ventilation and gas exchange  Outcome: Progressing     Problem: Fall Risk  Goal: Patient will remain free from falls  Outcome: Progressing       Patient is not progressing towards the following goals:

## 2025-06-02 NOTE — PROGRESS NOTES
Hospital Medicine Daily Progress Note    Date of Service  6/2/2025    Chief Complaint  Ragini Reaves is a 80 y.o. female admitted 5/28/2025 with leg pain     Hospital Course  This is a 80 y.o. female who presented 5/28/2025 with left lower extremity pain and swelling.  Patient lives alone at home and is able to care for self.  She has not been told that she had atrial fibrillation in the past.  For the last 2 weeks, she began to notice a progressively worsening swelling in her lower extremities bilaterally.  She noticed to have worsening pain and erythema on her left lower extremity.  Eventually, she reports that the swelling increased in her right lower extremity, however her left lower extremity continues to be painful, swollen, erythematous.  She went to see her dermatologist today, who recommended her to come into the ER for IV antibiotics.     In ER, patient found to have atrial fibrillation with rapid ventricular response.  Initial white blood cell count 15,000 with left shift.  Chest x-ray negative for acute cardiopulmonary abnormality.  Patient given fluids and Zyvox to which she converted to normal sinus rhythm, however still tachycardic at 110 bpm.      Interval Problem Update  Patient seen and examined  still with some lower extremity pain cont on IV abx  In regards to her A.fibb cont on metoprolol and eliquis   Close monitor for decompensation   I have discussed this patient's plan of care and discharge plan at IDT rounds today with Case Management, Nursing, Nursing leadership, and other members of the IDT team.    Consultants/Specialty  None     Code Status  DNAR/DNI    Disposition  The patient is not medically cleared for discharge to home or a post-acute facility.      I have placed the appropriate orders for post-discharge needs.    Review of Systems  Review of Systems   Constitutional:  Negative for chills and fever.   Cardiovascular:  Negative for chest pain and palpitations.    Gastrointestinal:  Negative for abdominal pain, nausea and vomiting.   Genitourinary:  Negative for dysuria and urgency.   Musculoskeletal:  Positive for myalgias. Negative for back pain.        Physical Exam  Temp:  [36.4 °C (97.5 °F)-37.1 °C (98.8 °F)] 36.4 °C (97.5 °F)  Pulse:  [57-99] 78  Resp:  [16-18] 18  BP: (117-148)/(57-74) 117/57  SpO2:  [91 %-98 %] 93 %    Physical Exam  Constitutional:       Appearance: Normal appearance. She is obese.   HENT:      Head: Normocephalic.      Nose: Nose normal.      Mouth/Throat:      Mouth: Mucous membranes are moist.   Eyes:      Pupils: Pupils are equal, round, and reactive to light.   Cardiovascular:      Rate and Rhythm: Normal rate and regular rhythm.      Pulses: Normal pulses.   Pulmonary:      Effort: Pulmonary effort is normal.      Breath sounds: Normal breath sounds.   Abdominal:      General: Abdomen is flat. Bowel sounds are normal.      Palpations: Abdomen is soft.   Musculoskeletal:      Cervical back: Neck supple.   Skin:     General: Skin is warm.      Comments: Left lower extremity: Swollen, erythematous, 2 x 2 cm wound noted on the medial aspect of left shin.  Tender to touch.   Neurological:      General: No focal deficit present.      Mental Status: She is alert and oriented to person, place, and time. Mental status is at baseline.   Psychiatric:         Mood and Affect: Mood normal.         Behavior: Behavior normal.         Thought Content: Thought content normal.         Judgment: Judgment normal.         Fluids    Intake/Output Summary (Last 24 hours) at 6/2/2025 1344  Last data filed at 6/2/2025 0900  Gross per 24 hour   Intake 1070 ml   Output --   Net 1070 ml        Laboratory  Recent Labs     05/31/25  0011 06/01/25  0134 06/02/25  0044   WBC 11.7* 9.9 9.9   RBC 4.24 4.44 4.50   HEMOGLOBIN 12.0 12.5 13.0   HEMATOCRIT 36.6* 38.7 39.1   MCV 86.3 87.2 86.9   MCH 28.3 28.2 28.9   MCHC 32.8 32.3 33.2   RDW 41.0 42.0 41.3   PLATELETCT 320 319 572    MPV 10.1 9.7 9.5     Recent Labs     05/31/25  0011 06/01/25  0134 06/02/25  0044   SODIUM 139 140 139   POTASSIUM 4.0 4.0 4.2   CHLORIDE 108 108 108   CO2 21 21 22   GLUCOSE 136* 160* 154*   BUN 19 17 12   CREATININE 0.56 0.54 0.50   CALCIUM 8.5 8.4* 8.7                   Imaging  CT-LOWER EXTREMITY UNILATERAL W/O CONTRAST LEFT   Final Result      1.  Chronic fracture deformities of the distal tibia and fibula.   2.  No acute fracture or dislocation.   3.  Degenerative changes are noted involving the knee, ankle, and foot.   4.  There is diffuse soft tissue edema. This may be seen with cellulitis.      EC-ECHOCARDIOGRAM COMPLETE W/ CONT   Final Result      US-EXTREMITY VENOUS LOWER UNILAT LEFT   Final Result      DX-CHEST-PORTABLE (1 VIEW)   Final Result         No acute cardiac or pulmonary abnormality is identified.           Assessment/Plan  * Cellulitis- (present on admission)  Assessment & Plan  Noted to have worsening left lower extremity pain, erythema, swelling.  Noted to have a small wound on medial aspect of her left shin, suspect that this is the source for her infection  IV Unasyn  Wound care  DVT studies negative         ACP (advance care planning)  Assessment & Plan  DNR/DNI    Atrial fibrillation with RVR ([facility])  Assessment & Plan  Patient presents with atrial fibrillation with rapid ventricular response, suspected from underlying infection  New diagnosis of atrial fibrillation, AURORA VASC Score of 5, now on Lovenox therapeutic dose and also on metoprolol   Close monitor     DM (diabetes mellitus) ([facility])  Assessment & Plan  Sliding scale     Morbid obesity ([facility])  Assessment & Plan  BMI 34    Dyslipidemia, goal LDL below 100- (present on admission)  Assessment & Plan  Lipitor    Postsurgical hypothyroidism- (present on admission)  Assessment & Plan  Synthroid        VTE prophylaxis: Lovenox       I have performed a physical exam and reviewed and updated ROS and Plan today (6/2/2025). In review of  yesterday's note (6/1/2025), there are no changes except as documented above.

## 2025-06-02 NOTE — CARE PLAN
The patient is Stable - Low risk of patient condition declining or worsening    Shift Goals  Clinical Goals: IV abx, monitor HR, pain management  Patient Goals: rest  Family Goals: trina    Progress made toward(s) clinical / shift goals:        Problem: Knowledge Deficit - Standard  Goal: Patient and family/care givers will demonstrate understanding of plan of care, disease process/condition, diagnostic tests and medications  Outcome: Progressing     Problem: Hemodynamics  Goal: Patient's hemodynamics, fluid balance and neurologic status will be stable or improve  Outcome: Progressing     Problem: Fluid Volume  Goal: Fluid volume balance will be maintained  Outcome: Progressing     Problem: Urinary - Renal Perfusion  Goal: Ability to achieve and maintain adequate renal perfusion and functioning will improve  Outcome: Progressing     Problem: Respiratory  Goal: Patient will achieve/maintain optimum respiratory ventilation and gas exchange  Outcome: Progressing     Problem: Fall Risk  Goal: Patient will remain free from falls  Outcome: Progressing     Problem: Safety  Goal: Will remain free from injury  Outcome: Progressing

## 2025-06-03 ENCOUNTER — PHARMACY VISIT (OUTPATIENT)
Dept: PHARMACY | Facility: MEDICAL CENTER | Age: 80
End: 2025-06-03
Payer: COMMERCIAL

## 2025-06-03 VITALS
HEART RATE: 80 BPM | TEMPERATURE: 97.9 F | HEIGHT: 62 IN | OXYGEN SATURATION: 95 % | BODY MASS INDEX: 35.17 KG/M2 | DIASTOLIC BLOOD PRESSURE: 67 MMHG | RESPIRATION RATE: 16 BRPM | SYSTOLIC BLOOD PRESSURE: 126 MMHG | WEIGHT: 191.14 LBS

## 2025-06-03 LAB
ANION GAP SERPL CALC-SCNC: 9 MMOL/L (ref 7–16)
BUN SERPL-MCNC: 15 MG/DL (ref 8–22)
CALCIUM SERPL-MCNC: 8.6 MG/DL (ref 8.5–10.5)
CHLORIDE SERPL-SCNC: 106 MMOL/L (ref 96–112)
CO2 SERPL-SCNC: 21 MMOL/L (ref 20–33)
CREAT SERPL-MCNC: 0.56 MG/DL (ref 0.5–1.4)
ERYTHROCYTE [DISTWIDTH] IN BLOOD BY AUTOMATED COUNT: 41.7 FL (ref 35.9–50)
GFR SERPLBLD CREATININE-BSD FMLA CKD-EPI: 92 ML/MIN/1.73 M 2
GLUCOSE BLD STRIP.AUTO-MCNC: 142 MG/DL (ref 65–99)
GLUCOSE BLD STRIP.AUTO-MCNC: 178 MG/DL (ref 65–99)
GLUCOSE BLD STRIP.AUTO-MCNC: 188 MG/DL (ref 65–99)
GLUCOSE BLD STRIP.AUTO-MCNC: 243 MG/DL (ref 65–99)
GLUCOSE SERPL-MCNC: 164 MG/DL (ref 65–99)
HCT VFR BLD AUTO: 40.1 % (ref 37–47)
HGB BLD-MCNC: 12.8 G/DL (ref 12–16)
MCH RBC QN AUTO: 28 PG (ref 27–33)
MCHC RBC AUTO-ENTMCNC: 31.9 G/DL (ref 32.2–35.5)
MCV RBC AUTO: 87.7 FL (ref 81.4–97.8)
PLATELET # BLD AUTO: 362 K/UL (ref 164–446)
PMV BLD AUTO: 9.6 FL (ref 9–12.9)
POTASSIUM SERPL-SCNC: 4.4 MMOL/L (ref 3.6–5.5)
RBC # BLD AUTO: 4.57 M/UL (ref 4.2–5.4)
SODIUM SERPL-SCNC: 136 MMOL/L (ref 135–145)
WBC # BLD AUTO: 10.9 K/UL (ref 4.8–10.8)

## 2025-06-03 PROCEDURE — 99239 HOSP IP/OBS DSCHRG MGMT >30: CPT | Performed by: HOSPITALIST

## 2025-06-03 PROCEDURE — 85027 COMPLETE CBC AUTOMATED: CPT

## 2025-06-03 PROCEDURE — 700102 HCHG RX REV CODE 250 W/ 637 OVERRIDE(OP): Performed by: INTERNAL MEDICINE

## 2025-06-03 PROCEDURE — 700102 HCHG RX REV CODE 250 W/ 637 OVERRIDE(OP): Performed by: STUDENT IN AN ORGANIZED HEALTH CARE EDUCATION/TRAINING PROGRAM

## 2025-06-03 PROCEDURE — A9270 NON-COVERED ITEM OR SERVICE: HCPCS | Performed by: STUDENT IN AN ORGANIZED HEALTH CARE EDUCATION/TRAINING PROGRAM

## 2025-06-03 PROCEDURE — 700111 HCHG RX REV CODE 636 W/ 250 OVERRIDE (IP): Mod: JZ | Performed by: INTERNAL MEDICINE

## 2025-06-03 PROCEDURE — 700105 HCHG RX REV CODE 258: Performed by: INTERNAL MEDICINE

## 2025-06-03 PROCEDURE — 82962 GLUCOSE BLOOD TEST: CPT

## 2025-06-03 PROCEDURE — A9270 NON-COVERED ITEM OR SERVICE: HCPCS | Performed by: INTERNAL MEDICINE

## 2025-06-03 PROCEDURE — 80048 BASIC METABOLIC PNL TOTAL CA: CPT

## 2025-06-03 PROCEDURE — RXMED WILLOW AMBULATORY MEDICATION CHARGE: Performed by: HOSPITALIST

## 2025-06-03 RX ORDER — METOPROLOL SUCCINATE 25 MG/1
25 TABLET, EXTENDED RELEASE ORAL DAILY
Qty: 30 TABLET | Refills: 2 | Status: SHIPPED | OUTPATIENT
Start: 2025-06-03

## 2025-06-03 RX ADMIN — ATORVASTATIN CALCIUM 20 MG: 20 TABLET, FILM COATED ORAL at 05:26

## 2025-06-03 RX ADMIN — LOSARTAN POTASSIUM 25 MG: 25 TABLET, FILM COATED ORAL at 05:26

## 2025-06-03 RX ADMIN — AMPICILLIN AND SULBACTAM 3 G: 1; 2 INJECTION, POWDER, FOR SOLUTION INTRAMUSCULAR; INTRAVENOUS at 12:47

## 2025-06-03 RX ADMIN — AMPICILLIN AND SULBACTAM 3 G: 1; 2 INJECTION, POWDER, FOR SOLUTION INTRAMUSCULAR; INTRAVENOUS at 05:26

## 2025-06-03 RX ADMIN — LEVOTHYROXINE SODIUM 175 MCG: 0.12 TABLET ORAL at 05:26

## 2025-06-03 RX ADMIN — ACETAMINOPHEN 650 MG: 325 TABLET ORAL at 05:10

## 2025-06-03 RX ADMIN — APIXABAN 5 MG: 5 TABLET, FILM COATED ORAL at 05:26

## 2025-06-03 RX ADMIN — ACETAMINOPHEN 650 MG: 325 TABLET ORAL at 00:37

## 2025-06-03 RX ADMIN — SPIRONOLACTONE 25 MG: 25 TABLET ORAL at 05:27

## 2025-06-03 RX ADMIN — INSULIN LISPRO 1 UNITS: 100 INJECTION, SOLUTION INTRAVENOUS; SUBCUTANEOUS at 12:43

## 2025-06-03 RX ADMIN — AMPICILLIN AND SULBACTAM 3 G: 1; 2 INJECTION, POWDER, FOR SOLUTION INTRAMUSCULAR; INTRAVENOUS at 00:40

## 2025-06-03 ASSESSMENT — PAIN DESCRIPTION - PAIN TYPE
TYPE: ACUTE PAIN
TYPE: ACUTE PAIN

## 2025-06-03 ASSESSMENT — FIBROSIS 4 INDEX: FIB4 SCORE: 1.16

## 2025-06-03 NOTE — PROGRESS NOTES
Bedside report received and patient care assumed. Pt is resting in bed, A&O4, with no complaints of pain, and is on room air . All fall precautions are in place, belongings at bedside table.  Pt was updated on POC, no questions or concerns. Pt educated on use of call light for assistance.

## 2025-06-03 NOTE — DISCHARGE PLANNING
Case Management Discharge Planning    Admission Date: 5/28/2025  GMLOS: 3.5  ALOS: 6    6-Clicks ADL Score: 20  6-Clicks Mobility Score: 18      Anticipated Discharge Dispo: Discharge Disposition: Discharged to home/self care (01)    DME Needed: Yes    DME Ordered: Yes    Action(s) Taken: OTHER    SW attempted to called [facility] to set patient up with appointment. Left a VM requesting a call back. SW met with patient at bedside to obtain choice for walker. Patient gave choice for [facility]. SW faxed choice form. ALIDA asked DC RN to place traction order for walker.     Pending wound care appointment.    Addendum @ 1240: SW attempted to contact [facility] to schedule patient's wound care appointments. Left a second message asking for a call back.    Addendum @ 1392: SW called [facility] regarding pending outpatient referral. Spoke to Ty. Per Ty, can add appointment for Friday 06/06/2025 @ 1530. Added appointment to AVS.    Escalations Completed: None    Medically Clear: Yes    Next Steps: F/U with medical team to discuss discharge needs and barriers.F/U with [facility].    Barriers to Discharge: Medical clearance and Outpatient referrals pending    Is the patient up for discharge tomorrow: No

## 2025-06-03 NOTE — PROGRESS NOTES
Bedside report received and patient care assumed. Pt is resting in bed, A&O4, with no complaints of pain, and is on room air. Tele box on. All fall precautions are in place, belongings at bedside table.  Pt was updated on POC, no questions or concerns. Pt educated on use of call light for assistance.

## 2025-06-04 ENCOUNTER — PATIENT OUTREACH (OUTPATIENT)
Dept: MEDICAL GROUP | Facility: MEDICAL CENTER | Age: 80
End: 2025-06-04
Payer: MEDICARE

## 2025-06-04 NOTE — PROGRESS NOTES
"Transitional Care Management  TCM Outreach Date and Time: Filed (6/4/2025  7:48 AM)    Discharge Questions  Actual Discharge Date: 06/03/25  Now that you are home, how are you feeling?: Good (\"leg has improved and pt slept\")  Did you receive any new prescriptions?: Yes (amoxicillin-clav, eliquis, metoprolol)  Were you able to get them filled?: Yes  Meds to Bed or Pharmacy filled?: Meds to Bed  Do you have any questions about your current medications or new medications (Review Med Rec)?: No  Did you have any durable medical equipment ordered?: Yes (walker)  Did you receive it?: Yes  Do you have a follow up appointment scheduled with your PCP?: Yes  Appointment Date: 06/11/25  Appointment Time: 1400  Any issues or paperwork you wish to discuss with your PCP?: No  Are you (patient) able to get to the appointment?: Yes  If Home Health was ordered, have they contacted you (Patient): Not Applicable  Did you have enough support after your last discharge?: Yes (daughter and granddaughter)  Does this patient qualify for the CCM program?: No    Transitional Care  Number of attempts made to contact patient: 1  Current or previous attempts completed within two business days of discharge? : Yes  Provided education regarding treatment plan, medications, self-management, ADLs?: Yes (ED precautions)  Has patient completed an Advanced Directive?: No  Has the Care Manager's phone number provided?: Yes  Is there anything else I can help you with?: No    Discharge Summary  Chief Complaint: left lower extremity pain and swelling  Admitting Diagnosis: Cellulitis  Discharge Diagnosis: Cellulitis        "

## 2025-06-04 NOTE — DISCHARGE SUMMARY
Hospital Medicine Discharge Note     Admit Date:  5/28/2025       Discharge Date:   6/3/2025    Attending Physician:  Rell Bey M.D.     Diagnoses (includes active and resolved):   Principal Problem:    Cellulitis (POA: Yes)  Active Problems:    Postsurgical hypothyroidism (POA: Yes)    Dyslipidemia, goal LDL below 100 (POA: Yes)    Morbid obesity (HCC) (POA: Unknown)    DM (diabetes mellitus) (HCC) (POA: Unknown)    Atrial fibrillation with RVR (HCC) (POA: Unknown)    ACP (advance care planning) (POA: Unknown)  Resolved Problems:    * No resolved hospital problems. *      Hospital Summary (Brief Narrative):       80 y.o. female who presented 5/28/2025 with left lower extremity pain and swelling.  Patient lives alone at home and is able to care for self.  She has not been told that she had atrial fibrillation in the past.  For the last 2 weeks, she began to notice a progressively worsening swelling in her lower extremities bilaterally.  She noticed to have worsening pain and erythema on her left lower extremity.  Eventually, she reports that the swelling increased in her right lower extremity, however her left lower extremity continues to be painful, swollen, erythematous.  She went to see her dermatologist today, who recommended her to come into the ER for IV antibiotics.     In ER, patient found to have atrial fibrillation with rapid ventricular response.  Initial white blood cell count 15,000 with left shift.  Chest x-ray negative for acute cardiopulmonary abnormality.  Patient given fluids and Zyvox to which she converted to normal sinus rhythm.  She was started on metoprolol as well as Eliquis.  Wound culture grew out GBS as well as MSSA.  With sensitivities showing ampicillin/sulbactam.  Thus, patient was able to be converted to Augmentin and discharged home.  Patient will follow-up with PCP.  She will also discuss with PCP about necessity of continuing the aspirin 81 which she is not sure why she was on  this for other than heart.    The patient met 2-midnight criteria for an inpatient stay at the time of discharge.     Consultants:      None    Procedures:        None    Discharge Medications:           Medication List        START taking these medications        Instructions   amoxicillin-clavulanate 875-125 MG Tabs  Commonly known as: Augmentin   Take 1 Tablet by mouth 2 times a day for 4 days.  Dose: 1 Tablet     Eliquis 5 MG Tabs  Generic drug: apixaban   Take 1 Tablet by mouth 2 times a day.  Dose: 5 mg     metoprolol SR 25 MG Tb24  Commonly known as: Toprol XL   Take 1 Tablet by mouth every day.  Dose: 25 mg            CONTINUE taking these medications        Instructions   acetaminophen 500 MG Tabs  Commonly known as: Tylenol   Take 500 mg by mouth 2 Times a Day.  Dose: 500 mg     ascorbic acid 500 MG tablet  Commonly known as: Vitamin C   Take 1 Tab by mouth every day.  Dose: 500 mg     aspirin 81 MG tablet   Take 81 mg by mouth every morning.  Dose: 81 mg     atorvastatin 20 MG Tabs  Commonly known as: Lipitor   TAKE 1 TABLET BY MOUTH EVERY DAY. MUST BE SEEN FOR FURTHER REFILLS  Dose: 20 mg     BIOTIN PO   Take 1 Tab by mouth every day.  Dose: 1 Tablet     CALCIUM-MAGNESIUM-VITAMIN D PO   Take 1 Tab by mouth every day.  Dose: 1 Tablet     COENZYME Q10 PO   Take 1 Capsule by mouth every day.  Dose: 1 Capsule     Fiber Powd   Take 1 Packet by mouth every day.  Dose: 1 Packet     FREESTYLE LITE strip  Generic drug: glucose blood   USE 1 STRIP ONCE A DAY AS NEEDED     furosemide 40 MG Tabs  Commonly known as: Lasix   TAKE 1 TABLET BY MOUTH EVERY DAY IN THE MORNING  Dose: 40 mg     GLUCOSAMINE CHONDR COMPLEX PO   Take 1 Capsule by mouth every day.  Dose: 1 Capsule     levothyroxine 175 MCG Tabs  Commonly known as: Synthroid   Doctor's comments: Note dose increase  Take 1 Tablet by mouth every morning on an empty stomach.  Dose: 175 mcg     losartan 25 MG Tabs  Commonly known as: Cozaar   TAKE 1 TABLET BY MOUTH  EVERY DAY  Dose: 25 mg     LUTEIN PO   Take 1 Capsule by mouth every day.  Dose: 1 Capsule     metFORMIN 500 MG Tabs  Commonly known as: Glucophage   TAKE 1 TABLET BY MOUTH TWICE A DAY WITH MEALS  Dose: 500 mg     potassium chloride ER 10 MEQ tablet  Commonly known as: Klor-Con   TAKE 1 TABLET BY MOUTH EVERY DAY  Dose: 10 mEq     spironolactone 25 MG Tabs  Commonly known as: Aldactone   TAKE 1 TABLET BY MOUTH EVERY DAY  Dose: 25 mg     topiramate 50 MG tablet  Commonly known as: Topamax   TAKE 1 TABLET BY MOUTH EVERY DAY  Dose: 50 mg     Tradjenta 5 MG Tabs tablet  Generic drug: linagliptin   Take 1 Tablet by mouth every day.  Dose: 5 mg     Ultra Womens Pack Misc   Take 1 Tab by mouth every day.  Dose: 1 Tablet            Disposition:   Discharge home    Activity:   As tolerated    Code status:   DNAR/DNI    Primary Care Provider:    Chapito Marques M.D.    Follow up appointment details :      Chapito Marques M.D.  [address]  [facility] [address]  [facility] [address]  [phone]    Call in 1 week(s)      Future Appointments   Date Time Provider Department Center   6/6/2025  3:30 PM ERNA Rider [address] [place] [address]   6/11/2025  2:00 PM ERNA Sorensen [address]   6/12/2025  3:30 PM HERON Kinney [address] [place] [address]   6/19/2025  3:30 PM ISI Castorena [address] [place] [address]   6/26/2025  2:15 PM ERNA Rider [address] [place] [address]   7/3/2025  1:00 PM ISI Jose [address] [place] [address]   7/10/2025  2:00 PM ISI Jose [address] [place] [address]   10/8/2025  1:00 PM Chapito Marques M.D. [address]       Pending Studies:        None    Time spent on discharge day patient visit: 32 minutes    #################################################    Interval history/exam for day of discharge:    Vitals:    06/03/25 0400 06/03/25 0526 06/03/25 0726 06/03/25 0800   BP:  125/65 126/67 126/67   Pulse:   78 80   Resp:   16    Temp:   36.6 °C (97.9 °F)    TempSrc:   Temporal    SpO2:   95%   "  Weight: 86.7 kg (191 lb 2.2 oz)      Height:         Weight/BMI: Body mass index is 34.96 kg/m².  Pulse Oximetry: 95 %, O2 (LPM): 0, O2 Delivery Device: None - Room Air    Most Recent Labs:    Lab Results   Component Value Date/Time    WBC 10.9 (H) 06/03/2025 01:23 AM    RBC 4.57 06/03/2025 01:23 AM    HEMOGLOBIN 12.8 06/03/2025 01:23 AM    HEMATOCRIT 40.1 06/03/2025 01:23 AM    MCV 87.7 06/03/2025 01:23 AM    MCH 28.0 06/03/2025 01:23 AM    MCHC 31.9 (L) 06/03/2025 01:23 AM    MPV 9.6 06/03/2025 01:23 AM    NEUTSPOLYS 75.10 (H) 05/29/2025 12:21 AM    LYMPHOCYTES 12.10 (L) 05/29/2025 12:21 AM    MONOCYTES 9.30 05/29/2025 12:21 AM    EOSINOPHILS 2.20 05/29/2025 12:21 AM    BASOPHILS 0.50 05/29/2025 12:21 AM      Lab Results   Component Value Date/Time    SODIUM 136 06/03/2025 01:23 AM    POTASSIUM 4.4 06/03/2025 01:23 AM    CHLORIDE 106 06/03/2025 01:23 AM    CO2 21 06/03/2025 01:23 AM    GLUCOSE 164 (H) 06/03/2025 01:23 AM    BUN 15 06/03/2025 01:23 AM    CREATININE 0.56 06/03/2025 01:23 AM    BUNCREATRAT 44 (H) 03/31/2025 05:04 AM      Lab Results   Component Value Date/Time    ALTSGPT 42 05/29/2025 12:21 AM    ASTSGOT 34 05/29/2025 12:21 AM    ALKPHOSPHAT 122 (H) 05/29/2025 12:21 AM    TBILIRUBIN 0.4 05/29/2025 12:21 AM    ALBUMIN 3.1 (L) 05/29/2025 12:21 AM    GLOBULIN 3.3 05/29/2025 12:21 AM     No results found for: \"PROTHROMBTM\", \"INR\"   Culture Wound w/Gram Stain [[id number]] (Abnormal)  Collected: 05/29/25 1150   Order Status: Completed Specimen: Wound from Left Leg Updated: 06/01/25 1026    Significant Indicator POS Positive     Source WND    Site LEFT LEG    Culture Result Moderate growth usual skin radha. Abnormal     Gram Stain Result Rare WBCs.  Rare Gram positive cocci.    Culture Result Streptococcus agalactiae (Group B)  Heavy growth   Abnormal      Staphylococcus aureus  Moderate growth  Methicillin sensitive by screening method  This isolate is presumed to be clindamycin resistant based " on  detection of inducible resistance.   Abnormal    Susceptibility    Staphylococcus aureus (2)    Antibiotic Interpretation Microscan  Method Status    Ampicillin/sulbactam Sensitive <=8/4 mcg/mL ZARI Final    Clindamycin Resistant 0.5 mcg/mL ZARI Final    Cefazolin Sensitive <=8 mcg/mL ZARI Final    Cefepime Sensitive <=4 mcg/mL ZARI Final    Daptomycin Sensitive <=0.5 mcg/mL ZARI Final    Erythromycin Resistant >4 mcg/mL ZARI Final    Oxacillin Sensitive 0.5 mcg/mL ZARI Final    Trimeth/Sulfa Sensitive <=0.5/9.5 mcg/mL ZARI Final    Tetracycline Sensitive <=4 mcg/mL ZARI Final    Vancomycin Sensitive 1 mcg/mL ZARI Final            Imaging/ Testing:      CT-LOWER EXTREMITY UNILATERAL W/O CONTRAST LEFT   Final Result      1.  Chronic fracture deformities of the distal tibia and fibula.   2.  No acute fracture or dislocation.   3.  Degenerative changes are noted involving the knee, ankle, and foot.   4.  There is diffuse soft tissue edema. This may be seen with cellulitis.      EC-ECHOCARDIOGRAM COMPLETE W/ CONT   Final Result      US-EXTREMITY VENOUS LOWER UNILAT LEFT   Final Result      DX-CHEST-PORTABLE (1 VIEW)   Final Result         No acute cardiac or pulmonary abnormality is identified.          Instructions:      The patient was instructed to return to the ER in the event of worsening symptoms. I have counseled the patient on the importance of compliance and the patient has agreed to proceed with all medical recommendations and follow up plan indicated above.   The patient understands that all medications come with benefits and risks. Risks may include permanent injury or death and these risks can be minimized with close reassessment and monitoring.

## 2025-06-06 ENCOUNTER — OFFICE VISIT (OUTPATIENT)
Dept: WOUND CARE | Facility: MEDICAL CENTER | Age: 80
End: 2025-06-06
Attending: NURSE PRACTITIONER
Payer: MEDICARE

## 2025-06-06 DIAGNOSIS — R60.0 VENOUS STASIS ULCER OF LEFT LOWER LEG WITH EDEMA OF LEFT LOWER LEG (HCC): ICD-10-CM

## 2025-06-06 DIAGNOSIS — L97.929 VENOUS STASIS ULCER OF LEFT LOWER LEG WITH EDEMA OF LEFT LOWER LEG (HCC): ICD-10-CM

## 2025-06-06 DIAGNOSIS — I83.892 VENOUS STASIS ULCER OF LEFT LOWER LEG WITH EDEMA OF LEFT LOWER LEG (HCC): ICD-10-CM

## 2025-06-06 DIAGNOSIS — I87.2 VENOUS INSUFFICIENCY OF BOTH LOWER EXTREMITIES: ICD-10-CM

## 2025-06-06 DIAGNOSIS — I83.029 VENOUS STASIS ULCER OF LEFT LOWER LEG WITH EDEMA OF LEFT LOWER LEG (HCC): ICD-10-CM

## 2025-06-06 DIAGNOSIS — E11.65 UNCONTROLLED TYPE 2 DIABETES MELLITUS WITH HYPERGLYCEMIA (HCC): ICD-10-CM

## 2025-06-06 DIAGNOSIS — L03.116 CELLULITIS OF LEFT LOWER EXTREMITY: Primary | ICD-10-CM

## 2025-06-06 PROCEDURE — 1170F FXNL STATUS ASSESSED: CPT | Performed by: NURSE PRACTITIONER

## 2025-06-06 PROCEDURE — 99214 OFFICE O/P EST MOD 30 MIN: CPT | Mod: 25 | Performed by: NURSE PRACTITIONER

## 2025-06-06 PROCEDURE — 11042 DBRDMT SUBQ TIS 1ST 20SQCM/<: CPT | Performed by: NURSE PRACTITIONER

## 2025-06-06 PROCEDURE — 11042 DBRDMT SUBQ TIS 1ST 20SQCM/<: CPT

## 2025-06-06 PROCEDURE — 99214 OFFICE O/P EST MOD 30 MIN: CPT

## 2025-06-06 PROCEDURE — 99213 OFFICE O/P EST LOW 20 MIN: CPT

## 2025-06-06 ASSESSMENT — ENCOUNTER SYMPTOMS
VOMITING: 0
WEIGHT LOSS: 0
SENSORY CHANGE: 0
DIARRHEA: 0
DEPRESSION: 0
DIZZINESS: 0
EYES NEGATIVE: 1
COUGH: 0
NAUSEA: 0
SHORTNESS OF BREATH: 0
CHILLS: 0
NERVOUS/ANXIOUS: 0
PALPITATIONS: 0
WHEEZING: 0
WEAKNESS: 0
MYALGIAS: 0
HEADACHES: 0
FEVER: 0
FALLS: 0

## 2025-06-06 NOTE — PROGRESS NOTES
Provider Encounter- Lower Extremity Ulcer      HISTORY OF PRESENT ILLNESS  Wound History:    START OF CARE IN CLINIC: 6/6/25    REFERRING PROVIDER: Rell Bey MD     WOUND ETIOLOGY: venous   LOCATION: Left anterior lower leg     Left medial posterior lower leg       HISTORY: Approximately 3 weeks prior to being admitted to the hospital from 5/28/2025 - 6/3/2025, patient developed increased edema to BLE.  She noticed increased pain, erythema in her left lower leg.  She followed up with her dermatologist who recommended she proceed to ER for further workup and IV antibiotics.  Prior to coming into the hospital, patient applied antibiotic ointment to the legs and left them open to air.  She did notice significant serous drainage.  While admitted she is found to be in A-fib with rapid ventricular response.  She did have leukocytosis.  She was given fluids and Zyvox and she converted to normal sinus rhythm.  She was started on Eliquis.  Wound culture was positive for group B strep as well as MSSA.  She was switched to Augmentin and discharged to home.  She was referred to [facility] wound care clinic for further treatment and care.  While admitted she was seen by inpatient wound team on 5/29/2025.  Xeroform and rolled gauze dressing was initiated.    Pertinent Medical History: Morbid obesity, hypertension, dyslipidemia, heart failure, A-fib with RVR, started on Eliquis, type 2 diabetes   ETIOLOGY HISTORY: Diagnosed no. Vascular Surgeon: no. Previous vascular procedures no, US no dvt. Compression no. Varicose Veins yes         TOBACCO USE: No    Patient's problem list, allergies, and current medications reviewed and updated in Epic    Interval History:  6/6/2025: Initial wound evaluation, initial provider Clinic visit with Africa HAWKINS, FNP-BC, CWON, CFCN.  Pt denies fevers, chills, nausea, vomiting.  Isaiah accompanied by granddaughter Cecile.  Initiate 2 layer compression wrap.  Referral to home health  placed.  Continues on Augmentin.  Set to finish tomorrow.  Continues to have cellulitis.  Augmentin extended for an additional 7 days.  End date 6/14/2025.  Patient has follow-up with her PCP on 6/11/2025.  Follow-up regarding anticoagulants and antiplatelets.            REVIEW OF SYSTEMS:   Review of Systems   Constitutional:  Negative for chills, fever and weight loss.   HENT: Negative.     Eyes: Negative.    Respiratory:  Negative for cough, shortness of breath and wheezing.    Cardiovascular:  Positive for leg swelling. Negative for chest pain and palpitations.   Gastrointestinal:  Negative for diarrhea, nausea and vomiting.   Genitourinary: Negative.    Musculoskeletal:  Negative for falls and myalgias.   Skin:  Negative for itching.        Left leg wounds   Neurological:  Negative for dizziness, sensory change, weakness and headaches.   Psychiatric/Behavioral:  Negative for depression. The patient is not nervous/anxious.          PHYSICAL EXAMINATION:   LMP  (LMP Unknown)     Physical Exam  Vitals reviewed.   Constitutional:       Appearance: Normal appearance.   Cardiovascular:      Rate and Rhythm: Normal rate.      Pulses: Normal pulses.      Comments: Brisk biphasic PT/DP bilaterally with hand-held Doppler  +2 DP, +2 PT palpated to right foot  Unable to palpate left foot secondary to edema  Pulmonary:      Effort: Pulmonary effort is normal.   Musculoskeletal:         General: Swelling present.      Right lower leg: Edema present.      Left lower leg: Edema present.      Comments: Left leg edema worse than right leg edema, dependent rubor, elevation pallor present  +4 pitting edema to LLE.  Edema concentrated to foot and also to lower leg   Skin:     Comments: Venous stasis ulcer left posterior medial lower leg: Full-thickness, shallow denuded macerated slightly blistered epithelium, moderate heavy serous drainage, residual erythema, severe edema.  Despite elevation, she does have remaining  erythema    Left anterior lower leg: Partial-thickness, shallow, moderate serous drainage, no odor, residual erythema, edema       Neurological:      Mental Status: She is alert.   Psychiatric:         Mood and Affect: Mood normal.         WOUND ASSESSMENT  Wound 05/29/25 Left posteromedial LE (Active)   Wound Image    06/06/25 1530   Site Assessment Pink;Red;Painful 06/06/25 1530   Periwound Assessment Hemosiderin Staining;Edema;Painful 06/06/25 1530   Margins Attached edges;Undefined edges 06/06/25 1530   Drainage Amount Small 06/06/25 1530   Drainage Description Serosanguineous 06/06/25 1530   Treatments Topical Lidocaine;Cleansed;Provider debridement;Site care 06/06/25 1530   Wound Cleansing Hypochlorus Acid 06/06/25 1530   Periwound Protectant Silicone barrier cream 06/06/25 1530   Dressing Status Old drainage 06/06/25 1530   Dressing Changed New 06/06/25 1530   Dressing Cleansing/Solutions Not Applicable 06/06/25 1530   Dressing Options Viscopaste;Compression Wrap Two Layer 06/06/25 1530   Dressing Change/Treatment Frequency Weekly, and As Needed 06/06/25 1530   Wound Team Following Weekly 06/06/25 1530   Non-staged Wound Description Partial thickness 06/06/25 1530   Wound Length (cm) 4.5 cm 06/06/25 1530   Wound Width (cm) 6 cm 06/06/25 1530   Wound Depth (cm) 0.1 cm 06/06/25 1530   Wound Surface Area (cm^2) 21.21 cm^2 06/06/25 1530   Wound Volume (cm^3) 1.414 cm^3 06/06/25 1530   Post-Procedure Length (cm) 6 cm 06/06/25 1530   Post-Procedure Width (cm) 6 cm 06/06/25 1530   Post-Procedure Depth (cm) 0.1 cm 06/06/25 1530   Post-Procedure Surface Area (cm^2) 28.27 cm^2 06/06/25 1530   Post-Procedure Volume (cm^3) 1.885 cm^3 06/06/25 1530   Wound Healing % -502 06/06/25 1530   Tunneling (cm) 0 cm 06/06/25 1530   Undermining (cm) 0 cm 06/06/25 1530   Wound Odor None 06/06/25 1530   Pulses Left;Not palpable;DP;PT;Doppler 06/06/25 1530   Number of days: 8       Wound 06/06/25 Left anterior LE (Active)   Wound Image    06/06/25 1530   Site Assessment Crusted 06/06/25 1530   Periwound Assessment Hemosiderin Staining;Edema 06/06/25 1530   Margins Attached edges 06/06/25 1530   Drainage Amount None 06/06/25 1530   Treatments Topical Lidocaine;Cleansed;Site care 06/06/25 1530   Wound Cleansing Hypochlorus Acid 06/06/25 1530   Periwound Protectant Silicone barrier cream 06/06/25 1530   Dressing Status Intact 06/06/25 1530   Dressing Changed Changed 06/06/25 1530   Dressing Cleansing/Solutions Not Applicable 06/06/25 1530   Dressing Options Viscopaste;Compression Wrap Two Layer 06/06/25 1530   Dressing Change/Treatment Frequency Weekly, and As Needed 06/06/25 1530   Wound Team Following Weekly 06/06/25 1530   Tunneling (cm) 0 cm 06/06/25 1530   Undermining (cm) 0 cm 06/06/25 1530   Wound Odor None 06/06/25 1530   Pulses Left;Not palpable;DP;PT;Doppler 06/06/25 1530   Exposed Structures None 06/06/25 1530   Number of days: 0           PROCEDURE: Debridement of left lower leg gaiter region  -2% viscous lidocaine applied topically to wound bed for approximately 5 minutes prior to debridement  -Curette used to excise nonviable tissue from wound bed.  Excisional debridement was performed to remove devitalized tissue until healthy, bleeding tissue was visualized.   Entire surface of wound, less than 20 cm² debrided.  Tissue debrided into the subcutaneous layer.    -Bleeding controlled with manual pressure.   -Wound care completed by wound RN, refer to wound flowsheet   -Patient tolerated the procedure well, without c/o of significant pain or discomfort.       Pertinent Labs and Diagnostics:    Labs:   A1c:   Lab Results   Component Value Date/Time    HBA1C 7.6 (H) 03/31/2025 05:04 AM    HBA1C 7.0 (H) 03/11/2019 02:05 PM            IMAGING: See epic    VASCULAR STUDIES: See epic  No results found.    LAST  WOUND CULTURE:  DATE :    Lab Results   Component Value Date/Time    CULTRSULT Moderate growth usual skin radha. (A) 05/29/2025 11:50 AM     CULTRSULT Streptococcus agalactiae (Group B)  Heavy growth   (A) 05/29/2025 11:50 AM    CULTRSULT (A) 05/29/2025 11:50 AM     Staphylococcus aureus  Moderate growth  Methicillin sensitive by screening method  This isolate is presumed to be clindamycin resistant based on  detection of inducible resistance.                ASSESSMENT AND PLAN:     1. Venous stasis ulcer of left lower leg with edema of left lower leg (HCC)  2. Venous insufficiency of both lower extremities  Developed worsening BLE edema around beginning of May 2025 and significant erythema to LLE progressed prompting admission to hospital.    6/6/2025: Initial assessment, scattered superficial ulcerations to left gaiter region.  Minimal slough, denuded epithelium with maceration.  Prior to this patient did have Xeroform dressing on that she changed daily  - Excisional debridement performed today.  Medically necessary to promote wound healing.  - Initiate 2 layer compression wrap to control drainage.  Patient to keep wrap dry in between clinic appointments.  Recommend obtaining cast protector to keep wrap dry during showering.  If unable to tolerate wrap, patient to remove and apply Tubigrip which was provided to patient.    - -Discussed in detail the etiology, management and prevention of venous stasis/venous insufficiency/lymphedema.  See education section below.  -Measure and order for compression garments at next appointment once edema is controlled.   -Referral to home health placed.  Uses walker, depends on others for transportation.  Not working  -Patient to follow-up 2 times next week until patient is confirmed to have home health.    Wound care: Viscopaste patch, 2 layer compression wrap      3. Cellulitis of left lower extremity  6/6/2025: Was given 10-day course of antibiotics between hospitalization and discharging with Augmentin.  - Despite elevation, leg remains erythemic  - Augmentin extended for 1 week  -Patient currently tolerating  Augmentin.    4. Uncontrolled type 2 diabetes mellitus with hyperglycemia (HCC)    6/6/2025: Patient checking her blood sugars daily.  Averaging between 120s to 140s.  - Foot exam completed by PCP on 4/7/2025.  She had mild diminished sensation.  - She does see podiatry  -A1c 7.6% on 3/31/2025  -  Implications of loss of protective sensation (LOPS) discussed with patient- including increased risk for amputation.  Advised to check feet at least daily, moisturize feet, and to always wear protective foot wear.               PATIENT EDUCATION  - Etiology of  venous stasis ulceration discussed with patient  - Importance of managing edema for healing of ulcer, and for prevention of new ulcer development  -Need for lifelong compression of lower legs   -Elevate legs above the level of the heart periodically throughout the day.  - Importance of adequate nutrition for wound healing  -Advised to go to ER for any increased redness, swelling, drainage or odor, or if patient develops fever, chills, nausea or vomiting.      My total time spent caring for the patient on the day of the encounter was 30 minutes.   This does not include time spent on separately billable procedures/tests.       Please note that this note may have been created using voice recognition software. I have worked with technical experts from [employer] to optimize the interface.  I have made every reasonable attempt to correct obvious errors, but there may be errors of grammar and possibly     N

## 2025-06-06 NOTE — PATIENT INSTRUCTIONS
"The following information is a summary of the education provided in the clinic today. This is not an exhaustive list of the education provided during your appointment.       DRESSING CHANGES    Keep your wound dressing clean, dry, and intact.    You may not shower with the dressing(s) off. Please do not take baths, or swim in the ocean, lakes, rivers, pools, or hot tubs.      Wounds do not need to \"air out\" or \"breathe\". Gently dry your wound before placing a new dressing.     After you get out of the shower, wash the wound a second time (with soap and water, wound cleanser, or saline). Gently dry the wound before you place a new dressing.       If you need to change your dressings at home, you should wash your wound. Use normal saline, wound cleanser, hypochlorous acid, or unscented soap and water. Do not use hydrogen peroxide or rubbing alcohol to clean your wounds. Hydrogen peroxide and rubbing alcohol will damage new cells and tissue. Do not use betadine or iodine unless told to by your wound care team.    Do not soak your wounds in epsom salt baths. This can worsen your wound(s) or delay wound healing. It can also lead to infection or maceration (tissue is too wet).     If you do not have home health, the clinic will give you with leftover supplies from your appointment. We do not give out extra dressing supplies. We will order you supplies through a DME company. Your insurance may or may not pay for all these supplies. The company will reach out to you if insurance does not cover supplies. These supplies will be sent to your home within a few days. If you do have home health, they will provide wound care supplies.     The dressings we use may change as your wound changes.     COMPRESSION   -Compression helps reduce swelling and helps wounds heal quicker. It is still important to elevate your feet several times daily to reduce swelling, even when you are wearing compression. It is important to wear compression " every day, to help your wound heal, and to prevent new wounds from developing.      -Today, a 2 layer compression wrap was applied. Please take off the wrap if you have pain, extreme swelling, new numbness or tingling, a change in the color or temperature of your feet. Take off the wrap if the wrap slides down/bunches up Take off the wrap if it gets wet or leaks through.  If you have to take off the wrap, please do not cut it off with scissors or other sharp tools. Do not trim the wrap. Unravel the wrap one layer at a time. Place a clean and dry dressing over the wound. Do not leave wrap on for more than seven days at a time. You can wear a cast protector over your wrap so you can take a shower. Cast protectors can be found at most [employer] and [employer]. [employer] does not endorse any brand of cast protector.      GENERAL HEALTH ADVICE   -Nutrition is important for wound healing. Unless told otherwise by your doctor, eat more protein and consider supplementing with a multi-vitamin, zinc, and vitamin C.       CLINIC INFORMATION  The clinic's hours are Monday-Friday, 7:30 AM to 5:00 PM. We are closed most holidays and on weekends. If you leave us a message, please allow 24 hours for someone to return your call. If you have concerns or are having a medical emergency, call 911 or go to the hospital emergency room.     You might not see the same nurse or provider every visit.     If you notice any large changes in your wound(s), or signs of infection (redness, swelling, localized heat, increased pain, fever > 101 F, chills, nausea/vomiting) or have any questions about your home care instructions, please call the wound center at [phone]. If it's after hours, contact your primary care physician or go to the hospital emergency room. If you are admitted to any hospital, you will need a new referral to come back to the wound clinic. Any wound care appointments that you already have may be cancelled.    If you are 5 or  more minutes late for an appointment, we reserve the right to cancel and reschedule that appointment. For example, if your appointment is at 1:00 PM, and you arrive at 1:06 PM, you are more than five minutes late and might not be seen. If you are consistently late or not coming to your appointments (typically 3 late cancellations and/or no shows), we reserve the right to cancel your future appointments or discharge you from the clinic. It is then your responsibility to obtain a new referral if wound care is still needed.       Please follow up with your primary care doctor and orthopedics in 1-3 days   Please be aware of any new or worsening signs or symptoms that should prompt your return to the ER.      Tendinitis    Tendinitis is inflammation of a tendon. A tendon is a strong cord of tissue that connects muscle to bone. Tendinitis can affect any tendon, but it most commonly affects the shoulder tendon (rotator cuff), ankle tendon (Achilles tendon), elbow tendon (triceps tendon), or one of the tendons in the wrist.    What are the causes?  This condition may be caused by:  Overusing a tendon or muscle. This is common.  Age-related wear and tear.  Injury.  Inflammatory conditions, such as arthritis.  Certain medicines.  What increases the risk?  This condition is more likely to develop in people who do activities that involve repetitive motions.    What are the signs or symptoms?  Symptoms of this condition may include:  Pain.  Tenderness.  Mild swelling.  How is this diagnosed?  This condition is diagnosed with a physical exam. You may also have tests, such as:  Ultrasound. This uses sound waves to make an image of your affected area.  MRI.  How is this treated?  This condition may be treated by resting, icing, applying pressure (compression), and raising (elevating) the area above the level of your heart. This is known as RICE therapy. Treatment may also include:  Medicines to help reduce inflammation or to help reduce pain.  Exercises or physical therapy to strengthen and stretch the tendon.  A brace or splint.  Surgery (rare).  Follow these instructions at home:  Image   If you have a splint or brace:     Wear the splint or brace as told by your health care provider. Remove it only as told by your health care provider.  Loosen the splint or brace if your fingers or toes tingle, become numb, or turn cold and blue.  Do not take baths, swim, or use a hot tub until your health care provider approves. Ask your health care provider if you can take showers. You may only be allowed to take sponge baths for bathing.  Do not let your splint or brace get wet if it is not waterproof.  If your splint or brace is not waterproof, cover it with a watertight plastic bag when you take a bath or a shower.  Keep the splint or brace clean.  Managing pain, stiffness, and swelling     If directed, apply ice to the affected area.  Put ice in a plastic bag.  Place a towel between your skin and the bag.  Leave the ice on for 20 minutes, 2–3 times a day.  If directed, apply heat to the affected area as often as told by your health care provider. Use the heat source that your health care provider recommends, such as a moist heat pack or a heating pad.  Place a towel between your skin and the heat source.  Leave the heat on for 20–30 minutes.  Remove the heat if your skin turns bright red. This is especially important if you are unable to feel pain, heat, or cold. You may have a greater risk of getting burned.  Move the fingers or toes of the affected limb often, if this applies. This can help to prevent stiffness and lessen swelling.  If directed, elevate the affected area above the level of your heart while you are sitting or lying down.  Driving     Do not drive or operate heavy machinery while taking prescription pain medicine.  Ask your health care provider when it is safe to drive if you have a splint or brace on any part of your arm or leg.  Activity     Return to your normal activities as told by your health care provider. Ask your health care provider what activities are safe for you.  Rest the affected area as told by your health care provider.  Avoid using the affected area while you are experiencing symptoms of tendinitis.  Do exercises as told by your health care provider.  General instructions     If you have a splint, do not put pressure on any part of the splint until it is fully hardened. This may take several hours.  Wear an elastic bandage or compression wrap only as told by your health care provider.  Take over-the-counter and prescription medicines only as told by your health care provider.  Keep all follow-up visits as told by your health care provider. This is important.  Contact a health care provider if:  Your symptoms do not improve.  You develop new, unexplained problems, such as numbness in your hands.  This information is not intended to replace advice given to you by your health care provider. Make sure you discuss any questions you have with your health care provider.    Document Released: 12/15/2001 Document Revised: 08/17/2017 Document Reviewed: 09/19/2016  [employer]vier Interactive Patient Education © 2019 [employer]    Ankle Pain    Many things can cause ankle pain, including an injury to the area and overuse of the ankle. The ankle joint holds your body weight and allows you to move around. Ankle pain can occur on either side or the back of one ankle or both ankles. Ankle pain may be sharp and burning or dull and aching. There may be tenderness, stiffness, redness, or warmth around the ankle.     HOME CARE INSTRUCTIONS  Activity    Rest your ankle as told by your health care provider. Avoid any activities that cause ankle pain.  Do exercises as told by your health care provider.  Ask your health care provider if you can drive.    Using a Brace, a Bandage, or Crutches    If you were given a brace:  Wear it as told by your health care provider.  Remove it when you take a bath or a shower.  Try not to move your ankle very much, but wiggle your toes from time to time. This helps to prevent swelling.  If you were given an elastic bandage:  Remove it when you take a bath or a shower.  Try not to move your ankle very much, but wiggle your toes from time to time. This helps to prevent swelling.  Adjust the bandage to make it more comfortable if it feels too tight.  Loosen the bandage if you have numbness or tingling in your foot or if your foot turns cold and blue.  If you have crutches, use them as told by your health care provider. Continue to use them until you can walk without feeling pain in your ankle.    Managing Pain, Stiffness, and Swelling    Raise (elevate) your ankle above the level of your heart while you are sitting or lying down.  If directed, apply ice to the area:  Put ice in a plastic bag.  Place a towel between your skin and the bag.  Leave the ice on for 20 minutes, 2–3 times per day.    General Instructions    Keep all follow-up visits as told by your health care provider. This is important.  Record this information that may be helpful for you and your health care provider:  How often you have ankle pain.  Where the pain is located.  What the pain feels like.  Take over-the-counter and prescription medicines only as told by your health care provider.    SEEK MEDICAL CARE IF:  Your pain gets worse.  Your pain is not relieved with medicines.  You have a fever or chills.  You are having more trouble with walking.  You have new symptoms.    SEEK IMMEDIATE MEDICAL CARE IF:  Your foot, leg, toes, or ankle tingles or becomes numb.  Your foot, leg, toes, or ankle becomes swollen.  Your foot, leg, toes, or ankle turns pale or blue.    ADDITIONAL NOTES AND INSTRUCTIONS    Please follow up with your Primary MD in 24-48 hr.  Seek immediate medical care for any new/worsening signs or symptoms.

## 2025-06-07 NOTE — PROGRESS NOTES
Educated patient to keep wound dressing clean, dry, and intact. Change your dressing if the dressing becomes, soiled, leaks, gets wet, or falls off. May not shower with the dressing(s) off. Please do not take baths, or swim in the ocean, lakes, rivers, pools, or hot tubs. If you need to change your dressings at home, you should wash your wound. Use normal saline, wound cleanser, hypochlorous acid, or unscented soap and water. Do not use hydrogen peroxide or rubbing alcohol to clean your wounds. Hydrogen peroxide and rubbing alcohol will damage new cells and tissue. Do not use betadine or iodine unless told to by your wound care team.     -Compression helps reduce swelling and helps wounds heal quicker. It is still important to elevate your feet several times daily to reduce swelling, even when you are wearing compression. It is important to wear compression every day, to help your wound heal, and to prevent new wounds from developing.      -Today, a 2 layer compression wrap was applied. Please take off the wrap if you have pain, extreme swelling, new numbness or tingling, a change in the color or temperature of your feet. Take off the wrap if the wrap slides down/bunches up Take off the wrap if it gets wet or leaks through.  If you have to take off the wrap, please do not cut it off with scissors or other sharp tools. Do not trim the wrap. Unravel the wrap one layer at a time. Place a clean and dry dressing over the wound. Do not leave wrap on for more than seven days at a time. You can wear a cast protector over your wrap so you can take a shower. Cast protectors can be found at most drug [employer] and [employer]. [employer] does not endorse any brand of cast protector.      Patient and granddaughter verbalized understanding of all education provided. Provided them with a tensogrip in case they need to remove the wrap over the weekend. Patient will be coming back two times next week until HH can be established to ensure that  she is tolerating the wrap well.

## 2025-06-09 ENCOUNTER — NON-PROVIDER VISIT (OUTPATIENT)
Dept: WOUND CARE | Facility: MEDICAL CENTER | Age: 80
End: 2025-06-09
Attending: NURSE PRACTITIONER
Payer: MEDICARE

## 2025-06-09 ENCOUNTER — TELEPHONE (OUTPATIENT)
Dept: WOUND CARE | Facility: MEDICAL CENTER | Age: 80
End: 2025-06-09
Payer: MEDICARE

## 2025-06-09 DIAGNOSIS — E66.812 OBESITY, CLASS II, BMI 35-39.9: ICD-10-CM

## 2025-06-09 PROCEDURE — 99213 OFFICE O/P EST LOW 20 MIN: CPT

## 2025-06-09 NOTE — DOCUMENTATION QUERY
"                                                                         [facility]                                                                       Query Response Note      PATIENT:               JA MAHER  ACCT #:                  [id number]  MRN:                     [id number]  :                      1945  ADMIT DATE:       2025 3:20 PM  DISCH DATE:        6/3/2025 2:14 PM  RESPONDING  PROVIDER #:        [id number]           QUERY TEXT:    The diagnosis of sepsis has been documented in the Emergency Department note without an associated organ dysfunction.     Please clarify the status of sepsis by providing SIRS criteria and sepsis-related organ dysfunction.    Sepsis - real or suspected infection plus 2 or more SIRS criteria + organ dysfunction related to sepsis    Temp <96.8 or >101  HR >90  RR >20  WBC <4,000 or > 12,000 or >10% bandemia    The patient's Clinical Indicators include:  81yo with dx of cellulitis, DM2, atrial fibrillation, chronic diastolic heart failure, BMI >30     ED note \"sepsis with acute organ dysfunction without septic shock\"     WBC 15     Wound Cx GBS and staphylococcus aureus     Epic Nurse VS HR      Risk factors: advanced age, cellulitis of LLE, chronic diastolic heart failure, nonpressure ulcer of left lower leg, afib with RVR, DM2  Treatments: labwork, antibiotics, imaging, monitoring, wound care consult, IVF    Note: If you agree with a diagnosis listed above, please remember to include it in your concurrent daily documentation and onto the Discharge Summary.    Contact me with questions.     Thank you,  LI Corbett, CDI  [email or web address]  Options provided:   -- Sepsis does not exist - amended documentation in the medical record and updated problem list   -- Sepsis exists, (provide SIRS criteria plus sepsis-related organ dysfunction)   -- Other explanation, Please specify      Query created by: Kitty Jung on 2025 " 12:22 PM    RESPONSE TEXT:    Sepsis does not exist - amended documentation in the medical record and updated problem list          Electronically signed by:  PUJA GUILLORY MD 6/9/2025 12:36 PM

## 2025-06-09 NOTE — PATIENT INSTRUCTIONS
The following information is a summary of the education provided in the clinic today. This is not an exhaustive list of the education provided during your appointment.     COMPRESSION   -Compression helps reduce swelling and helps wounds heal quicker. It is still important to elevate your feet several times daily to reduce swelling, even when you are wearing compression. It is important to wear compression every day, to help your wound heal, and to prevent new wounds from developing.      -Today, a 2 layer compression wrap was applied. Please take off the wrap if you have pain, extreme swelling, new numbness or tingling, a change in the color or temperature of your feet. Take off the wrap if the wrap slides down/bunches up Take off the wrap if it gets wet or leaks through.  If you have to take off the wrap, please do not cut it off with scissors or other sharp tools. Do not trim the wrap. Unravel the wrap one layer at a time. Place a clean and dry dressing over the wound. Do not leave wrap on for more than seven days at a time. You can wear a cast protector over your wrap so you can take a shower. Cast protectors can be found at most [employer] and [employer]. [employer] does not endorse any brand of cast protector.      GENERAL HEALTH ADVICE   -High blood sugar, like with diabetes, can delay or reverse wound healing by impacting your immune system. It also increases the chances of infection. Wounds heal best when your blood sugar is consistently less than 140 mg/dL. It is important to check your blood sugar regularly.      -Nutrition is important for wound healing. Unless told otherwise by your doctor, eat more protein and consider supplementing with a multi-vitamin, zinc, and vitamin C.       CLINIC INFORMATION  The clinic's hours are Monday-Friday, 7:30 AM to 5:00 PM. We are closed most holidays and on weekends. If you leave us a message, please allow 24 hours for someone to return your call. If you have concerns or are  having a medical emergency, call 911 or go to the hospital emergency room.     You might not see the same nurse or provider every visit.     If you notice any large changes in your wound(s), or signs of infection (redness, swelling, localized heat, increased pain, fever > 101 F, chills, nausea/vomiting) or have any questions about your home care instructions, please call the wound center at [phone]. If it's after hours, contact your primary care physician or go to the hospital emergency room. If you are admitted to any hospital, you will need a new referral to come back to the wound clinic. Any wound care appointments that you already have may be cancelled.    If you are 5 or more minutes late for an appointment, we reserve the right to cancel and reschedule that appointment. For example, if your appointment is at 1:00 PM, and you arrive at 1:06 PM, you are more than five minutes late and might not be seen. If you are consistently late or not coming to your appointments (typically 3 late cancellations and/or no shows), we reserve the right to cancel your future appointments or discharge you from the clinic. It is then your responsibility to obtain a new referral if wound care is still needed.

## 2025-06-09 NOTE — TELEPHONE ENCOUNTER
Ragini will have start of care with Lupe DOSS on Wednesday, 6/11/2025. Requested all appointments be changed to provider  starting 6/19/2025.

## 2025-06-09 NOTE — PROGRESS NOTES
Non-selective debridement was performed using a no-rinse foam cleanser and a washcloth to remove non-viable tissue from the wound bed and periwound areas. Following debridement, gauze soaked in hypochlorous acid was allowed to dwell over the wound bed for approximately 5 minutes prior to dressing application.    Skin moisturizer was applied to the intact skin of the left lower extremity. A Viscopaste patch was applied over the open wound and surrounding periwound areas, then secured with a two-layer compression wrap. The compression wrap was applied from the base of the toes to two fingerbreadths below the popliteal crease.    The patient was instructed on appropriate compression wrap care, including when early removal may be necessary (e.g., increased pain, numbness, or signs of impaired circulation). The patient was also educated on how to properly remove the compression wrap if needed, specifically to unwrap rather than cut the layers to avoid injury.    Refer to the wound care flow sheet for additional treatment details. The patient tolerated the procedure well.    The patient is now scheduled for provider appointments moving forward, as [facility] will begin following patient starting Wednesday, 06/11/25.

## 2025-06-11 ENCOUNTER — OFFICE VISIT (OUTPATIENT)
Dept: MEDICAL GROUP | Facility: MEDICAL CENTER | Age: 80
End: 2025-06-11
Payer: MEDICARE

## 2025-06-11 VITALS
BODY MASS INDEX: 35.15 KG/M2 | DIASTOLIC BLOOD PRESSURE: 58 MMHG | OXYGEN SATURATION: 97 % | WEIGHT: 191 LBS | TEMPERATURE: 97.4 F | SYSTOLIC BLOOD PRESSURE: 108 MMHG | HEIGHT: 62 IN | HEART RATE: 89 BPM | RESPIRATION RATE: 21 BRPM

## 2025-06-11 DIAGNOSIS — R60.0 VENOUS STASIS ULCER OF LEFT LOWER LEG WITH EDEMA OF LEFT LOWER LEG (HCC): ICD-10-CM

## 2025-06-11 DIAGNOSIS — E11.65 UNCONTROLLED TYPE 2 DIABETES MELLITUS WITH HYPERGLYCEMIA (HCC): ICD-10-CM

## 2025-06-11 DIAGNOSIS — I48.91 ATRIAL FIBRILLATION WITH RVR (HCC): ICD-10-CM

## 2025-06-11 DIAGNOSIS — L97.929 VENOUS STASIS ULCER OF LEFT LOWER LEG WITH EDEMA OF LEFT LOWER LEG (HCC): ICD-10-CM

## 2025-06-11 DIAGNOSIS — I83.892 VENOUS STASIS ULCER OF LEFT LOWER LEG WITH EDEMA OF LEFT LOWER LEG (HCC): ICD-10-CM

## 2025-06-11 DIAGNOSIS — I87.2 VENOUS INSUFFICIENCY OF BOTH LOWER EXTREMITIES: ICD-10-CM

## 2025-06-11 DIAGNOSIS — Z09 HOSPITAL DISCHARGE FOLLOW-UP: Primary | ICD-10-CM

## 2025-06-11 DIAGNOSIS — I83.029 VENOUS STASIS ULCER OF LEFT LOWER LEG WITH EDEMA OF LEFT LOWER LEG (HCC): ICD-10-CM

## 2025-06-11 DIAGNOSIS — D68.69 SECONDARY HYPERCOAGULABLE STATE (HCC): ICD-10-CM

## 2025-06-11 DIAGNOSIS — L03.116 CELLULITIS OF LEFT LOWER EXTREMITY: ICD-10-CM

## 2025-06-11 RX ORDER — PHENTERMINE HYDROCHLORIDE 37.5 MG/1
37.5 TABLET ORAL EVERY MORNING
Qty: 30 TABLET | Refills: 2 | Status: SHIPPED | OUTPATIENT
Start: 2025-06-11 | End: 2025-09-09

## 2025-06-11 ASSESSMENT — FIBROSIS 4 INDEX: FIB4 SCORE: 1.16

## 2025-06-11 NOTE — TELEPHONE ENCOUNTER
Patient last seen in April, well within the required timeframe.  No adverse events related to the phentermine have been reported or observed.  She has maintained an impressive weight loss.  Patient states this has been helpful in quieting food noise.  PDMP review shows no inconsistencies.  The phentermine is renewed.

## 2025-06-11 NOTE — PROGRESS NOTES
"Subjective:     Ragini Reaves is a 80 y.o. female who presents for Hospital Follow-up.    Transitional Care Management  TCM Outreach Date and Time: Filed (6/4/2025  7:48 AM)    Discharge Questions  Actual Discharge Date: 06/03/25  Now that you are home, how are you feeling?: Good (\"leg has improved and pt slept\")  Did you receive any new prescriptions?: Yes (amoxicillin-clav, eliquis, metoprolol)  Were you able to get them filled?: Yes  Meds to Bed or Pharmacy filled?: Meds to Bed  Do you have any questions about your current medications or new medications (Review Med Rec)?: No  Did you have any durable medical equipment ordered?: Yes (walker)  Did you receive it?: Yes  Do you have a follow up appointment scheduled with your PCP?: Yes  Appointment Date: 06/11/25  Appointment Time: 1400  Any issues or paperwork you wish to discuss with your PCP?: No  Are you (patient) able to get to the appointment?: Yes  If Home Health was ordered, have they contacted you (Patient): Not Applicable  Did you have enough support after your last discharge?: Yes (daughter and granddaughter)  Does this patient qualify for the CCM program?: No    Transitional Care  Number of attempts made to contact patient: 1  Current or previous attempts completed within two business days of discharge? : Yes  Provided education regarding treatment plan, medications, self-management, ADLs?: Yes (ED precautions)  Has patient completed an Advanced Directive?: No  Has the Care Manager's phone number provided?: Yes  Is there anything else I can help you with?: No    Discharge Summary  Chief Complaint: left lower extremity pain and swelling  Admitting Diagnosis: Cellulitis  Discharge Diagnosis: Cellulitis        History of Present Illness  The patient presented to the emergency department on 05/28/2025 with complaints of lower extremity pain and edema. She resides alone and was previously able to perform self-care independently. Over the preceding two " weeks, she observed progressive bilateral lower extremity edema, with exacerbation of pain and erythema in the left lower extremity. Subsequently, the edema extended to the right lower extremity, although the left remained more painful and swollen. Following a consultation with her dermatologist, she was advised to seek emergency evaluation. In the emergency department, she was diagnosed with atrial fibrillation with rapid ventricular response (RVR). Initial white blood cell count was elevated at 15,000/mm³ with a left shift. Chest radiography was negative for acute cardiopulmonary pathology. She received intravenous fluids and Zyvox, resulting in conversion to normal sinus rhythm. She was initiated on metoprolol and apixaban (Eliquis). Wound cultures identified Group B Streptococcus (GBS) and Methicillin-sensitive Staphylococcus aureus (MSSA), with antibiotic sensitivities indicating susceptibility to ampicillin and sulbactam. She was transitioned to Augmentin and subsequently discharged home. She presents today for follow-up post-hospitalization.    Lower Extremity Pain and Edema  Over the preceding two weeks, she observed progressive bilateral lower extremity edema, with exacerbation of pain and erythema in the left lower extremity. Subsequently, the edema extended to the right lower extremity, although the left remained more painful and swollen.  - Onset: Over the preceding two weeks.  - Location: Bilateral lower extremities, with more pain and swelling in the left lower extremity.  - Duration: Progressive over two weeks.  - Character: Edema, pain, erythema.  - Alleviating/Aggravating Factors: Elevates legs at night; difficulty using compression stockings due to insufficient hand strength.  - Severity: Significant enough to seek emergency evaluation.    Atrial Fibrillation with Rapid Ventricular Response (RVR)  In the emergency department, she was diagnosed with atrial fibrillation with rapid ventricular  response (RVR). Initial white blood cell count was elevated at 15,000/mm³ with a left shift. Chest radiography was negative for acute cardiopulmonary pathology. She received intravenous fluids and Zyvox, resulting in conversion to normal sinus rhythm. She was initiated on metoprolol and apixaban (Eliquis).  - Onset: Diagnosed in the emergency department on 05/28/2025.  - Character: Atrial fibrillation with rapid ventricular response.  - Alleviating/Aggravating Factors: Intravenous fluids and Zyvox; initiated on metoprolol and apixaban.  - Severity: Required emergency department visit and hospitalization.    Leg Infection  Wound cultures identified Group B Streptococcus (GBS) and Methicillin-sensitive Staphylococcus aureus (MSSA), with antibiotic sensitivities indicating susceptibility to ampicillin and sulbactam. She was transitioned to Augmentin and subsequently discharged home. She presents today for follow-up post-hospitalization.  - Onset: Diagnosed during hospitalization.  - Location: Legs.  - Character: Infection with Group B Streptococcus (GBS) and Methicillin-sensitive Staphylococcus aureus (MSSA).  - Alleviating/Aggravating Factors: Transitioned to Augmentin; attending wound care center weekly.  - Severity: Required hospitalization and ongoing wound care.    Since discharge, she has adhered to her apixaban regimen without experiencing any hemorrhagic complications. Although home health care services did not start today as expected, she has been attending the wound care center weekly, with notable improvement in her wound. She remains ambulatory and has a robust support system at home, including her granddaughter. She is currently on Augmentin, which she will continue until 06/13/2025. She has been advised to discontinue baby aspirin while on apixaban unless otherwise directed by Dr. Marques.    Efforts are underway to transition to a healthier diet. She has been advised to use compression stockings but  reports difficulty due to insufficient hand strength. She experiences morning leg edema and elevates her legs at night. Prolonged bed rest has resulted in soreness at the plantar aspect of her legs.      Current medicines (including reconciliation performed today)  Current Outpatient Medications   Medication Sig Dispense Refill    phentermine (ADIPEX-P) 37.5 MG tablet Take 1 Tablet by mouth every morning for 90 days. 30 tablets is a 30 day quantity 30 Tablet 2    amoxicillin-clavulanate (AUGMENTIN) 875-125 MG Tab Take 1 Tablet by mouth 2 times a day for 7 days. 14 Tablet 0    apixaban (ELIQUIS) 5mg Tab Take 1 Tablet by mouth 2 times a day. 60 Tablet 2    metoprolol SR (TOPROL XL) 25 MG TABLET SR 24 HR Take 1 Tablet by mouth every day. 30 Tablet 2    COENZYME Q10 PO Take 1 Capsule by mouth every day.      potassium chloride ER (KLOR-CON) 10 MEQ tablet TAKE 1 TABLET BY MOUTH EVERY DAY 90 Tablet 3    furosemide (LASIX) 40 MG Tab TAKE 1 TABLET BY MOUTH EVERY DAY IN THE MORNING 90 Tablet 3    spironolactone (ALDACTONE) 25 MG Tab TAKE 1 TABLET BY MOUTH EVERY DAY 90 Tablet 3    FREESTYLE LITE strip USE 1 STRIP ONCE A DAY AS NEEDED 100 Strip 6    levothyroxine (SYNTHROID) 175 MCG Tab Take 1 Tablet by mouth every morning on an empty stomach. 90 Tablet 3    topiramate (TOPAMAX) 50 MG tablet TAKE 1 TABLET BY MOUTH EVERY DAY 90 Tablet 3    metFORMIN (GLUCOPHAGE) 500 MG Tab TAKE 1 TABLET BY MOUTH TWICE A DAY WITH MEALS 180 Tablet 3    losartan (COZAAR) 25 MG Tab TAKE 1 TABLET BY MOUTH EVERY DAY 90 Tablet 2    linagliptin (TRADJENTA) 5 MG Tab tablet Take 1 Tablet by mouth every day. 90 Tablet 3    atorvastatin (LIPITOR) 20 MG Tab TAKE 1 TABLET BY MOUTH EVERY DAY. MUST BE SEEN FOR FURTHER REFILLS 90 Tablet 3    Fiber Powder Take 1 Packet by mouth every day.      acetaminophen (TYLENOL) 500 MG Tab Take 500 mg by mouth 2 Times a Day. (Patient taking differently: Take 500 mg by mouth 3 times a day as needed for Mild Pain.)       "CALCIUM-MAGNESIUM-VITAMIN D PO Take 1 Tab by mouth every day.      BIOTIN PO Take 1 Tab by mouth every day.      Glucosamine-Chondroitin (GLUCOSAMINE CHONDR COMPLEX PO) Take 1 Capsule by mouth every day.      LUTEIN PO Take 1 Capsule by mouth every day.      ascorbic acid (VITAMIN C) 500 MG tablet Take 1 Tab by mouth every day. 30 Tab 11    Multiple Vitamins-Minerals (ULTRA WOMENS PACK) Misc Take 1 Tab by mouth every day. 30 Each 11     No current facility-administered medications for this visit.       Allergies:   Asa [aspirin], Cough syrup m [cough cold-flu relief], and Dextromethorphan    Social History     Tobacco Use    Smoking status: Never    Smokeless tobacco: Never   Vaping Use    Vaping status: Never Used   Substance Use Topics    Alcohol use: No     Alcohol/week: 0.0 oz    Drug use: No       ROS:  See HPI    Objective:     Vitals:    06/11/25 1358   BP: 108/58   Pulse: 89   Resp: (!) 21   Temp: 36.3 °C (97.4 °F)   TempSrc: Temporal   SpO2: 97%   Weight: 86.6 kg (191 lb)   Height: 1.575 m (5' 2\")     Body mass index is 34.93 kg/m².    Physical Exam:  Physical Exam  Constitutional:       Appearance: Normal appearance.   Eyes:      Pupils: Pupils are equal, round, and reactive to light.   Cardiovascular:      Rate and Rhythm: Normal rate and regular rhythm.      Pulses: Normal pulses.      Heart sounds: Normal heart sounds.   Pulmonary:      Effort: Pulmonary effort is normal.   Neurological:      Mental Status: She is alert and oriented to person, place, and time.   Psychiatric:         Mood and Affect: Mood normal.         Behavior: Behavior normal.         Assessment & Plan         1. Hospital discharge follow-up  2. Cellulitis of left lower extremity  Acute, complicated- improving  Recommend completing antibiotics, Augmentin 875-125 mg twice daily until 6/13/2025.  Continue to follow-up with wound care clinic as directed.  Patient did not follow-up with home health today.  She states that they did not " show up.  Contact information provided to patient for home health and instructed to call to establish care.    3. Venous insufficiency of both lower extremities  4. Venous stasis ulcer of left lower leg with edema of left lower leg (HCC)  Chronic, stable  Presented with worsening bilateral lower extremity edema, left leg more affected.  Treatment plan: Wound culture grew Group B Streptococcus (GBS) and Methicillin-Sensitive Staphylococcus Aureus (MSSA), sensitive to ampicillin and sulbactam. Initially treated with Zyvox, later switched to Augmentin; continues Augmentin until 06/13/2025. Advised to elevate feet while resting at home, wear compression socks; referral to lymphedema specialist for compression wrap fitting; contact home health services to inquire about visits.  - REFERRAL TO [facility]    5. Atrial fibrillation with RVR (HCC)  6. Secondary hypercoagulable state (HCC)  Chronic, stable  Found to be in atrial fibrillation with RVR during emergency room visit.  Treatment plan: Converted to normal sinus rhythm with IV fluids and Zyvox; started on metoprolol and Eliquis 5 mg twice daily. Continues taking Eliquis as prescribed; no bleeding reported. Necessity of concurrent baby aspirin use to be determined by Dr. Sneed during next consultation.    7. Uncontrolled type 2 diabetes mellitus with hyperglycemia (HCC)  Chronic, stable  A1c was 7.6 during last check by Dr. Marques.  Treatment plan: Currently on metformin 500 mg twice a day, Tradjenta 5 mg daily. Continue current medications.  Advised to maintain a protein-rich diet to aid in wound healing. Consult with Dr. Marques before discontinuing any medications.  - REFERRAL TO [facility]    - Chart and discharge summary were reviewed.   - Hospitalization and results reviewed with patient.   - Medications reviewed including instructions regarding high risk medications, dosing and side effects.  - Recommended Services: Referral to  Home Health  - Advance directive/POLST on file?  No     Follow-up:Return in about 4 weeks (around 7/9/2025).    Face-to-face transitional care management services with [facility] (today's visit is within 14 days post discharge & LACE+ score of 28-58) medical decision complexity were provided.

## 2025-06-12 ENCOUNTER — APPOINTMENT (OUTPATIENT)
Dept: WOUND CARE | Facility: MEDICAL CENTER | Age: 80
End: 2025-06-12
Attending: NURSE PRACTITIONER
Payer: MEDICARE

## 2025-06-19 ENCOUNTER — OFFICE VISIT (OUTPATIENT)
Dept: WOUND CARE | Facility: MEDICAL CENTER | Age: 80
End: 2025-06-19
Attending: NURSE PRACTITIONER
Payer: MEDICARE

## 2025-06-19 DIAGNOSIS — I83.892 VENOUS STASIS ULCER OF LEFT LOWER LEG WITH EDEMA OF LEFT LOWER LEG (HCC): ICD-10-CM

## 2025-06-19 DIAGNOSIS — L97.929 VENOUS STASIS ULCER OF LEFT LOWER LEG WITH EDEMA OF LEFT LOWER LEG (HCC): ICD-10-CM

## 2025-06-19 DIAGNOSIS — R60.0 VENOUS STASIS ULCER OF LEFT LOWER LEG WITH EDEMA OF LEFT LOWER LEG (HCC): ICD-10-CM

## 2025-06-19 DIAGNOSIS — I87.2 VENOUS INSUFFICIENCY OF BOTH LOWER EXTREMITIES: ICD-10-CM

## 2025-06-19 DIAGNOSIS — L03.115 CELLULITIS OF RIGHT LOWER EXTREMITY: Primary | ICD-10-CM

## 2025-06-19 DIAGNOSIS — E11.65 UNCONTROLLED TYPE 2 DIABETES MELLITUS WITH HYPERGLYCEMIA (HCC): ICD-10-CM

## 2025-06-19 DIAGNOSIS — L03.116 CELLULITIS OF LEFT LOWER EXTREMITY: ICD-10-CM

## 2025-06-19 DIAGNOSIS — I83.029 VENOUS STASIS ULCER OF LEFT LOWER LEG WITH EDEMA OF LEFT LOWER LEG (HCC): ICD-10-CM

## 2025-06-19 PROCEDURE — 99214 OFFICE O/P EST MOD 30 MIN: CPT

## 2025-06-19 PROCEDURE — 99214 OFFICE O/P EST MOD 30 MIN: CPT | Performed by: NURSE PRACTITIONER

## 2025-06-19 PROCEDURE — 1170F FXNL STATUS ASSESSED: CPT | Performed by: NURSE PRACTITIONER

## 2025-06-19 PROCEDURE — 29581 APPL MULTLAYER CMPRN SYS LEG: CPT

## 2025-06-19 RX ORDER — CEPHALEXIN 500 MG/1
500 CAPSULE ORAL 4 TIMES DAILY
Qty: 28 CAPSULE | Refills: 0 | Status: SHIPPED | OUTPATIENT
Start: 2025-06-19 | End: 2025-06-26

## 2025-06-19 NOTE — Clinical Note
REFERRAL APPROVAL NOTICE         Sent on June 18, 2025                   Ragini Reaves  [address]  [place] [address]                   Dear Ms. Reaves,    After a careful review of the medical information and benefit coverage, [facility] has processed your referral. See below for additional details.    If applicable, you must be actively enrolled with your insurance for coverage of the authorized service. If you have any questions regarding your coverage, please contact your insurance directly.    REFERRAL INFORMATION   Referral #:  [id number]  Referred-To Department    Referred-By Provider:  Physical Therapy    ER[facility] Sorensen   [facility] [address]      [address]  [address]  [place] [id number] [phone] [address]  [address]  [place] [id number] [phone]    Referral Start Date:  06/11/2025  Referral End Date:   06/11/2026             SCHEDULING  If you do not already have an appointment, please call [phone] to make an appointment.     MORE INFORMATION  If you do not already have a [employer] account, sign up at: [email or web address]  You can access your medical information, make appointments, see lab results, billing information, and more.  If you have questions regarding this referral, please contact  the [facility] Referrals department at:             [phone]. Monday - Friday 8:00AM - 5:00PM.     Sincerely,    [facility]

## 2025-06-19 NOTE — PROGRESS NOTES
Patient came into clinic today with a 2 layer wrap that started at the ankle and was 3-4 inches below the bend of the knee. Left foot was very swollen as was the area above the top of the wrap. Patient reports that the  nurse had wrapped it that way on Monday. Advised patient and her granddaughter of how the wrap is to be applied. Also advised them that this RN would call  so that the nurse can receive some additional training.     Updated wound care orders faxed to [facility] via [facility]. Called [facility] and spoke with clinical RN and she states that when they go out on Monday to change wraps, she will have a more experienced nurse go with her.

## 2025-06-19 NOTE — PATIENT INSTRUCTIONS
"The following information is a summary of the education provided in the clinic today. This is not an exhaustive list of the education provided during your appointment.       DRESSING CHANGES    Keep your wound dressing clean, dry, and intact. Change your dressing every if the dressing becomes, soiled, leaks, gets wet, or falls off.      You may not shower with the dressing(s) off. Please do not take baths, or swim in the ocean, lakes, rivers, pools, or hot tubs.      Wounds do not need to \"air out\" or \"breathe\". Gently dry your wound before placing a new dressing.     After you get out of the shower, wash the wound a second time (with soap and water, wound cleanser, or saline). Gently dry the wound before you place a new dressing.       If you need to change your dressings at home, you should wash your wound. Use normal saline, wound cleanser, hypochlorous acid, or unscented soap and water. Do not use hydrogen peroxide or rubbing alcohol to clean your wounds. Hydrogen peroxide and rubbing alcohol will damage new cells and tissue. Do not use betadine or iodine unless told to by your wound care team.    Do not soak your wounds in epsom salt baths. This can worsen your wound(s) or delay wound healing. It can also lead to infection or maceration (tissue is too wet).     If you do not have home health, the clinic will give you with leftover supplies from your appointment. We do not give out extra dressing supplies. We will order you supplies through a [employer] company. Your insurance may or may not pay for all these supplies. The company will reach out to you if insurance does not cover supplies. These supplies will be sent to your home within a few days. If you do have home health, they will provide wound care supplies.     The dressings we use may change as your wound changes.     COMPRESSION   -Compression helps reduce swelling and helps wounds heal quicker. It is still important to elevate your feet several times daily to " reduce swelling, even when you are wearing compression. It is important to wear compression every day, to help your wound heal, and to prevent new wounds from developing.      -Today, a 2 layer compression wrap was applied. Please take off the wrap if you have pain, extreme swelling, new numbness or tingling, a change in the color or temperature of your feet. Take off the wrap if the wrap slides down/bunches up Take off the wrap if it gets wet or leaks through.  If you have to take off the wrap, please do not cut it off with scissors or other sharp tools. Do not trim the wrap. Unravel the wrap one layer at a time. Place a clean and dry dressing over the wound. Do not leave wrap on for more than seven days at a time. You can wear a cast protector over your wrap so you can take a shower. Cast protectors can be found at most [employer] and [employer]. [employer] does not endorse any brand of cast protector.        GENERAL HEALTH ADVICE   -Nutrition is important for wound healing. Unless told otherwise by your doctor, eat more protein and consider supplementing with a multi-vitamin, zinc, and vitamin C.       CLINIC INFORMATION  The clinic's hours are Monday-Friday, 7:30 AM to 5:00 PM. We are closed most holidays and on weekends. If you leave us a message, please allow 24 hours for someone to return your call. If you have concerns or are having a medical emergency, call 911 or go to the hospital emergency room.     You might not see the same nurse or provider every visit.     If you notice any large changes in your wound(s), or signs of infection (redness, swelling, localized heat, increased pain, fever > 101 F, chills, nausea/vomiting) or have any questions about your home care instructions, please call the wound center at [phone]. If it's after hours, contact your primary care physician or go to the hospital emergency room. If you are admitted to any hospital, you will need a new referral to come back to the wound  clinic. Any wound care appointments that you already have may be cancelled.    If you are 5 or more minutes late for an appointment, we reserve the right to cancel and reschedule that appointment. For example, if your appointment is at 1:00 PM, and you arrive at 1:06 PM, you are more than five minutes late and might not be seen. If you are consistently late or not coming to your appointments (typically 3 late cancellations and/or no shows), we reserve the right to cancel your future appointments or discharge you from the clinic. It is then your responsibility to obtain a new referral if wound care is still needed.

## 2025-06-20 NOTE — PROGRESS NOTES
Provider Encounter- Lower Extremity Ulcer      HISTORY OF PRESENT ILLNESS  Wound History:    START OF CARE IN CLINIC: 6/6/25    REFERRING PROVIDER: Rell Bey MD     WOUND ETIOLOGY: venous   LOCATION: Left anterior lower leg     Left medial posterior lower leg       HISTORY: Approximately 3 weeks prior to being admitted to the hospital from 5/28/2025 - 6/3/2025, patient developed increased edema to BLE.  She noticed increased pain, erythema in her left lower leg.  She followed up with her dermatologist who recommended she proceed to ER for further workup and IV antibiotics.  Prior to coming into the hospital, patient applied antibiotic ointment to the legs and left them open to air.  She did notice significant serous drainage.  While admitted she is found to be in A-fib with rapid ventricular response.  She did have leukocytosis.  She was given fluids and Zyvox and she converted to normal sinus rhythm.  She was started on Eliquis.  Wound culture was positive for group B strep as well as MSSA.  She was switched to Augmentin and discharged to home.  She was referred to [facility] wound care clinic for further treatment and care.  While admitted she was seen by inpatient wound team on 5/29/2025.  Xeroform and rolled gauze dressing was initiated.    Pertinent Medical History: Morbid obesity, hypertension, dyslipidemia, heart failure, A-fib with RVR, started on Eliquis, type 2 diabetes   ETIOLOGY HISTORY: Diagnosed no. Vascular Surgeon: no. Previous vascular procedures no, US no dvt. Compression no. Varicose Veins yes         TOBACCO USE: No    Patient's problem list, allergies, and current medications reviewed and updated in Epic    Interval History:  6/6/2025: Initial wound evaluation, initial provider Clinic visit with Africa HAWKINS, FNP-BC, CWON, CFCN.  Pt denies fevers, chills, nausea, vomiting.  Isaiah accompanied by granddaughter Cecile.  Initiate 2 layer compression wrap.  Referral to home health  placed.  Continues on Augmentin.  Set to finish tomorrow.  Continues to have cellulitis.  Augmentin extended for an additional 7 days.  End date 6/14/2025.  Patient has follow-up with her PCP on 6/11/2025.  Follow-up regarding anticoagulants and antiplatelets.    6/19/2025 : Clinic visit with ROSS Forman, MALDONADO, MARLON, NICOLÁS.  Patient is here today with her granddaughter.  She states that overall she is feeling okay, however she has developed increased pain, swelling, and redness to her right lower extremity.  Her left lower extremity wounds are nearly resolved, presents with a small dry crust over wound sites with scant drainage.  She is currently not on antibiotics, recently completed extended course of Augmentin.  Rx for Keflex sent to patient's pharmacy.        REVIEW OF SYSTEMS:   Unchanged from previous clinic visit on 6/6/2025, except as documented in interval history above       PHYSICAL EXAMINATION:   LMP  (LMP Unknown)     Physical Exam  Vitals reviewed.   Constitutional:       Appearance: Normal appearance.   Cardiovascular:      Rate and Rhythm: Normal rate.      Pulses: Normal pulses.      Comments: DP and PT pulses palpable, 2+ bilaterally  Pulmonary:      Effort: Pulmonary effort is normal.   Musculoskeletal:      Right lower leg: Edema present.      Left lower leg: Edema present.      Comments: 3+ edema bilateral lower extremities, pitting.   Skin:     Findings: Erythema present.      Comments: Venous stasis ulcer left posterior medial lower leg: Nearly resolved.  Presents today covered with dry crust, scant drainage.  No periwound erythema or induration    Left anterior lower leg: Resolved      Right lower extremity presents with erythema from mid calf to ankle, increased edema, weeping of serous fluid.  No open wounds.       Neurological:      Mental Status: She is alert.   Psychiatric:         Mood and Affect: Mood normal.         WOUND ASSESSMENT  Wound 05/29/25 Left posteromedial LE  (Active)   Wound Image   06/19/25 1500   Site Assessment Crusted;Fragile 06/19/25 1500   Periwound Assessment Hemosiderin Staining;Edema 06/19/25 1500   Margins Attached edges;Undefined edges 06/19/25 1500   Drainage Amount Small 06/19/25 1500   Drainage Description Serosanguineous 06/19/25 1500   Treatments Topical Lidocaine;Cleansed;Site care 06/19/25 1500   Wound Cleansing Foam Cleanser/Washcloth;Hypochlorus Acid 06/19/25 1500   Periwound Protectant Silicone barrier cream 06/19/25 1500   Dressing Status Old drainage 06/06/25 1530   Dressing Changed Changed 06/19/25 1500   Dressing Cleansing/Solutions Not Applicable 06/19/25 1500   Dressing Options Viscopaste;Compression Wrap Two Layer 06/19/25 1500   Dressing Change/Treatment Frequency Weekly, and As Needed 06/19/25 1500   Wound Team Following Weekly 06/19/25 1500   Non-staged Wound Description Partial thickness 06/19/25 1500   Wound Length (cm) 4 cm 06/09/25 1400   Wound Width (cm) 4.5 cm 06/09/25 1400   Wound Depth (cm) 0.1 cm 06/09/25 1400   Wound Surface Area (cm^2) 14.14 cm^2 06/09/25 1400   Wound Volume (cm^3) 0.942 cm^3 06/09/25 1400   Post-Procedure Length (cm) 4 cm 06/09/25 1400   Post-Procedure Width (cm) 4.5 cm 06/09/25 1400   Post-Procedure Depth (cm) 0.1 cm 06/09/25 1400   Post-Procedure Surface Area (cm^2) 14.14 cm^2 06/09/25 1400   Post-Procedure Volume (cm^3) 0.942 cm^3 06/09/25 1400   Wound Healing % -301 06/09/25 1400   Tunneling (cm) 0 cm 06/19/25 1500   Undermining (cm) 0 cm 06/09/25 1400   Wound Odor None 06/19/25 1500   Pulses Left;Not palpable;DP;PT;Doppler 06/06/25 1530   Number of days: 21           PROCEDURE:   -2% viscous lidocaine applied topically to wound bed for approximately 5 minutes prior to assessment  - No excisional debridement required.  Blunt debridement of posteromedial woundusing saline and gauze   - There was no bleeding  -Wound care completed by wound RN, refer to wound flowsheet   -Patient tolerated the procedure well,  without c/o of significant pain or discomfort.       Pertinent Labs and Diagnostics:    Labs:   A1c:   Lab Results   Component Value Date/Time    HBA1C 7.6 (H) 03/31/2025 05:04 AM    HBA1C 7.0 (H) 03/11/2019 02:05 PM            IMAGING: See epic    VASCULAR STUDIES: See epic  No results found.    LAST  WOUND CULTURE:  DATE :    Lab Results   Component Value Date/Time    CULTRSULT Moderate growth usual skin radha. (A) 05/29/2025 11:50 AM    CULTRSULT Streptococcus agalactiae (Group B)  Heavy growth   (A) 05/29/2025 11:50 AM    CULTRSULT (A) 05/29/2025 11:50 AM     Staphylococcus aureus  Moderate growth  Methicillin sensitive by screening method  This isolate is presumed to be clindamycin resistant based on  detection of inducible resistance.                ASSESSMENT AND PLAN:     1. Venous stasis ulcer of left lower leg with edema of left lower leg (HCC)  2. Venous insufficiency of both lower extremities  Developed worsening BLE edema around beginning of May 2025 and significant erythema to LLE progressed prompting admission to hospital.    6/19/2025: Anterior wound is healed, posteromedial wound is nearly resolved with dry crust and scant drainage.  Lower extremity is still edematous.  - No excisional debridement required today, length debridement only  - Briefly reviewed etiology, management and prevention of venous stasis/venous insufficiency/lymphedema.    -Measure and order for compression garments at next appointment once edema is controlled.   - Home health to change dressing and compression wrap 1 time per week in between clinic visits    Wound care: Viscopaste patch, 2 layer compression wrap      3. Cellulitis of left lower extremity    6/19/2025: Augmentin was extended by 7 days last visit.  Patient completed these over a week ago.  - Erythema to this leg has resolved.  However, she has developed cellulitis to her right lower leg with increased swelling and weeping of skin.  - Rx for Keflex sent to  patient's pharmacy  -Compression wrap applied to right lower extremity  - Home health to change compression wrap 1 time per week in between clinic visits    4. Uncontrolled type 2 diabetes mellitus with hyperglycemia (HCC)    6/6/2025: Patient checking her blood sugars daily.  Averaging between 120s to 140s.  - Foot exam completed by PCP on 4/7/2025.  She had mild diminished sensation.  - She does see podiatry  -A1c 7.6% on 3/31/2025  -Patient reports her blood sugar today was 130                 PATIENT EDUCATION  - Etiology of  venous stasis ulceration discussed with patient  - Importance of managing edema for healing of ulcer, and for prevention of new ulcer development  -Need for lifelong compression of lower legs   -Elevate legs above the level of the heart periodically throughout the day.  - Importance of adequate nutrition for wound healing  -Advised to go to ER for any increased redness, swelling, drainage or odor, or if patient develops fever, chills, nausea or vomiting.      My total time spent caring for the patient on the day of the encounter was 30 minutes.   This does not include time spent on separately billable procedures/tests.       Please note that this note may have been created using voice recognition software. I have worked with technical experts from [employer] to optimize the interface.  I have made every reasonable attempt to correct obvious errors, but there may be errors of grammar and possibly     N

## 2025-06-26 ENCOUNTER — OFFICE VISIT (OUTPATIENT)
Dept: WOUND CARE | Facility: MEDICAL CENTER | Age: 80
End: 2025-06-26
Attending: NURSE PRACTITIONER
Payer: MEDICARE

## 2025-06-26 DIAGNOSIS — R60.0 VENOUS STASIS ULCER OF LEFT LOWER LEG WITH EDEMA OF LEFT LOWER LEG (HCC): ICD-10-CM

## 2025-06-26 DIAGNOSIS — L03.116 CELLULITIS OF LEFT LOWER EXTREMITY: ICD-10-CM

## 2025-06-26 DIAGNOSIS — I83.892 VENOUS STASIS ULCER OF LEFT LOWER LEG WITH EDEMA OF LEFT LOWER LEG (HCC): ICD-10-CM

## 2025-06-26 DIAGNOSIS — I83.029 VENOUS STASIS ULCER OF LEFT LOWER LEG WITH EDEMA OF LEFT LOWER LEG (HCC): ICD-10-CM

## 2025-06-26 DIAGNOSIS — L97.929 VENOUS STASIS ULCER OF LEFT LOWER LEG WITH EDEMA OF LEFT LOWER LEG (HCC): ICD-10-CM

## 2025-06-26 DIAGNOSIS — E11.65 UNCONTROLLED TYPE 2 DIABETES MELLITUS WITH HYPERGLYCEMIA (HCC): ICD-10-CM

## 2025-06-26 DIAGNOSIS — I87.2 VENOUS INSUFFICIENCY OF BOTH LOWER EXTREMITIES: Primary | ICD-10-CM

## 2025-06-26 PROCEDURE — 99213 OFFICE O/P EST LOW 20 MIN: CPT | Performed by: NURSE PRACTITIONER

## 2025-06-26 PROCEDURE — 29581 APPL MULTLAYER CMPRN SYS LEG: CPT

## 2025-06-26 PROCEDURE — 99213 OFFICE O/P EST LOW 20 MIN: CPT

## 2025-06-26 PROCEDURE — 1170F FXNL STATUS ASSESSED: CPT | Performed by: NURSE PRACTITIONER

## 2025-06-26 NOTE — PROGRESS NOTES
Provider Encounter- Lower Extremity Ulcer      HISTORY OF PRESENT ILLNESS  Wound History:    START OF CARE IN CLINIC: 6/6/25    REFERRING PROVIDER: Rell Bey MD     WOUND ETIOLOGY: venous   LOCATION: Left anterior lower leg     Left medial posterior lower leg       HISTORY: Approximately 3 weeks prior to being admitted to the hospital from 5/28/2025 - 6/3/2025, patient developed increased edema to BLE.  She noticed increased pain, erythema in her left lower leg.  She followed up with her dermatologist who recommended she proceed to ER for further workup and IV antibiotics.  Prior to coming into the hospital, patient applied antibiotic ointment to the legs and left them open to air.  She did notice significant serous drainage.  While admitted she is found to be in A-fib with rapid ventricular response.  She did have leukocytosis.  She was given fluids and Zyvox and she converted to normal sinus rhythm.  She was started on Eliquis.  Wound culture was positive for group B strep as well as MSSA.  She was switched to Augmentin and discharged to home.  She was referred to [facility] wound care clinic for further treatment and care.  While admitted she was seen by inpatient wound team on 5/29/2025.  Xeroform and rolled gauze dressing was initiated.    Pertinent Medical History: Morbid obesity, hypertension, dyslipidemia, heart failure, A-fib with RVR, started on Eliquis, type 2 diabetes   ETIOLOGY HISTORY: Diagnosed no. Vascular Surgeon: no. Previous vascular procedures no, US no dvt. Compression no. Varicose Veins yes         TOBACCO USE: No    Patient's problem list, allergies, and current medications reviewed and updated in Epic    Interval History:  6/6/2025: Initial wound evaluation, initial provider Clinic visit with Africa HAWKINS, FNP-BC, CWON, CFCN.  Pt denies fevers, chills, nausea, vomiting.  Isaiah accompanied by granddaughter Cecile.  Initiate 2 layer compression wrap.  Referral to home health  placed.  Continues on Augmentin.  Set to finish tomorrow.  Continues to have cellulitis.  Augmentin extended for an additional 7 days.  End date 6/14/2025.  Patient has follow-up with her PCP on 6/11/2025.  Follow-up regarding anticoagulants and antiplatelets.    6/19/2025 : Clinic visit with ROSS Forman, MALDONADO, MARLON, NICOLÁS.  Patient is here today with her granddaughter.  She states that overall she is feeling okay, however she has developed increased pain, swelling, and redness to her right lower extremity.  Her left lower extremity wounds are nearly resolved, presents with a small dry crust over wound sites with scant drainage.  She is currently not on antibiotics, recently completed extended course of Augmentin.  Rx for Keflex sent to patient's pharmacy.    6/26/2025: Clinic visit with Africa HAWKINS, MALDONADO, HARRIET, NICOLÁS.  Pt denies fevers, chills, nausea, vomiting.  Accompanied by daughter Urvashi.  Has an appointment with lymphedema therapist on 7/30/2025.  At this point ulceration is healed to left lower leg.  Unfortunately patient has developed new skin breakdown to left posterior calf at 2 areas where edge of Viscopaste layer was applied.  Patient noted discomfort to her calf after home health change dressing.  Patient tolerated 2 layer compression wrap to RLE.  Continues on Keflex.  Finishes antibiotics today for RLE cellulitis which has resolved.  Plan to discharge from home health today.  Measure for Velcro compression garments at next week's appointment.        REVIEW OF SYSTEMS:   Unchanged from previous clinic visit on 6/19/2025, except as documented in interval history above       PHYSICAL EXAMINATION:   LMP  (LMP Unknown)     Physical Exam  Vitals reviewed.   Constitutional:       Appearance: Normal appearance.   Cardiovascular:      Rate and Rhythm: Normal rate.      Pulses: Normal pulses.      Comments: DP and PT pulses palpable, 2+ bilaterally  Pulmonary:      Effort: Pulmonary  effort is normal.   Musculoskeletal:      Right lower leg: Edema present.      Left lower leg: Edema present.      Comments: 3+ edema bilateral lower extremities, pitting.   Skin:     Findings: Erythema present.      Comments: Venous stasis ulcer left posterior medial lower leg: resolved.    Left anterior lower leg: Resolved    Left posterior calf x 2: Partial-thickness denuded tissue with linear areas demarcating edge of Viscopaste, tenderness with light palpation, increased edema, moist tissue      Right lower extremity erythema, edema, weeping serous fluid all resolved.  No ulceration.       Neurological:      Mental Status: She is alert.   Psychiatric:         Mood and Affect: Mood normal.         WOUND ASSESSMENT  Wound 05/29/25 Vascular Ulcer Venous Leg Left posteromedial LE (Active)   Wound Image      06/26/25 1508   Site Assessment Red;Pink;Painful 06/26/25 1508   Periwound Assessment Denuded;Hemosiderin Staining;Edema 06/26/25 1508   Margins Undefined edges 06/26/25 1508   Drainage Amount Moderate 06/26/25 1508   Drainage Description Serosanguineous 06/26/25 1508   Treatments Cleansed;Topical Lidocaine;Site care;Compression 06/26/25 1508   Wound Cleansing Foam Cleanser/Washcloth;Hypochlorus Acid 06/26/25 1508   Periwound Protectant Urea lotion 06/26/25 1508   Dressing Status Old drainage 06/06/25 1530   Dressing Changed Changed 06/19/25 1500   Dressing Cleansing/Solutions Not Applicable 06/26/25 1508   Dressing Options Triad Hydro;Super Absorbent Pad;Compression Wrap Two Layer 06/26/25 1508   Dressing Change/Treatment Frequency Weekly, and As Needed 06/26/25 1508   Wound Team Following Weekly 06/19/25 1500   Non-staged Wound Description Full thickness 06/26/25 1508   Wound Length (cm) 6 cm 06/26/25 1508   Wound Width (cm) 6 cm 06/26/25 1508   Wound Depth (cm) 0 cm 06/26/25 1508   Wound Surface Area (cm^2) 28.27 cm^2 06/26/25 1508   Wound Volume (cm^3) 0 cm^3 06/26/25 1508   Post-Procedure Length (cm) 6 cm  06/26/25 1508   Post-Procedure Width (cm) 6 cm 06/26/25 1508   Post-Procedure Depth (cm) 0 cm 06/26/25 1508   Post-Procedure Surface Area (cm^2) 28.27 cm^2 06/26/25 1508   Post-Procedure Volume (cm^3) 0 cm^3 06/26/25 1508   Wound Healing % 100 06/26/25 1508   Tunneling (cm) 0 cm 06/26/25 1508   Undermining (cm) 0 cm 06/26/25 1508   Wound Odor None 06/26/25 1508   Pulses Left;Not palpable;DP;PT;Doppler 06/06/25 1530   Exposed Structures None 06/26/25 1508   Number of days: 28           PROCEDURE:   -2% viscous lidocaine applied topically to wound bed for approximately 5 minutes prior to assessment  - No excisional debridement required.  Blunt debridement of posterior wounds using Puracyn and gauze   - There was no bleeding  -Wound care completed by wound RN, refer to wound flowsheet   -Patient tolerated the procedure well, without c/o of significant pain or discomfort.       Pertinent Labs and Diagnostics:    Labs:   A1c:   Lab Results   Component Value Date/Time    HBA1C 7.6 (H) 03/31/2025 05:04 AM    HBA1C 7.0 (H) 03/11/2019 02:05 PM            IMAGING: See epic    VASCULAR STUDIES: See epic  No results found.    LAST  WOUND CULTURE:  DATE :    Lab Results   Component Value Date/Time    CULTRSULT Moderate growth usual skin radha. (A) 05/29/2025 11:50 AM    CULTRSULT Streptococcus agalactiae (Group B)  Heavy growth   (A) 05/29/2025 11:50 AM    CULTRSULT (A) 05/29/2025 11:50 AM     Staphylococcus aureus  Moderate growth  Methicillin sensitive by screening method  This isolate is presumed to be clindamycin resistant based on  detection of inducible resistance.                ASSESSMENT AND PLAN:     1. Venous stasis ulcer of left lower leg with edema of left lower leg (HCC)  2. Venous insufficiency of both lower extremities  Developed worsening BLE edema around beginning of May 2025 and significant erythema to LLE progressed prompting admission to hospital.    6/26/2025: Anterior wound is healed, posteromedial wound  resolved   -New ulcerations x 2 to left posterior calf from Viscopaste layer  -Lower extremity is still edematous.  - No excisional debridement required today, nonselective debridement only  - Briefly reviewed etiology, management and prevention of venous stasis/venous insufficiency/lymphedema.    -Measure and order for compression garments at next appointment for BLE  - Discharge from home health  - Patient to follow-up at wound care clinic weekly    Wound care: Triad, superabsorbent, 2 layer compression wrap      3. Cellulitis of left lower extremity  4.  Cellulitis of right lower extremity    6/26/2025: Augmentin was extended by 7 days previously which patient completed.  - Erythema to left leg has resolved.    -However, she developed cellulitis to her right lower leg with increased swelling and weeping of skin.  Started on Keflex last week which patient completes today.  Cellulitis resolved to RLE.  -Continue to wear compression wrap applied to right lower extremity      5. Uncontrolled type 2 diabetes mellitus with hyperglycemia (HCC)    6/26/2025: Patient checking her blood sugars daily.  Averaging between 120s to 140s.  - Foot exam completed by PCP on 4/7/2025.  She had mild diminished sensation.  - She does see podiatry  -A1c 7.6% on 3/31/2025                   PATIENT EDUCATION  - Etiology of  venous stasis ulceration discussed with patient  - Importance of managing edema for healing of ulcer, and for prevention of new ulcer development  -Need for lifelong compression of lower legs   -Elevate legs above the level of the heart periodically throughout the day.  - Importance of adequate nutrition for wound healing  -Advised to go to ER for any increased redness, swelling, drainage or odor, or if patient develops fever, chills, nausea or vomiting.      My total time spent caring for the patient on the day of the encounter was 20 minutes.   This does not include time spent on separately billable  procedures/tests.       Please note that this note may have been created using voice recognition software. I have worked with technical experts from [facility] to optimize the interface.  I have made every reasonable attempt to correct obvious errors, but there may be errors of grammar and possibly     N

## 2025-06-26 NOTE — PATIENT INSTRUCTIONS
Keep dressings clean and dry. Change dressings if they become over saturated, soiled or fall off.     On the days you are not changing your dressings, avoid getting the dressings wet. It is not harmful to get your wound wet when you are washing your wound before applying a new dressing.    If you need to change your dressings at home: Wash your wound with normal saline, wound cleanser, or unscented soap and water prior to applying your new dressings. Please avoid cleansing with hydrogen peroxide or rubbing alcohol. Hydrogen peroxide and rubbing alcohol are toxic to new tissue and skin cells.    Avoid prolonged standing or sitting without elevating your legs.    Remove your compression garments if you have severe pain, severe swelling, numbness/color change/temperature change in your toes, if the wrap gets wet, or if the wrap slips down. If you need to remove your compression garments, do so by unrolling them. Do not cut the compression garments off, this is to prevent cutting yourself on accident.    Should you experience any significant changes in your wound(s), such as signs of infection (increasing redness, swelling, localized heat, increased pain, fever > 101 F, chills) or have any questions regarding your home care instructions, please contact the wound center at [phone]. If after hours, contact your primary care physician or go to the hospital emergency room.     If you are admitted to any hospital, you will need a new referral to come back to the wound clinic and any scheduled appointments that you already have, may be cancelled.     If you are 5 or more minutes late for an appointment, we reserve the right to cancel and reschedule that appointment. Additionally, if you are habitually late or not showing (3 late cancellations and/or no shows), we reserve the right to cancel your remaining appointments and it will be your responsibility to obtain a new referral if services are still needed.

## 2025-06-26 NOTE — PROGRESS NOTES
Patient discharging from home health. Notice of this sent to [facility] via [employer]. Patient with new denuded area to left posterior LE. Discontinued use of Viscopaste as base layer. 2 layer compression wraps applied to BLE. Plan to measure patient for [employer] ready wraps with foot piece for BLE at next appointment per provider. Instructed patient that if she needs to remove compression she is to remove wraps completely, and to do so by unwrapping wraps and NOT to cut them off. Patient given a pair of tensogrip compression socks to don in the event that she has to remove wraps.   Patient verbalized understanding of all instruction.

## 2025-07-03 ENCOUNTER — OFFICE VISIT (OUTPATIENT)
Dept: WOUND CARE | Facility: MEDICAL CENTER | Age: 80
End: 2025-07-03
Attending: NURSE PRACTITIONER
Payer: MEDICARE

## 2025-07-03 DIAGNOSIS — L03.115 CELLULITIS OF RIGHT LOWER EXTREMITY: ICD-10-CM

## 2025-07-03 DIAGNOSIS — I83.029 VENOUS STASIS ULCER OF LEFT LOWER LEG WITH EDEMA OF LEFT LOWER LEG (HCC): ICD-10-CM

## 2025-07-03 DIAGNOSIS — R60.0 VENOUS STASIS ULCER OF LEFT LOWER LEG WITH EDEMA OF LEFT LOWER LEG (HCC): ICD-10-CM

## 2025-07-03 DIAGNOSIS — L03.116 CELLULITIS OF LEFT LOWER EXTREMITY: ICD-10-CM

## 2025-07-03 DIAGNOSIS — I87.2 LYMPHEDEMA DUE TO VENOUS INSUFFICIENCY: Primary | ICD-10-CM

## 2025-07-03 DIAGNOSIS — I83.892 VENOUS STASIS ULCER OF LEFT LOWER LEG WITH EDEMA OF LEFT LOWER LEG (HCC): ICD-10-CM

## 2025-07-03 DIAGNOSIS — L97.929 VENOUS STASIS ULCER OF LEFT LOWER LEG WITH EDEMA OF LEFT LOWER LEG (HCC): ICD-10-CM

## 2025-07-03 DIAGNOSIS — I89.0 LYMPHEDEMA DUE TO VENOUS INSUFFICIENCY: Primary | ICD-10-CM

## 2025-07-03 DIAGNOSIS — E11.65 UNCONTROLLED TYPE 2 DIABETES MELLITUS WITH HYPERGLYCEMIA (HCC): ICD-10-CM

## 2025-07-03 PROCEDURE — 99213 OFFICE O/P EST LOW 20 MIN: CPT

## 2025-07-03 PROCEDURE — 99213 OFFICE O/P EST LOW 20 MIN: CPT | Performed by: NURSE PRACTITIONER

## 2025-07-03 PROCEDURE — 1170F FXNL STATUS ASSESSED: CPT | Performed by: NURSE PRACTITIONER

## 2025-07-03 NOTE — PROGRESS NOTES
Provider Encounter- Lower Extremity Ulcer      HISTORY OF PRESENT ILLNESS  Wound History:    START OF CARE IN CLINIC: 6/6/25    REFERRING PROVIDER: Rell Bey MD     WOUND ETIOLOGY: venous   LOCATION: Left anterior lower leg     Left medial posterior lower leg       HISTORY: Approximately 3 weeks prior to being admitted to the hospital from 5/28/2025 - 6/3/2025, patient developed increased edema to BLE.  She noticed increased pain, erythema in her left lower leg.  She followed up with her dermatologist who recommended she proceed to ER for further workup and IV antibiotics.  Prior to coming into the hospital, patient applied antibiotic ointment to the legs and left them open to air.  She did notice significant serous drainage.  While admitted she is found to be in A-fib with rapid ventricular response.  She did have leukocytosis.  She was given fluids and Zyvox and she converted to normal sinus rhythm.  She was started on Eliquis.  Wound culture was positive for group B strep as well as MSSA.  She was switched to Augmentin and discharged to home.  She was referred to [facility] wound care clinic for further treatment and care.  While admitted she was seen by inpatient wound team on 5/29/2025.  Xeroform and rolled gauze dressing was initiated.    Pertinent Medical History: Morbid obesity, hypertension, dyslipidemia, heart failure, A-fib with RVR, started on Eliquis, type 2 diabetes   ETIOLOGY HISTORY: Diagnosed no. Vascular Surgeon: no. Previous vascular procedures no, US no dvt. Compression no. Varicose Veins yes         TOBACCO USE: No    Patient's problem list, allergies, and current medications reviewed and updated in Epic    Interval History:  6/6/2025: Initial wound evaluation, initial provider Clinic visit with Africa HAWKINS, FNP-BC, CWON, CFCN.  Pt denies fevers, chills, nausea, vomiting.  Isaiah accompanied by granddaughter Cecile.  Initiate 2 layer compression wrap.  Referral to home health  placed.  Continues on Augmentin.  Set to finish tomorrow.  Continues to have cellulitis.  Augmentin extended for an additional 7 days.  End date 6/14/2025.  Patient has follow-up with her PCP on 6/11/2025.  Follow-up regarding anticoagulants and antiplatelets.    6/19/2025 : Clinic visit with ROSS Forman, MALDONADO, NICOLÁS MASON.  Patient is here today with her granddaughter.  She states that overall she is feeling okay, however she has developed increased pain, swelling, and redness to her right lower extremity.  Her left lower extremity wounds are nearly resolved, presents with a small dry crust over wound sites with scant drainage.  She is currently not on antibiotics, recently completed extended course of Augmentin.  Rx for Keflex sent to patient's pharmacy.    6/26/2025: Clinic visit with MALDONADO Garvin, NICOLÁS LARIOS.  Pt denies fevers, chills, nausea, vomiting.  Accompanied by daughter Urvashi.  Has an appointment with lymphedema therapist on 7/30/2025.  At this point ulceration is healed to left lower leg.  Unfortunately patient has developed new skin breakdown to left posterior calf at 2 areas where edge of Viscopaste layer was applied.  Patient noted discomfort to her calf after home health change dressing.  Patient tolerated 2 layer compression wrap to RLE.  Continues on Keflex.  Finishes antibiotics today for RLE cellulitis which has resolved.  Plan to discharge from home health today.  Measure for Velcro compression garments at next week's appointment.      7/3/2025 : Clinic visit with ROSS Forman, MALDONADO, MARLON, NICOLÁS.   Patient is here today with her granddaughter.  She states she is feeling well.  Her wounds are resolved, and she will be discharge from [facility].  We had plan to measure for compression garments today, however patient has an appointment with lymphedema specialist on 7/30.  Specialist may recommend different type of garment.  In the meantime, will have patient use  Tubigrip's and elevation to manage edema.  She understands she is to return to the clinic if her wound reopens, and that she would require a new referral.      REVIEW OF SYSTEMS:   Unchanged from previous clinic visit on 6/26/2025, except as documented in interval history above       PHYSICAL EXAMINATION:   LMP  (LMP Unknown)     Physical Exam  Vitals reviewed.   Constitutional:       Appearance: Normal appearance.   Cardiovascular:      Rate and Rhythm: Normal rate.      Pulses: Normal pulses.      Comments: DP and PT pulses palpable, 2+ bilaterally  Pulmonary:      Effort: Pulmonary effort is normal.   Musculoskeletal:      Right lower leg: Edema present.      Left lower leg: Edema present.      Comments: 3+ edema bilateral lower extremities, pitting.   Skin:     Findings: Erythema present.      Comments: Venous stasis ulcer left posterior medial lower leg: resolved.    Left anterior lower leg: Resolved    Left posterior calf x 2: Partial-thickness denuded tissue with linear areas demarcating edge of Viscopaste, tenderness with light palpation, increased edema, moist tissue      Right lower extremity erythema, edema, weeping serous fluid all resolved.  No ulceration.       Neurological:      Mental Status: She is alert.   Psychiatric:         Mood and Affect: Mood normal.         WOUND ASSESSMENT             PROCEDURE:   - no need for excisional debridement, wound resolved  -Legs cleansed and moisturized, Tubigrip applied by wound RN  -Patient tolerated the procedure well, without c/o of significant pain or discomfort.       Pertinent Labs and Diagnostics:    Labs:   A1c:   Lab Results   Component Value Date/Time    HBA1C 7.6 (H) 03/31/2025 05:04 AM    HBA1C 7.0 (H) 03/11/2019 02:05 PM            IMAGING: See epic    VASCULAR STUDIES: See epic  No results found.    LAST  WOUND CULTURE:  DATE :    Lab Results   Component Value Date/Time    CULTRSULT Moderate growth usual skin radha. (A) 05/29/2025 11:50 AM     CULTRSULT Streptococcus agalactiae (Group B)  Heavy growth   (A) 05/29/2025 11:50 AM    CULTRSULT (A) 05/29/2025 11:50 AM     Staphylococcus aureus  Moderate growth  Methicillin sensitive by screening method  This isolate is presumed to be clindamycin resistant based on  detection of inducible resistance.                ASSESSMENT AND PLAN:     1. Venous stasis ulcer of left lower leg with edema of left lower leg (HCC)  2.  Lymphedema due to venous insufficiency  Developed worsening BLE edema around beginning of May 2025 and significant erythema to LLE progressed prompting admission to hospital.    7/3/2025: Patient's wounds are healed  - -Discharge from [facility]  -Patient to return to clinic ASAP if wound recurs, or if she develops new wounds   - Patient with chronic edema, varicose veins, and hemosiderin staining consistent with chronic venous insufficiency. She also has limb circumference discrepancy with left leg more edematous than right consistent with lymphedema. Edema moderately managed with compression last few weeks, further evidence of lymphedema   - She has an appointment to establish with lymphedema specialist on 7/30, referral placed by primary provider  - Briefly reviewed etiology, management and prevention of venous stasis/venous insufficiency/lymphedema.    - We had planned measured for compression garments today, however will defer to lymphedema specialist to recommend appropriate garment  -Patient reminded to elevate her legs periodically throughout the day, wear Tubigrip's daily      Wound care: Legs cleansed and moisturized, Tubigrip applied      3. Cellulitis of left lower extremity  4.  Cellulitis of right lower extremity    7/3/2025: Patient has completed her antibiotics  - Resolving erythema to left lower extremity      5. Uncontrolled type 2 diabetes mellitus with hyperglycemia (HCC)    7/3/2025: Patient checking her blood sugars daily.  Averaging between 120s to 140s.  - Foot exam completed by  PCP on 4/7/2025.  She had mild diminished sensation.  - She does see podiatry  -A1c 7.6% on 3/31/2025  - PCP managing                   PATIENT EDUCATION  - Etiology of  venous stasis ulceration discussed with patient  - Importance of managing edema for healing of ulcer, and for prevention of new ulcer development  -Need for lifelong compression of lower legs   -Elevate legs above the level of the heart periodically throughout the day.  - Importance of adequate nutrition for wound healing  -Advised to go to ER for any increased redness, swelling, drainage or odor, or if patient develops fever, chills, nausea or vomiting.      My total time spent caring for the patient on the day of the encounter was 20 minutes.   This does not include time spent on separately billable procedures/tests.       Please note that this note may have been created using voice recognition software. I have worked with technical experts from [employer] to optimize the interface.  I have made every reasonable attempt to correct obvious errors, but there may be errors of grammar and possibly     N

## 2025-07-03 NOTE — PATIENT INSTRUCTIONS
The following information is a summary of the education provided in the clinic today. This is not an exhaustive list of the education provided during your appointment.       RESOLVED WOUNDS   -Once your wound is healed, the scar tissue and skin are still fragile. Scar tissue is not as strong as the original skin and might only ever regain 80% of its tensile strength. Scar remodeling may continue for 6-12 months. Clean the area and dry it gently. Do not scratch or pick at the area. It is important to moisturize your skin regularly and apply sunscreen if the area is exposed to the sun.          CLINIC INFORMATION  The clinic's hours are Monday-Friday, 7:30 AM to 5:00 PM. We are closed most holidays and on weekends. If you leave us a message, please allow 24 hours for someone to return your call. If you have concerns or are having a medical emergency, call 911 or go to the hospital emergency room.     You might not see the same nurse or provider every visit.     If you notice any large changes in your wound(s), or signs of infection (redness, swelling, localized heat, increased pain, fever > 101 F, chills, nausea/vomiting) or have any questions about your home care instructions, please call the wound center at [phone]. If it's after hours, contact your primary care physician or go to the hospital emergency room. If you are admitted to any hospital, you will need a new referral to come back to the wound clinic. Any wound care appointments that you already have may be cancelled.    If you are 5 or more minutes late for an appointment, we reserve the right to cancel and reschedule that appointment. For example, if your appointment is at 1:00 PM, and you arrive at 1:06 PM, you are more than five minutes late and might not be seen. If you are consistently late or not coming to your appointments (typically 3 late cancellations and/or no shows), we reserve the right to cancel your future appointments or discharge you  from the clinic. It is then your responsibility to obtain a new referral if wound care is still needed.

## 2025-07-07 ENCOUNTER — TELEPHONE (OUTPATIENT)
Dept: WOUND CARE | Facility: MEDICAL CENTER | Age: 80
End: 2025-07-07
Payer: MEDICARE

## 2025-07-07 NOTE — TELEPHONE ENCOUNTER
Daughter, Urvashi called as Ragini hit leg and wound reopened. She is seeing primary on Wednesday and will request referral to return to clinic. Discussed wound care, maintaining compression while waiting to return to clinic. Daughter will check to see what supplies are left over from previous wound care.

## 2025-07-09 ENCOUNTER — OFFICE VISIT (OUTPATIENT)
Dept: MEDICAL GROUP | Facility: MEDICAL CENTER | Age: 80
End: 2025-07-09
Payer: MEDICARE

## 2025-07-09 VITALS
OXYGEN SATURATION: 95 % | HEART RATE: 130 BPM | RESPIRATION RATE: 18 BRPM | WEIGHT: 187.39 LBS | SYSTOLIC BLOOD PRESSURE: 96 MMHG | HEIGHT: 62 IN | DIASTOLIC BLOOD PRESSURE: 58 MMHG | TEMPERATURE: 98 F | BODY MASS INDEX: 34.48 KG/M2

## 2025-07-09 DIAGNOSIS — I48.91 ATRIAL FIBRILLATION WITH RVR (HCC): ICD-10-CM

## 2025-07-09 DIAGNOSIS — I87.2 VENOUS INSUFFICIENCY OF BOTH LOWER EXTREMITIES: ICD-10-CM

## 2025-07-09 DIAGNOSIS — E89.0 POSTSURGICAL HYPOTHYROIDISM: ICD-10-CM

## 2025-07-09 DIAGNOSIS — E78.5 DYSLIPIDEMIA, GOAL LDL BELOW 100: ICD-10-CM

## 2025-07-09 DIAGNOSIS — L03.116 CELLULITIS OF LEFT LOWER EXTREMITY: ICD-10-CM

## 2025-07-09 DIAGNOSIS — I83.029 VENOUS STASIS ULCER OF LEFT LOWER LEG WITH EDEMA OF LEFT LOWER LEG (HCC): ICD-10-CM

## 2025-07-09 DIAGNOSIS — R60.0 VENOUS STASIS ULCER OF LEFT LOWER LEG WITH EDEMA OF LEFT LOWER LEG (HCC): ICD-10-CM

## 2025-07-09 DIAGNOSIS — L97.929 VENOUS STASIS ULCER OF LEFT LOWER LEG WITH EDEMA OF LEFT LOWER LEG (HCC): ICD-10-CM

## 2025-07-09 DIAGNOSIS — E11.65 UNCONTROLLED TYPE 2 DIABETES MELLITUS WITH HYPERGLYCEMIA (HCC): ICD-10-CM

## 2025-07-09 DIAGNOSIS — I83.892 VENOUS STASIS ULCER OF LEFT LOWER LEG WITH EDEMA OF LEFT LOWER LEG (HCC): ICD-10-CM

## 2025-07-09 PROBLEM — E66.812 OBESITY, CLASS II, BMI 35-39.9: Status: RESOLVED | Noted: 2021-11-18 | Resolved: 2025-07-09

## 2025-07-09 PROCEDURE — 1170F FXNL STATUS ASSESSED: CPT | Performed by: FAMILY MEDICINE

## 2025-07-09 PROCEDURE — 3074F SYST BP LT 130 MM HG: CPT | Performed by: FAMILY MEDICINE

## 2025-07-09 PROCEDURE — 3078F DIAST BP <80 MM HG: CPT | Performed by: FAMILY MEDICINE

## 2025-07-09 PROCEDURE — 99214 OFFICE O/P EST MOD 30 MIN: CPT | Performed by: FAMILY MEDICINE

## 2025-07-09 RX ORDER — METOPROLOL SUCCINATE 25 MG/1
25 TABLET, EXTENDED RELEASE ORAL DAILY
Qty: 100 TABLET | Refills: 3 | Status: SHIPPED | OUTPATIENT
Start: 2025-07-09

## 2025-07-09 RX ORDER — ATORVASTATIN CALCIUM 20 MG/1
20 TABLET, FILM COATED ORAL DAILY
Qty: 90 TABLET | Refills: 3 | Status: SHIPPED | OUTPATIENT
Start: 2025-07-09

## 2025-07-09 ASSESSMENT — FIBROSIS 4 INDEX: FIB4 SCORE: 1.16

## 2025-07-09 NOTE — LETTER
July 9, 2025        Patient: Ragini Reaves   YOB: 1945   Date of Visit: 7/9/2025         To Whom It May Concern:     It is my medical opinion that Ragini Reaves takes the following over the counter medications and supplements which are medically necessary;     1) acetaminophen 500 mg 2 per day  2) stool softener (docusate 100 mg) once daily  3) vitamin pack once daily (womens pack)  4) fiber powder pack, psyllium, once a day  5) probiotic powder pack, once a day.  6) joint health pain, once a day  7) vitamin C 500 mg once a day  8) co q 10 once a day  9) lutein once a day.  10) calcium/magnesium/vitamin D   11) concentrated fruits and vegetables  12) biotin    If you have any questions or concerns, please don't hesitate to call.    Sincerely,        Chapito Marques M.D.    [facility]  [place]  [address] [place], [place] [address] [address] [place] [id number] [phone] (Phone)  [phone] (Fax)

## 2025-07-10 ENCOUNTER — APPOINTMENT (OUTPATIENT)
Dept: WOUND CARE | Facility: MEDICAL CENTER | Age: 80
End: 2025-07-10
Attending: NURSE PRACTITIONER
Payer: MEDICARE

## 2025-07-15 ENCOUNTER — TELEPHONE (OUTPATIENT)
Dept: MEDICAL GROUP | Facility: MEDICAL CENTER | Age: 80
End: 2025-07-15
Payer: MEDICARE

## 2025-07-15 DIAGNOSIS — K76.0 FATTY LIVER: ICD-10-CM

## 2025-07-15 DIAGNOSIS — E11.65 UNCONTROLLED TYPE 2 DIABETES MELLITUS WITH HYPERGLYCEMIA (HCC): Primary | ICD-10-CM

## 2025-07-15 DIAGNOSIS — E78.5 DYSLIPIDEMIA, GOAL LDL BELOW 100: ICD-10-CM

## 2025-07-15 DIAGNOSIS — I10 ESSENTIAL HYPERTENSION: ICD-10-CM

## 2025-07-15 DIAGNOSIS — J45.30 MILD PERSISTENT ASTHMA, UNCOMPLICATED: ICD-10-CM

## 2025-07-15 DIAGNOSIS — Z85.850 HISTORY OF THYROID CANCER: ICD-10-CM

## 2025-07-15 DIAGNOSIS — E89.0 POSTSURGICAL HYPOTHYROIDISM: ICD-10-CM

## 2025-07-15 NOTE — TELEPHONE ENCOUNTER
VOICEMAIL  1. Caller Name: Ragini Reaves                       Call Back Number: There are no phone numbers on file.     2. Message: Patient called and stated she has an upcoming appt in October. She would like to know if she needs to get blood work done. If she does she would like for the orders to be mailed to her as she uses [employer] and her printer is not working.     3. Patient approves office to leave a detailed voicemail/MyChart message: N\A

## 2025-07-16 ENCOUNTER — OFFICE VISIT (OUTPATIENT)
Dept: WOUND CARE | Facility: MEDICAL CENTER | Age: 80
End: 2025-07-16
Attending: NURSE PRACTITIONER
Payer: MEDICARE

## 2025-07-16 DIAGNOSIS — I50.32 CHRONIC DIASTOLIC CONGESTIVE HEART FAILURE (HCC): ICD-10-CM

## 2025-07-16 DIAGNOSIS — E11.65 UNCONTROLLED TYPE 2 DIABETES MELLITUS WITH HYPERGLYCEMIA (HCC): ICD-10-CM

## 2025-07-16 DIAGNOSIS — L03.119 CELLULITIS OF LOWER EXTREMITY, UNSPECIFIED LATERALITY: ICD-10-CM

## 2025-07-16 DIAGNOSIS — I87.2 LYMPHEDEMA DUE TO VENOUS INSUFFICIENCY: Primary | ICD-10-CM

## 2025-07-16 DIAGNOSIS — I89.0 LYMPHEDEMA DUE TO VENOUS INSUFFICIENCY: Primary | ICD-10-CM

## 2025-07-16 LAB
ALBUMIN SERPL-MCNC: 4.2 G/DL (ref 3.8–4.8)
ALBUMIN/CREAT UR: 57 MG/G CREAT (ref 0–29)
ALP SERPL-CCNC: 102 IU/L (ref 44–121)
ALT SERPL-CCNC: 20 IU/L (ref 0–32)
AST SERPL-CCNC: 27 IU/L (ref 0–40)
BILIRUB SERPL-MCNC: 0.4 MG/DL (ref 0–1.2)
BUN SERPL-MCNC: 24 MG/DL (ref 8–27)
BUN/CREAT SERPL: 41 (ref 12–28)
CALCIUM SERPL-MCNC: 8.9 MG/DL (ref 8.7–10.3)
CHLORIDE SERPL-SCNC: 104 MMOL/L (ref 96–106)
CHOLEST SERPL-MCNC: 104 MG/DL (ref 100–199)
CO2 SERPL-SCNC: 20 MMOL/L (ref 20–29)
CREAT SERPL-MCNC: 0.58 MG/DL (ref 0.57–1)
CREAT UR-MCNC: 90.2 MG/DL
EGFRCR SERPLBLD CKD-EPI 2021: 91 ML/MIN/1.73
ERYTHROCYTE [DISTWIDTH] IN BLOOD BY AUTOMATED COUNT: 12.7 % (ref 11.7–15.4)
GLOBULIN SER CALC-MCNC: 2.6 G/DL (ref 1.5–4.5)
GLUCOSE SERPL-MCNC: 162 MG/DL (ref 70–99)
HBA1C MFR BLD: 7.9 % (ref 4.8–5.6)
HCT VFR BLD AUTO: 44.1 % (ref 34–46.6)
HDLC SERPL-MCNC: 43 MG/DL
HGB BLD-MCNC: 13.7 G/DL (ref 11.1–15.9)
LDL CALC COMMENT:: NORMAL
LDLC SERPL CALC-MCNC: 45 MG/DL (ref 0–99)
MCH RBC QN AUTO: 27.9 PG (ref 26.6–33)
MCHC RBC AUTO-ENTMCNC: 31.1 G/DL (ref 31.5–35.7)
MCV RBC AUTO: 90 FL (ref 79–97)
MICROALBUMIN UR-MCNC: 51.1 UG/ML
NRBC BLD AUTO-RTO: ABNORMAL %
PLATELET # BLD AUTO: 273 X10E3/UL (ref 150–450)
POTASSIUM SERPL-SCNC: 4.3 MMOL/L (ref 3.5–5.2)
PROT SERPL-MCNC: 6.8 G/DL (ref 6–8.5)
RBC # BLD AUTO: 4.91 X10E6/UL (ref 3.77–5.28)
SODIUM SERPL-SCNC: 140 MMOL/L (ref 134–144)
TRIGL SERPL-MCNC: 78 MG/DL (ref 0–149)
TSH SERPL DL<=0.005 MIU/L-ACNC: 0.7 UIU/ML (ref 0.45–4.5)
VLDLC SERPL CALC-MCNC: 16 MG/DL (ref 5–40)
WBC # BLD AUTO: 8.8 X10E3/UL (ref 3.4–10.8)

## 2025-07-16 PROCEDURE — 1170F FXNL STATUS ASSESSED: CPT | Performed by: NURSE PRACTITIONER

## 2025-07-16 PROCEDURE — 99214 OFFICE O/P EST MOD 30 MIN: CPT | Performed by: NURSE PRACTITIONER

## 2025-07-16 PROCEDURE — 99214 OFFICE O/P EST MOD 30 MIN: CPT

## 2025-07-16 ASSESSMENT — ENCOUNTER SYMPTOMS
VOMITING: 0
FEVER: 0
CHILLS: 0
COUGH: 0
BACK PAIN: 1
SHORTNESS OF BREATH: 0
CLAUDICATION: 0
NAUSEA: 0
CONSTIPATION: 0
DIARRHEA: 0

## 2025-07-16 NOTE — PATIENT INSTRUCTIONS
"The following information is a summary of the education provided in the clinic today. It is not an exhaustive list..       DRESSING CHANGES    -Keep your wound dressing clean, dry, and intact. Change your dressing only if it's soiled, leaks, gets wet, or falls off.      -You may not shower with the dressing(s) on. Please do not take baths, or swim in the ocean, lakes, rivers, pools, or hot tubs.      -Wounds do not need to \"air out\" or \"breathe\". Gently dry your wound before placing a new dressing.     -If you need to change your dressings at home, you should wash your wound. Use normal saline, wound cleanser, hypochlorous acid, or unscented soap and water. Do not use hydrogen peroxide or rubbing alcohol to clean your wounds. Hydrogen peroxide and rubbing alcohol will damage new cells and tissue. Do not use betadine or iodine unless told to by your wound care team.    COMPRESSION   -Compression helps reduce swelling and helps wounds heal quicker. It is still important to elevate your feet several times daily to reduce swelling, even when you are wearing compression. It is important to wear compression every day, to help your wound heal, and to prevent new wounds from developing.      -Today, a 2 layer compression wrap was applied. Please take off the wrap if you have pain, extreme swelling, new numbness or tingling, a change in the color or temperature of your feet. Take off the wrap if the wrap slides down/bunches up Take off the wrap if it gets wet or leaks through.  If you have to take off the wrap, please do not cut it off with scissors or other sharp tools. Do not trim the wrap. Unravel the wrap one layer at a time. Place a clean and dry dressing over the wound. Do not leave wrap on for more than seven days at a time. You can wear a cast protector over your wrap so you can take a shower. Cast protectors can be found at most drug [employer] and [employer]. [employer] does not endorse any brand of cast protector.      CLINIC " INFORMATION  -The clinic hours are Monday-Friday, 7:30 AM to 5:00 PM. We are closed most holidays and on weekends. If you leave us a message, please allow 24 hours for someone to return your call. If you are having a medical emergency, call 911 or go to the hospital emergency room.     -You may not see the same nurse or provider every visit.     -If you notice any large changes in your wound(s), or signs of infection (redness, swelling, localized heat, increased pain, fever > 101 F, chills, nausea/vomiting) or have any questions about your home care instructions, please call the wound center at [phone]. If it's after hours, contact your primary care physician or go to the hospital emergency room. If you are admitted to any hospital, you will need a new referral to come back to the wound clinic. Any wound care appointments that you already have may be cancelled.    -If you are 5 or more minutes late for an appointment, we reserve the right to cancel and reschedule that appointment. For example, if your appointment is at 1:00 PM, and you arrive at 1:06 PM, you are more than five minutes late and might not be seen. If you are consistently late or not coming to your appointments (typically 3 late cancellations and/or no shows), we reserve the right to cancel your future appointments or discharge you from the clinic. It is then your responsibility to obtain a new referral if wound care is still needed.

## 2025-07-16 NOTE — WOUND TEAM
"Multilayer compression wrap applied to BLEs. Pt tolerated well: \"I was hoping you were going to do that.\"  "

## 2025-07-16 NOTE — PROGRESS NOTES
Provider Encounter- Lower Extremity Ulcer      HISTORY OF PRESENT ILLNESS  Wound History:    START OF CARE IN CLINIC: 7/16/2025    REFERRING PROVIDER: Chapito Marques      WOUND ETIOLOGY: Venous/lymphedema   LOCATION: Bilateral lower extremities   HISTORY: Patient referred to [facility] for bilateral lower extremity wounds and cellulitis.  She was just seen in the clinic recently for similar wounds to her left lower leg, discharged on 6/6 due to wound resolution.  She was instructed to wear Tubigrip's daily, elevate legs periodically throughout the day, and to follow-up with lymphedema specialist (scheduled on 7/30).  Approximately 1 month after discharge from the clinic he had an exacerbation of her lower extremity edema, developed wounds from minor trauma and cellulitis.  She was seen by her PCP who prescribed Augmentin and referred her back to the clinic.    Pertinent Medical History: Morbid obesity, DM 2, CVI, lymphedema   ETIOLOGY HISTORY: Diagnosed no. Vascular Surgeon: No. Previous vascular procedures none. Compression: Not consistently. Varicose Veins yes        TOBACCO USE: She has never smoked or used smokeless tobacco    Patient's problem list, allergies, and current medications reviewed and updated in Epic    Interval History:  7/16/2025 Initial clinic visit with Alice Nugent, ROSS, LI-BC, CWCARLOSN, CFKHADIJAH.   Patient returns to the clinic with recurring lower extremity wounds in the setting of venous insufficiency and lymphedema.  She will be completing her Augmentin tonight or tomorrow.  She states lower extremity redness significantly better.  She does have multiple small wounds, some of which are draining serous fluid.  None of these are large enough to require debridement.  She is otherwise feeling well,denies fevers, chills, nausea, vomiting, cough or shortness of breath.  She states her blood sugar this morning was 136   Compression wraps applied to bilateral lower extremities.  We will continue to manage  patient's edema until she is seen by lymphedema specialist at the end of the month.      REVIEW OF SYSTEMS:   Review of Systems   Constitutional:  Negative for chills and fever.   Respiratory:  Negative for cough and shortness of breath.    Cardiovascular:  Positive for leg swelling. Negative for chest pain and claudication.        Chronic swelling in both legs, worse at the end of the day   Gastrointestinal:  Negative for constipation, diarrhea, nausea and vomiting.   Genitourinary:  Negative for dysuria.   Musculoskeletal:  Positive for back pain.   Neurological:         Denies numbness in her feet         PHYSICAL EXAMINATION:       Physical Exam  Constitutional:       Appearance: She is obese.   Cardiovascular:      Pulses: Normal pulses.      Comments: DP and PT pulses palpable, +2  Pulmonary:      Effort: Pulmonary effort is normal.   Musculoskeletal:      Right lower leg: Edema present.      Left lower leg: Edema present.      Comments: +3 pitting edema/lymphedema bilateral lower extremities, from toes to upper thighs   Skin:     Comments: Multiple small, scattered partial and full thickness wounds to bilateral lower extremities.  Several of these draining serous fluid, moderate to heavy amount.  Wound beds are clean.  Bilateral lower extremities edematous with scattered areas of erythema   Neurological:      Mental Status: She is alert and oriented to person, place, and time.   Psychiatric:         Behavior: Behavior normal.     Monofilament testing with a 10 gram force: sensation intact: intact bilaterally mostly intact, patient stands 9/10 bilaterally  Visual Inspection: Feet without maceration, ulcers, fissures.  Pedal pulses: intact bilaterally     WOUND ASSESSMENT  Wound 07/16/25 Vascular Ulcer Venous Leg Left LLE ANTERIOR/LATERAL CLUSTER (Active)   Wound Image    07/16/25 1000   Site Assessment Red 07/16/25 1000   Periwound Assessment Blanchable erythema;Flaky;Fragile;Edema;Hyperpigmented 07/16/25 1000    Margins Attached edges 07/16/25 1000   Closure Secondary intention 07/16/25 1000   Drainage Amount Moderate 07/16/25 1000   Drainage Description Serous 07/16/25 1000   Treatments Cleansed 07/16/25 1000   Wound Cleansing Foam Cleanser/Washcloth 07/16/25 1000   Periwound Protectant Silicone barrier cream 07/16/25 1000   Dressing Changed New 07/16/25 1000   Dressing Cleansing/Solutions Not Applicable 07/16/25 1000   Dressing Options Hydrofiber Silver;Super Absorbent Pad;Compression Wrap Two Layer 07/16/25 1000   Dressing Change/Treatment Frequency Weekly, and As Needed 07/16/25 1000   Wound Team Following Weekly 07/16/25 1000   Non-staged Wound Description Full thickness 07/16/25 1000   Wound Length (cm) 0.5 cm 07/16/25 1000   Wound Width (cm) 3.2 cm 07/16/25 1000   Wound Depth (cm) 0.1 cm 07/16/25 1000   Wound Surface Area (cm^2) 1.26 cm^2 07/16/25 1000   Wound Volume (cm^3) 0.084 cm^3 07/16/25 1000   Tunneling (cm) 0 cm 07/16/25 1000   Undermining (cm) 0 cm 07/16/25 1000   Wound Odor None 07/16/25 1000   Pulses Right;Left;2+;DP;PT 07/16/25 1000   Right Foot Monofilament 10-point exam (Sensate) 9/10 07/16/25 1000   Left Foot Monofilament 10-point exam (Sensate) 9/10 07/16/25 1000   Exposed Structures None 07/16/25 1000   Number of days: 0       Wound 07/16/25 Vascular Ulcer Leg Left LLE MEDIAL CLUSTER (Active)   Wound Image    07/16/25 1000   Site Assessment Crusted;Girard 07/16/25 1000   Periwound Assessment Blanchable erythema;Edema;Fragile;Hyperpigmented;Crusted 07/16/25 1000   Margins Undefined edges 07/16/25 1000   Closure Secondary intention 07/16/25 1000   Drainage Amount Moderate 07/16/25 1000   Drainage Description Serous 07/16/25 1000   Treatments Cleansed 07/16/25 1000   Wound Cleansing Foam Cleanser/Washcloth 07/16/25 1000   Periwound Protectant Silicone barrier cream 07/16/25 1000   Dressing Changed New 07/16/25 1000   Dressing Cleansing/Solutions Not Applicable 07/16/25 1000   Dressing Options Hydrofiber  Silver;Compression Wrap Two Layer 07/16/25 1000   Dressing Change/Treatment Frequency Weekly, and As Needed 07/16/25 1000   Wound Team Following Weekly 07/16/25 1000   Non-staged Wound Description Full thickness 07/16/25 1000   Wound Length (cm) 6.5 cm 07/16/25 1000   Wound Width (cm) 4.5 cm 07/16/25 1000   Wound Depth (cm) 0.1 cm 07/16/25 1000   Wound Surface Area (cm^2) 22.97 cm^2 07/16/25 1000   Wound Volume (cm^3) 1.532 cm^3 07/16/25 1000   Tunneling (cm) 0 cm 07/16/25 1000   Undermining (cm) 0 cm 07/16/25 1000   Wound Odor None 07/16/25 1000   Pulses Right;Left;2+;DP;PT 07/16/25 1000   Right Foot Monofilament 10-point exam (Sensate) 9/10 07/16/25 1000   Left Foot Monofilament 10-point exam (Sensate) 9/10 07/16/25 1000   Exposed Structures None 07/16/25 1000   Number of days: 0           PROCEDURE:   - no excisional debridement required today.   -Wound care completed by wound RN, refer to wound flowsheet   -Patient tolerated the procedure well, without c/o of significant pain or discomfort.       Pertinent Labs and Diagnostics:    Labs:   A1c:   Lab Results   Component Value Date/Time    HBA1C 7.6 (H) 03/31/2025 05:04 AM    HBA1C 7.0 (H) 03/11/2019 02:05 PM            IMAGING: N/A    VASCULAR STUDIES:   5/20/2025-DVT study, negative      LAST  WOUND CULTURE:  DATE :    Lab Results   Component Value Date/Time    CULTRSULT Moderate growth usual skin radha. (A) 05/29/2025 11:50 AM    CULTRSULT Streptococcus agalactiae (Group B)  Heavy growth   (A) 05/29/2025 11:50 AM    CULTRSULT (A) 05/29/2025 11:50 AM     Staphylococcus aureus  Moderate growth  Methicillin sensitive by screening method  This isolate is presumed to be clindamycin resistant based on  detection of inducible resistance.                ASSESSMENT AND PLAN:     1. Lymphedema due to venous insufficiency    7/16/2025: Patient returns to clinic with bilateral lower extremity wounds in the setting of severe lower extremity edema/lymphedema.  Recent  exacerbation of swelling, wounds caused by minor trauma  - No need for excisional debridement in clinic today  - Edema poorly controlled.  She was advised to wear Tubigrip's daily and elevate legs.  Scheduled to see lymphedema specialist on 7/30 with anticipation of recommendations for compression garments  - 2 layer compression wraps applied bilaterally.  Will plan to have patient return to clinic weekly for reapplication.  - Anticipate resolution of wounds with adequate compression  - Patient advised to keep compression wraps dry in between clinic visits.  She already has cast protectors and understands how to use these  - Advised to remove compression wraps if she experiences any significant discomfort, or if the wraps slide down her leg.  If she has to remove them, she has to reapply Tubigrip's    Wound care: Bilateral lower extremities-Silver Hydrofiber over open wounds, 2 layer compression wrap    2. Cellulitis of lower extremity, unspecified laterality    7/16/2025: Patient developed swelling in cellulitis of bilateral lower extremities  - Prescribed 7-day course of Augmentin by PCP on 7/9, tolerating well, should be completing today or tomorrow  - Monitor for signs and symptoms of infection each clinic visit  - Pt advised to go to ER for any increased redness, swelling, drainage or odor, or if she develops fever, chills, nausea or vomiting.     3. Uncontrolled type 2 diabetes mellitus with hyperglycemia (HCC)    7/16/2025: Patient's most recent A1c 7.6%  - She reports her blood sugar today was 136  - Advised keep blood sugars below 140 in order to optimize wound healing  - PCP managing    4. Chronic diastolic congestive heart failure (HCC)    7/16/2025: Contributing factor to lower extremity edema  - Patient is on Lasix and spironolactone  - Cardiology managing        PATIENT EDUCATION  - Etiology of lymphedema and chronic venous insufficiency  discussed with patient  - Importance of managing edema for  healing of ulcer, and for prevention of new ulcer development  -Need for lifelong compression of lower legs   -Elevate legs above the level of the heart periodically throughout the day.  - Importance of adequate nutrition for wound healing  -Advised to go to ER for any increased redness, swelling, drainage or odor, or if patient develops fever, chills, nausea or vomiting.      My total time spent caring for the patient on the day of the encounter was 30 minutes.   This does not include time spent on separately billable procedures/tests.       Please note that this note may have been created using voice recognition software. I have worked with technical experts from [employer] to optimize the interface.  I have made every reasonable attempt to correct obvious errors, but there may be errors of grammar and possibly     N

## 2025-07-16 NOTE — TELEPHONE ENCOUNTER
I have placed the lab orders.  They have printed out and I will put them in the folder so you can mail them to her.

## 2025-07-17 ENCOUNTER — APPOINTMENT (OUTPATIENT)
Dept: WOUND CARE | Facility: MEDICAL CENTER | Age: 80
End: 2025-07-17
Attending: NURSE PRACTITIONER
Payer: MEDICARE

## 2025-07-23 ENCOUNTER — NON-PROVIDER VISIT (OUTPATIENT)
Dept: WOUND CARE | Facility: MEDICAL CENTER | Age: 80
End: 2025-07-23
Attending: NURSE PRACTITIONER
Payer: MEDICARE

## 2025-07-23 PROCEDURE — 97602 WOUND(S) CARE NON-SELECTIVE: CPT

## 2025-07-23 PROCEDURE — 99213 OFFICE O/P EST LOW 20 MIN: CPT

## 2025-07-23 NOTE — PROGRESS NOTES
Assessment and Cleansing summary:  The left anterolateral and medial lower extremity wound beds and periwound areas were assessed and debrided in the wound clinic today. Non-selective debridement was performed using forceps, no-rinse foam cleanser, and a moist washcloth to remove non-viable tissue from the wound beds and periwound areas. Following debridement, the wound sites were cleansed with hypochlorous acid-soaked gauze. The patient tolerated the procedure well.    Wound Dressing Applied:  Current dressing selection was continued due to positive wound progression, with wounds noted to be nearly resolved (refer to wound care flow sheet for details). Periwound skin was prepped with a urea-based skin moisturizer applied to the intact skin of both lower extremities. A two-layer compression wrap was applied bilaterally, extending from the base of the toes to two fingerbreadths below the popliteal crease.    Patient education:  Education was reinforced regarding compression therapy, including the importance of regular leg elevation to reduce swelling and support wound healing. The patient was instructed on appropriate indications for wrap removal and safe unwrapping technique.    Additional Notes:  The patient has an upcoming appointment with lymphedema next week. Given the near resolution of wounds, discharge may be considered at the next visit once care is established with lymphedema.

## 2025-07-23 NOTE — PATIENT INSTRUCTIONS
The following information is a summary of the education provided in the clinic today. This is not an exhaustive list of the education provided during your appointment.     COMPRESSION   -Compression helps reduce swelling and helps wounds heal quicker. It is still important to elevate your feet several times daily to reduce swelling, even when you are wearing compression. It is important to wear compression every day, to help your wound heal, and to prevent new wounds from developing.      -Today, a 2 layer compression wrap was applied. Please take off the wrap if you have pain, extreme swelling, new numbness or tingling, a change in the color or temperature of your feet. Take off the wrap if the wrap slides down/bunches up Take off the wrap if it gets wet or leaks through.  If you have to take off the wrap, please do not cut it off with scissors or other sharp tools. Do not trim the wrap. Unravel the wrap one layer at a time. Place a clean and dry dressing over the wound. Do not leave wrap on for more than seven days at a time. You can wear a cast protector over your wrap so you can take a shower. Cast protectors can be found at most [employer] and [employer]. [employer] does not endorse any brand of cast protector.      GENERAL HEALTH ADVICE   -Nutrition is important for wound healing. Unless told otherwise by your doctor, eat more protein and consider supplementing with a multi-vitamin, zinc, and vitamin C.       CLINIC INFORMATION  The clinic's hours are Monday-Friday, 7:30 AM to 5:00 PM. We are closed most holidays and on weekends. If you leave us a message, please allow 24 hours for someone to return your call. If you have concerns or are having a medical emergency, call 911 or go to the hospital emergency room.     You might not see the same nurse or provider every visit.     If you notice any large changes in your wound(s), or signs of infection (redness, swelling, localized heat, increased pain, fever > 101 F,  chills, nausea/vomiting) or have any questions about your home care instructions, please call the wound center at [phone]. If it's after hours, contact your primary care physician or go to the hospital emergency room. If you are admitted to any hospital, you will need a new referral to come back to the wound clinic. Any wound care appointments that you already have may be cancelled.    If you are 5 or more minutes late for an appointment, we reserve the right to cancel and reschedule that appointment. For example, if your appointment is at 1:00 PM, and you arrive at 1:06 PM, you are more than five minutes late and might not be seen. If you are consistently late or not coming to your appointments (typically 3 late cancellations and/or no shows), we reserve the right to cancel your future appointments or discharge you from the clinic. It is then your responsibility to obtain a new referral if wound care is still needed.

## 2025-07-30 ENCOUNTER — TELEPHONE (OUTPATIENT)
Dept: WOUND CARE | Facility: MEDICAL CENTER | Age: 80
End: 2025-07-30
Payer: MEDICARE

## 2025-07-30 ENCOUNTER — APPOINTMENT (OUTPATIENT)
Dept: WOUND CARE | Facility: MEDICAL CENTER | Age: 80
End: 2025-07-30
Attending: NURSE PRACTITIONER
Payer: MEDICARE

## 2025-07-30 ENCOUNTER — PHYSICAL THERAPY (OUTPATIENT)
Dept: PHYSICAL THERAPY | Facility: REHABILITATION | Age: 80
End: 2025-07-30
Payer: MEDICARE

## 2025-07-30 DIAGNOSIS — I83.892 VENOUS STASIS ULCER OF LEFT LOWER LEG WITH EDEMA OF LEFT LOWER LEG (HCC): ICD-10-CM

## 2025-07-30 DIAGNOSIS — R60.0 VENOUS STASIS ULCER OF LEFT LOWER LEG WITH EDEMA OF LEFT LOWER LEG (HCC): ICD-10-CM

## 2025-07-30 DIAGNOSIS — I89.0 LYMPHEDEMA OF BOTH LOWER EXTREMITIES: Primary | ICD-10-CM

## 2025-07-30 DIAGNOSIS — I83.029 VENOUS STASIS ULCER OF LEFT LOWER LEG WITH EDEMA OF LEFT LOWER LEG (HCC): ICD-10-CM

## 2025-07-30 DIAGNOSIS — L97.929 VENOUS STASIS ULCER OF LEFT LOWER LEG WITH EDEMA OF LEFT LOWER LEG (HCC): ICD-10-CM

## 2025-07-30 PROCEDURE — 97163 PT EVAL HIGH COMPLEX 45 MIN: CPT

## 2025-07-30 NOTE — OP THERAPY EVALUATION
Outpatient Physical Therapy  LYMPHEDEMA THERAPY INITIAL EVALUATION    [facility] [address]  [address]  [place] [address]  Phone:  [phone]  Fax:  [phone]    Date of Evaluation: 07/30/2025    Patient: Ragini Reaves  YOB: 1945  MRN: [id number]     Referring Provider: ERNA Sorensen  [address] [place] [address] [address]  [place] [address]   Referring Diagnosis Cellulitis of left lower extremity [L03.116];Venous insufficiency of both lower extremities [I87.2];Venous stasis ulcer of left lower leg with edema of left lower leg (HCC) [I83.029, I83.892, L97.929, R60.0];Uncontrolled type 2 diabetes mellitus with hyperglycemia (HCC) [E11.65]     Time Calculation    Start time: 1416  Stop time: 1502 Time Calculation (min): 46 minutes             Chief Complaint: Phlebolymphedema    Visit Diagnoses     ICD-10-CM   1. Lymphedema of both lower extremities  I89.0   2. Venous stasis ulcer of left lower leg with edema of left lower leg (HCC)  I83.029    I83.892    L97.929    R60.0       Subjective:   History of Present Illness:     Mechanism of injury:  Ragini reports she has a Sleep Number bed. Around May 28th she noted her legs became largely swollen. Unclear why. Began weeping. She could not control the weeping. Became so bad that she went to the dermatologist and the derm told her her legs were infected and she was hospitalized for IV antibiotics. She was on a month long course. Her legs currently appear as close to normal as she has seen them since May. She reports in 1996 she slipped on black ice and broke her leg. She arrives here with her granddaughter seeking to control her leg swelling.   Patient Goals:     Patient goals for therapy:  Decreased edema      Past Medical History[1]  Past Surgical History[2]  Social History     Tobacco Use    Smoking status: Never    Smokeless tobacco: Never   Substance Use Topics    Alcohol use: No     Alcohol/week:  0.0 oz     Social Hx - Family and Occupational[3]    Lymphedema Objective    Postural Observation  Seated posture is  Standing posture is  Correction of posture  Abdomen presenting large anteriorly; mostly firm superior as well as inferiorly. Pt reporting she has lost significant weight elsewhere but abdomen has remained large and is unsure why. Encouraged her to discuss with PCP.     Skin Appearance    B LE wrapped in two layer wrap from wound center. .  Left Lower Extremity Circumferential Measurements  Waist: cm  Hip: cm  Scrotum: cm  Ground/Upper Thigh: cm  Mid Thigh: cm  Knee: cm  Upper Calf: 38.2 cm  Mid Calf: 27.3 cm  Ankle: 23.7 cm  Heel to Foot: 22.5 cm  Total: 111.7 cm    Right Lower Extremity Circumferential Measurements  Waist: cm  Hip: cm  Scrotum: cm  Ground/Upper Thigh: cm  Mid Thigh: cm  Knee: cm  Upper Calf: 37.3 cm  Mid Calf: 28 cm  Ankle: 24.4 cm  Heel to Foot: 22.7 cm  Total: 112.4 cm          Therapeutic Treatments and Modalities:     Therapeutic Treatment and Modalities Summary: Educated patient on pathogenesis of lymphedema. Distributed brochure. Patient has also been educated on skin care precautions including hygiene, lotions with pH 5-5.5, and avoidance of lacerations/mosquito bites/heat. Also educated pt on signs/symptoms of cellulitis.     Discussed the need for external compression and educated pt regarding compression garment options.  Patient was educated the optimal use of garment (all day, every day, especially during higher risk activities as discussed, remove at night).  Recommended velcro garments to pt and her granddaughter. Demonstrated how to don garment.  Patient verbalized understanding to all education today.     Discussed basic LE exercises for pt to perform at home: seated ankle DF/PF, LAQ, marching. Standing calf raises, marching. Encouraged ongoing walking. Discussed, while seated, raising abdomen from thighs for 1-2mins to allow for lymphatic flow at her anterior thigh.      Time-based treatments/modalities:           Assessment and Plan:   Functional Impairments: lacks appropriate home exercise program and swelling    Assessment details:  Ragini is an 81yo F who reports onset of B LE cellulitis earlier this year. She also reports a history of vein issues which may have previously contributed to mild swelling. She arrives today with distal B LE wrapped in two-layer from the wound center. She and her granddaughter report that her legs are much improved since May and she believes them to have reduced close to or possibly to the size they were before. Size is maintained by two-layer. Discussed necessity of compression to sustain her current reduction. She is in eager agreement and would like to pursue distal B LE compression. Given her age and mobility status, do not recommend firm compression across the knee or at thigh as this could increase her risk for falls. She will benefit from skilled intervention to ensure she can safely don her new compression without creating tourniquet or injury to her limbs.   Prognosis: good      Goals:   Short Term Goals:  1. Ragini will obtain and wear foot/ankle/calf velcro compression to sustain her current leg circumferences within 3cm.     Short term goal time span:  2-4 weeks    Long Term Goals:  1. Pt will have a reduction in total limb circumference of 3cm in order to decrease risk for infection and progression of disease process.   2. Patient will be independent with maintenance phase management of lymphedema including: compression garments, skin care education, risk reduction strategies, manual lymphatic drainage, and therapeutic exercise.   Long term goal time span:  4-6 weeks    Plan:  Therapy options:  Physical therapy treatment to continue  Planned therapy interventions:  Compression bandaging, decongestive exercises, home exercise program, intermittent compression, manual lymph drainage, Velcro wraps, strengthening exercises, soft  tissue manual techniques (CPT 98326), skin/wound care, sequential compression pump, self-care/training (CPT 65097), referral for compression garment & instructions for don/doffing, range of motion exercises, patient education, postural exercises, orthotic measurements/fitting and myofascial release techniques  Planned education:  Functional anatomy and physiology of the lymphatic system, pathophysiology of lymphedema, lymphedema exercise, lymphedema precautions, proper skin care/nutrition, compression bandaging, self massage, infection prevention, scar tissue management, activity guidelines, dietary guidelines, skin care guidelines, home pump use, bandage removal and long term self-management of lymphedema  Frequency:  2x month  Duration in weeks:  8  Discussed with:  Patient and family      Functional Assessment Used    LLIS    Referring provider co-signature:  I have reviewed this plan of care and my co-signature certifies the need for services.    Certification Period: 07/30/2025 to  09/24/25    Physician Signature: ________________________________ Date: ______________                 [1]   Past Medical History:  Diagnosis Date    Asthma     Cataract 03/11/2019    early stages    Chickenpox     Chronic diastolic congestive heart failure (HCC) 08/22/2018    Dental disorder     upper denture    Diabetes (HCC)     oral meds    Ductal carcinoma in situ (DCIS) of right breast 03/25/2019    Added automatically from request for surgery [id number].  March 2019    Dyslipidemia, goal LDL below 100     Dyslipidemia, goal LDL below 130     Essential hypertension     Frequent urination     History of malignant melanoma of back 11/18/2021    History of melanoma in situ 06/24/2019    Hypertension     Hypothyroidism     Lump of right breast     Malignant melanoma (HCC) 08/09/2021    Microalbuminuria 05/02/2016    Nonrheumatic aortic valve insufficiency 09/19/2022    Obesity     Obstructive sleep apnea 02/01/2018    Osteoarthritis      Osteopenia     Osteoporosis of lumbar spine 07/22/2024    Papillary carcinoma of thyroid (HCC) 06/2000    Ringing in ears     Shortness of breath     Sleep apnea     cpap    Snoring     Status post bilateral cataract extraction 04/07/2025    Tonsillitis     Uncontrolled type 2 diabetes mellitus with hyperglycemia (HCC) 03/20/2023    Urinary incontinence     Uterine fibroid     Wears glasses    [2]   Past Surgical History:  Procedure Laterality Date    MASTECTOMY Right 3/28/2019    Procedure: MASTECTOMY - RE-EXCISION RIGHT BREAST BIOPSY SITE FOR MARGINS;  Surgeon: Karuna Hussein M.D.;  [facility];  Service: General    BREAST BIOPSY Right 3/13/2019    Procedure: BREAST BIOPSY - WIRE LOCALIZED;  Surgeon: Karuna Hussein M.D.;  Location: SURGERY [facility];  Service: Gen Robotic    COLONOSCOPY WITH BIOPSY  5/21/2013    Performed by Mauro Garcia M.D. at ENDOSCOPY [facility]    THYROIDECTOMY  2000    TONSILLECTOMY     [3]   Family and Occupational History  Socioeconomic History    Marital status: Single     Spouse name: Not on file    Number of children: Not on file    Years of education: Not on file    Highest education level: Not on file   Occupational History    Not on file

## 2025-07-30 NOTE — TELEPHONE ENCOUNTER
Granddaughter came into ask if Ragini can shower as she has compression on her legs. Advised that she can shower prior to appointment and cover wound if present. Ragini has not been showering and granddaughter is concerned.

## 2025-07-31 ENCOUNTER — OFFICE VISIT (OUTPATIENT)
Dept: WOUND CARE | Facility: MEDICAL CENTER | Age: 80
End: 2025-07-31
Attending: NURSE PRACTITIONER
Payer: MEDICARE

## 2025-07-31 DIAGNOSIS — I87.2 LYMPHEDEMA DUE TO VENOUS INSUFFICIENCY: Primary | ICD-10-CM

## 2025-07-31 DIAGNOSIS — E11.65 UNCONTROLLED TYPE 2 DIABETES MELLITUS WITH HYPERGLYCEMIA (HCC): ICD-10-CM

## 2025-07-31 DIAGNOSIS — L03.119 CELLULITIS OF LOWER EXTREMITY, UNSPECIFIED LATERALITY: ICD-10-CM

## 2025-07-31 DIAGNOSIS — I89.0 LYMPHEDEMA DUE TO VENOUS INSUFFICIENCY: Primary | ICD-10-CM

## 2025-07-31 DIAGNOSIS — I50.32 CHRONIC DIASTOLIC CONGESTIVE HEART FAILURE (HCC): ICD-10-CM

## 2025-07-31 PROCEDURE — 29581 APPL MULTLAYER CMPRN SYS LEG: CPT

## 2025-07-31 PROCEDURE — 99213 OFFICE O/P EST LOW 20 MIN: CPT

## 2025-07-31 NOTE — PROGRESS NOTES
Provider Encounter- Lower Extremity Ulcer      HISTORY OF PRESENT ILLNESS  Wound History:    START OF CARE IN CLINIC: 7/16/2025    REFERRING PROVIDER: Chapito Marques      WOUND ETIOLOGY: Venous/lymphedema   LOCATION: Bilateral lower extremities   HISTORY: Patient referred to [facility] for bilateral lower extremity wounds and cellulitis.  She was just seen in the clinic recently for similar wounds to her left lower leg, discharged on 6/6 due to wound resolution.  She was instructed to wear Tubigrip's daily, elevate legs periodically throughout the day, and to follow-up with lymphedema specialist (scheduled on 7/30).  Approximately 1 month after discharge from the clinic he had an exacerbation of her lower extremity edema, developed wounds from minor trauma and cellulitis.  She was seen by her PCP who prescribed Augmentin and referred her back to the clinic.    Pertinent Medical History: Morbid obesity, DM 2, CVI, lymphedema   ETIOLOGY HISTORY: Diagnosed no. Vascular Surgeon: No. Previous vascular procedures none. Compression: Not consistently. Varicose Veins yes        TOBACCO USE: She has never smoked or used smokeless tobacco    Patient's problem list, allergies, and current medications reviewed and updated in Epic    Interval History:  7/16/2025 Initial clinic visit with Alice Nugent, ROSS, LI-BC, CWCARLOSN, CFKHADIJAH.   Patient returns to the clinic with recurring lower extremity wounds in the setting of venous insufficiency and lymphedema.  She will be completing her Augmentin tonight or tomorrow.  She states lower extremity redness significantly better.  She does have multiple small wounds, some of which are draining serous fluid.  None of these are large enough to require debridement.  She is otherwise feeling well,denies fevers, chills, nausea, vomiting, cough or shortness of breath.  She states her blood sugar this morning was 136   Compression wraps applied to bilateral lower extremities.  We will continue to manage  patient's edema until she is seen by lymphedema specialist at the end of the month.    7/31/2025 : Clinic visit with ROSS Forman, LI-BC, CWCARLOSN, CFKHADIJAH.   Patient is here again today with her granddaughter.  She states she is feeling well.  She was seen by lymphedema specialist yesterday, garments have been ordered.  Her left lower extremity wounds are nearly resolved, skin presents with area of denudation, at skin level.  No need for excisional debridement today      REVIEW OF SYSTEMS:   Unchanged from previous clinic visit on 7/16/2025, except as documented in interval history above       PHYSICAL EXAMINATION:       Physical Exam  Constitutional:       Appearance: She is obese.   Cardiovascular:      Pulses: Normal pulses.      Comments: DP and PT pulses palpable, +2  Pulmonary:      Effort: Pulmonary effort is normal.   Musculoskeletal:      Right lower leg: Edema present.      Left lower leg: Edema present.      Comments: +3 pitting edema/lymphedema bilateral lower extremities, from toes to upper thighs   Skin:     Comments: Left medial left posterior lower leg: Well-demarcated area of denudation with weeping of skin.  Moderate serous drainage.  Wound bed clean.  No evidence of infection   Neurological:      Mental Status: She is alert and oriented to person, place, and time.   Psychiatric:         Behavior: Behavior normal.     Monofilament testing with a 10 gram force: sensation intact: intact bilaterally mostly intact, patient stands 9/10 bilaterally  Visual Inspection: Feet without maceration, ulcers, fissures.  Pedal pulses: intact bilaterally     WOUND ASSESSMENT  Wound 07/16/25 Vascular Ulcer Leg Left LLE MEDIAL CLUSTER (Active)   Wound Image   07/31/25 0930   Site Assessment Red 07/31/25 0930   Periwound Assessment Denuded 07/31/25 0930   Margins Undefined edges 07/31/25 0930   Closure Secondary intention 07/16/25 1000   Drainage Amount Scant 07/31/25 0930   Drainage Description Serous 07/31/25 0930    Treatments Cleansed;Nonselective debridement;Site care;Compression 07/31/25 0930   Wound Cleansing Foam Cleanser/Washcloth;Hypochlorus Acid 07/31/25 0930   Periwound Protectant TRIAD paste;Silicone barrier cream 07/31/25 0930   Dressing Changed New 07/31/25 0930   Dressing Cleansing/Solutions Not Applicable 07/31/25 0930   Dressing Options Unna Boot;Hydrofiber;Compression Wrap Two Layer 07/31/25 0930   Dressing Change/Treatment Frequency Weekly, and As Needed 07/31/25 0930   Wound Team Following Weekly 07/31/25 0930   Non-staged Wound Description Partial thickness 07/31/25 0930   Wound Length (cm) 0 cm 07/23/25 1300   Wound Width (cm) 0 cm 07/23/25 1300   Wound Depth (cm) 0 cm 07/23/25 1300   Wound Surface Area (cm^2) 0 cm^2 07/23/25 1300   Wound Volume (cm^3) 0 cm^3 07/23/25 1300   Post-Procedure Length (cm) 0 cm 07/23/25 1300   Post-Procedure Width (cm) 0 cm 07/23/25 1300   Post-Procedure Depth (cm) 0 cm 07/23/25 1300   Post-Procedure Surface Area (cm^2) 0 cm^2 07/23/25 1300   Post-Procedure Volume (cm^3) 0 cm^3 07/23/25 1300   Wound Healing % 100 07/23/25 1300   Tunneling (cm) 0 cm 07/31/25 0930   Undermining (cm) 0 cm 07/31/25 0930   Wound Odor None 07/31/25 0930   Pulses Left;DP;PT;1+ 07/31/25 0930   Right Foot Monofilament 10-point exam (Sensate) 9/10 07/16/25 1000   Left Foot Monofilament 10-point exam (Sensate) 9/10 07/16/25 1000   Exposed Structures None 07/31/25 0930   Number of days: 15           PROCEDURE:   - no excisional debridement required today.   -Wound care completed by wound RN, refer to wound flowsheet   -Patient tolerated the procedure well, without c/o of significant pain or discomfort.       Pertinent Labs and Diagnostics:    Labs:   A1c:   Lab Results   Component Value Date/Time    HBA1C 7.9 (H) 07/15/2025 06:14 AM    HBA1C 7.0 (H) 03/11/2019 02:05 PM            IMAGING: N/A    VASCULAR STUDIES:   5/20/2025-DVT study, negative      LAST  WOUND CULTURE:  DATE :    Lab Results   Component  Value Date/Time    CULTRSULT Moderate growth usual skin radha. (A) 05/29/2025 11:50 AM    CULTRSULT Streptococcus agalactiae (Group B)  Heavy growth   (A) 05/29/2025 11:50 AM    CULTRSULT (A) 05/29/2025 11:50 AM     Staphylococcus aureus  Moderate growth  Methicillin sensitive by screening method  This isolate is presumed to be clindamycin resistant based on  detection of inducible resistance.                ASSESSMENT AND PLAN:     1. Lymphedema due to venous insufficiency    7/31/2025: Patient returns to clinic with bilateral lower extremity wounds in the setting of severe lower extremity edema/lymphedema.  Recent exacerbation of swelling, wounds caused by minor trauma  - Wounds are nearly resolved, she presents today with small area of well-demarcated denudation  -No need for excisional debridement today  - She was seen by lymphedema specialist yesterday, garments have been ordered  - 2 layer compression wraps applied bilaterally.  Will plan to have patient return to clinic weekly for reapplication until she receives her compression garments.  - Remaining wound will likely be resolved by next visit  - Patient advised to keep compression wraps dry in between clinic visits.  She already has cast protectors and understands how to use these  - Advised to remove compression wraps if she experiences any significant discomfort, or if the wraps slide down her leg.  If she has to remove them, she has to reapply Tubigrip's    Wound care: Unna's boot to left lower extremity +2 layer compression wrap  Left lower extremity- 2 layer compression wrap,    2. Cellulitis of lower extremity, unspecified laterality    7/31/2025: Patient developed swelling and cellulitis of bilateral lower extremities  - Prescribed 7-day course of Augmentin by PCP on 7/9, has completed these  -No evidence of cellulitis today  - Monitor for signs and symptoms of infection each clinic visit      3. Uncontrolled type 2 diabetes mellitus with  hyperglycemia ([facility])    7/31/2025: Patient's most recent A1c 7.6%  - She reports her blood sugar today was 145  - Advised keep blood sugars below 140 in order to optimize wound healing  - PCP managing    4. Chronic diastolic congestive heart failure ([facility])    7/31/2025: Contributing factor to lower extremity edema  - Patient is on Lasix and spironolactone  - Cardiology managing        PATIENT EDUCATION  - Etiology of lymphedema and chronic venous insufficiency  discussed with patient  - Importance of managing edema for healing of ulcer, and for prevention of new ulcer development  -Need for lifelong compression of lower legs   -Elevate legs above the level of the heart periodically throughout the day.  - Importance of adequate nutrition for wound healing  -Advised to go to ER for any increased redness, swelling, drainage or odor, or if patient develops fever, chills, nausea or vomiting.      My total time spent caring for the patient on the day of the encounter was 20 minutes.   This does not include time spent on separately billable procedures/tests.       Please note that this note may have been created using voice recognition software. I have worked with technical experts from [employer] to optimize the interface.  I have made every reasonable attempt to correct obvious errors, but there may be errors of grammar and possibly     N

## 2025-07-31 NOTE — PATIENT INSTRUCTIONS
"The following information is a summary of the education provided in the clinic today. This is not an exhaustive list of the education provided during your appointment.       DRESSING CHANGES    Keep your wound dressing clean, dry, and intact. Change your dressing only if the dressing becomes soiled, leaks, gets wet, or falls off.      You may shower with the dressing(s) off on dressing change day only. Please do not take baths, or swim in the ocean, lakes, rivers, pools, or hot tubs.      Wounds do not need to \"air out\" or \"breathe\". Gently dry your wound before placing a new dressing.     After you get out of the shower, wash the wound a second time (with soap and water, wound cleanser, or saline). Gently dry the wound before you place a new dressing.       If you need to change your dressings at home, you should wash your wound. Use normal saline, wound cleanser, hypochlorous acid, or unscented soap and water. Do not use hydrogen peroxide or rubbing alcohol to clean your wounds. Hydrogen peroxide and rubbing alcohol will damage new cells and tissue. Do not use betadine or iodine unless told to by your wound care team.    Do not soak your wounds in epsom salt baths. This can worsen your wound(s) or delay wound healing. It can also lead to infection or maceration (tissue is too wet).     If you do not have home health, the clinic will give you with leftover supplies from your appointment. We do not give out extra dressing supplies. We will order you supplies through a [employer] company. Your insurance may or may not pay for all these supplies. The company will reach out to you if insurance does not cover supplies. These supplies will be sent to your home within a few days. If you do have home health, they will provide wound care supplies.     The dressings we use may change as your wound changes.       COMPRESSION   -Compression helps reduce swelling and helps wounds heal quicker. It is still important to elevate your " feet several times daily to reduce swelling, even when you are wearing compression. It is important to wear compression every day, to help your wound heal, and to prevent new wounds from developing.      -Today, a 2 layer compression wrap and Unna boot was applied. Please take off the wrap if you have pain, extreme swelling, new numbness or tingling, a change in the color or temperature of your feet. Take off the wrap if the wrap slides down/bunches up Take off the wrap if it gets wet or leaks through.  If you have to take off the wrap, please do not cut it off with scissors or other sharp tools. Do not trim the wrap. Unravel the wrap one layer at a time. Place a clean and dry dressing over the wound. Do not leave wrap on for more than seven days at a time. You can wear a cast protector over your wrap so you can take a shower. Cast protectors can be found at most [employer] and [employer]. [facility] does not endorse any brand of cast protector.        GENERAL HEALTH ADVICE   -High blood sugar, like with diabetes, can delay or reverse wound healing by impacting your immune system. It also increases the chances of infection. Wounds heal best when your blood sugar is consistently less than 140 mg/dL. It is important to check your blood sugar regularly.      -Nutrition is important for wound healing. Unless told otherwise by your doctor, eat more protein and consider supplementing with a multi-vitamin, zinc, and vitamin C.         CLINIC INFORMATION  The clinic's hours are Monday-Friday, 7:30 AM to 5:00 PM. We are closed most holidays and on weekends. If you leave us a message, please allow 24 hours for someone to return your call. If you have concerns or are having a medical emergency, call 911 or go to the hospital emergency room.     You might not see the same nurse or provider every visit.     If you notice any large changes in your wound(s), or signs of infection (redness, swelling, localized heat, increased pain, fever  > 101 F, chills, nausea/vomiting) or have any questions about your home care instructions, please call the wound center at [phone]. If it's after hours, contact your primary care physician or go to the hospital emergency room. If you are admitted to any hospital, you will need a new referral to come back to the wound clinic. Any wound care appointments that you already have may be cancelled.    If you are 5 or more minutes late for an appointment, we reserve the right to cancel and reschedule that appointment. For example, if your appointment is at 1:00 PM, and you arrive at 1:06 PM, you are more than five minutes late and might not be seen. If you are consistently late or not coming to your appointments (typically 3 late cancellations and/or no shows), we reserve the right to cancel your future appointments or discharge you from the clinic. It is then your responsibility to obtain a new referral if wound care is still needed.

## 2025-07-31 NOTE — WOUND TEAM
"The following information is a summary of the education provided in the clinic today. This is not an exhaustive list of the education provided during your appointment.       DRESSING CHANGES    Keep your wound dressing clean, dry, and intact. Change your dressing only if the dressing becomes soiled, leaks, gets wet, or falls off.      You may shower with the dressing(s) off on dressing change day only. Please do not take baths, or swim in the ocean, lakes, rivers, pools, or hot tubs.      Wounds do not need to \"air out\" or \"breathe\". Gently dry your wound before placing a new dressing.     After you get out of the shower, wash the wound a second time (with soap and water, wound cleanser, or saline). Gently dry the wound before you place a new dressing.       If you need to change your dressings at home, you should wash your wound. Use normal saline, wound cleanser, hypochlorous acid, or unscented soap and water. Do not use hydrogen peroxide or rubbing alcohol to clean your wounds. Hydrogen peroxide and rubbing alcohol will damage new cells and tissue. Do not use betadine or iodine unless told to by your wound care team.    Do not soak your wounds in epsom salt baths. This can worsen your wound(s) or delay wound healing. It can also lead to infection or maceration (tissue is too wet).     If you do not have home health, the clinic will give you with leftover supplies from your appointment. We do not give out extra dressing supplies. We will order you supplies through a [employer] company. Your insurance may or may not pay for all these supplies. The company will reach out to you if insurance does not cover supplies. These supplies will be sent to your home within a few days. If you do have home health, they will provide wound care supplies.     The dressings we use may change as your wound changes.       COMPRESSION  -Unna's boot to LLE followed by 2 layer wrap. Unna's boot  compression therapy for wounds, zinc oxide to " encourage re-epithelialization of denuded areas.   No wounds to RLE, 2 layer wrap applied for compression. Patient has been seen by Shelli Shetty PT and compression wraps were ordered. Patient has yet to receive them. Instructed patient to bring wraps to next appointment, if she receives them.     -Compression helps reduce swelling and helps wounds heal quicker. It is still important to elevate your feet several times daily to reduce swelling, even when you are wearing compression. It is important to wear compression every day, to help your wound heal, and to prevent new wounds from developing.      -Today, a 2 layer compression wrap and Unna boot was applied. Please take off the wrap if you have pain, extreme swelling, new numbness or tingling, a change in the color or temperature of your feet. Take off the wrap if the wrap slides down/bunches up Take off the wrap if it gets wet or leaks through.  If you have to take off the wrap, please do not cut it off with scissors or other sharp tools. Do not trim the wrap. Unravel the wrap one layer at a time. Place a clean and dry dressing over the wound. Do not leave wrap on for more than seven days at a time. You can wear a cast protector over your wrap so you can take a shower. Cast protectors can be found at most drug [employer] and [employer]. [employer] does not endorse any brand of cast protector.        GENERAL HEALTH ADVICE   -High blood sugar, like with diabetes, can delay or reverse wound healing by impacting your immune system. It also increases the chances of infection. Wounds heal best when your blood sugar is consistently less than 140 mg/dL. It is important to check your blood sugar regularly.      -Nutrition is important for wound healing. Unless told otherwise by your doctor, eat more protein and consider supplementing with a multi-vitamin, zinc, and vitamin C.

## 2025-08-06 ENCOUNTER — OFFICE VISIT (OUTPATIENT)
Dept: WOUND CARE | Facility: MEDICAL CENTER | Age: 80
End: 2025-08-06
Attending: NURSE PRACTITIONER
Payer: MEDICARE

## 2025-08-06 DIAGNOSIS — I87.2 LYMPHEDEMA DUE TO VENOUS INSUFFICIENCY: Primary | ICD-10-CM

## 2025-08-06 DIAGNOSIS — I50.32 CHRONIC DIASTOLIC CONGESTIVE HEART FAILURE (HCC): ICD-10-CM

## 2025-08-06 DIAGNOSIS — E11.65 UNCONTROLLED TYPE 2 DIABETES MELLITUS WITH HYPERGLYCEMIA (HCC): ICD-10-CM

## 2025-08-06 DIAGNOSIS — L03.119 CELLULITIS OF LOWER EXTREMITY, UNSPECIFIED LATERALITY: ICD-10-CM

## 2025-08-06 DIAGNOSIS — I89.0 LYMPHEDEMA DUE TO VENOUS INSUFFICIENCY: Primary | ICD-10-CM

## 2025-08-06 PROCEDURE — 99212 OFFICE O/P EST SF 10 MIN: CPT

## 2025-08-06 PROCEDURE — 99213 OFFICE O/P EST LOW 20 MIN: CPT | Performed by: STUDENT IN AN ORGANIZED HEALTH CARE EDUCATION/TRAINING PROGRAM

## 2025-08-06 PROCEDURE — 1170F FXNL STATUS ASSESSED: CPT | Performed by: STUDENT IN AN ORGANIZED HEALTH CARE EDUCATION/TRAINING PROGRAM

## 2025-08-06 PROCEDURE — 29581 APPL MULTLAYER CMPRN SYS LEG: CPT

## 2025-08-13 ENCOUNTER — NON-PROVIDER VISIT (OUTPATIENT)
Dept: WOUND CARE | Facility: MEDICAL CENTER | Age: 80
End: 2025-08-13
Attending: NURSE PRACTITIONER
Payer: MEDICARE

## 2025-08-13 PROCEDURE — 99213 OFFICE O/P EST LOW 20 MIN: CPT

## 2025-08-13 PROCEDURE — 97602 WOUND(S) CARE NON-SELECTIVE: CPT

## 2025-08-20 ENCOUNTER — NON-PROVIDER VISIT (OUTPATIENT)
Dept: WOUND CARE | Facility: MEDICAL CENTER | Age: 80
End: 2025-08-20
Attending: NURSE PRACTITIONER
Payer: MEDICARE

## 2025-08-20 PROCEDURE — 99213 OFFICE O/P EST LOW 20 MIN: CPT

## 2025-08-23 DIAGNOSIS — I10 ESSENTIAL HYPERTENSION: ICD-10-CM

## 2025-08-23 RX ORDER — LOSARTAN POTASSIUM 25 MG/1
25 TABLET ORAL
Qty: 90 TABLET | Refills: 2 | Status: SHIPPED | OUTPATIENT
Start: 2025-08-23

## 2025-08-25 ENCOUNTER — APPOINTMENT (OUTPATIENT)
Dept: URBAN - METROPOLITAN AREA CLINIC 6 | Facility: CLINIC | Age: 80
Setting detail: DERMATOLOGY
End: 2025-08-25

## 2025-08-25 DIAGNOSIS — D18.0 HEMANGIOMA: ICD-10-CM

## 2025-08-25 DIAGNOSIS — L81.4 OTHER MELANIN HYPERPIGMENTATION: ICD-10-CM

## 2025-08-25 DIAGNOSIS — L30.9 DERMATITIS, UNSPECIFIED: ICD-10-CM

## 2025-08-25 DIAGNOSIS — D22 MELANOCYTIC NEVI: ICD-10-CM

## 2025-08-25 DIAGNOSIS — L82.1 OTHER SEBORRHEIC KERATOSIS: ICD-10-CM

## 2025-08-25 DIAGNOSIS — Z85.820 PERSONAL HISTORY OF MALIGNANT MELANOMA OF SKIN: ICD-10-CM | Status: STABLE

## 2025-08-25 PROBLEM — D22.5 MELANOCYTIC NEVI OF TRUNK: Status: ACTIVE | Noted: 2025-08-25

## 2025-08-25 PROBLEM — D18.01 HEMANGIOMA OF SKIN AND SUBCUTANEOUS TISSUE: Status: ACTIVE | Noted: 2025-08-25

## 2025-08-25 PROCEDURE — ? COUNSELING

## 2025-08-25 PROCEDURE — ? PRESCRIPTION

## 2025-08-25 PROCEDURE — ? PRESCRIPTION MEDICATION MANAGEMENT

## 2025-08-25 RX ORDER — DOXYCYCLINE HYCLATE 100 MG/1
CAPSULE, GELATIN COATED ORAL
Qty: 20 | Refills: 0 | Status: ERX | COMMUNITY
Start: 2025-08-25

## 2025-08-25 RX ADMIN — DOXYCYCLINE HYCLATE: 100 CAPSULE, GELATIN COATED ORAL at 00:00

## 2025-08-25 ASSESSMENT — LOCATION DETAILED DESCRIPTION DERM
LOCATION DETAILED: MIDDLE STERNUM
LOCATION DETAILED: LEFT DISTAL PRETIBIAL REGION
LOCATION DETAILED: SUPERIOR THORACIC SPINE
LOCATION DETAILED: LEFT SUPERIOR MEDIAL LOWER BACK
LOCATION DETAILED: EPIGASTRIC SKIN
LOCATION DETAILED: PERIUMBILICAL SKIN

## 2025-08-25 ASSESSMENT — LOCATION ZONE DERM
LOCATION ZONE: LEG
LOCATION ZONE: TRUNK

## 2025-08-25 ASSESSMENT — LOCATION SIMPLE DESCRIPTION DERM
LOCATION SIMPLE: UPPER BACK
LOCATION SIMPLE: LEFT PRETIBIAL REGION
LOCATION SIMPLE: ABDOMEN
LOCATION SIMPLE: LEFT LOWER BACK
LOCATION SIMPLE: CHEST

## 2025-08-26 RX ORDER — CICLOPIROX OLAMINE 7.7 MG/G
CREAM TOPICAL BID
Qty: 30 | Refills: 6 | Status: ERX

## (undated) DEVICE — ELECTRODE 850 FOAM ADHESIVE - HYDROGEL RADIOTRNSPRNT (50/PK)

## (undated) DEVICE — SUTURE 3-0 VICRYL PLUS SH - 8X 18 INCH (12/BX)

## (undated) DEVICE — SET LEADWIRE 5 LEAD BEDSIDE DISPOSABLE ECG (1SET OF 5/EA)

## (undated) DEVICE — SODIUM CHL IRRIGATION 0.9% 1000ML (12EA/CA)

## (undated) DEVICE — GLOVE BIOGEL PI INDICATOR SZ 7.5 SURGICAL PF LF -(50/BX 4BX/CA)

## (undated) DEVICE — CHLORAPREP 26 ML APPLICATOR - ORANGE TINT(25/CA)

## (undated) DEVICE — SUCTION INSTRUMENT YANKAUER BULBOUS TIP W/O VENT (50EA/CA)

## (undated) DEVICE — MASK ANESTHESIA ADULT  - (100/CA)

## (undated) DEVICE — TUBING CLEARLINK DUO-VENT - C-FLO (48EA/CA)

## (undated) DEVICE — TUBE CONNECTING SUCTION - CLEAR PLASTIC STERILE 72 IN (50EA/CA)

## (undated) DEVICE — KIT ANESTHESIA W/CIRCUIT & 3/LT BAG W/FILTER (20EA/CA)

## (undated) DEVICE — GLOVE BIOGEL PI INDICATOR SZ 7.0 SURGICAL PF LF - (50/BX 4BX/CA)

## (undated) DEVICE — SHEET TRANSVERSE LAP - (12EA/CA)

## (undated) DEVICE — STERI STRIP COMPOUND BENZOIN - TINCTURE 0.6ML WITH APPLICATOR (40EA/BX)

## (undated) DEVICE — HEAD HOLDER JUNIOR/ADULT

## (undated) DEVICE — PROTECTOR ULNA NERVE - (36PR/CA)

## (undated) DEVICE — SET EXTENSION WITH 2 PORTS (48EA/CA) ***PART #2C8610 IS A SUBSTITUTE*****

## (undated) DEVICE — MASK, LARYNGEAL AIRWAY #4

## (undated) DEVICE — DETERGENT RENUZYME PLUS 10 OZ PACKET (50/BX)

## (undated) DEVICE — CANISTER SUCTION 3000ML MECHANICAL FILTER AUTO SHUTOFF MEDI-VAC NONSTERILE LF DISP  (40EA/CA)

## (undated) DEVICE — DRESSING TRANSPARENT FILM TEGADERM 4 X 4.75" (50EA/BX)"

## (undated) DEVICE — GLOVE BIOGEL INDICATOR SZ 6.5 SURGICAL PF LTX - (50PR/BX 4BX/CA)

## (undated) DEVICE — CLOSURE SKIN STRIP 1/2 X 4 IN - (STERI STRIP) (50/BX 4BX/CA)

## (undated) DEVICE — SENSOR SPO2 NEO LNCS ADHESIVE (20/BX) SEE USER NOTES

## (undated) DEVICE — LACTATED RINGERS INJ 1000 ML - (14EA/CA 60CA/PF)

## (undated) DEVICE — SUTURE 4-0 MONOCRYL PLUS PS-1 - 27 INCH (36/BX)

## (undated) DEVICE — GLOVE BIOGEL SZ 6.5 SURGICAL PF LTX (50PR/BX 4BX/CA)

## (undated) DEVICE — SPONGE GAUZESTER 4 X 4 4PLY - (128PK/CA)

## (undated) DEVICE — CANISTER SUCTION RIGID RED 1500CC (40EA/CA)

## (undated) DEVICE — GOWN WARMING STANDARD FLEX - (30/CA)

## (undated) DEVICE — NEPTUNE 4 PORT MANIFOLD - (20/PK)

## (undated) DEVICE — ELECTRODE DUAL RETURN W/ CORD - (50/PK)

## (undated) DEVICE — GLOVE BIOGEL SZ 7.5 SURGICAL PF LTX - (50PR/BX 4BX/CA)

## (undated) DEVICE — GLOVE SZ 6.5 BIOGEL PI MICRO - PF LF (50PR/BX)

## (undated) DEVICE — WATER IRRIGATION STERILE 1000ML (12EA/CA)

## (undated) DEVICE — SUTURE 2-0 VICRYL PLUS SH - 8 X 18 INCH (12/BX)

## (undated) DEVICE — SUTURE 2-0 SILK BB 30 (36PK/BX)"

## (undated) DEVICE — DERMABOND ADVANCED - (12EA/BX)

## (undated) DEVICE — KIT ROOM DECONTAMINATION

## (undated) DEVICE — PACK MINOR BASIN - (2EA/CA)

## (undated) DEVICE — KIT  I.V. START (100EA/CA)

## (undated) DEVICE — SUTURE GENERAL

## (undated) DEVICE — CATHETER IV 20 GA X 1-1/4 ---SURG.& SDS ONLY--- (50EA/BX)

## (undated) DEVICE — BLADE SURGICAL #15 - (50/BX 3BX/CA)

## (undated) DEVICE — PAD LAP STERILE 18 X 18 - (5/PK 40PK/CA)